# Patient Record
Sex: FEMALE | Race: BLACK OR AFRICAN AMERICAN | NOT HISPANIC OR LATINO | URBAN - METROPOLITAN AREA
[De-identification: names, ages, dates, MRNs, and addresses within clinical notes are randomized per-mention and may not be internally consistent; named-entity substitution may affect disease eponyms.]

---

## 2023-10-20 ENCOUNTER — EMERGENCY (EMERGENCY)
Facility: HOSPITAL | Age: 43
LOS: 1 days | Discharge: HOME | End: 2023-10-20
Attending: EMERGENCY MEDICINE | Admitting: EMERGENCY MEDICINE
Payer: COMMERCIAL

## 2023-10-20 VITALS
DIASTOLIC BLOOD PRESSURE: 79 MMHG | TEMPERATURE: 98 F | RESPIRATION RATE: 16 BRPM | SYSTOLIC BLOOD PRESSURE: 126 MMHG | HEART RATE: 80 BPM | OXYGEN SATURATION: 100 %

## 2023-10-20 VITALS
TEMPERATURE: 98 F | HEART RATE: 97 BPM | OXYGEN SATURATION: 100 % | SYSTOLIC BLOOD PRESSURE: 136 MMHG | RESPIRATION RATE: 18 BRPM | DIASTOLIC BLOOD PRESSURE: 85 MMHG

## 2023-10-20 LAB
ALBUMIN SERPL ELPH-MCNC: 4.1 G/DL — SIGNIFICANT CHANGE UP (ref 3.3–5)
ALBUMIN SERPL ELPH-MCNC: 4.1 G/DL — SIGNIFICANT CHANGE UP (ref 3.3–5)
ALP SERPL-CCNC: 70 U/L — SIGNIFICANT CHANGE UP (ref 40–120)
ALP SERPL-CCNC: 70 U/L — SIGNIFICANT CHANGE UP (ref 40–120)
ALT FLD-CCNC: 7 U/L — SIGNIFICANT CHANGE UP (ref 4–33)
ALT FLD-CCNC: 7 U/L — SIGNIFICANT CHANGE UP (ref 4–33)
ANION GAP SERPL CALC-SCNC: 12 MMOL/L — SIGNIFICANT CHANGE UP (ref 7–14)
ANION GAP SERPL CALC-SCNC: 12 MMOL/L — SIGNIFICANT CHANGE UP (ref 7–14)
AST SERPL-CCNC: 16 U/L — SIGNIFICANT CHANGE UP (ref 4–32)
AST SERPL-CCNC: 16 U/L — SIGNIFICANT CHANGE UP (ref 4–32)
BASOPHILS # BLD AUTO: 0.03 K/UL — SIGNIFICANT CHANGE UP (ref 0–0.2)
BASOPHILS # BLD AUTO: 0.03 K/UL — SIGNIFICANT CHANGE UP (ref 0–0.2)
BASOPHILS NFR BLD AUTO: 0.5 % — SIGNIFICANT CHANGE UP (ref 0–2)
BASOPHILS NFR BLD AUTO: 0.5 % — SIGNIFICANT CHANGE UP (ref 0–2)
BILIRUB SERPL-MCNC: 0.2 MG/DL — SIGNIFICANT CHANGE UP (ref 0.2–1.2)
BILIRUB SERPL-MCNC: 0.2 MG/DL — SIGNIFICANT CHANGE UP (ref 0.2–1.2)
BUN SERPL-MCNC: 10 MG/DL — SIGNIFICANT CHANGE UP (ref 7–23)
BUN SERPL-MCNC: 10 MG/DL — SIGNIFICANT CHANGE UP (ref 7–23)
CALCIUM SERPL-MCNC: 9.4 MG/DL — SIGNIFICANT CHANGE UP (ref 8.4–10.5)
CALCIUM SERPL-MCNC: 9.4 MG/DL — SIGNIFICANT CHANGE UP (ref 8.4–10.5)
CHLORIDE SERPL-SCNC: 102 MMOL/L — SIGNIFICANT CHANGE UP (ref 98–107)
CHLORIDE SERPL-SCNC: 102 MMOL/L — SIGNIFICANT CHANGE UP (ref 98–107)
CO2 SERPL-SCNC: 23 MMOL/L — SIGNIFICANT CHANGE UP (ref 22–31)
CO2 SERPL-SCNC: 23 MMOL/L — SIGNIFICANT CHANGE UP (ref 22–31)
CREAT SERPL-MCNC: 0.43 MG/DL — LOW (ref 0.5–1.3)
CREAT SERPL-MCNC: 0.43 MG/DL — LOW (ref 0.5–1.3)
EGFR: 122 ML/MIN/1.73M2 — SIGNIFICANT CHANGE UP
EGFR: 122 ML/MIN/1.73M2 — SIGNIFICANT CHANGE UP
EOSINOPHIL # BLD AUTO: 0.03 K/UL — SIGNIFICANT CHANGE UP (ref 0–0.5)
EOSINOPHIL # BLD AUTO: 0.03 K/UL — SIGNIFICANT CHANGE UP (ref 0–0.5)
EOSINOPHIL NFR BLD AUTO: 0.5 % — SIGNIFICANT CHANGE UP (ref 0–6)
EOSINOPHIL NFR BLD AUTO: 0.5 % — SIGNIFICANT CHANGE UP (ref 0–6)
GLUCOSE SERPL-MCNC: 77 MG/DL — SIGNIFICANT CHANGE UP (ref 70–99)
GLUCOSE SERPL-MCNC: 77 MG/DL — SIGNIFICANT CHANGE UP (ref 70–99)
HCT VFR BLD CALC: 36.1 % — SIGNIFICANT CHANGE UP (ref 34.5–45)
HCT VFR BLD CALC: 36.1 % — SIGNIFICANT CHANGE UP (ref 34.5–45)
HGB BLD-MCNC: 11.7 G/DL — SIGNIFICANT CHANGE UP (ref 11.5–15.5)
HGB BLD-MCNC: 11.7 G/DL — SIGNIFICANT CHANGE UP (ref 11.5–15.5)
IANC: 3.02 K/UL — SIGNIFICANT CHANGE UP (ref 1.8–7.4)
IANC: 3.02 K/UL — SIGNIFICANT CHANGE UP (ref 1.8–7.4)
IMM GRANULOCYTES NFR BLD AUTO: 0.2 % — SIGNIFICANT CHANGE UP (ref 0–0.9)
IMM GRANULOCYTES NFR BLD AUTO: 0.2 % — SIGNIFICANT CHANGE UP (ref 0–0.9)
LIDOCAIN IGE QN: 18 U/L — SIGNIFICANT CHANGE UP (ref 7–60)
LIDOCAIN IGE QN: 18 U/L — SIGNIFICANT CHANGE UP (ref 7–60)
LYMPHOCYTES # BLD AUTO: 1.87 K/UL — SIGNIFICANT CHANGE UP (ref 1–3.3)
LYMPHOCYTES # BLD AUTO: 1.87 K/UL — SIGNIFICANT CHANGE UP (ref 1–3.3)
LYMPHOCYTES # BLD AUTO: 33.6 % — SIGNIFICANT CHANGE UP (ref 13–44)
LYMPHOCYTES # BLD AUTO: 33.6 % — SIGNIFICANT CHANGE UP (ref 13–44)
MCHC RBC-ENTMCNC: 27.6 PG — SIGNIFICANT CHANGE UP (ref 27–34)
MCHC RBC-ENTMCNC: 27.6 PG — SIGNIFICANT CHANGE UP (ref 27–34)
MCHC RBC-ENTMCNC: 32.4 GM/DL — SIGNIFICANT CHANGE UP (ref 32–36)
MCHC RBC-ENTMCNC: 32.4 GM/DL — SIGNIFICANT CHANGE UP (ref 32–36)
MCV RBC AUTO: 85.1 FL — SIGNIFICANT CHANGE UP (ref 80–100)
MCV RBC AUTO: 85.1 FL — SIGNIFICANT CHANGE UP (ref 80–100)
MONOCYTES # BLD AUTO: 0.6 K/UL — SIGNIFICANT CHANGE UP (ref 0–0.9)
MONOCYTES # BLD AUTO: 0.6 K/UL — SIGNIFICANT CHANGE UP (ref 0–0.9)
MONOCYTES NFR BLD AUTO: 10.8 % — SIGNIFICANT CHANGE UP (ref 2–14)
MONOCYTES NFR BLD AUTO: 10.8 % — SIGNIFICANT CHANGE UP (ref 2–14)
NEUTROPHILS # BLD AUTO: 3.02 K/UL — SIGNIFICANT CHANGE UP (ref 1.8–7.4)
NEUTROPHILS # BLD AUTO: 3.02 K/UL — SIGNIFICANT CHANGE UP (ref 1.8–7.4)
NEUTROPHILS NFR BLD AUTO: 54.4 % — SIGNIFICANT CHANGE UP (ref 43–77)
NEUTROPHILS NFR BLD AUTO: 54.4 % — SIGNIFICANT CHANGE UP (ref 43–77)
NRBC # BLD: 0 /100 WBCS — SIGNIFICANT CHANGE UP (ref 0–0)
NRBC # BLD: 0 /100 WBCS — SIGNIFICANT CHANGE UP (ref 0–0)
NRBC # FLD: 0 K/UL — SIGNIFICANT CHANGE UP (ref 0–0)
NRBC # FLD: 0 K/UL — SIGNIFICANT CHANGE UP (ref 0–0)
PLATELET # BLD AUTO: 420 K/UL — HIGH (ref 150–400)
PLATELET # BLD AUTO: 420 K/UL — HIGH (ref 150–400)
POTASSIUM SERPL-MCNC: 4.8 MMOL/L — SIGNIFICANT CHANGE UP (ref 3.5–5.3)
POTASSIUM SERPL-MCNC: 4.8 MMOL/L — SIGNIFICANT CHANGE UP (ref 3.5–5.3)
POTASSIUM SERPL-SCNC: 4.8 MMOL/L — SIGNIFICANT CHANGE UP (ref 3.5–5.3)
POTASSIUM SERPL-SCNC: 4.8 MMOL/L — SIGNIFICANT CHANGE UP (ref 3.5–5.3)
PROT SERPL-MCNC: 7.7 G/DL — SIGNIFICANT CHANGE UP (ref 6–8.3)
PROT SERPL-MCNC: 7.7 G/DL — SIGNIFICANT CHANGE UP (ref 6–8.3)
RBC # BLD: 4.24 M/UL — SIGNIFICANT CHANGE UP (ref 3.8–5.2)
RBC # BLD: 4.24 M/UL — SIGNIFICANT CHANGE UP (ref 3.8–5.2)
RBC # FLD: 15.2 % — HIGH (ref 10.3–14.5)
RBC # FLD: 15.2 % — HIGH (ref 10.3–14.5)
SODIUM SERPL-SCNC: 137 MMOL/L — SIGNIFICANT CHANGE UP (ref 135–145)
SODIUM SERPL-SCNC: 137 MMOL/L — SIGNIFICANT CHANGE UP (ref 135–145)
TROPONIN T, HIGH SENSITIVITY RESULT: <6 NG/L — SIGNIFICANT CHANGE UP
TROPONIN T, HIGH SENSITIVITY RESULT: <6 NG/L — SIGNIFICANT CHANGE UP
WBC # BLD: 5.56 K/UL — SIGNIFICANT CHANGE UP (ref 3.8–10.5)
WBC # BLD: 5.56 K/UL — SIGNIFICANT CHANGE UP (ref 3.8–10.5)
WBC # FLD AUTO: 5.56 K/UL — SIGNIFICANT CHANGE UP (ref 3.8–10.5)
WBC # FLD AUTO: 5.56 K/UL — SIGNIFICANT CHANGE UP (ref 3.8–10.5)

## 2023-10-20 PROCEDURE — 99285 EMERGENCY DEPT VISIT HI MDM: CPT

## 2023-10-20 PROCEDURE — 93010 ELECTROCARDIOGRAM REPORT: CPT

## 2023-10-20 PROCEDURE — 74177 CT ABD & PELVIS W/CONTRAST: CPT | Mod: 26,MA

## 2023-10-20 PROCEDURE — 71045 X-RAY EXAM CHEST 1 VIEW: CPT | Mod: 26

## 2023-10-20 RX ORDER — ONDANSETRON 8 MG/1
4 TABLET, FILM COATED ORAL ONCE
Refills: 0 | Status: COMPLETED | OUTPATIENT
Start: 2023-10-20 | End: 2023-10-20

## 2023-10-20 RX ORDER — ACETAMINOPHEN 500 MG
1000 TABLET ORAL ONCE
Refills: 0 | Status: COMPLETED | OUTPATIENT
Start: 2023-10-20 | End: 2023-10-20

## 2023-10-20 RX ADMIN — ONDANSETRON 4 MILLIGRAM(S): 8 TABLET, FILM COATED ORAL at 22:21

## 2023-10-20 RX ADMIN — Medication 400 MILLIGRAM(S): at 21:40

## 2023-10-20 NOTE — ED PROVIDER NOTE - PROGRESS NOTE DETAILS
CT abdomen pelvis showing new endometrial lesion, and uterine fibroids.  Discussed results with patient, she says that she would prefer to follow-up with her OB/GYN for further outpatient evaluation and management.  Hemoglobin stable, otherwise work-up negative.  We will plan for discharge with close follow-up with her OB/GYN.

## 2023-10-20 NOTE — ED ADULT TRIAGE NOTE - CHIEF COMPLAINT QUOTE
Pt presents to ED via EMS from home with c/o abdominal pain and chest pain. Pt received 324mg asa by EMS. Pt was at routine OBGYN visit and was found to have abdominal mass. Pt has hx of spina bifida and HTN.

## 2023-10-20 NOTE — ED PROVIDER NOTE - OBJECTIVE STATEMENT
43-year-old female with history of spina bifida, potentially uterine fibroids, nearly 10 years of heavy menstrual periods with prolonged bleeding, presenting today from her OB/GYN for chest pain, shortness of breath.  Patient states that this for started at 6 AM this morning, and that it gets worse with exertion.  Also notes some nausea and vomiting, as well as constipation and diarrhea that has been ongoing over the past several days.  Additionally reports history of abnormal uterine bleeding, and is not sure if this is also coming from her bladder.  Has had some increased urination as well recently.  Also states that she is now having abdominal pain, which is different from baseline.    Patient states that she gets most of her care at [facility].  Notes that she is taking gabapentin in the past but that this actually made her pain worse.  Denies any recent fevers chills, cough, headache, new weakness, dizziness, lightheadedness, throat pain or swelling, rhinorrhea.

## 2023-10-20 NOTE — ED PROVIDER NOTE - PATIENT PORTAL LINK FT
You can access the FollowMyHealth Patient Portal offered by [facility] by registering at the following website: [email or web address]. By joining [employer]’s FollowMyHealth portal, you will also be able to view your health information using other applications (apps) compatible with our system.

## 2023-10-20 NOTE — ED ADULT NURSE NOTE - NSFALLRISKASMT_ED_ALL_ED_DT
[Patient] : patient [Follow-Up] : a follow-up visit for [Mother] : mother [FreeTextEntry3] : Pilonidal disease 20-Oct-2023 17:28

## 2023-10-20 NOTE — ED PROVIDER NOTE - PHYSICAL EXAMINATION
Constitutional: Well nourished, well developed, appears stated age.   Eyes: PERRL, EOMI. No scleral icterus  HENT: Moist mucous membranes. No posterior oropharyngeal erythema.   Neck: Supple. No goiter.   CV: RRR, no m/r/g. Well perfused extremities.   RESP: Lungs CTAB, normal respiratory effort  GI: Soft except for area of induration over right lower quadrant. Some TTP diffusely.   MSK: Moving all four extremities. No obvious deformity  Skin: Warm, dry, no rashes  Neuro: Alert and oriented. CNII-XII grossly intact. LEs flaccid. Upper extremities without deficits.   Psych: Appropriate mood and affect

## 2023-10-20 NOTE — ED PROVIDER NOTE - CLINICAL SUMMARY MEDICAL DECISION MAKING FREE TEXT BOX
Fili: 10 yrs heavy VB. C/o CP x 1 day. SOB. N/V/D. Abd firm lump (OB said fibroid vs. constipation). Check trop and EKG and CXR. Check Hb. CT abd/pelvis. Fili: 10 yrs heavy VB. C/o CP x 1 day. SOB. N/V/D. Abd firm lump (OB said fibroid vs. constipation). Check trop and EKG and CXR. Check Hb. CT abd/pelvis - ? SBO.

## 2023-10-20 NOTE — ED ADULT NURSE NOTE - OBJECTIVE STATEMENT
43 yof presents A&Ox4 c/o abdominal pain and chest pain that started this morning. Pt also endorsing heavy vaginal bleeding x10 years. PHx spinal bifida. States her GYN sent her into the ED this morning. Respirations even and unlabored.  sating 100% on air. Pt offers no additional complaints at this time. 20g IV placed in R AC. Labs sent per order. Pending results. Safety maintained.

## 2023-10-20 NOTE — ED ADULT NURSE NOTE - NSFALLRISKINTERV_ED_ALL_ED

## 2023-10-20 NOTE — ED PROVIDER NOTE - ATTENDING CONTRIBUTION TO CARE
I performed a face-to-face evaluation of the patient and performed a history and physical examination. I agree with the history and physical examination. If this was a PA visit, I personally saw the patient with the PA and performed a substantive portion of the visit including all aspects of the medical decision making.    10 yrs heavy VB. C/o CP x 1 day. SOB. N/V/D. Abd firm lump (OB said fibroid vs. constipation). Check trop and EKG and CXR. Check Hb. CT abd/pelvis. I performed a face-to-face evaluation of the patient and performed a history and physical examination. I agree with the history and physical examination. If this was a PA visit, I personally saw the patient with the PA and performed a substantive portion of the visit including all aspects of the medical decision making.    10 yrs heavy VB. C/o CP x 1 day. SOB. N/V/D. Abd firm lump (OB said fibroid vs. constipation). Check trop and EKG and CXR. Check Hb. CT abd/pelvis - ? SBO.

## 2024-05-11 ENCOUNTER — INPATIENT (INPATIENT)
Facility: HOSPITAL | Age: 44
LOS: 25 days | Discharge: NURSING, REHAB OR LONG-TERM CARE | End: 2024-06-06
Attending: HOSPITALIST | Admitting: HOSPITALIST
Payer: MEDICAID

## 2024-05-11 VITALS
HEIGHT: 62 IN | WEIGHT: 119.93 LBS | RESPIRATION RATE: 16 BRPM | DIASTOLIC BLOOD PRESSURE: 100 MMHG | HEART RATE: 114 BPM | TEMPERATURE: 98 F | SYSTOLIC BLOOD PRESSURE: 152 MMHG | OXYGEN SATURATION: 98 %

## 2024-05-11 LAB
ALBUMIN SERPL ELPH-MCNC: 3.5 G/DL — SIGNIFICANT CHANGE UP (ref 3.3–5)
ALP SERPL-CCNC: 70 U/L — SIGNIFICANT CHANGE UP (ref 40–120)
ALT FLD-CCNC: 6 U/L — SIGNIFICANT CHANGE UP (ref 4–33)
ANION GAP SERPL CALC-SCNC: 14 MMOL/L — SIGNIFICANT CHANGE UP (ref 7–14)
APPEARANCE UR: ABNORMAL
APTT BLD: 31.3 SEC — SIGNIFICANT CHANGE UP (ref 24.5–35.6)
AST SERPL-CCNC: 11 U/L — SIGNIFICANT CHANGE UP (ref 4–32)
BACTERIA # UR AUTO: ABNORMAL /HPF
BASE EXCESS BLDV CALC-SCNC: 1.9 MMOL/L — SIGNIFICANT CHANGE UP (ref -2–3)
BASOPHILS # BLD AUTO: 0.04 K/UL — SIGNIFICANT CHANGE UP (ref 0–0.2)
BASOPHILS NFR BLD AUTO: 0.5 % — SIGNIFICANT CHANGE UP (ref 0–2)
BILIRUB SERPL-MCNC: <0.2 MG/DL — SIGNIFICANT CHANGE UP (ref 0.2–1.2)
BILIRUB UR-MCNC: NEGATIVE — SIGNIFICANT CHANGE UP
BLOOD GAS VENOUS COMPREHENSIVE RESULT: SIGNIFICANT CHANGE UP
BUN SERPL-MCNC: 7 MG/DL — SIGNIFICANT CHANGE UP (ref 7–23)
CALCIUM SERPL-MCNC: 9 MG/DL — SIGNIFICANT CHANGE UP (ref 8.4–10.5)
CAST: 1 /LPF — SIGNIFICANT CHANGE UP (ref 0–4)
CHLORIDE BLDV-SCNC: 104 MMOL/L — SIGNIFICANT CHANGE UP (ref 96–108)
CHLORIDE SERPL-SCNC: 103 MMOL/L — SIGNIFICANT CHANGE UP (ref 98–107)
CO2 BLDV-SCNC: 27.9 MMOL/L — HIGH (ref 22–26)
CO2 SERPL-SCNC: 21 MMOL/L — LOW (ref 22–31)
COLOR SPEC: YELLOW — SIGNIFICANT CHANGE UP
CREAT SERPL-MCNC: 0.49 MG/DL — LOW (ref 0.5–1.3)
CRP SERPL-MCNC: 18.3 MG/L — HIGH
DIFF PNL FLD: ABNORMAL
EGFR: 118 ML/MIN/1.73M2 — SIGNIFICANT CHANGE UP
EOSINOPHIL # BLD AUTO: 0.03 K/UL — SIGNIFICANT CHANGE UP (ref 0–0.5)
EOSINOPHIL NFR BLD AUTO: 0.4 % — SIGNIFICANT CHANGE UP (ref 0–6)
ERYTHROCYTE [SEDIMENTATION RATE] IN BLOOD: 81 MM/HR — HIGH (ref 4–25)
GAS PNL BLDV: 135 MMOL/L — LOW (ref 136–145)
GAS PNL BLDV: SIGNIFICANT CHANGE UP
GLUCOSE BLDV-MCNC: 87 MG/DL — SIGNIFICANT CHANGE UP (ref 70–99)
GLUCOSE SERPL-MCNC: 84 MG/DL — SIGNIFICANT CHANGE UP (ref 70–99)
GLUCOSE UR QL: NEGATIVE MG/DL — SIGNIFICANT CHANGE UP
HCG SERPL-ACNC: <1 MIU/ML — SIGNIFICANT CHANGE UP
HCO3 BLDV-SCNC: 27 MMOL/L — SIGNIFICANT CHANGE UP (ref 22–29)
HCT VFR BLD CALC: 29.7 % — LOW (ref 34.5–45)
HCT VFR BLDA CALC: 30 % — LOW (ref 34.5–46.5)
HGB BLD CALC-MCNC: 10.1 G/DL — LOW (ref 11.7–16.1)
HGB BLD-MCNC: 9.4 G/DL — LOW (ref 11.5–15.5)
IANC: 5.58 K/UL — SIGNIFICANT CHANGE UP (ref 1.8–7.4)
IMM GRANULOCYTES NFR BLD AUTO: 0.2 % — SIGNIFICANT CHANGE UP (ref 0–0.9)
INR BLD: 1.04 RATIO — SIGNIFICANT CHANGE UP (ref 0.85–1.18)
KETONES UR-MCNC: NEGATIVE MG/DL — SIGNIFICANT CHANGE UP
LACTATE BLDV-MCNC: 1.4 MMOL/L — SIGNIFICANT CHANGE UP (ref 0.5–2)
LACTATE SERPL-SCNC: 1.2 MMOL/L — SIGNIFICANT CHANGE UP (ref 0.5–2)
LEUKOCYTE ESTERASE UR-ACNC: ABNORMAL
LYMPHOCYTES # BLD AUTO: 1.88 K/UL — SIGNIFICANT CHANGE UP (ref 1–3.3)
LYMPHOCYTES # BLD AUTO: 23.2 % — SIGNIFICANT CHANGE UP (ref 13–44)
MCHC RBC-ENTMCNC: 26 PG — LOW (ref 27–34)
MCHC RBC-ENTMCNC: 31.6 GM/DL — LOW (ref 32–36)
MCV RBC AUTO: 82.3 FL — SIGNIFICANT CHANGE UP (ref 80–100)
MONOCYTES # BLD AUTO: 0.54 K/UL — SIGNIFICANT CHANGE UP (ref 0–0.9)
MONOCYTES NFR BLD AUTO: 6.7 % — SIGNIFICANT CHANGE UP (ref 2–14)
NEUTROPHILS # BLD AUTO: 5.58 K/UL — SIGNIFICANT CHANGE UP (ref 1.8–7.4)
NEUTROPHILS NFR BLD AUTO: 69 % — SIGNIFICANT CHANGE UP (ref 43–77)
NITRITE UR-MCNC: NEGATIVE — SIGNIFICANT CHANGE UP
NRBC # BLD: 0 /100 WBCS — SIGNIFICANT CHANGE UP (ref 0–0)
NRBC # FLD: 0 K/UL — SIGNIFICANT CHANGE UP (ref 0–0)
PCO2 BLDV: 41 MMHG — SIGNIFICANT CHANGE UP (ref 39–52)
PH BLDV: 7.42 — SIGNIFICANT CHANGE UP (ref 7.32–7.43)
PH UR: 8 — SIGNIFICANT CHANGE UP (ref 5–8)
PLATELET # BLD AUTO: 516 K/UL — HIGH (ref 150–400)
PO2 BLDV: 21 MMHG — LOW (ref 25–45)
POTASSIUM BLDV-SCNC: 5.3 MMOL/L — HIGH (ref 3.5–5.1)
POTASSIUM SERPL-MCNC: 4.1 MMOL/L — SIGNIFICANT CHANGE UP (ref 3.5–5.3)
POTASSIUM SERPL-SCNC: 4.1 MMOL/L — SIGNIFICANT CHANGE UP (ref 3.5–5.3)
PROT SERPL-MCNC: 7.5 G/DL — SIGNIFICANT CHANGE UP (ref 6–8.3)
PROT UR-MCNC: NEGATIVE MG/DL — SIGNIFICANT CHANGE UP
PROTHROM AB SERPL-ACNC: 11.6 SEC — SIGNIFICANT CHANGE UP (ref 9.5–13)
RBC # BLD: 3.61 M/UL — LOW (ref 3.8–5.2)
RBC # FLD: 16.8 % — HIGH (ref 10.3–14.5)
RBC CASTS # UR COMP ASSIST: 44 /HPF — HIGH (ref 0–4)
REVIEW: SIGNIFICANT CHANGE UP
SAO2 % BLDV: 27.6 % — LOW (ref 67–88)
SODIUM SERPL-SCNC: 138 MMOL/L — SIGNIFICANT CHANGE UP (ref 135–145)
SP GR SPEC: 1.01 — SIGNIFICANT CHANGE UP (ref 1–1.03)
SQUAMOUS # UR AUTO: 2 /HPF — SIGNIFICANT CHANGE UP (ref 0–5)
UROBILINOGEN FLD QL: 0.2 MG/DL — SIGNIFICANT CHANGE UP (ref 0.2–1)
WBC # BLD: 8.09 K/UL — SIGNIFICANT CHANGE UP (ref 3.8–10.5)
WBC # FLD AUTO: 8.09 K/UL — SIGNIFICANT CHANGE UP (ref 3.8–10.5)
WBC UR QL: 3 /HPF — SIGNIFICANT CHANGE UP (ref 0–5)

## 2024-05-11 PROCEDURE — 93971 EXTREMITY STUDY: CPT | Mod: 26,LT

## 2024-05-11 PROCEDURE — 99285 EMERGENCY DEPT VISIT HI MDM: CPT

## 2024-05-11 PROCEDURE — 71045 X-RAY EXAM CHEST 1 VIEW: CPT | Mod: 26

## 2024-05-11 PROCEDURE — 73620 X-RAY EXAM OF FOOT: CPT | Mod: 26,RT

## 2024-05-11 PROCEDURE — 73701 CT LOWER EXTREMITY W/DYE: CPT | Mod: 26,RT,MC

## 2024-05-11 RX ORDER — MORPHINE SULFATE 50 MG/1
2 CAPSULE, EXTENDED RELEASE ORAL ONCE
Refills: 0 | Status: DISCONTINUED | OUTPATIENT
Start: 2024-05-11 | End: 2024-05-11

## 2024-05-11 RX ORDER — VANCOMYCIN HCL 1 G
1000 VIAL (EA) INTRAVENOUS ONCE
Refills: 0 | Status: COMPLETED | OUTPATIENT
Start: 2024-05-11 | End: 2024-05-11

## 2024-05-11 RX ORDER — SODIUM CHLORIDE 9 MG/ML
1700 INJECTION INTRAMUSCULAR; INTRAVENOUS; SUBCUTANEOUS ONCE
Refills: 0 | Status: COMPLETED | OUTPATIENT
Start: 2024-05-11 | End: 2024-05-11

## 2024-05-11 RX ORDER — PIPERACILLIN AND TAZOBACTAM 4; .5 G/20ML; G/20ML
3.38 INJECTION, POWDER, LYOPHILIZED, FOR SOLUTION INTRAVENOUS ONCE
Refills: 0 | Status: COMPLETED | OUTPATIENT
Start: 2024-05-11 | End: 2024-05-11

## 2024-05-11 RX ADMIN — Medication 250 MILLIGRAM(S): at 22:05

## 2024-05-11 RX ADMIN — SODIUM CHLORIDE 1700 MILLILITER(S): 9 INJECTION INTRAMUSCULAR; INTRAVENOUS; SUBCUTANEOUS at 18:45

## 2024-05-11 RX ADMIN — PIPERACILLIN AND TAZOBACTAM 200 GRAM(S): 4; .5 INJECTION, POWDER, LYOPHILIZED, FOR SOLUTION INTRAVENOUS at 18:45

## 2024-05-11 RX ADMIN — MORPHINE SULFATE 2 MILLIGRAM(S): 50 CAPSULE, EXTENDED RELEASE ORAL at 18:46

## 2024-05-11 NOTE — ED PROVIDER NOTE - ATTENDING CONTRIBUTION TO CARE
Attending Statement: I have personally seen and examined this patient. I have fully participated in the care of this patient. I have reviewed all pertinent clinical information, including history physical exam, plan and the Resident's note and agree except as noted  44-year-old female history of spina bifida presents with pain and swelling to the right foot.  Patient with father at bedside states that she has had abscess of the left foot in the past, and has had "swelling and a hole" for approximately 3 months however for the last 3 days the foot has been more swollen and painful.  Patient states last night there was "a lot of blood and pus draining" prompted ER visit.  No recent antibiotic use.  No recent fever or chills.  No other complaints.  No chest pain or shortness of breath no abdominal pain no nausea no vomiting.  Patient nonambulatory at baseline.  She is also noticed that her foot has been more swollen in general.  No history of DVT or PE.    Vital signs appreciated patient tachycardic heart rate 114 but not tachypneic and not hypoxic.  Patient sitting up in bed nontoxic alert and oriented.  Has no work of breathing benign abdomen.  Right foot anterior mid dorsum has a swollen, tender, erythematous and warm 6 x 7 cm wide area with a central opening with no obvious discharge.  No crepitus palpated.  Swelling of the digits as well.  Old healed scars noted on the dorsum of the foot.  No swelling of the ankle or the calf.    Plan pain meds, sepsis workup, IV antibiotics, pain meds, x-rays of the foot, chest x-ray, ultrasound rule out DVT and admission.  Will consult podiatry as well.  Plan discussed with patient and father at bedside Attending Statement: I have personally seen and examined this patient. I have fully participated in the care of this patient. I have reviewed all pertinent clinical information, including history physical exam, plan and the Resident's note and agree except as noted  44-year-old female history of spina bifida presents with pain and swelling to the right foot.  Patient with father at bedside states that she has had abscess of the left foot in the past, and has had "swelling and a hole" for approximately 3 months however for the last 3 days the foot has been more swollen and painful.  Patient states last night there was "a lot of blood and pus draining" prompted ER visit.  No recent antibiotic use.  No recent fever or chills.  No other complaints.  No chest pain or shortness of breath no abdominal pain no nausea no vomiting.  Patient nonambulatory at baseline.  She is also noticed that her foot has been more swollen in general.  No history of DVT or PE.    Vital signs appreciated patient tachycardic heart rate 114 but not tachypneic and not hypoxic.  Patient sitting up in bed nontoxic alert and oriented.  Has no work of breathing benign abdomen.  Right foot anterior mid dorsum has a swollen, tender, erythematous and warm 6 x 7 cm wide area with a central opening with no obvious discharge.  No crepitus palpated.  Swelling of the digits as well.  Old healed scars noted on the dorsum of the foot.  No swelling of the ankle or the calf.  +sacral decuit stage 4. pressure ulcer on the lateral left ankle. no swelling nontender     Plan pain meds, sepsis workup, IV antibiotics, pain meds, x-rays of the foot, chest x-ray, ultrasound rule out DVT and admission.  Will consult podiatry as well.  Plan discussed with patient and father at bedside

## 2024-05-11 NOTE — ED PROVIDER NOTE - PHYSICAL EXAMINATION
GENERAL: no acute distress, endomorphic body habitus  HEENT: atraumatic, normocephalic, vision grossly intact, EOMI, no conjunctivitis or discharge, hearing grossly intact, no nasal discharge or epistaxis, clear pharynx  CV: regular rate, normal rhythm, normal S1/S2, no murmurs/rubs, no cyanosis  PULM: normal work of breathing, normal O2 saturation on RA, clear breath sounds in b/l upper/lower lung fields, no crackles/rales/rhonchi/wheezing  GI: soft/non-tender/nondistended abdomen, no guarding or rebound tenderness, no palpable masses  NEURO: A&Ox4, follows commands, normal speech, no focal motor or sensory deficits  MSK: no joint tenderness/swelling/erythema, ranging all extremities c/w baseline  EXT/SKIN: 4cm diameter lesion on dorsal surface of R foot w/ fluctuance/erythema/TTP and surrounding skin peeling, 2 cm chronic ulcer over L lateral malleolus  PSYCH: appropriate mood and affect

## 2024-05-11 NOTE — ED ADULT NURSE NOTE - NSFALLRISKINTERV_ED_ALL_ED

## 2024-05-11 NOTE — CONSULT NOTE ADULT - SUBJECTIVE AND OBJECTIVE BOX
Patient is a 44y old  Female who presents with a chief complaint of     HPI:  43 yo F w/ PMHx of spina bifida, multiple bilateral foot surgeries, and  shunt for unspecified reason (managed [facility] neurosurgery) presents for 4 days of worsening swelling/pain/redness of right foot lesion and 1-2 days of purulent/bloody drainage.  She has been having right foot lesion/ulcer over the past 9 months that has not been evaluated by doctor. She denies any previous history of abscess formation on right foot and has never had an I&D or antibiotics prescribed for this lesion.  She denies any fever/chills/cough/sore throat, CP, SOB, abdominal pain,  symptoms.  She wishes to transfer medical care with regards to her feet to [facility].    PAST MEDICAL & SURGICAL HISTORY:  Spina bifida          MEDICATIONS  (STANDING):  vancomycin  IVPB. 1000 milliGRAM(s) IV Intermittent once    MEDICATIONS  (PRN):      Allergies    Zyrtec (Rash)    Intolerances        VITALS:    Vital Signs Last 24 Hrs  T(C): 38.1 (11 May 2024 19:19), Max: 38.1 (11 May 2024 19:19)  T(F): 100.5 (11 May 2024 19:19), Max: 100.5 (11 May 2024 19:19)  HR: 114 (11 May 2024 17:16) (114 - 114)  BP: 152/100 (11 May 2024 17:16) (152/100 - 152/100)  BP(mean): --  RR: 16 (11 May 2024 17:16) (16 - 16)  SpO2: 98% (11 May 2024 17:16) (98% - 98%)    Parameters below as of 11 May 2024 17:16  Patient On (Oxygen Delivery Method): room air        LABS:                          9.4    8.09  )-----------( 516      ( 11 May 2024 18:40 )             29.7       05-11    138  |  103  |  7   ----------------------------<  84  4.1   |  21<L>  |  0.49<L>    Ca    9.0      11 May 2024 18:40    TPro  7.5  /  Alb  3.5  /  TBili  <0.2  /  DBili  x   /  AST  11  /  ALT  6   /  AlkPhos  70  05-11      CAPILLARY BLOOD GLUCOSE          PT/INR - ( 11 May 2024 18:40 )   PT: 11.6 sec;   INR: 1.04 ratio         PTT - ( 11 May 2024 18:40 )  PTT:31.3 sec    LOWER EXTREMITY PHYSICAL EXAM:    Vascular: DP/PT 1/4, B/L, CFT <3 seconds B/L, Temperature gradient warm to cool, B/L.   Neuro: Epicritic sensation intact to the level of digits, B/L.  Musculoskeletal/Ortho: unremarkable  Skin: Right foot dorsal medial non-mobile mass with central wound to dermis, hyperpigmented periwound, no fluctuance, no bogginess, no malodor, no periwound erythema, no drainage. Left foot no open wounds or lesions, no acute signs of infection.      RADIOLOGY & ADDITIONAL STUDIES:     Patient is a 44y old  Female who presents with a chief complaint of     HPI:  45 yo F w/ PMHx of spina bifida, multiple bilateral foot surgeries, and  shunt for unspecified reason (managed [facility] neurosurgery) presents for 4 days of worsening swelling/pain/redness of right foot lesion and 1-2 days of purulent/bloody drainage.  She has been having right foot lesion/ulcer over the past 9 months that has not been evaluated by doctor. She denies any previous history of abscess formation on right foot and has never had an I&D or antibiotics prescribed for this lesion.  She denies any fever/chills/cough/sore throat, CP, SOB, abdominal pain,  symptoms.  She wishes to transfer medical care with regards to her feet to [facility].    PAST MEDICAL & SURGICAL HISTORY:  Spina bifida          MEDICATIONS  (STANDING):  vancomycin  IVPB. 1000 milliGRAM(s) IV Intermittent once    MEDICATIONS  (PRN):      Allergies    Zyrtec (Rash)    Intolerances        VITALS:    Vital Signs Last 24 Hrs  T(C): 38.1 (11 May 2024 19:19), Max: 38.1 (11 May 2024 19:19)  T(F): 100.5 (11 May 2024 19:19), Max: 100.5 (11 May 2024 19:19)  HR: 114 (11 May 2024 17:16) (114 - 114)  BP: 152/100 (11 May 2024 17:16) (152/100 - 152/100)  BP(mean): --  RR: 16 (11 May 2024 17:16) (16 - 16)  SpO2: 98% (11 May 2024 17:16) (98% - 98%)    Parameters below as of 11 May 2024 17:16  Patient On (Oxygen Delivery Method): room air        LABS:                          9.4    8.09  )-----------( 516      ( 11 May 2024 18:40 )             29.7       05-11    138  |  103  |  7   ----------------------------<  84  4.1   |  21<L>  |  0.49<L>    Ca    9.0      11 May 2024 18:40    TPro  7.5  /  Alb  3.5  /  TBili  <0.2  /  DBili  x   /  AST  11  /  ALT  6   /  AlkPhos  70  05-11      CAPILLARY BLOOD GLUCOSE          PT/INR - ( 11 May 2024 18:40 )   PT: 11.6 sec;   INR: 1.04 ratio         PTT - ( 11 May 2024 18:40 )  PTT:31.3 sec    LOWER EXTREMITY PHYSICAL EXAM:    Vascular: DP/PT 1/4, B/L, CFT <3 seconds B/L, Temperature gradient warm to cool, B/L.   Neuro: Epicritic sensation diminished but not absent to the level of digits, B/L.  Musculoskeletal/Ortho: unremarkable  Skin:  Right foot dorsal medial non-mobile mass with central wound to dermis, hyperpigmented periwound, no fluctuance, no pus, no bogginess, no malodor, no periwound erythema. Left foot no open wounds or lesions, no acute signs of infection.      RADIOLOGY & ADDITIONAL STUDIES:     Patient is a 44y old  Female who presents with a chief complaint of     HPI:  45 yo F w/ PMHx of spina bifida, multiple bilateral foot surgeries, and  shunt for unspecified reason (managed [facility] neurosurgery) presents for 4 days of worsening swelling/pain/redness of right foot lesion and 1-2 days of purulent/bloody drainage.  She has been having right foot lesion/ulcer over the past 9 months that has not been evaluated by doctor. She denies any previous history of abscess formation on right foot and has never had an I&D or antibiotics prescribed for this lesion.  She denies any fever/chills/cough/sore throat, CP, SOB, abdominal pain,  symptoms.  She wishes to transfer medical care with regards to her feet to [facility].    PAST MEDICAL & SURGICAL HISTORY:  Spina bifida          MEDICATIONS  (STANDING):  vancomycin  IVPB. 1000 milliGRAM(s) IV Intermittent once    MEDICATIONS  (PRN):      Allergies    Zyrtec (Rash)    Intolerances        VITALS:    Vital Signs Last 24 Hrs  T(C): 38.1 (11 May 2024 19:19), Max: 38.1 (11 May 2024 19:19)  T(F): 100.5 (11 May 2024 19:19), Max: 100.5 (11 May 2024 19:19)  HR: 114 (11 May 2024 17:16) (114 - 114)  BP: 152/100 (11 May 2024 17:16) (152/100 - 152/100)  BP(mean): --  RR: 16 (11 May 2024 17:16) (16 - 16)  SpO2: 98% (11 May 2024 17:16) (98% - 98%)    Parameters below as of 11 May 2024 17:16  Patient On (Oxygen Delivery Method): room air        LABS:                          9.4    8.09  )-----------( 516      ( 11 May 2024 18:40 )             29.7       05-11    138  |  103  |  7   ----------------------------<  84  4.1   |  21<L>  |  0.49<L>    Ca    9.0      11 May 2024 18:40    TPro  7.5  /  Alb  3.5  /  TBili  <0.2  /  DBili  x   /  AST  11  /  ALT  6   /  AlkPhos  70  05-11      CAPILLARY BLOOD GLUCOSE          PT/INR - ( 11 May 2024 18:40 )   PT: 11.6 sec;   INR: 1.04 ratio         PTT - ( 11 May 2024 18:40 )  PTT:31.3 sec    LOWER EXTREMITY PHYSICAL EXAM:    Vascular: DP/PT 1/4, B/L, CFT <3 seconds B/L, Temperature gradient warm to cool, B/L.   Neuro: Epicritic sensation diminished but not absent to the level of digits, B/L.  Musculoskeletal/Ortho: unremarkable  Skin:  Right foot dorsal medial non-mobile mass with central wound to dermis, hyperpigmented periwound, no fluctuance, no pus, no bogginess, no malodor, no periwound erythema. Left foot no open wounds or lesions, no acute signs of infection. Left lateral ankle dry eschar with periwound xerosis, no pus, no malodor.      RADIOLOGY & ADDITIONAL STUDIES:

## 2024-05-11 NOTE — ED PROVIDER NOTE - PROGRESS NOTE DETAILS
Tamir PGY2: Podiatry consulted.  Recommending CT scan noncontrast of right lower extremity.  However, since there is concern for abscess and has had multiple surgeries on bilateral legs, plan for CT right foot with contrast. Patient evaluated by podiatry.  Elevated ESR CRP.  Patient given pain meds and antibiotics.  Required ultrasound-guided IV pending CT to be done. Signed out as 44-year-old female with history of spina bifida presenting with right foot pain and mass, seen by podiatry, CT of foot equivocal for possible osteomyelitis, inflammatory markers elevated, patient agreeable to admission for MRI. Podiatry to follow. - Heavenly Yost, PGY-3

## 2024-05-11 NOTE — ED ADULT TRIAGE NOTE - CHIEF COMPLAINT QUOTE
c/o right foot wound and swelling x 3 days. Pt states has had abscess to right foot since Dec 2024. Pt states draining pus from wound intermittently since Feb 2024. Phx spina bifida, fibroids.

## 2024-05-11 NOTE — ED ADULT NURSE NOTE - OBJECTIVE STATEMENT
Pt A+Ox4.  Pt states she has a wound to right foot that opened several months ago but 3-4 days ago the wound began to swell.  Pt also noted with a scab on left ankle, no swelling/redness noted.  Pt lungs clear, equal and unlabored, abdomen soft.  Pt awaiting IV placement and CT scan.

## 2024-05-11 NOTE — ED PROVIDER NOTE - OBJECTIVE STATEMENT
43 yo F w/ PMHx of spina bifida, multiple bilateral foot surgeries, and  shunt for unspecified reason (managed [facility] neurosurgery) presents for 4 days of worsening swelling/pain/redness of right foot lesion and 1-2 days of purulent/bloody drainage.  She has been having right foot lesion/ulcer over the past 9 months that has not been evaluated by doctor. She denies any previous history of abscess formation on right foot and has never had an I&D or antibiotics prescribed for this lesion.  She denies any fever/chills/cough/sore throat, CP, SOB, abdominal pain,  symptoms.  She wishes to transfer medical care with regards to her feet to [facility].

## 2024-05-11 NOTE — ED ADULT NURSE REASSESSMENT NOTE - NS ED NURSE REASSESS COMMENT FT1
stage 4 pressure wound to inner right butt cheek, 5" by 7". wound care applied. stage 2 pressure wound to right butt cheek, 2" by 3".

## 2024-05-11 NOTE — ED PROVIDER NOTE - NS_EDPROVIDERDISPOUSERTYPE_ED_A_ED
Advance Care Planning    Advance Care Planning (ACP) Provider Note - Comprehensive     Date of ACP Conversation: 11/21/18  Persons included in Conversation:  patient  Length of ACP Conversation in minutes:  16 minutes    Authorized Decision Maker (if patient is incapable of making informed decisions): This person is: wife  Healthcare Agent/Medical Power of  under Advance Directive          General ACP for ALL Patients with Decision Making Capacity:   Importance of advance care planning, including choosing a healthcare agent to communicate patient's healthcare decisions if patient lost the ability to make decisions, such as after a sudden illness or accident  Understanding of the healthcare agent role was assessed and information provided  Exploration of values, goals, and preferences if recovery is not expected, even with continued medical treatment in the event of: Imminent death  Severe, permanent brain injury    Review of Existing Advance Directive:  Does this advance directive still reflect your preferences? Yes (Provide new form/Refer for assistance in updating)    For Serious or Chronic Illness:  Understanding of medical condition    Understanding of CPR, goals and expected outcomes, benefits and burdens discussed.     Interventions Provided:  Recommended communicating the plan and making copies for the healthcare agent, personal physician, and others as appropriate (e.g., health system)  Recommended review of completed ACP document annually or upon change in health status Attending Attestation (For Attendings USE Only)...

## 2024-05-11 NOTE — ED PROVIDER NOTE - CLINICAL SUMMARY MEDICAL DECISION MAKING FREE TEXT BOX
43 yo F w/ PMHx of spina bifida, multiple bilateral foot surgeries, and  shunt for unspecified reason (managed [facility] neurosurgery) presents for 4 days of worsening swelling/pain/redness of right foot lesion and 1-2 days of purulent/bloody drainage.  Vital signs are remarkable for  and /100.  Physical exam is remarkable for 4cm diameter lesion on dorsal surface of R foot w/ fluctuance/erythema/TTP and surrounding skin peeling, 2 cm chronic ulcer over L lateral malleolus.  Concern for right foot abscess and chronic arterial stasis vs pressure ulcers.  Low concern for DVT, but difficult to rule out as patient has had multiple surgeries b/l legs. Plan for sepsis workup, DVT study, x-rays, empiric antibiotics, and podiatry consult.

## 2024-05-11 NOTE — CONSULT NOTE ADULT - ASSESSMENT
44F presents with R foot dorsal medial mass  - Patient seen and evaluated  - Temp 100.5, WBC 8.09, ESR 81, CRP 1.83  - Right foot dorsal medial non-mobile mass with central wound to dermis, hyperpigmented periwound, no fluctuance, no pus, no bogginess, no malodor, no periwound erythema. Left foot no open wounds or lesions, no acute signs of infection.  - All imaging still pending  - After gaining verbal consent, cleansed right foot with alcohol swab. Injected 10cc of 2% lidocaine plain. Using a #15 blade, made an incision down to subQ and not beyond on the dorsal medial aspect of the midfoot, sanguinous drainage, no pus, no tracking or tunneling. Patient tolerated well.  - No culture 2/2 no acute signs of infection  - Recommend ambulation in a surgical shoe to the R foot  - Recommend application of mupirocin, 4x4 gauze and kerlix to right foot wound  - Recommend rule out other sources of infection causing increased fever and increased heart rate  - Recommend MR of right foot if admitted or when discharged  - Strict decubitus precaution with Z-flows to be worn at all times when in bed or in chair  - Pod plan PENDING IMAGING   - Discussed with attending   44F presents with R foot dorsal medial mass  - Patient seen and evaluated  - Temp 100.5, WBC 8.09, ESR 81, CRP 1.83  - Right foot dorsal medial non-mobile mass with central wound to dermis, hyperpigmented periwound, no fluctuance, no pus, no bogginess, no malodor, no periwound erythema. Left foot no open wounds or lesions, no acute signs of infection.  - Right foot XR: pending  - Right foot CT: pending (AFTER 10cc of LIDOCAINE INJECTED around mass)  - After gaining verbal consent, cleansed right foot with alcohol swab. Injected 10cc of 2% lidocaine plain. Using a #15 blade, made an incision down to subQ and not beyond on the dorsal medial aspect of the midfoot, sanguinous drainage, no pus, no tracking or tunneling. Patient tolerated well.  - No culture 2/2 no acute signs of infection  - Recommend ambulation in a surgical shoe to the R foot  - Recommend application of mupirocin, 4x4 gauze and kerlix to right foot wound  - Recommend rule out other sources of infection causing increased fever and increased heart rate  - Recommend MR of right foot if admitted or when discharged  - Strict decubitus precaution with Z-flows to be worn at all times when in bed or in chair  - Pod plan PENDING IMAGING   - Discussed with attending   44F presents with R foot dorsal medial mass  - Patient seen and evaluated  - Temp 100.5, WBC 8.09, ESR 81, CRP 1.83  - Right foot dorsal medial non-mobile mass with central wound to dermis, hyperpigmented periwound, no fluctuance, no pus, no bogginess, no malodor, no periwound erythema. Left foot no open wounds or lesions, no acute signs of infection.  - After gaining verbal consent, cleansed right foot with alcohol swab. Injected 10cc of 2% lidocaine plain. Using a #15 blade, made an incision down to subQ and not beyond on the dorsal medial aspect of the midfoot, sanguinous drainage, no pus, no tracking or tunneling. Incision closed primarily with 3-0 nylon. Patient tolerated well.  - Right foot XR: pending  - Right foot CT: pending (AFTER 10cc of LIDOCAINE INJECTED around mass)  - No culture 2/2 no acute signs of infection  - Recommend ambulation as tolerated in a surgical shoe to the R foot  - Recommend application of mupirocin, 4x4 gauze and kerlix to right foot wound  - Recommend rule out other sources of infection causing increased fever and increased heart rate  - Recommend MR of right foot if admitted or when discharged  - Strict decubitus precaution with Z-flows to be worn at all times when in bed or in chair  - Pod plan PENDING IMAGING   - Discussed with attending   44F presents with R foot dorsal medial mass  - Patient seen and evaluated  - Temp 100.5, WBC 8.09, ESR 81, CRP 1.83  - Right foot dorsal medial non-mobile mass with central wound to dermis, hyperpigmented periwound, no fluctuance, no pus, no bogginess, no malodor, no periwound erythema. Left foot no open wounds or lesions, no acute signs of infection.  Left lateral ankle dry eschar with periwound xerosis, no pus, no malodor.  - After gaining verbal consent, cleansed right foot with alcohol swab. Injected 10cc of 2% lidocaine plain. Using a #15 blade, made an incision down to subQ and not beyond on the dorsal medial aspect of the midfoot, sanguinous drainage, no pus, no tracking or tunneling. Incision closed primarily with 3-0 nylon. Patient tolerated well.  - Right foot XR: pending  - Right foot CT: pending (AFTER 10cc of LIDOCAINE INJECTED around mass)  - No culture 2/2 no acute signs of infection  - Recommend ambulation as tolerated in a surgical shoe to the R foot  - Recommend application of mupirocin, 4x4 gauze and kerlix to right foot wound  - Recommend rule out other sources of infection causing increased fever and increased heart rate  - Recommend MR of right foot if admitted or when discharged  - Strict decubitus precaution with Z-flows to be worn at all times when in bed or in chair  - Pod plan PENDING IMAGING   - Discussed with attending

## 2024-05-12 DIAGNOSIS — Z98.890 OTHER SPECIFIED POSTPROCEDURAL STATES: Chronic | ICD-10-CM

## 2024-05-12 DIAGNOSIS — D25.9 LEIOMYOMA OF UTERUS, UNSPECIFIED: ICD-10-CM

## 2024-05-12 DIAGNOSIS — Z98.2 PRESENCE OF CEREBROSPINAL FLUID DRAINAGE DEVICE: Chronic | ICD-10-CM

## 2024-05-12 DIAGNOSIS — S91.301A UNSPECIFIED OPEN WOUND, RIGHT FOOT, INITIAL ENCOUNTER: ICD-10-CM

## 2024-05-12 DIAGNOSIS — Q05.9 SPINA BIFIDA, UNSPECIFIED: ICD-10-CM

## 2024-05-12 DIAGNOSIS — L08.9 LOCAL INFECTION OF THE SKIN AND SUBCUTANEOUS TISSUE, UNSPECIFIED: ICD-10-CM

## 2024-05-12 DIAGNOSIS — S31.000A UNSPECIFIED OPEN WOUND OF LOWER BACK AND PELVIS WITHOUT PENETRATION INTO RETROPERITONEUM, INITIAL ENCOUNTER: ICD-10-CM

## 2024-05-12 DIAGNOSIS — Z29.9 ENCOUNTER FOR PROPHYLACTIC MEASURES, UNSPECIFIED: ICD-10-CM

## 2024-05-12 LAB
ANION GAP SERPL CALC-SCNC: 13 MMOL/L — SIGNIFICANT CHANGE UP (ref 7–14)
BUN SERPL-MCNC: 4 MG/DL — LOW (ref 7–23)
CALCIUM SERPL-MCNC: 8.3 MG/DL — LOW (ref 8.4–10.5)
CHLORIDE SERPL-SCNC: 104 MMOL/L — SIGNIFICANT CHANGE UP (ref 98–107)
CO2 SERPL-SCNC: 21 MMOL/L — LOW (ref 22–31)
CREAT SERPL-MCNC: 0.47 MG/DL — LOW (ref 0.5–1.3)
EGFR: 119 ML/MIN/1.73M2 — SIGNIFICANT CHANGE UP
GLUCOSE SERPL-MCNC: 82 MG/DL — SIGNIFICANT CHANGE UP (ref 70–99)
HCT VFR BLD CALC: 25.3 % — LOW (ref 34.5–45)
HGB BLD-MCNC: 8.6 G/DL — LOW (ref 11.5–15.5)
MAGNESIUM SERPL-MCNC: 1.9 MG/DL — SIGNIFICANT CHANGE UP (ref 1.6–2.6)
MCHC RBC-ENTMCNC: 26.6 PG — LOW (ref 27–34)
MCHC RBC-ENTMCNC: 34 GM/DL — SIGNIFICANT CHANGE UP (ref 32–36)
MCV RBC AUTO: 78.3 FL — LOW (ref 80–100)
MRSA PCR RESULT.: SIGNIFICANT CHANGE UP
NRBC # BLD: 0 /100 WBCS — SIGNIFICANT CHANGE UP (ref 0–0)
NRBC # FLD: 0 K/UL — SIGNIFICANT CHANGE UP (ref 0–0)
PHOSPHATE SERPL-MCNC: 3.1 MG/DL — SIGNIFICANT CHANGE UP (ref 2.5–4.5)
PLATELET # BLD AUTO: 462 K/UL — HIGH (ref 150–400)
POTASSIUM SERPL-MCNC: 3.4 MMOL/L — LOW (ref 3.5–5.3)
POTASSIUM SERPL-SCNC: 3.4 MMOL/L — LOW (ref 3.5–5.3)
RBC # BLD: 3.23 M/UL — LOW (ref 3.8–5.2)
RBC # FLD: 16.1 % — HIGH (ref 10.3–14.5)
S AUREUS DNA NOSE QL NAA+PROBE: DETECTED
SODIUM SERPL-SCNC: 138 MMOL/L — SIGNIFICANT CHANGE UP (ref 135–145)
WBC # BLD: 4.99 K/UL — SIGNIFICANT CHANGE UP (ref 3.8–10.5)
WBC # FLD AUTO: 4.99 K/UL — SIGNIFICANT CHANGE UP (ref 3.8–10.5)

## 2024-05-12 PROCEDURE — 99223 1ST HOSP IP/OBS HIGH 75: CPT

## 2024-05-12 PROCEDURE — 99222 1ST HOSP IP/OBS MODERATE 55: CPT | Mod: GC

## 2024-05-12 PROCEDURE — 99254 IP/OBS CNSLTJ NEW/EST MOD 60: CPT | Mod: GC

## 2024-05-12 RX ORDER — ENOXAPARIN SODIUM 100 MG/ML
30 INJECTION SUBCUTANEOUS EVERY 24 HOURS
Refills: 0 | Status: DISCONTINUED | OUTPATIENT
Start: 2024-05-12 | End: 2024-05-19

## 2024-05-12 RX ORDER — KETOROLAC TROMETHAMINE 30 MG/ML
15 SYRINGE (ML) INJECTION EVERY 6 HOURS
Refills: 0 | Status: DISCONTINUED | OUTPATIENT
Start: 2024-05-12 | End: 2024-05-15

## 2024-05-12 RX ORDER — MUPIROCIN 20 MG/G
1 OINTMENT TOPICAL
Refills: 0 | Status: DISCONTINUED | OUTPATIENT
Start: 2024-05-12 | End: 2024-06-06

## 2024-05-12 RX ORDER — TRAMADOL HYDROCHLORIDE 50 MG/1
25 TABLET ORAL EVERY 6 HOURS
Refills: 0 | Status: DISCONTINUED | OUTPATIENT
Start: 2024-05-12 | End: 2024-05-20

## 2024-05-12 RX ORDER — LANOLIN ALCOHOL/MO/W.PET/CERES
3 CREAM (GRAM) TOPICAL AT BEDTIME
Refills: 0 | Status: DISCONTINUED | OUTPATIENT
Start: 2024-05-12 | End: 2024-06-06

## 2024-05-12 RX ORDER — ENOXAPARIN SODIUM 100 MG/ML
40 INJECTION SUBCUTANEOUS EVERY 24 HOURS
Refills: 0 | Status: DISCONTINUED | OUTPATIENT
Start: 2024-05-12 | End: 2024-05-12

## 2024-05-12 RX ORDER — ACETAMINOPHEN 500 MG
975 TABLET ORAL EVERY 6 HOURS
Refills: 0 | Status: DISCONTINUED | OUTPATIENT
Start: 2024-05-12 | End: 2024-05-22

## 2024-05-12 RX ORDER — PIPERACILLIN AND TAZOBACTAM 4; .5 G/20ML; G/20ML
3.38 INJECTION, POWDER, LYOPHILIZED, FOR SOLUTION INTRAVENOUS EVERY 8 HOURS
Refills: 0 | Status: COMPLETED | OUTPATIENT
Start: 2024-05-12 | End: 2024-05-21

## 2024-05-12 RX ORDER — POTASSIUM CHLORIDE 20 MEQ
40 PACKET (EA) ORAL ONCE
Refills: 0 | Status: COMPLETED | OUTPATIENT
Start: 2024-05-12 | End: 2024-05-12

## 2024-05-12 RX ORDER — ACETAMINOPHEN 500 MG
650 TABLET ORAL ONCE
Refills: 0 | Status: DISCONTINUED | OUTPATIENT
Start: 2024-05-12 | End: 2024-05-12

## 2024-05-12 RX ADMIN — Medication 3 MILLIGRAM(S): at 21:52

## 2024-05-12 RX ADMIN — PIPERACILLIN AND TAZOBACTAM 25 GRAM(S): 4; .5 INJECTION, POWDER, LYOPHILIZED, FOR SOLUTION INTRAVENOUS at 06:56

## 2024-05-12 RX ADMIN — PIPERACILLIN AND TAZOBACTAM 25 GRAM(S): 4; .5 INJECTION, POWDER, LYOPHILIZED, FOR SOLUTION INTRAVENOUS at 21:52

## 2024-05-12 RX ADMIN — Medication 40 MILLIEQUIVALENT(S): at 12:52

## 2024-05-12 RX ADMIN — PIPERACILLIN AND TAZOBACTAM 25 GRAM(S): 4; .5 INJECTION, POWDER, LYOPHILIZED, FOR SOLUTION INTRAVENOUS at 14:44

## 2024-05-12 NOTE — DIETITIAN INITIAL EVALUATION ADULT - PHYSCIAL ASSESSMENT
Patient appears with little muscle/fat stores however noted she has always been at low body weight throughout her life./underweight

## 2024-05-12 NOTE — H&P ADULT - NSICDXPASTMEDICALHX_GEN_ALL_CORE_FT
PAST MEDICAL HISTORY:  Spina bifida      PAST MEDICAL HISTORY:  Fibroids     Spina bifida     Wound of left foot     Wound of right foot

## 2024-05-12 NOTE — H&P ADULT - NSHPPHYSICALEXAM_GEN_ALL_CORE
VITALS:   T(C): 36.8 (05-12-24 @ 06:40), Max: 38.1 (05-11-24 @ 19:19)  HR: 87 (05-12-24 @ 06:40) (83 - 114)  BP: 140/90 (05-12-24 @ 06:40) (139/90 - 152/100)  RR: 17 (05-12-24 @ 06:40) (16 - 18)  SpO2: 100% (05-12-24 @ 06:40) (98% - 100%)    GENERAL: NAD, lying in bed comfortably  HEAD:  Atraumatic, Normocephalic  EYES: EOMI, PERRLA, conjunctiva and sclera clear  ENT: Moist mucous membranes  NECK: Supple, No JVD  CHEST/LUNG: CTABL; No rales, rhonchi, wheezing, or rubs. Unlabored respirations  HEART: RRR. No M/R/G  ABDOMEN: Soft, nontender, non-distended, normoactive BS. No hepatomegaly  EXTREMITIES:  2+ Peripheral Pulses, brisk capillary refill. No clubbing, cyanosis, or edema  NERVOUS SYSTEM:  Alert & Oriented X3, speech clear. No deficits, CN II-XII intact. Normal sensation   MSK: FROM upper extremities, can mildly move lower extremities   PSYCH: Normal affect, normal speech, normal behavior  SKIN: wound with central ulceration, no active drainage, wrapped by podiatry

## 2024-05-12 NOTE — H&P ADULT - NSHPREVIEWOFSYSTEMS_GEN_ALL_CORE
REVIEW OF SYSTEMS:    CONSTITUTIONAL: No weakness, fevers or chills  EYES: No vision changes  ENT: No vertigo or throat pain   NECK: No pain or stiffness  RESPIRATORY: No cough, wheezing, hemoptysis; No shortness of breath  CARDIOVASCULAR: No chest pain or palpitations  GASTROINTESTINAL: + menstrual cramping. No nausea, vomiting, or hematemesis; No diarrhea or constipation. No melena or hematochezia.  GENITOURINARY: No dysuria, frequency or hematuria  NEUROLOGICAL: No numbness or weakness  PSYCH: No anxiety, depression, or behavior changes  MSK: R foot wound   All other review of systems is negative unless indicated above. CONSTITUTIONAL: No weakness, fevers or chills  EYES: No vision changes  ENT: No vertigo or throat pain   NECK: No pain or stiffness  RESPIRATORY: No cough, wheezing, hemoptysis; No shortness of breath  CARDIOVASCULAR: No chest pain or palpitations  GASTROINTESTINAL: + menstrual cramping. No nausea, vomiting, or hematemesis; No diarrhea or constipation. No melena or hematochezia.  GENITOURINARY: No dysuria, frequency or hematuria  NEUROLOGICAL: No numbness or weakness  PSYCH: No anxiety, depression, or behavior changes  MSK: R foot wound   All other review of systems is negative unless indicated above.

## 2024-05-12 NOTE — PATIENT PROFILE ADULT - FALL HARM RISK - HARM RISK INTERVENTIONS

## 2024-05-12 NOTE — DIETITIAN INITIAL EVALUATION ADULT - OTHER INFO
44F PMH spina bifida admitted for local infection of skin and subcutaneous tissue.     Patient met bedside in high spirits. Stated she has a lot of pain in her foot but pain significantly reduced since visit from podiatrist. Endorsed she is hungry, has not eaten since 2pm yesterday and is requesting meal (chicken, mixed veg, and mashed potato, orange juice). Verbalized she has no changes to her intake and usually is in a low body weight. Her UBW is 106-110 pounds with her highest weight at 125 pounds during pregnancy. Patient confirmed her height at 5'2. Noted to drink "Nutrament" supplement 1xd OTC. Trialed Ensure in the past but believes it gave her diarrhea. Amenable to vanilla supplement and protein supplement upon this hospitalization. Requesting no chocolate. Denied food allergies. Denied nausea/vomitting/diarrhea/constipation. Labs reviewed, K+ low at 3.4. Patient inquired why she may have a low body weight/weight loss despite eating consistently.

## 2024-05-12 NOTE — H&P ADULT - ASSESSMENT
44F MHx spina bifida, multiple bilateral foot surgeries, and  shunt (managed [facility] neurosurgery), uterine fibroids presenting with R foot wound.

## 2024-05-12 NOTE — H&P ADULT - ATTENDING COMMENTS
Pt is a 45 yo F with PMH spina bifida (s/p  shunt), uterine fibroids, extensive hx BL foot wounds (s/p surgeries), and gluteal pressure wounds p/w R foot wound with swelling and drainage x months, worse for the last few days. Difficult to fully ascertain hx as pt is tangential. States she initially had issues with BL foot wounds up until around 15 yoa after which she was "okay" until early this year. Has not been evaluated by a physician since R foot wound started early this year. Says that it has "opened up" twice and "bled" in the shower. She is able to ambulate at home but uses a motorized wheelchair when outdoors. Lives with her young son. Has been due to f/u with gyn for fibroids and irregular menses with menorrhagia, but has yet to do so. Started her period in the ER, likely attributing to microscopic hematuria seen on UA. Discussed f/u with PCP for repeat UA outpt.    On arrival, HDS but developed Tmax 100.5. Labs with elevated ESR/CRP, normocytic anemia, MRSA neg. RLE doppler neg, CXR neg, XR R foot with talar, calcaneal, navicular, and cuboid osseous fusion with surgical staples and extensive ST swelling most prominent medially at the level of the tarsal bones. CT R foot with talocalcaneal, navicular, and cuboid osseous and hardware fusion; 5.3x1.8x5cm forming abscess within the dorsal medial soft tissues; mild cortical irregularity at the adjacent distal navicula and proximal medial cuneiform with tiny ossific fragments age indeterminant.     Podiatry evaluated pt in ER, performed bedside ?I&D but unfortunately did not send culture as they had low c/f infx. Recommending amb as tolerated, surgical shoe when ambulatory, strict decub and z flows at all times, and mupirocen + 4x4 gauze + kerlix daily. MR R foot ordered. ID consulted with c/f abscess formation s/p drainage without clear organism. MRSA neg; received vanc x1 and zosyn. Will c/w zosyn for now pending ID recs. Pain control with tylenol / toradol / tramadol as needed. PT consulted. iSTOP reviewed, no fill history. Will need repeat UA outpt to assess resolution of microscopic hematuria along with outpt gyn f/u for fibroids and menorrhagia.     Discussed with HS, rest as outlined above.

## 2024-05-12 NOTE — DIETITIAN INITIAL EVALUATION ADULT - PERTINENT LABORATORY DATA
05-12    138  |  104  |  4<L>  ----------------------------<  82  3.4<L>   |  21<L>  |  0.47<L>    Ca    8.3<L>      12 May 2024 09:10  Phos  3.1     05-12  Mg     1.90     05-12    TPro  7.5  /  Alb  3.5  /  TBili  <0.2  /  DBili  x   /  AST  11  /  ALT  6   /  AlkPhos  70  05-11

## 2024-05-12 NOTE — PROGRESS NOTE ADULT - ASSESSMENT
44F s/p b/s R foot dorsal medial mass incision & drainage (5/11)  - Patient seen and evaluated  - Afebrile, no leukocytosis   - 5/11 s/p b/s Right foot dorsal medial non-mobile mass incision and drainage, closed, sutures intact, central mass wound to dermis, hyperpigmented periwound, no fluctuance, no pus, no bogginess, no malodor, no periwound erythema.  Left lateral ankle dry eschar with periwound xerosis, no signs of infection   - Right foot XR: no OM, no gas  - Right foot CT: No OM, no Abscess, no tracking of gas  - R foot MR pending   - No culture 2/2 no acute signs of infection  - R foot not likely source of fever, please rule out other sources   - Ordered ZFLO offloading boots   - Strict decubitus precaution with Z-flows to be worn at all times when in bed or in chair  - Pod plan local wound care pending MR  - Discussed with attending

## 2024-05-12 NOTE — CONSULT NOTE ADULT - SUBJECTIVE AND OBJECTIVE BOX
Patient is a 44y old  Female who presents with a chief complaint of Local infection of skin and subcutaneous tissue     (12 May 2024 13:38)    HPI:  44F MHx spina bifida, multiple bilateral foot surgeries, and  shunt (managed [facility] neurosurgery), uterine fibroids presenting with R foot wound. Per patient, has had R foot wound for past 6-7 months, denies past trauma or injury. Last foot surgery was at age 15, not able to say what type of surgery. Foot wound presented first as a palpable mass that started draining, drainage was mostly bloody, for past few days has been white/yellow and very painful- now developed into area with central ulceration and assc redness in the past. Can ambulate at home, small distances, but has struggled with this due to pain. Endorsing abdominal pain due to active menstruation. Denies any fevers, chills, CP, SOB, n/v/d.     In the ED, T 100.5, , /100, 99% RA.  (12 May 2024 09:51)       REVIEW OF SYSTEMS  pending full examination    prior hospital charts reviewed [V]  primary team notes reviewed [V]  other consultant notes reviewed [V]    PAST MEDICAL & SURGICAL HISTORY:  Spina bifida      Fibroids      Wound of right foot      Wound of left foot      S/P  shunt      S/P foot surgery, left      S/P foot surgery, right          SOCIAL HISTORY:  Denied smoking/vaping/alcohol/recreational drug use    FAMILY HISTORY:  No pertinent family history in first degree relatives        Allergies  Zyrtec (Rash)        ANTIMICROBIALS:  piperacillin/tazobactam IVPB.. 3.375 every 8 hours      ANTIMICROBIALS (past 90 days):  MEDICATIONS  (STANDING):  piperacillin/tazobactam IVPB..   25 mL/Hr IV Intermittent (05-12-24 @ 14:44)   25 mL/Hr IV Intermittent (05-12-24 @ 06:56)    piperacillin/tazobactam IVPB...   200 mL/Hr IV Intermittent (05-11-24 @ 18:45)    vancomycin  IVPB.   250 mL/Hr IV Intermittent (05-11-24 @ 22:05)        OTHER MEDS:   MEDICATIONS  (STANDING):  acetaminophen     Tablet .. 975 every 6 hours PRN  enoxaparin Injectable 30 every 24 hours  ketorolac 15 every 6 hours PRN  melatonin 3 at bedtime  traMADol 25 every 6 hours PRN      VITALS:  Vital Signs Last 24 Hrs  T(F): 98 (05-12-24 @ 12:59), Max: 100.5 (05-11-24 @ 19:19)    Vital Signs Last 24 Hrs  HR: 90 (05-12-24 @ 12:59) (83 - 114)  BP: 145/94 (05-12-24 @ 12:59) (139/90 - 152/100)  RR: 18 (05-12-24 @ 12:59)  SpO2: 100% (05-12-24 @ 12:59) (98% - 100%)  Wt(kg): --    EXAM:  pending full examination      Labs:                        8.6    4.99  )-----------( 462      ( 12 May 2024 09:10 )             25.3     05-12    138  |  104  |  4<L>  ----------------------------<  82  3.4<L>   |  21<L>  |  0.47<L>    Ca    8.3<L>      12 May 2024 09:10  Phos  3.1     05-12  Mg     1.90     05-12    TPro  7.5  /  Alb  3.5  /  TBili  <0.2  /  DBili  x   /  AST  11  /  ALT  6   /  AlkPhos  70  05-11      WBC Trend:  WBC Count: 4.99 (05-12-24 @ 09:10)  WBC Count: 8.09 (05-11-24 @ 18:40)      Auto Neutrophil #: 5.58 K/uL (05-11-24 @ 18:40)  Auto Neutrophil #: 3.02 K/uL (10-20-23 @ 17:12)      Creatine Trend:  Creatinine: 0.47 (05-12)  Creatinine: 0.49 (05-11)      Liver Biochemical Testing Trend:  Alanine Aminotransferase (ALT/SGPT): 6 (05-11)  Alanine Aminotransferase (ALT/SGPT): 7 (10-20)  Aspartate Aminotransferase (AST/SGOT): 11 (05-11-24 @ 18:40)  Aspartate Aminotransferase (AST/SGOT): 16 (10-20-23 @ 17:12)  Bilirubin Total: <0.2 (05-11)  Bilirubin Total: 0.2 (10-20)      Trend LDH      Auto Eosinophil %: 0.4 % (05-11-24 @ 18:40)      MICROBIOLOGY:  MRSA PCR Result.: NotDetec (05-11-24 @ 20:27)      C-Reactive Protein: 18.3 (05-11)  Lactate, Blood: 1.2 (05-11 @ 18:40)  Blood Gas Venous - Lactate: 1.4 (05-11 @ 18:40)    RADIOLOGY:  imaging below personally reviewed   Patient is a 44y old  Female who presents with a chief complaint of Local infection of skin and subcutaneous tissue    HPI:  Patient is a 44F PMHx spina bifida, multiple bilateral foot surgeries, and  shunt (managed [facility] neurosurgery), uterine fibroids presenting with R foot wound. Per patient, has had R foot wound for past 6-7 months, denies past trauma or injury. Last foot surgery was at age 15, not able to say what type of surgery. Foot wound presented first as a palpable mass that started draining, drainage was mostly bloody, for past few days has been white/yellow and very painful- now developed into area with central ulceration and assc redness in the past. UPon work up patient febrile to 100.5 (rectal) with tachycardia. Labs were significant for WBC of 8.09 now 4.99, PLTs 16 now 462, ESR 81, CrP 18.3, MSSA positive on swab and UA showing 3 WBC but many bacteria. Imaging performed was a RLE US which was negative for deep venous thrombosis but showed a nonspecific left groin lymph node measures 2.2 x 1.2 x 2.1 cm, and CT of the R foot which showed Talocalcaneal, navicular and cuboid osseous and hardware fusion, 5.3 x 1.8 x 5.0 cm forming abscess within the dorsal medial soft tissues, mild cortical irregularity at the adjacent distal navicula and proximal medial cuneiform with tiny ossific fragments and CXR negative.     REVIEW OF SYSTEMS  pending full examination    prior hospital charts reviewed [V]  primary team notes reviewed [V]  other consultant notes reviewed [V]    PAST MEDICAL & SURGICAL HISTORY:  Spina bifida      Fibroids      Wound of right foot      Wound of left foot      S/P  shunt      S/P foot surgery, left      S/P foot surgery, right          SOCIAL HISTORY:  Denied smoking/vaping/alcohol/recreational drug use    FAMILY HISTORY:  No pertinent family history in first degree relatives        Allergies  Zyrtec (Rash)        ANTIMICROBIALS:  piperacillin/tazobactam IVPB.. 3.375 every 8 hours      ANTIMICROBIALS (past 90 days):  MEDICATIONS  (STANDING):  piperacillin/tazobactam IVPB..   25 mL/Hr IV Intermittent (05-12-24 @ 14:44)   25 mL/Hr IV Intermittent (05-12-24 @ 06:56)    piperacillin/tazobactam IVPB...   200 mL/Hr IV Intermittent (05-11-24 @ 18:45)    vancomycin  IVPB.   250 mL/Hr IV Intermittent (05-11-24 @ 22:05)        OTHER MEDS:   MEDICATIONS  (STANDING):  acetaminophen     Tablet .. 975 every 6 hours PRN  enoxaparin Injectable 30 every 24 hours  ketorolac 15 every 6 hours PRN  melatonin 3 at bedtime  traMADol 25 every 6 hours PRN      VITALS:  Vital Signs Last 24 Hrs  T(F): 98 (05-12-24 @ 12:59), Max: 100.5 (05-11-24 @ 19:19)    Vital Signs Last 24 Hrs  HR: 90 (05-12-24 @ 12:59) (83 - 114)  BP: 145/94 (05-12-24 @ 12:59) (139/90 - 152/100)  RR: 18 (05-12-24 @ 12:59)  SpO2: 100% (05-12-24 @ 12:59) (98% - 100%)  Wt(kg): --    EXAM:  pending full examination      Labs:                        8.6    4.99  )-----------( 462      ( 12 May 2024 09:10 )             25.3     05-12    138  |  104  |  4<L>  ----------------------------<  82  3.4<L>   |  21<L>  |  0.47<L>    Ca    8.3<L>      12 May 2024 09:10  Phos  3.1     05-12  Mg     1.90     05-12    TPro  7.5  /  Alb  3.5  /  TBili  <0.2  /  DBili  x   /  AST  11  /  ALT  6   /  AlkPhos  70  05-11      WBC Trend:  WBC Count: 4.99 (05-12-24 @ 09:10)  WBC Count: 8.09 (05-11-24 @ 18:40)      Auto Neutrophil #: 5.58 K/uL (05-11-24 @ 18:40)  Auto Neutrophil #: 3.02 K/uL (10-20-23 @ 17:12)      Creatine Trend:  Creatinine: 0.47 (05-12)  Creatinine: 0.49 (05-11)      Liver Biochemical Testing Trend:  Alanine Aminotransferase (ALT/SGPT): 6 (05-11)  Alanine Aminotransferase (ALT/SGPT): 7 (10-20)  Aspartate Aminotransferase (AST/SGOT): 11 (05-11-24 @ 18:40)  Aspartate Aminotransferase (AST/SGOT): 16 (10-20-23 @ 17:12)  Bilirubin Total: <0.2 (05-11)  Bilirubin Total: 0.2 (10-20)    Auto Eosinophil %: 0.4 % (05-11-24 @ 18:40)    MICROBIOLOGY:  MRSA PCR Result.: NotDetec (05-11-24 @ 20:27)    C-Reactive Protein: 18.3 (05-11)  Lactate, Blood: 1.2 (05-11 @ 18:40)  Blood Gas Venous - Lactate: 1.4 (05-11 @ 18:40)    RADIOLOGY:  < from: CT Foot w/ IV Cont, Right (05.11.24 @ 23:51) >  Findings:    There is confluent hypodensity dorsal medially centered at the level of   the navicular medial cuneiform articulation which spans 5.3 x 1.8 x 5.0   cm. There may be faint rim enhancement suggestive of a forming abscess.   There is mild cortical irregularity at the adjacent distal navicular and   proximal medial cuneiform with tiny adjacent ossificfragments which is   age indeterminate. Correlate with the scheduled MRI.    Status post osseous talocalcaneal, navicular, cuboid fusion with 2   surgical staples. A small ossific fragment dorsally adjacent to the base   the first proximal phalanx appears chronic. There is posterior superior   spurring at the dorsal talus with small adjacent chronic osseous   fragmentation. There is diffuse muscle atrophy at the foot.    Impression: Talocalcaneal, navicular and cuboid osseous and hardware   fusion. 5.3 x 1.8 x 5.0 cm forming abscess within the dorsal medial soft   tissues. Mild cortical irregularity at the adjacent distal navicula and   proximal medial cuneiform with tiny ossific fragments which is age   indeterminate. Correlate with the scheduled MRI.    < from: Xray Foot AP + Lateral, Right (05.11.24 @ 23:40) >  Impression:  Talar, calcaneal, navicular and cuboid osseous fusion with associated   surgical staples. There is extensive soft tissue swelling which is most   prominent medially at the level of thetarsal bones. There is age   indeterminate irregularity of the distal navicular and medial cuneiform   proximally. Correlate with the pending MRI.    There is a chronic ossific fragment medially adjacent to the base of the   first proximal phalanx. There is mild concavity at the first metatarsal   head and flattening at the fifth metatarsal head which are likely chronic.    < from: Xray Chest 1 View AP/PA (05.11.24 @ 23:39) >  IMPRESSION:  No focal consolidations. Findings unchanged      < from: US Duplex Venous Lower Ext Ltd, Right (05.11.24 @ 21:16) >  IMPRESSION:  No evidence of right lower extremity deep venous thrombosis.  A nonspecific left groin lymph node measures 2.2 x 1.2 x 2.1 cm.       Patient is a 44y old  Female who presents with a chief complaint of Local infection of skin and subcutaneous tissue    HPI:  Patient is a 44F PMHx spina bifida, multiple bilateral foot surgeries, and  shunt (managed [facility] neurosurgery), uterine fibroids presenting with R foot wound. Per patient, has had R foot wound for past 6-7 months, denies past trauma or injury. Last foot surgery was at age 15, not able to say what type of surgery. Foot wound presented first as a palpable mass that started draining, drainage was mostly bloody, for past few days has been white/yellow and very painful- now developed into area with central ulceration and assc redness in the past. UPon work up patient febrile to 100.5 (rectal) with tachycardia. Labs were significant for WBC of 8.09 now 4.99, PLTs 16 now 462, ESR 81, CrP 18.3, MSSA positive on swab and UA showing 3 WBC but many bacteria. Imaging performed was a RLE US which was negative for deep venous thrombosis but showed a nonspecific left groin lymph node measures 2.2 x 1.2 x 2.1 cm, and CT of the R foot which showed Talocalcaneal, navicular and cuboid osseous and hardware fusion, 5.3 x 1.8 x 5.0 cm forming abscess within the dorsal medial soft tissues, mild cortical irregularity at the adjacent distal navicula and proximal medial cuneiform with tiny ossific fragments and CXR negative. Currently patient admits to 6/10 pain in her R foot improved after draining. She states she was given a brace in November 2023 and then in January she started to develop swelling of her foot which has progressed and occasionally drains blood and pus. She also thinks there is some area on her L foot being affected. She states the pain would occasional move up her legs.     REVIEW OF SYSTEMS  Constitutional: No fevers, No chills, No weight loss, No fatigue   Skin: R foot wound, L foot skin break down, no phlebitis	  Eyes: No discharge, No change in vision	  ENMT: No sore throat, No ulcers  Respiratory: No cough, no SOB  Cardiovascular:  No chest pain, No palpitations   Gastrointestinal: No pain, No nausea, No vomiting, No diarrhea, No constipation	  Genitourinary: No dysuria, No frequency, No hesitancy, No flank pain  MSK: R foot pain, No back pain  Neurological: No HA, no weakness, no seizures, no AMS     prior hospital charts reviewed [V]  primary team notes reviewed [V]  other consultant notes reviewed [V]    PAST MEDICAL & SURGICAL HISTORY:  Spina bifida      Fibroids      Wound of right foot      Wound of left foot      S/P  shunt      S/P foot surgery, left      S/P foot surgery, right          SOCIAL HISTORY:  Denied smoking/vaping/alcohol/recreational drug use, lives with her mom and her son, denies travel, no pets, no sick contacts    FAMILY HISTORY:  No pertinent family history in first degree relatives        Allergies  Zyrtec (Rash)        ANTIMICROBIALS:  piperacillin/tazobactam IVPB.. 3.375 every 8 hours      ANTIMICROBIALS (past 90 days):  MEDICATIONS  (STANDING):  piperacillin/tazobactam IVPB..   25 mL/Hr IV Intermittent (05-12-24 @ 14:44)   25 mL/Hr IV Intermittent (05-12-24 @ 06:56)    piperacillin/tazobactam IVPB...   200 mL/Hr IV Intermittent (05-11-24 @ 18:45)    vancomycin  IVPB.   250 mL/Hr IV Intermittent (05-11-24 @ 22:05)        OTHER MEDS:   MEDICATIONS  (STANDING):  acetaminophen     Tablet .. 975 every 6 hours PRN  enoxaparin Injectable 30 every 24 hours  ketorolac 15 every 6 hours PRN  melatonin 3 at bedtime  traMADol 25 every 6 hours PRN      VITALS:  Vital Signs Last 24 Hrs  T(F): 98 (05-12-24 @ 12:59), Max: 100.5 (05-11-24 @ 19:19)    Vital Signs Last 24 Hrs  HR: 90 (05-12-24 @ 12:59) (83 - 114)  BP: 145/94 (05-12-24 @ 12:59) (139/90 - 152/100)  RR: 18 (05-12-24 @ 12:59)  SpO2: 100% (05-12-24 @ 12:59) (98% - 100%)  Wt(kg): --    EXAM:  General: Patient appears comfortable, no acute distress  HEENT: NCAT, PERRL, anicteric sclera, mucous membranes moist and intact  Neck: Supple, No lymphadenopathy  CV: +S1/S2, RRR, no M/R/G  Lungs: No respiratory distress, CTA b/l, no wheezing, rales or rhonchi  Abd:  BS4+, Soft, NTND, no guarding  : No suprapubic tenderness  Neuro: AAOx3.   Ext: R foot with large edematous/erythematous wound on the dorsal aspect, L foot has slight skin break down medially, slight pain to palpation of R foot,  Msk: freely moving upper and lower extremities  Skin: No rash, no phlebitis, No erythema other than mentioned above      Labs:                        8.6    4.99  )-----------( 462      ( 12 May 2024 09:10 )             25.3     05-12    138  |  104  |  4<L>  ----------------------------<  82  3.4<L>   |  21<L>  |  0.47<L>    Ca    8.3<L>      12 May 2024 09:10  Phos  3.1     05-12  Mg     1.90     05-12    TPro  7.5  /  Alb  3.5  /  TBili  <0.2  /  DBili  x   /  AST  11  /  ALT  6   /  AlkPhos  70  05-11      WBC Trend:  WBC Count: 4.99 (05-12-24 @ 09:10)  WBC Count: 8.09 (05-11-24 @ 18:40)      Auto Neutrophil #: 5.58 K/uL (05-11-24 @ 18:40)  Auto Neutrophil #: 3.02 K/uL (10-20-23 @ 17:12)      Creatine Trend:  Creatinine: 0.47 (05-12)  Creatinine: 0.49 (05-11)      Liver Biochemical Testing Trend:  Alanine Aminotransferase (ALT/SGPT): 6 (05-11)  Alanine Aminotransferase (ALT/SGPT): 7 (10-20)  Aspartate Aminotransferase (AST/SGOT): 11 (05-11-24 @ 18:40)  Aspartate Aminotransferase (AST/SGOT): 16 (10-20-23 @ 17:12)  Bilirubin Total: <0.2 (05-11)  Bilirubin Total: 0.2 (10-20)    Auto Eosinophil %: 0.4 % (05-11-24 @ 18:40)    MICROBIOLOGY:  MRSA PCR Result.: NotDetec (05-11-24 @ 20:27)    C-Reactive Protein: 18.3 (05-11)  Lactate, Blood: 1.2 (05-11 @ 18:40)  Blood Gas Venous - Lactate: 1.4 (05-11 @ 18:40)    RADIOLOGY:  < from: CT Foot w/ IV Cont, Right (05.11.24 @ 23:51) >  Findings:    There is confluent hypodensity dorsal medially centered at the level of   the navicular medial cuneiform articulation which spans 5.3 x 1.8 x 5.0   cm. There may be faint rim enhancement suggestive of a forming abscess.   There is mild cortical irregularity at the adjacent distal navicular and   proximal medial cuneiform with tiny adjacent ossificfragments which is   age indeterminate. Correlate with the scheduled MRI.    Status post osseous talocalcaneal, navicular, cuboid fusion with 2   surgical staples. A small ossific fragment dorsally adjacent to the base   the first proximal phalanx appears chronic. There is posterior superior   spurring at the dorsal talus with small adjacent chronic osseous   fragmentation. There is diffuse muscle atrophy at the foot.    Impression: Talocalcaneal, navicular and cuboid osseous and hardware   fusion. 5.3 x 1.8 x 5.0 cm forming abscess within the dorsal medial soft   tissues. Mild cortical irregularity at the adjacent distal navicula and   proximal medial cuneiform with tiny ossific fragments which is age   indeterminate. Correlate with the scheduled MRI.    < from: Xray Foot AP + Lateral, Right (05.11.24 @ 23:40) >  Impression:  Talar, calcaneal, navicular and cuboid osseous fusion with associated   surgical staples. There is extensive soft tissue swelling which is most   prominent medially at the level of thetarsal bones. There is age   indeterminate irregularity of the distal navicular and medial cuneiform   proximally. Correlate with the pending MRI.    There is a chronic ossific fragment medially adjacent to the base of the   first proximal phalanx. There is mild concavity at the first metatarsal   head and flattening at the fifth metatarsal head which are likely chronic.    < from: Xray Chest 1 View AP/PA (05.11.24 @ 23:39) >  IMPRESSION:  No focal consolidations. Findings unchanged      < from: US Duplex Venous Lower Ext Ltd, Right (05.11.24 @ 21:16) >  IMPRESSION:  No evidence of right lower extremity deep venous thrombosis.  A nonspecific left groin lymph node measures 2.2 x 1.2 x 2.1 cm.

## 2024-05-12 NOTE — H&P ADULT - PROBLEM SELECTOR PLAN 1
pt with chronic R foot wound with white/yellow drainage  XR R foot with extensive soft tissue swelling  prelim CT R foot with soft tissue swelling/thickening, subq edema   appreciate podiatry recs   f/u MR R foot   c/w empiric zosyn pt with chronic R foot wound with white/yellow drainage  XR R foot with extensive soft tissue swelling  prelim CT R foot with soft tissue swelling/thickening, subq edema   appreciate podiatry recs   f/u MR R foot   c/w empiric zosyn  consider ID consult if febrile or worsening foot exam   pain control: tylenol, toradol 15 mod pain, tramadol 25 severe pain

## 2024-05-12 NOTE — DIETITIAN INITIAL EVALUATION ADULT - NUTRITIONGOAL OUTCOME1
Adequate oral intake (>75% at most meals, 100% oral nutrition supplement)   Maintain weight (+-2%) / Gradual wt gain (+2%)

## 2024-05-12 NOTE — DIETITIAN INITIAL EVALUATION ADULT - NSFNSPHYEXAMSKINFT_GEN_A_CORE
Pressure Injury 1: sacrum, Stage III 3.5x2x1.5 cm  Pressure Injury 2: Left:, heel, Suspected deep tissue injury 3x2 cm

## 2024-05-12 NOTE — PROGRESS NOTE ADULT - SUBJECTIVE AND OBJECTIVE BOX
Patient is a 44y old  Female who presents with a chief complaint of right foot wound (12 May 2024 09:51)       INTERVAL HPI/OVERNIGHT EVENTS:  Patient seen and evaluated at bedside.  Pt is resting comfortable in NAD. Denies N/V/F/C.      Allergies    Zyrtec (Rash)    Intolerances        Vital Signs Last 24 Hrs  T(C): 36.8 (12 May 2024 06:40), Max: 38.1 (11 May 2024 19:19)  T(F): 98.2 (12 May 2024 06:40), Max: 100.5 (11 May 2024 19:19)  HR: 87 (12 May 2024 06:40) (83 - 114)  BP: 140/90 (12 May 2024 06:40) (139/90 - 152/100)  BP(mean): --  RR: 17 (12 May 2024 06:40) (16 - 18)  SpO2: 100% (12 May 2024 06:40) (98% - 100%)    Parameters below as of 12 May 2024 06:40  Patient On (Oxygen Delivery Method): room air        LABS:                        8.6    4.99  )-----------( 462      ( 12 May 2024 09:10 )             25.3     05-12    138  |  104  |  4<L>  ----------------------------<  82  3.4<L>   |  21<L>  |  0.47<L>    Ca    8.3<L>      12 May 2024 09:10  Phos  3.1     05-12  Mg     1.90     05-12    TPro  7.5  /  Alb  3.5  /  TBili  <0.2  /  DBili  x   /  AST  11  /  ALT  6   /  AlkPhos  70  05-11    PT/INR - ( 11 May 2024 18:40 )   PT: 11.6 sec;   INR: 1.04 ratio         PTT - ( 11 May 2024 18:40 )  PTT:31.3 sec  Urinalysis Basic - ( 12 May 2024 09:10 )    Color: x / Appearance: x / SG: x / pH: x  Gluc: 82 mg/dL / Ketone: x  / Bili: x / Urobili: x   Blood: x / Protein: x / Nitrite: x   Leuk Esterase: x / RBC: x / WBC x   Sq Epi: x / Non Sq Epi: x / Bacteria: x      CAPILLARY BLOOD GLUCOSE          Lower Extremity Physical Exam:  Vascular: DP/PT 1/4, B/L, CFT <3 seconds B/L, Temperature gradient warm to cool, B/L.   Neuro: Epicritic sensation diminished but not absent to the level of digits, B/L.  Musculoskeletal/Ortho: drop foot, weak muscle strength, ROM diminished 2/2 spina bifida   Skin:  5/11 s/p b/s Right foot dorsal medial non-mobile mass incision and drainage, closed, sutures intact, central mass wound to dermis, hyperpigmented periwound, no fluctuance, no pus, no bogginess, no malodor, no periwound erythema.  Left lateral ankle dry eschar with periwound xerosis,no signs of infection     RADIOLOGY & ADDITIONAL TESTS:  < from: CT Foot w/ IV Cont, Right (05.11.24 @ 23:51) >    ACC: [id number] EXAM:  CT FOOT ONLY IC RT   ORDERED BY: KATERINA NELSON     PROCEDURE DATE:  05/11/2024          INTERPRETATION:  Clinical information: Right foot abscess, rule out   osteomyelitis.    Comparison: Right foot radiographs from 5/11/2024    Technique:  CT scan of the right foot.  Intravenous contrast: 75 cc of Omnipaque 350 were administered and 25 cc   were discarded.    Findings:    There is confluent hypodensity dorsal medially centered at the level of   the navicular medial cuneiform articulation which spans 5.3 x 1.8 x 5.0   cm. There may be faint rim enhancement suggestive of a forming abscess.   There is mild cortical irregularity at the adjacent distal navicular and   proximal medial cuneiform with tiny adjacent ossificfragments which is   age indeterminate. Correlate with the scheduled MRI.    Status post osseous talocalcaneal, navicular, cuboid fusion with 2   surgical staples. A small ossific fragment dorsally adjacent to the base   the first proximal phalanx appears chronic. There is posterior superior   spurring at the dorsal talus with small adjacent chronic osseous   fragmentation. There is diffuse muscle atrophy at the foot.    Impression: Talocalcaneal, navicular and cuboid osseous and hardware   fusion. 5.3 x 1.8 x 5.0 cm forming abscess within the dorsal medial soft   tissues. Mild cortical irregularity at the adjacent distal navicula and   proximal medial cuneiform with tiny ossific fragments which is age   indeterminate. Correlate with the scheduled MRI.    --- End of Report ---      < from: Xray Foot AP + Lateral, Right (05.11.24 @ 23:40) >    ACC: [id number] EXAM:  XR FOOT 2 VIEWS RT   ORDERED BY: KATERINA NELSON     PROCEDURE DATE:  05/11/2024          INTERPRETATION:  CLINICAL INDICATION: Right foot wound and swelling. Rule   out abscess.    TECHNIQUE:  3 views of the right foot.    COMPARISON: No similar prior comparisons available.    Impression:  Talar, calcaneal, navicular and cuboid osseous fusion with associated   surgical staples. There is extensive soft tissue swelling which is most   prominent medially at the level of thetarsal bones. There is age   indeterminate irregularity of the distal navicular and medial cuneiform   proximally. Correlate with the pending MRI.    There is a chronic ossific fragment medially adjacent to the base of the   first proximal phalanx. There is mild concavity at the first metatarsal   head and flattening at the fifth metatarsal head which are likely chronic.    --- End of Report ---          CHIVO LADD MD; Resident Radiologist  This document has been electronically signed.  DAVID NAVA MD; Attending Radiologist  This document has been electronically signed. May 12 2024  9:41AM    < end of copied text >        DAVID NAVA MD; Attending Radiologist  This document has been electronically signed. May 12 2024 11:44AM    < end of copied text >

## 2024-05-12 NOTE — PATIENT PROFILE ADULT - FUNCTIONAL ASSESSMENT - BASIC MOBILITY 6.
2-calculated by average /Not able to assess (calculate score using [facility] averaging method)

## 2024-05-12 NOTE — H&P ADULT - HISTORY OF PRESENT ILLNESS
44F MHx spina bifida, multiple bilateral foot surgeries, and  shunt (managed [facility] neurosurgery), uterine fibroids presenting with R foot wound. Per patient, has had R foot wound for past 6-7 months, denies past trauma or injury. Last foot surgery was at age 15, not able to say what type of surgery. Foot wound presented first as a palpable mass that started draining, drainage was mostly bloody, for past few days has been white/yellow and very painful- now developed into area with central ulceration and assc redness in the past. Can ambulate at home, small distances, but has struggled with this due to pain. Endorsing abdominal pain due to active menstruation. Denies any fevers, chills, CP, SOB, n/v/d.     In the ED, T 100.5, , /100, 99% RA.

## 2024-05-12 NOTE — H&P ADULT - NSHPLABSRESULTS_GEN_ALL_CORE
8.6    4.99  )-----------( 462      ( 12 May 2024 09:10 )             25.3     05-12    138  |  104  |  4<L>  ----------------------------<  82  3.4<L>   |  21<L>  |  0.47<L>    Ca    8.3<L>      12 May 2024 09:10  Phos  3.1     05-12  Mg     1.90     05-12    TPro  7.5  /  Alb  3.5  /  TBili  <0.2  /  DBili  x   /  AST  11  /  ALT  6   /  AlkPhos  70  05-11          LIVER FUNCTIONS - ( 11 May 2024 18:40 )  Alb: 3.5 g/dL / Pro: 7.5 g/dL / ALK PHOS: 70 U/L / ALT: 6 U/L / AST: 11 U/L / GGT: x           Urinalysis Basic - ( 12 May 2024 09:10 )    Color: x / Appearance: x / SG: x / pH: x  Gluc: 82 mg/dL / Ketone: x  / Bili: x / Urobili: x   Blood: x / Protein: x / Nitrite: x   Leuk Esterase: x / RBC: x / WBC x   Sq Epi: x / Non Sq Epi: x / Bacteria: x      PT/INR - ( 11 May 2024 18:40 )   PT: 11.6 sec;   INR: 1.04 ratio         PTT - ( 11 May 2024 18:40 )  PTT:31.3 sec    < from: Xray Foot AP + Lateral, Right (05.11.24 @ 23:40) >      Impression:  Talar, calcaneal, navicular and cuboid osseous fusion with associated   surgical staples. There is extensive soft tissue swelling which is most   prominent medially at the level of thetarsal bones. There is age   indeterminate irregularity of the distal navicular and medial cuneiform   proximally. Correlate with the pending MRI.    There is a chronic ossific fragment medially adjacent to the base of the   first proximal phalanx. There is mild concavity at the first metatarsal   head and flattening at the fifth metatarsal head which are likely chronic.    < end of copied text >    < from: CT Foot w/ IV Cont, Right (05.11.24 @ 23:51) >    impression:    There is orthopedic hardware in place.  There is chronic bony fusion of   the calcaneus, talus, navicular and cuneiform.    There is no acute fracture, dislocation or suspicious osseous lesion.    There is no gross CT evidence of advanced osteomyelitis or destructive   bony process.    There is diffuse superficial soft tissues/skin thickening and   subcutaneous edema in the foot.  No discrete abscess or drainable fluid   collection is visualized.  No soft tissue gas or foreign body is   visualized.    If there is clinical concern for early osteomyelitis, follow-up   three-phase nuclear medicine bone scan or MRI may be obtained for further   evaluation.    < end of copied text >

## 2024-05-12 NOTE — H&P ADULT - PROBLEM SELECTOR PLAN 4
hx of uterine fibroid, heavy periods  outpatient OBGYN f/u hx of uterine fibroid, heavy periods  currently on period -- likely etiology of microscopic hematuria on UA  outpatient OBGYN f/u

## 2024-05-12 NOTE — H&P ADULT - NSHPSOCIALHISTORY_GEN_ALL_CORE
lives home with son    uses motorized wheelchair when outside, can ambulate at home small distance  no alc use, smoking use, IVDU lives at home with son    uses motorized wheelchair when outside, can ambulate at home small distances  no alc use, smoking use, IVDU

## 2024-05-12 NOTE — DIETITIAN INITIAL EVALUATION ADULT - PERTINENT MEDS FT
MEDICATIONS  (STANDING):  enoxaparin Injectable 40 milliGRAM(s) SubCutaneous every 24 hours  melatonin 3 milliGRAM(s) Oral at bedtime  mupirocin 2% Ointment 1 Application(s) Topical <User Schedule>  piperacillin/tazobactam IVPB.. 3.375 Gram(s) IV Intermittent every 8 hours    MEDICATIONS  (PRN):  acetaminophen     Tablet .. 975 milliGRAM(s) Oral every 6 hours PRN Temp greater or equal to 38C (100.4F), Mild Pain (1 - 3)  ketorolac 15 milliGRAM(s) Oral every 6 hours PRN Moderate Pain (4 - 6)  traMADol 25 milliGRAM(s) Oral every 6 hours PRN Severe Pain (7 - 10)

## 2024-05-12 NOTE — H&P ADULT - TIME BILLING
review of laboratory data, radiology results, consultants' recommendations, documentation in [facility], discussion with patient/ACP and interdisciplinary staff (such as , social workers, etc). Interventions were performed as documented above.

## 2024-05-12 NOTE — H&P ADULT - PROBLEM SELECTOR PLAN 3
pt with hx spina bifida, has motorized wheelchair at home    shunt managed by [facility] neurosurgery   CTM

## 2024-05-12 NOTE — H&P ADULT - PROBLEM SELECTOR PLAN 5
DVT: lovenox   Diet; regular  Dispo: pending DVT: lovenox   Diet: regular  Dispo: pending med optimization, PT eval

## 2024-05-12 NOTE — CONSULT NOTE ADULT - ASSESSMENT
Impression/Hospital Course:  Patient is a 44F PMHx spina bifida, multiple bilateral foot surgeries, and  shunt (managed [facility] neurosurgery), uterine fibroids presenting with R foot wound. Per patient, has had R foot wound for past 6-7 months, denies past trauma or injury. Last foot surgery was at age 15, not able to say what type of surgery. Foot wound presented first as a palpable mass that started draining, drainage was mostly bloody, for past few days has been white/yellow and very painful- now developed into area with central ulceration and assc redness in the past. UPon work up patient febrile to 100.5 (rectal) with tachycardia. Labs were significant for WBC of 8.09 now 4.99, PLTs 16 now 462, ESR 81, CrP 18.3, MSSA positive on swab and UA showing 3 WBC but many bacteria. Imaging performed was a RLE US which was negative for deep venous thrombosis but showed a nonspecific left groin lymph node measures 2.2 x 1.2 x 2.1 cm, and CT of the R foot which showed Talocalcaneal, navicular and cuboid osseous and hardware fusion, 5.3 x 1.8 x 5.0 cm forming abscess within the dorsal medial soft tissues, mild cortical irregularity at the adjacent distal navicula and proximal medial cuneiform with tiny ossific fragments and CXR negative.     Antimicrobials:  vancomycin 5/11  piperacillin/tazobactam 5/11 - current    Assessment:  *R foot wound with 5.3 x 1.8 x 5.0 cm abscess along with hardware concerning for hardware infection along with possible osteomyelitis with soft tissue infection  *Pyrexia secondary to above  *Elevated ESR and CrP  *Thrombocytosis   *Spina bifida   * shunt     Recommendations: PLEASE DEFER ALL CHANGES IN PLAN UNTIL SIGNED BY ATTENDING. All recommendations are tentative pending Attending Attestation.  - continue piperacillin/tazobactam   - would obtain culture of foot drainage  - obtain MRI of R foot to assess extent of infection   - no need for Vanco as swab shows MSSA  - there is concern for source control in the setting of abscess with closely associated hardware, would defer to podiatry team for intervention  - trend temperature, ESR/CRP and WBC curve to monitor the progression of infection   - will adjust antimicrobial therapy based off of culture and sensitivity, blood cultures received by lab with results pending     Casa Martino DO, PGY-4   Infectious Disease Fellow  [facility] Teams Preferred  After 5pm/weekends call [phone]  Impression/Hospital Course:  Patient is a 44F PMHx spina bifida, multiple bilateral foot surgeries, and  shunt (managed [facility] neurosurgery), uterine fibroids presenting with R foot wound. Per patient, has had R foot wound for past 6-7 months, denies past trauma or injury. Last foot surgery was at age 15, not able to say what type of surgery. Foot wound presented first as a palpable mass that started draining, drainage was mostly bloody, for past few days has been white/yellow and very painful- now developed into area with central ulceration and assc redness in the past. UPon work up patient febrile to 100.5 (rectal) with tachycardia. Labs were significant for WBC of 8.09 now 4.99, PLTs 16 now 462, ESR 81, CrP 18.3, MSSA positive on swab and UA showing 3 WBC but many bacteria. Imaging performed was a RLE US which was negative for deep venous thrombosis but showed a nonspecific left groin lymph node measures 2.2 x 1.2 x 2.1 cm, and CT of the R foot which showed Talocalcaneal, navicular and cuboid osseous and hardware fusion, 5.3 x 1.8 x 5.0 cm forming abscess within the dorsal medial soft tissues, mild cortical irregularity at the adjacent distal navicula and proximal medial cuneiform with tiny ossific fragments and CXR negative.     Antimicrobials:  vancomycin 5/11  piperacillin/tazobactam 5/11 - current    Assessment:  *R foot wound with 5.3 x 1.8 x 5.0 cm abscess along with hardware concerning for hardware infection along with possible osteomyelitis with soft tissue infection  *Pyrexia secondary to above  *Elevated ESR and CrP  *Thrombocytosis   *Spina bifida   * shunt     Recommendations:   - continue piperacillin/tazobactam   - would obtain culture of foot drainage  - obtain MRI of R foot to assess extent of infection   - no need for Vanco as swab shows MSSA  - there is concern for source control in the setting of abscess with closely associated hardware, would defer to podiatry team for intervention  - trend temperature, ESR/CRP and WBC curve to monitor the progression of infection   - will adjust antimicrobial therapy based off of culture and sensitivity, blood cultures received by lab with results pending     Casa Martino DO, PGY-4   Infectious Disease Fellow  [facility] Teams Preferred  After 5pm/weekends call [phone]

## 2024-05-12 NOTE — DIETITIAN INITIAL EVALUATION ADULT - PERSON TAUGHT/METHOD
Explored patient food preferences and provided encouragement to eat consistently to optimize intake. Advised to adhere to supplement recommendations to meet nutritional needs. Explored patient food preferences to implement as able during hospitalization. Education provided on high energy/protein requirements for wound healing. Patient verbalized understanding and thanked writer for visit./verbal instruction/individual instruction/patient instructed

## 2024-05-13 ENCOUNTER — TRANSCRIPTION ENCOUNTER (OUTPATIENT)
Age: 44
End: 2024-05-13

## 2024-05-13 ENCOUNTER — RESULT REVIEW (OUTPATIENT)
Age: 44
End: 2024-05-13

## 2024-05-13 LAB
ANION GAP SERPL CALC-SCNC: 12 MMOL/L — SIGNIFICANT CHANGE UP (ref 7–14)
BUN SERPL-MCNC: 15 MG/DL — SIGNIFICANT CHANGE UP (ref 7–23)
CALCIUM SERPL-MCNC: 9 MG/DL — SIGNIFICANT CHANGE UP (ref 8.4–10.5)
CHLORIDE SERPL-SCNC: 104 MMOL/L — SIGNIFICANT CHANGE UP (ref 98–107)
CO2 SERPL-SCNC: 22 MMOL/L — SIGNIFICANT CHANGE UP (ref 22–31)
CREAT SERPL-MCNC: 0.55 MG/DL — SIGNIFICANT CHANGE UP (ref 0.5–1.3)
EGFR: 114 ML/MIN/1.73M2 — SIGNIFICANT CHANGE UP
GLUCOSE SERPL-MCNC: 103 MG/DL — HIGH (ref 70–99)
HCT VFR BLD CALC: 29.3 % — LOW (ref 34.5–45)
HGB BLD-MCNC: 9.4 G/DL — LOW (ref 11.5–15.5)
MAGNESIUM SERPL-MCNC: 2 MG/DL — SIGNIFICANT CHANGE UP (ref 1.6–2.6)
MCHC RBC-ENTMCNC: 26.9 PG — LOW (ref 27–34)
MCHC RBC-ENTMCNC: 32.1 GM/DL — SIGNIFICANT CHANGE UP (ref 32–36)
MCV RBC AUTO: 84 FL — SIGNIFICANT CHANGE UP (ref 80–100)
NRBC # BLD: 0 /100 WBCS — SIGNIFICANT CHANGE UP (ref 0–0)
NRBC # FLD: 0 K/UL — SIGNIFICANT CHANGE UP (ref 0–0)
PHOSPHATE SERPL-MCNC: 4.1 MG/DL — SIGNIFICANT CHANGE UP (ref 2.5–4.5)
PLATELET # BLD AUTO: 452 K/UL — HIGH (ref 150–400)
POTASSIUM SERPL-MCNC: 3.7 MMOL/L — SIGNIFICANT CHANGE UP (ref 3.5–5.3)
POTASSIUM SERPL-SCNC: 3.7 MMOL/L — SIGNIFICANT CHANGE UP (ref 3.5–5.3)
RBC # BLD: 3.49 M/UL — LOW (ref 3.8–5.2)
RBC # FLD: 17.2 % — HIGH (ref 10.3–14.5)
SODIUM SERPL-SCNC: 138 MMOL/L — SIGNIFICANT CHANGE UP (ref 135–145)
WBC # BLD: 4.51 K/UL — SIGNIFICANT CHANGE UP (ref 3.8–10.5)
WBC # FLD AUTO: 4.51 K/UL — SIGNIFICANT CHANGE UP (ref 3.8–10.5)

## 2024-05-13 PROCEDURE — 99233 SBSQ HOSP IP/OBS HIGH 50: CPT

## 2024-05-13 PROCEDURE — 93923 UPR/LXTR ART STDY 3+ LVLS: CPT | Mod: 26

## 2024-05-13 PROCEDURE — 99232 SBSQ HOSP IP/OBS MODERATE 35: CPT

## 2024-05-13 PROCEDURE — 99222 1ST HOSP IP/OBS MODERATE 55: CPT

## 2024-05-13 PROCEDURE — 93922 UPR/L XTREMITY ART 2 LEVELS: CPT | Mod: 26,59

## 2024-05-13 RX ORDER — MUPIROCIN 20 MG/G
1 OINTMENT TOPICAL EVERY 12 HOURS
Refills: 0 | Status: DISCONTINUED | OUTPATIENT
Start: 2024-05-13 | End: 2024-05-13

## 2024-05-13 RX ORDER — MUPIROCIN 20 MG/G
1 OINTMENT TOPICAL EVERY 12 HOURS
Refills: 0 | Status: COMPLETED | OUTPATIENT
Start: 2024-05-13 | End: 2024-05-16

## 2024-05-13 RX ADMIN — PIPERACILLIN AND TAZOBACTAM 25 GRAM(S): 4; .5 INJECTION, POWDER, LYOPHILIZED, FOR SOLUTION INTRAVENOUS at 05:01

## 2024-05-13 RX ADMIN — PIPERACILLIN AND TAZOBACTAM 25 GRAM(S): 4; .5 INJECTION, POWDER, LYOPHILIZED, FOR SOLUTION INTRAVENOUS at 14:39

## 2024-05-13 RX ADMIN — Medication 975 MILLIGRAM(S): at 22:00

## 2024-05-13 RX ADMIN — MUPIROCIN 1 APPLICATION(S): 20 OINTMENT TOPICAL at 09:55

## 2024-05-13 RX ADMIN — PIPERACILLIN AND TAZOBACTAM 25 GRAM(S): 4; .5 INJECTION, POWDER, LYOPHILIZED, FOR SOLUTION INTRAVENOUS at 21:15

## 2024-05-13 RX ADMIN — Medication 975 MILLIGRAM(S): at 21:27

## 2024-05-13 RX ADMIN — Medication 3 MILLIGRAM(S): at 21:15

## 2024-05-13 NOTE — PROGRESS NOTE ADULT - PROBLEM SELECTOR PLAN 5
DVT: lovenox   Diet: regular  Dispo: pending med optimization, PT eval DVT: lovenox   Diet: regular  Dispo: pending med optimization, PT rec outpatient pt

## 2024-05-13 NOTE — PROGRESS NOTE ADULT - ATTENDING COMMENTS
HPI:  87M PMHx dementia (AAO x 2 at baseline), Afib on eliquis, DM, HTN, hard of hearing who presents with cough, sneezing, and increased confusion at home after Covid exposure Wednesday July 6. Of note, patient currently AAO x 1 is unable to provide any ROS or contribution to information regarding symptoms due to dementia/delirium. As per his son in law Dr. Zeinab Frost (cardiologist; [phone]), his baseline mental status is AAO x 2 usually, able to basically care for himself for the most part with help of HHA at home. Ambulates independently. As per my discussion with his daughter Maddie, patient has been coughing and sneezing and has been "sundowning" at home, more confused than baseline. He lives with his wife who is currently in ER as well, who also presented with Covid and cognitive decline/increasing confusion. Patient denies having any pain when asked in Lao using , as well as staff at bedside who speaks Lao. Denied having any other complaints but very limited history as noted above. In the ED, he was noted to have wheezing and mild tachypnea, was given duonebs and decadron, 500cc LR, and haldol 2.5mg IV for agitation. He was noted to have fever of 100.8F and be saturating well on room air.  (11 Jul 2022 08:33)    Endocrine consult  HPI:  88 yo M with dementia, afib, DM2, HTN, with COVID s/p dex course. Endocrine consulted for DM2 management.    History obtained by phone from family.  Has had diabetes for many years. Has never been on insulin and has never been hospitalized for diabetes. Per son-in-law (cardiologist at [facility]), patient's diabetes has been well controlled without hypoglycemia. No known diabetes complications. DM managed by Dr. Paz (palliative care medicine).    Home Meds:  Glipizide XR 10 mg only    Previously on metformin     Last HbA1c: 6.1%    Inpatient DM regimen:  s/p lantus 12 units last night  glucoses 120->422 in last 24 hrs      PAST MEDICAL & SURGICAL HISTORY:  Afib  on eliquis      Diabetes mellitus      Hypertension      Hard of hearing      Dementia      Cataract, bilateral      No significant past surgical history          FAMILY HISTORY:  No pertinent family history in first degree relatives  +DM in famly      Social History:  no tobacco, etoh or drug use    Outpatient Medications:    MEDICATIONS  (STANDING):  amLODIPine   Tablet 5 milliGRAM(s) Oral daily  apixaban 2.5 milliGRAM(s) Oral every 12 hours  ascorbic acid 500 milliGRAM(s) Oral daily  dextrose 5%. 1000 milliLiter(s) (50 mL/Hr) IV Continuous <Continuous>  dextrose 5%. 1000 milliLiter(s) (100 mL/Hr) IV Continuous <Continuous>  dextrose 5%. 1000 milliLiter(s) (100 mL/Hr) IV Continuous <Continuous>  dextrose 5%. 1000 milliLiter(s) (50 mL/Hr) IV Continuous <Continuous>  dextrose 50% Injectable 25 Gram(s) IV Push once  dextrose 50% Injectable 12.5 Gram(s) IV Push once  dextrose 50% Injectable 25 Gram(s) IV Push once  dextrose 50% Injectable 25 Gram(s) IV Push once  dextrose 50% Injectable 12.5 Gram(s) IV Push once  dextrose 50% Injectable 25 Gram(s) IV Push once  donepezil 5 milliGRAM(s) Oral at bedtime  glucagon  Injectable 1 milliGRAM(s) IntraMuscular once  glucagon  Injectable 1 milliGRAM(s) IntraMuscular once  insulin glargine Injectable (LANTUS) 12 Unit(s) SubCutaneous at bedtime  insulin lispro (ADMELOG) corrective regimen sliding scale   SubCutaneous three times a day before meals  insulin lispro (ADMELOG) corrective regimen sliding scale   SubCutaneous at bedtime  melatonin 9 milliGRAM(s) Oral at bedtime  metoprolol tartrate 50 milliGRAM(s) Oral two times a day  multivitamin 1 Tablet(s) Oral daily  pantoprazole    Tablet 40 milliGRAM(s) Oral before breakfast  simvastatin 20 milliGRAM(s) Oral at bedtime    MEDICATIONS  (PRN):  acetaminophen     Tablet .. 650 milliGRAM(s) Oral every 4 hours PRN Temp greater or equal to 38C (100.4F), Severe Pain (7 - 10)  ALBUTerol    90 MICROgram(s) HFA Inhaler 2 Puff(s) Inhalation every 6 hours PRN Bronchospasm  dextrose Oral Gel 15 Gram(s) Oral once PRN Blood Glucose LESS THAN 70 milliGRAM(s)/deciliter  dextrose Oral Gel 15 Gram(s) Oral once PRN Blood Glucose LESS THAN 70 milliGRAM(s)/deciliter  guaiFENesin Oral Liquid (Sugar-Free) 100 milliGRAM(s) Oral every 6 hours PRN Cough  metoprolol tartrate Injectable 5 milliGRAM(s) IV Push every 6 hours PRN sustained HR over 140 bpm      Allergies    No Known Allergies    Intolerances        Review of Systems:  Constitutional: No fever, good appetite/po intake  Eyes: No blurry vision, diplopia  Neuro: No tremors  HEENT: No pain  Cardiovascular: No chest pain, palpitations  Respiratory: No SOB, no cough  GI: No nausea, vomiting,   : No dysuria, hematuria  Skin: no rash  Psych: no depression  Endocrine: no polyuria, polydipsia  Hem/lymph: no swelling  Osteoporosis: no fractures    ALL OTHER SYSTEMS REVIEWED AND NEGATIVE    PHYSICAL EXAM:  VITALS: T(C): 36.4 (07-21-22 @ 11:07)  T(F): 97.5 (07-21-22 @ 11:07), Max: 97.8 (07-20-22 @ 22:45)  HR: 74 (07-21-22 @ 11:07) (74 - 85)  BP: 119/72 (07-21-22 @ 11:07) (106/60 - 146/75)  RR:  (16 - 18)  SpO2:  (94% - 98%)  Wt(kg): --  GENERAL: NAD, well-groomed, well-developed  EYES: No proptosis, extraocular movements intact,  no lid lag, anicteric  HEENT:  Atraumatic, Normocephalic, moist mucous membranes  THYROID: Normal size, no palpable nodules, no thyromegaly  RESPIRATORY: Clear to auscultation bilaterally; No rales, rhonchi, wheezing, or rubs  CARDIOVASCULAR: Regular rate and rhythm; No murmurs; no peripheral edema  GI: Soft, nontender, non distended, normal bowel sounds  SKIN: Dry, intact, No rashes or lesions  EXTREMITIES: No foot ulcers, distal pedal pulses intact bilaterally  NEURO: sensation intact, no tremors  PSYCH: reactive affect, euthymic mood  CUSHING'S SIGNS: no striae or visible bruising                              12.9   16.22 )-----------( 198      ( 21 Jul 2022 05:44 )             42.1       07-21    147<H>  |  111<H>  |  66<H>  ----------------------------<  171<H>  4.8   |  25  |  1.16    eGFR: 60    Ca    9.1      07-21  Mg     2.40     07-21  Phos  3.0     07-21    TPro  5.9<L>  /  Alb  2.8<L>  /  TBili  0.6  /  DBili  0.2  /  AST  34  /  ALT  34  /  AlkPhos  82  07-19      Thyroid Function Tests:      07-12 Chol 146 Direct LDL -- LDL calculated 67 HDL 62 Trig 84    Radiology:       A/P: 88 yo M with dementia, afib, DM2, HTN, with COVID s/p dex course. Endocrine consulted for DM2 management.      #Type 2 Diabetes Mellitus c/b steroid induced hyperglycemia  - a1c 6.1%, home regimen glipizide 10 mg  - Recommend reducing lantus 10 units qhs  - Recommend low dose admelog correction scale TIDQAC and QHS  - Please check FSG before meals and QHS, or q6h while NPO  - inpt glucose goals 140-180  - carb consistent diet  - hypo protocol  - d/c planning - if no further steroids, would discharge off glipizide. Glucoses likely tightly controlled at home prior to covid/dex. Outpatient can consider alternative/non sulfonylurea agent such as DPP4i (tradjenta, januvia, alogliptin). If going to rehab, can discharge on just low dose correction scale with meals/before bed if no steroids    #Hypertension  - goal BP <130/80  - Management as per primary team    #Hyperlipidemia  - check fasting lipid panel as outpt  - continue simvastatin 20 mg    Avis Feliciano MD  Attending Physician   Department of Endocrinology, Diabetes and Metabolism   Pager: [phone]    If before 9AM or after 5PM, or on weekends/holidays, please call the Endocrine answering service for assistance ([phone]).  For nonurgent matters, please email [email or web address] for assistance.          HPI:  87M PMHx dementia (AAO x 2 at baseline), Afib on eliquis, DM, HTN, hard of hearing who presents with cough, sneezing, and increased confusion at home after Covid exposure Wednesday July 6. Of note, patient currently AAO x 1 is unable to provide any ROS or contribution to information regarding symptoms due to dementia/delirium. As per his son in law Dr. Zeinab Frost (cardiologist; [phone]), his baseline mental status is AAO x 2 usually, able to basically care for himself for the most part with help of HHA at home. Ambulates independently. As per my discussion with his daughter Maddie, patient has been coughing and sneezing and has been "sundowning" at home, more confused than baseline. He lives with his wife who is currently in ER as well, who also presented with Covid and cognitive decline/increasing confusion. Patient denies having any pain when asked in Polish using , as well as staff at bedside who speaks Polish. Denied having any other complaints but very limited history as noted above. In the ED, he was noted to have wheezing and mild tachypnea, was given duonebs and decadron, 500cc LR, and haldol 2.5mg IV for agitation. He was noted to have fever of 100.8F and be saturating well on room air.  (11 Jul 2022 08:33)    Endocrine consult  HPI:  88 yo M with dementia, afib, DM2, HTN, with COVID s/p dex course. Endocrine consulted for DM2 management.  [place]  ID [id number]    History obtained by phone from family.   Has had diabetes for many years. Has never been on insulin and has never been hospitalized for diabetes. Per son-in-law (cardiologist at [facility]), patient's diabetes has been well controlled without hypoglycemia. No known diabetes complications. DM managed by Dr. Paz (palliative care medicine).    Home Meds:  Glipizide XR 10 mg only    Previously on metformin     Last HbA1c: 6.1%    Inpatient DM regimen:  s/p lantus 12 units last night  glucoses 120->422 in last 24 hrs      PAST MEDICAL & SURGICAL HISTORY:  Afib  on eliquis      Diabetes mellitus      Hypertension      Hard of hearing      Dementia      Cataract, bilateral      No significant past surgical history          FAMILY HISTORY:  No pertinent family history in first degree relatives  +DM in famly      Social History:  no tobacco, etoh or drug use    Outpatient Medications:    MEDICATIONS  (STANDING):  amLODIPine   Tablet 5 milliGRAM(s) Oral daily  apixaban 2.5 milliGRAM(s) Oral every 12 hours  ascorbic acid 500 milliGRAM(s) Oral daily  dextrose 5%. 1000 milliLiter(s) (50 mL/Hr) IV Continuous <Continuous>  dextrose 5%. 1000 milliLiter(s) (100 mL/Hr) IV Continuous <Continuous>  dextrose 5%. 1000 milliLiter(s) (100 mL/Hr) IV Continuous <Continuous>  dextrose 5%. 1000 milliLiter(s) (50 mL/Hr) IV Continuous <Continuous>  dextrose 50% Injectable 25 Gram(s) IV Push once  dextrose 50% Injectable 12.5 Gram(s) IV Push once  dextrose 50% Injectable 25 Gram(s) IV Push once  dextrose 50% Injectable 25 Gram(s) IV Push once  dextrose 50% Injectable 12.5 Gram(s) IV Push once  dextrose 50% Injectable 25 Gram(s) IV Push once  donepezil 5 milliGRAM(s) Oral at bedtime  glucagon  Injectable 1 milliGRAM(s) IntraMuscular once  glucagon  Injectable 1 milliGRAM(s) IntraMuscular once  insulin glargine Injectable (LANTUS) 12 Unit(s) SubCutaneous at bedtime  insulin lispro (ADMELOG) corrective regimen sliding scale   SubCutaneous three times a day before meals  insulin lispro (ADMELOG) corrective regimen sliding scale   SubCutaneous at bedtime  melatonin 9 milliGRAM(s) Oral at bedtime  metoprolol tartrate 50 milliGRAM(s) Oral two times a day  multivitamin 1 Tablet(s) Oral daily  pantoprazole    Tablet 40 milliGRAM(s) Oral before breakfast  simvastatin 20 milliGRAM(s) Oral at bedtime    MEDICATIONS  (PRN):  acetaminophen     Tablet .. 650 milliGRAM(s) Oral every 4 hours PRN Temp greater or equal to 38C (100.4F), Severe Pain (7 - 10)  ALBUTerol    90 MICROgram(s) HFA Inhaler 2 Puff(s) Inhalation every 6 hours PRN Bronchospasm  dextrose Oral Gel 15 Gram(s) Oral once PRN Blood Glucose LESS THAN 70 milliGRAM(s)/deciliter  dextrose Oral Gel 15 Gram(s) Oral once PRN Blood Glucose LESS THAN 70 milliGRAM(s)/deciliter  guaiFENesin Oral Liquid (Sugar-Free) 100 milliGRAM(s) Oral every 6 hours PRN Cough  metoprolol tartrate Injectable 5 milliGRAM(s) IV Push every 6 hours PRN sustained HR over 140 bpm      Allergies    No Known Allergies    Intolerances        Review of Systems:  Constitutional: No fever, good appetite/po intake  Eyes: No blurry vision, diplopia  Neuro: No tremors  HEENT: No pain  Cardiovascular: No chest pain, palpitations  Respiratory: No SOB, no cough  GI: No nausea, vomiting,   : No dysuria, hematuria  Skin: no rash  Psych: no depression  Endocrine: no polyuria, polydipsia  Hem/lymph: no swelling  Osteoporosis: no fractures    ALL OTHER SYSTEMS REVIEWED AND NEGATIVE    PHYSICAL EXAM:  VITALS: T(C): 36.4 (07-21-22 @ 11:07)  T(F): 97.5 (07-21-22 @ 11:07), Max: 97.8 (07-20-22 @ 22:45)  HR: 74 (07-21-22 @ 11:07) (74 - 85)  BP: 119/72 (07-21-22 @ 11:07) (106/60 - 146/75)  RR:  (16 - 18)  SpO2:  (94% - 98%)  Wt(kg): --  GENERAL: NAD, well-groomed, well-developed  EYES: No proptosis, extraocular movements intact,  no lid lag, anicteric  HEENT:  Atraumatic, Normocephalic, moist mucous membranes  THYROID: Normal size, no thyromegaly  RESPIRATORY: no respiratory distress on RA; No rales, rhonchi, wheezing, or rubs  CARDIOVASCULAR: Regular rate and rhythm; No murmurs; no peripheral edema  GI: Soft, nontender, non distended, normal bowel sounds  SKIN: Dry, intact, No rashes or lesions  EXTREMITIES: No edema or rash  NEURO: sensation intact, no tremors  PSYCH: reactive affect, euthymic mood  CUSHING'S SIGNS: no striae or visible bruising                              12.9   16.22 )-----------( 198      ( 21 Jul 2022 05:44 )             42.1       07-21    147<H>  |  111<H>  |  66<H>  ----------------------------<  171<H>  4.8   |  25  |  1.16    eGFR: 60    Ca    9.1      07-21  Mg     2.40     07-21  Phos  3.0     07-21    TPro  5.9<L>  /  Alb  2.8<L>  /  TBili  0.6  /  DBili  0.2  /  AST  34  /  ALT  34  /  AlkPhos  82  07-19      Thyroid Function Tests:      07-12 Chol 146 Direct LDL -- LDL calculated 67 HDL 62 Trig 84    Radiology:        44F MHx spina bifida, multiple bilateral foot surgeries, and  shunt (managed [facility] neurosurgery), uterine fibroids presenting with R foot wound.  Pt admitted for rule out OM    R foot wound: given drainage and suspicion of abscess  seen on CT, will continue with ABx. MRI pending to r/o OM/ f/u Podiatry and ID recs. Check ADAM. 44F MHx spina bifida, multiple bilateral foot surgeries, and  shunt (managed [facility] neurosurgery), uterine fibroids presenting with R foot wound.  Pt admitted for rule out OM    R foot wound: given drainage and suspicion of abscess  seen on CT, will continue with ABx. MRI pending to r/o OM/ f/u Podiatry and ID recs. Check ADAM.    Stage 4 sacral  decubitus: c/w wound vac and wound care.

## 2024-05-13 NOTE — PROGRESS NOTE ADULT - PROBLEM SELECTOR PLAN 4
hx of uterine fibroid, heavy periods  currently on period -- likely etiology of microscopic hematuria on UA  outpatient OBGYN f/u

## 2024-05-13 NOTE — DISCHARGE NOTE PROVIDER - NSDCCPTREATMENT_GEN_ALL_CORE_FT
PRINCIPAL PROCEDURE  Procedure: CT foot right  Findings and Treatment:   < end of copied text >  Impression: Talocalcaneal, navicular and cuboid osseous and hardware   fusion. 5.3 x 1.8 x 5.0 cm forming abscess within the dorsal medial soft   tissues. Mild cortical irregularity at the adjacent distal navicula and   proximal medial cuneiform with tiny ossific fragments which is age   indeterminate. Correlate with the scheduled MRI.< from: CT Foot w/ IV Cont, Right (05.11.24 @ 23:51) >       PRINCIPAL PROCEDURE  Procedure: CT foot right  Findings and Treatment:   < end of copied text >  Impression: Talocalcaneal, navicular and cuboid osseous and hardware   fusion. 5.3 x 1.8 x 5.0 cm forming abscess within the dorsal medial soft   tissues. Mild cortical irregularity at the adjacent distal navicula and   proximal medial cuneiform with tiny ossific fragments which is age   indeterminate. Correlate with the scheduled MRI.< from: CT Foot w/ IV Cont, Right (05.11.24 @ 23:51) >        SECONDARY PROCEDURE  Procedure: CT abdomen and pelvis  Findings and Treatment: IMPRESSION:  *  Enlarged, myomatous uterus.  *  Stool distends the colon and rectum.  *  Posteriorly directed anal sphincter complex with surrounding soft   tissue infiltration.  *  Thick walled fluid collection superficial to the left ischium   measuring up to 7.9 cm with thick surrounding soft tissue component.   Findings are concerning for abscess.      Procedure: MRI rt foot  Findings and Treatment: IMPRESSION:  1.  Subcutaneous abscess in the dorsum of the foot measuring 18 x 22 8 mm.  2.  No acute osteomyelitis.      Procedure: CT head  Findings and Treatment: FINDINGS:  Right occipital approach shunt catheter tip in region of interhemispheric   fissure. Left occipital shunt catheter in the region of the posterior   medial left temporal lobe. Agenesis of the corpus callosum. The lateral   ventricles are slitlike in appearance. Chronic right parietal calvarial   shivam hole.  No acute intracranial hemorrhage. There is no compelling evidence for an   acute transcortical infarction. There is no evidence of mass, mass   effect, midline shift or extra-axial fluid collection.  The orbits,   mastoid air cells and visualized paranasal sinuses are unremarkable. The   calvarium is intact. Consider MRI as clinically warranted.  IMPRESSION: No hydrocephalus.

## 2024-05-13 NOTE — PROGRESS NOTE ADULT - PROBLEM SELECTOR PLAN 3
pt with hx spina bifida, has motorized wheelchair at home    shunt managed by [facility] neurosurgery   CTM

## 2024-05-13 NOTE — PROGRESS NOTE ADULT - PROBLEM SELECTOR PLAN 2
pt with sacral wound noted  frequent repositioning  f/u wound care recs pt with sacral wound noted  frequent repositioning  f/u wound care recs  per wound care NP, pt to go home with wound vac for sacral wound Stage 4 sacral decub with wound vac.  pt with sacral wound noted  frequent repositioning  f/u wound care recs  per wound care NP, pt to go home with wound vac for sacral wound

## 2024-05-13 NOTE — PROGRESS NOTE ADULT - ASSESSMENT
44F s/p b/s R foot dorsal medial mass incision & drainage (5/11)  - Patient seen and evaluated  - Afebrile, no leukocytosis   - 5/11 s/p b/s Right foot dorsal medial non-mobile mass incision and drainage, closed, sutures intact, central mass wound to dermis, hyperpigmented periwound, no fluctuance, no pus, no bogginess, no malodor, no periwound erythema.  Left lateral ankle wound to subQ, no tracking/ tunnelling/ signs of infection.   - Right foot XR: no OM, no gas  - Right foot CT:  abscess within the dorsal medial soft tissues, clinically no fluctuance, no purulence expressed today.   - R foot MR pending   - R foot not likely source of fever, please rule out other sources   - STRICT decubitus precautions, Z- FLOWS boots at all time when in bed and in chair resting.   - Pod plan heavily pending R foot MR  - Seen with attending

## 2024-05-13 NOTE — DISCHARGE NOTE PROVIDER - DETAILS OF MALNUTRITION DIAGNOSIS/DIAGNOSES
This patient has been assessed with a concern for Malnutrition and was treated during this hospitalization for the following Nutrition diagnosis/diagnoses:     -  05/12/2024: Underweight (BMI < 19)

## 2024-05-13 NOTE — DISCHARGE NOTE PROVIDER - NSDCMRMEDTOKEN_GEN_ALL_CORE_FT
gabapentin 100 mg oral capsule: 1 cap(s) orally 3 times a day   acetaminophen 325 mg oral tablet: 2 tab(s) orally every 6 hours As needed Temp greater or equal to 38C (100.4F), Mild Pain (1 - 3), Moderate Pain (4 - 6)  bisacodyl 10 mg rectal suppository: 1 suppository(ies) rectal once a day as needed for  constipation  gabapentin 100 mg oral capsule: 1 cap(s) orally 3 times a day  polyethylene glycol 3350 oral powder for reconstitution: 17 gram(s) orally once a day  simethicone 80 mg oral tablet, chewable: 1 tab(s) orally 3 times a day As needed Gas   acetaminophen 325 mg oral tablet: 2 tab(s) orally every 6 hours As needed Temp greater or equal to 38C (100.4F), Mild Pain (1 - 3), Moderate Pain (4 - 6)  bisacodyl 10 mg rectal suppository: 1 suppository(ies) rectal once a day as needed for  constipation  calamine topical lotion: 1 Apply topically to affected area 4 times a day As needed Itching  enoxaparin: 30 milligram(s) subcutaneous once a day  ferrous sulfate 325 mg (65 mg elemental iron) oral tablet: 1 tab(s) orally once a day  gabapentin 100 mg oral capsule: 1 cap(s) orally 3 times a day  hydrocortisone 1% topical cream: 1 Apply topically to affected area 2 times a day  lidocaine 4% topical film: Apply topically to affected area once a day  melatonin 3 mg oral tablet: 1 tab(s) orally once a day (at bedtime)  oxyCODONE 5 mg oral tablet: 1 tab(s) orally every 8 hours As needed Severe Pain (7 - 10)  polyethylene glycol 3350 oral powder for reconstitution: 17 gram(s) orally once a day  senna leaf extract oral tablet: 2 tab(s) orally 2 times a day  simethicone 80 mg oral tablet, chewable: 1 tab(s) orally 3 times a day As needed Gas   acetaminophen 325 mg oral tablet: 2 tab(s) orally every 6 hours As needed Temp greater or equal to 38C (100.4F), Mild Pain (1 - 3), Moderate Pain (4 - 6)  bisacodyl 10 mg rectal suppository: 1 suppository(ies) rectal once a day as needed for  constipation  calamine topical lotion: 1 Apply topically to affected area 4 times a day As needed Itching  enoxaparin: 30 milligram(s) subcutaneous once a day  ferrous sulfate 325 mg (65 mg elemental iron) oral tablet: 1 tab(s) orally once a day  gabapentin 100 mg oral capsule: 1 cap(s) orally 3 times a day  hydrocortisone 1% topical cream: 1 Apply topically to affected area 2 times a day  Levaquin 750 mg oral tablet: 1 tab(s) orally once a day  lidocaine 4% topical film: Apply topically to affected area once a day  melatonin 3 mg oral tablet: 1 tab(s) orally once a day (at bedtime)  oxyCODONE 5 mg oral tablet: 1 tab(s) orally every 8 hours As needed Severe Pain (7 - 10)  polyethylene glycol 3350 oral powder for reconstitution: 17 gram(s) orally once a day  senna leaf extract oral tablet: 2 tab(s) orally 2 times a day  simethicone 80 mg oral tablet, chewable: 1 tab(s) orally 3 times a day As needed Gas

## 2024-05-13 NOTE — CONSULT NOTE ADULT - SUBJECTIVE AND OBJECTIVE BOX
Wound SURGERY CONSULT NOTE    HPI: 44F MHx spina bifida, multiple bilateral foot surgeries, and  shunt (managed [facility] neurosurgery), uterine fibroids presenting with R foot wound. Per patient, has had R foot wound for past 6-7 months, denies past trauma or injury. Last foot surgery was at age 15, not able to say what type of surgery. Foot wound presented first as a palpable mass that started draining, drainage was mostly bloody, for past few days has been white/yellow and very painful- now developed into area with central ulceration and assc redness in the past. Can ambulate at home, small distances, but has struggled with this due to pain. Endorsing abdominal pain due to active menstruation. Denies any fevers, chills, CP, SOB, n/v/d.  In the ED, T 100.5, , /100, 99% RA.  (12 May 2024 09:51)    Wound consult requested to assist w/ management of  Left ischium Stage 4 pressure injury present on admission. Patient endorses she was not aware of left ischium wound until she came to the hospital and the nurses told her she has a wound. Patient denies associated pain or discomfort. Patient able to move in bed with some assistance. Endorses she is bedbound since November of 2023 after she has being having more bleeding secondary to "fibroids". Patient endorses prior to that she walked with a walker and braces to her lower extremities. Endorses she believes the braces caused "irritation and peeling of the skin to bilateral feet causing wounds" Patient endorses she has sensation from her waist down, endorses she has had surgeries in her lumbar spine, and bilateral feet. Patient endorses in the past most if her was under [facility] hospital. Patient reported at home wears diapers and sits at times in her motorized  wheelchair. Patient endorses left foot with previous "scab" today was oozing reported the podiatrist was recently at bedside. Patient endorses good appetite denies weight loss. Patient reports continence of urine and stool       Current Diet: Diet, Regular (05-12-24 @ 09:21)      PAST MEDICAL & SURGICAL HISTORY:  Spina bifida      Fibroids      Wound of right foot      Wound of left foot      S/P  shunt      S/P foot surgery, left      S/P foot surgery, right      REVIEW OF SYSTEMS:   General/Skin/ MSK: see HPI  All other systems negative    MEDICATIONS  (STANDING):  enoxaparin Injectable 30 milliGRAM(s) SubCutaneous every 24 hours  melatonin 3 milliGRAM(s) Oral at bedtime  mupirocin 2% Ointment 1 Application(s) Topical <User Schedule>  piperacillin/tazobactam IVPB.. 3.375 Gram(s) IV Intermittent every 8 hours    MEDICATIONS  (PRN):  acetaminophen     Tablet .. 975 milliGRAM(s) Oral every 6 hours PRN Temp greater or equal to 38C (100.4F), Mild Pain (1 - 3)  ketorolac 15 milliGRAM(s) Oral every 6 hours PRN Moderate Pain (4 - 6)  traMADol 25 milliGRAM(s) Oral every 6 hours PRN Severe Pain (7 - 10)      Allergies    Zyrtec (Rash)    Intolerances    SOCIAL HISTORY: Bedbound, lives at home with her son.     FAMILY HISTORY:  No pertinent family history in first degree relatives    PHYSICAL EXAM:  Vital Signs Last 24 Hrs  T(C): 36.7 (13 May 2024 04:30), Max: 36.9 (12 May 2024 18:50)  T(F): 98 (13 May 2024 04:30), Max: 98.5 (12 May 2024 18:50)  HR: 75 (13 May 2024 04:30) (74 - 90)  BP: 139/72 (13 May 2024 04:30) (108/66 - 145/94)  BP(mean): --  RR: 17 (13 May 2024 04:30) (17 - 18)  SpO2: 100% (13 May 2024 04:30) (100% - 100%)    Parameters below as of 13 May 2024 04:30  Patient On (Oxygen Delivery Method): room air      Weight (kg): 43.227 (12 May 2024 06:40)  BMI (kg/m2): 17.4 (12 May 2024 06:40)    Physical Exam   Constitutional: NAD/AOX3. Well kept.   Thin, (+) bony prominences to sacrum and bilateral trochanters  (+) low airloss support surface, (+) fluidized positioning devices, (+) complete cair boots  HEENT:  NC/AT, non icteric, mucosa moist, throat clear, trachea midline, neck supple  Cardiovascular: Rate regular   Respiratory: Equal chest expansion   Gastrointestinal soft NT/ND  : continence   Neurology : weakened strength & sensation grossly intact. Paraplegic.   Musculoskeletal:  limited aROM in lower extremities. Mild foot drop  Vascular: LLE equally warm, o cyanosis, clubbing, edema  Right foot with intact/dry dressing in place-Management as per podiatry   Left lateral malleolus shallow wound- 0.4cmx1.2cmx0.4cm, linear shape.   -No associated cellulitis, no purulence.   -Tissue type exposing 100% pink moist granular tissue.   -Periwound skin with yellow callus at wound edge from 9-12 o'clock. (+) Trace edema, no erythema, no fluctuance.   -No purulence.   Left DP pulse +2 palpable  no overt  ischemia noted  Skin:  moist w/ good turgor  Lumbar spine, Left achilles surgical scars, well approximated.   Left ischium Stage 4 pressure injury   -Well demarcated ulcer   -7izh5umw8.5cm   -Undermining circumferentially extends 3.5cm at 11 o'clock, and 4cm at 6 and 9 o'clock.   -No purulence express  -Tissue type exposing 100% red viable tissue appears like muscle?  -No bone palpable or visualized   -Periwound skin with skin maceration circumferentially no increased warmth, no erythema, no edema.   Bilateral buttocks with areas of hypopigmentation previous healed wounds suspect secondary to moisture associated dermatitis. No open wounds to sacrum.   Psych: calm and cooperative. Becomes emotional when sharing the delivery of her son  experienced. Emotional support provided.   **Of note patient requesting to keep briefs in place as she "bleeds a lot" and does not want to wet the bed. Explained to patient that absorbent pads are absorbent and will not trap moisture potentially further impairing wound healing, patient verbalized understanding, however still requesting to wear for now, encouraged patient to notified nursing promptly when the brief is soiled, she verbalized understanding.     LABS/ CULTURES/ RADIOLOGY:                        9.4    4.51  )-----------( 452      ( 13 May 2024 06:39 )             29.3       138  |  104  |  15  ----------------------------<  103      [05-13-24 @ 06:39]  3.7   |  22  |  0.55        Ca     9.0     [05-13-24 @ 06:39]      Mg     2.00     [05-13-24 @ 06:39]      Phos  4.1     [05-13-24 @ 06:39]    TPro  7.5  /  Alb  3.5  /  TBili  <0.2  /  DBili  x   /  AST  11  /  ALT  6   /  AlkPhos  70  [05-11-24 @ 18:40]    PT/INR: PT 11.6 , INR 1.04       [05-11-24 @ 18:40]  PTT: 31.3       [05-11-24 @ 18:40]    Culture - Blood (collected 05-11-24 @ 18:30)  Source: .Blood Blood-Peripheral  Preliminary Report (05-13-24 @ 02:02):    No growth at 24 hours    Culture - Blood (collected 05-11-24 @ 18:15)  Source: .Blood Blood-Peripheral  Preliminary Report (05-13-24 @ 02:02):    No growth at 24 hours        ACC: [id number] EXAM:  CT ABDOMEN AND PELVIS IC   ORDERED BY: ALFONSO RODRIGUES     PROCEDURE DATE:  10/20/2023      INTERPRETATION:  CLINICAL INFORMATION: Abdominal pain. Nausea and   vomiting.    COMPARISON: None.    CONTRAST/COMPLICATIONS:  IV Contrast: Omnipaque 350  90 cc administered   10 cc discarded  Oral Contrast: NONE  Complications: None reported at time of study completion    PROCEDURE:  CT of the Abdomen and Pelvis was performed.  Sagittal and coronal reformats were performed.    FINDINGS:  LOWER CHEST: Scattered cysts and subcentimeter indeterminant hypodense   foci that are too small to characterize.    LIVER: Within normal limits.  BILE DUCTS: Normal caliber.  GALLBLADDER: Within normal limits.  SPLEEN: Within normal limits.  PANCREAS: Within normal limits.  ADRENALS: Within normal limits.  KIDNEYS/URETERS: Within normal limits.    BLADDER: Within normal limits.  REPRODUCTIVE ORGANS: Enlarged multi fibroid uterus. Hypodense foci within   the endometrium.    BOWEL: No bowel obstruction. Moderate fecal load with fecal impaction of   the rectum.  PERITONEUM: No ascites.  VESSELS: Within normal limits.  RETROPERITONEUM/LYMPH NODES: No lymphadenopathy.  ABDOMINAL WALL:  shunt catheter descending along the left ventral chest  and abdominal wall entering the peritoneal cavity with tip terminating in   the left abdomen. Decubitus ulcer 2.4 cm deep overlying the left ischium..  BONES: L5 neural tube defect.    IMPRESSION:  Enlarged fibroid uterus with endometrial lesion is of uncertain etiology.   These may represent polyps, submucosal fibroids, neoplasm, or blood   clots. Recommend correlating with transvaginal pelvic ultrasound.    Left-sided decubitus ulcer overlying the ischium.    --- End of Report ---            MYRA GARCIA MD; Attending Radiologist  This document has been electronically signed. Oct 20 2023  9:16PM

## 2024-05-13 NOTE — DISCHARGE NOTE PROVIDER - NSDCHHASSISTDEVIC_GEN_ALL_CORE
Toothache    Toothaches are generally caused by tooth decay. If you have severe pain or swelling around a tooth, you may have a deep tooth infection. Tooth decay and infections require evaluation and treatment by a dentist or an oral surgeon. The emergency department will provide you with the best possible care available for your dental problem. Unfortunately, there is not a dentist or dental clinic available in the department. Routine dental care, such as fillings, tooth extractions, or sukhdev canals are not available in the emergency department. However, we are avaialbe for emergencies including abscesses, fractures of the jaw and other oral trauma such as a tooth that is knocked out. Any other dental problem is best treated by a dentist.  Please see the list of dental clinics in the area if you do not currently have a dentist.      Treatment That Can Be Provided for Toothache in the Emergency Department    If you have a severe toothache, medication may be prescribed for you until you are able to see a dentist.  If you are given an antibiotic, take it as prescribed and continue to take it until gone. Even if you start to feel better, your toothache will need to be treated by a dentist or an oral surgeon. Pain medication may be prescribed for you. As is the policy of the [facility] Department, the emergency department uses nonsteroidal anti-inflammatory medication (NSAIDs) as the primary medication for pain. These may include ibuprofen (Motrin®) or naproxyn sodium (Naprosyn®). Patients who are unable to take nonsteroidal anti-inflammatory medications will generally be advised to take acetaminophen (Tylenol®) for dental pain. As is the policy of the [address] [place] [address], the hospitals emergency department policy strongly discourages the use of the narcotic medications. (Tylenol #3®, Vicodin®, etc) are restricted by specific, strict guidelines.     If you have any questions concerning these instructions, call the emergency department at [facility] @ [phone]. Consult your care giver regarding these instructions and seek your physicians advice regarding medical care. Dental Referrals  Following is a list of local clinics that treat dental problems. Many also have specific emergency hours. For an appointment or for hours of operation, contact the clinic. [address] [facility]  [phone]     The [facility]  [address] Priyanka Weinberg 37    [facility] (referral agency)  [address] [facility], [address] , [address] [place] [address] Jermaine  [phone] or [phone]  (Application Process, Must Qualify)     [facility]  [address] [place] [address] Amy Harley Jose D Villal[address]. [address]  [phone]  ([facility] Medicaid and Insurance with Payment plans for Self-Insured)     Clinics:    [address] [facility]   [facility]  [address], [address]  [address] [facility]  [address]  [place] [address]. [place], [address] [facility]  [address] [facility]  Outpatient, [address] [facility], [address]:    [address] Simone Howell  Appointment only  [address] [facility], [address] [facility]  Malena-British speaking  [address]  [address]  [place], [address] [facility]  19[address] [facility]  81 [facility]  Bean Makeda [facility] [place] [address]  [address]  [address]  [place]  [address]  [facility] 167.   [place], [address]  [facility] 39  5900 [facility] 401 [facility] [place] [address]  Holly Weber 61  [address] Sim[address] [facility], [address] [place]     [facility]   [address]  [place], [address] Jayla Fitzpatrick  [phone] ext.  Mir Cuenc[address] 19  10 [facility], 1100 [facility]  [phone] or [phone]  (Must qualify) Wheelchair

## 2024-05-13 NOTE — CONSULT NOTE ADULT - ASSESSMENT
Assessment/Plan: 44F MHx spina bifida, multiple bilateral foot surgeries, and  shunt (managed [facility] neurosurgery), uterine fibroids presenting with R foot wound. Patient has being seen by podiatry and ID for right foot with abscess.     Wound Consult requested to assist w/ management of Left ischium Stage 4 pressure injury present on admission.     Left ischium pressure injury present on admission   -Well demarcated ulcer   -Patient endorses sensation from waist down, however was not aware of full thickness wound.   -Denies pain ir tenderness   -Previous CT of the ABD and pelvis (10/20/23):" Decubitus ulcer 2.4 cm deep overlying the left ischium"   -+ Undermining circumferentially deepest extends 4cm at 6 and 9 o'clock, no purulence.   -Tissue type exposing 100% viable tissue appears like muscle   -No associated cellulitis   -No bone palpable or visible  -No associated cellulitis   -Topical recommendations for left ischium: Negative therapy dressing, white foam for undermining, black granufoam, and 3M advanced skin protectant for macerated periwound skin. Manage by wound care nursing  team on M,W and Friday.   Default dressing: Clean wound and periwound skin with NS. Pat dry. Apply liquid barrier film to intact periwound skin, allow to dry. Lightly pack wound depth and undermining with Aquacel hydrofiber ribbon, make sure to leave 2" out at end visible for fully removal with next dressing change. Cover with silicone foam with border. Change daily or prn if soiled.   -Continue to offload as per hospsital protocol   -Avoid diaper use, if patient reluctant to keeping diaper please provide vigilant perineal care  -Risks for moisture associated dermatitis in bilateral buttocks/perineum: Gently clean with skin cleanser. Pat dry. Apply a thin layer of Willian barrier moisture cream, apply twice a day or prn if soiled.   -Appreciate nutrition Consult for patient with underweight, consider MVI & Vit C to promote wound healing  -Once discharge to home, Patient should qualify for a  roho seat cushion and low air loss bed  if she does not already have one at home.     Right foot wound and left lateral malleolus shallow wound -Management as per podiatry team, right foot wound receiving mupirocin as per podiatry team, consider mupirocin for left foot lateral malleolus wound and to cover  with small silicone foam with border (awaiting official recs from podiatry). Change daily.   -Continue to offload both heels with complete cair boots.     Additional Recs   Continue turning and positioning w/ offloading assistive devices as per protocol  Continue w/ Pericare as per protocol  Continue w/ low air loss fluidized bed surface     Care as per medicine, will follow w/ you periodically during hospital stay, please reconsult earlier if needed     Upon discharge f/u as outpatient at [facility] [address] [place], [place] [phone]  D/w team MD Billy Deluna and wound care nurse   Findings discussed with patient, all questions and concerns address to satisfaction.     Thank you for this consult  Roxie Patterson, KATHY-BC, CWАННАN (pager #[phone] or available in MS teams)    If after 4PM or before 7:30AM on Mon-Friday or weekend/holiday please contact general surgery for urgent matters.   Team A- [id number]   Team B- [id number]  For non-urgent matters e-mail [email or web address]    I spent 55 minutes face to face with this patient of which more than 50% of the time was spent counseling & coordinating care of this pt

## 2024-05-13 NOTE — DISCHARGE NOTE PROVIDER - ATTENDING ATTESTATION STATEMENT
Well FT AGA Female Cornish I have personally seen and examined the patient. I have collaborated with and supervised the

## 2024-05-13 NOTE — DISCHARGE NOTE PROVIDER - NSDCHHWEIGHT_GEN_ALL_CORE
"Occupational Therapy Evaluation completed.   Functional Status:  Pt seen today for OT evaluation. Pt is at supv level for basic self cares, functional transfers, and functional mobility with FWW. Pt educated on compensatory strategies during ADLs and reviewed DME for home. Pt has very accessible home and is well prepared, with assistance from her spouse as needed. Pt denies any further deficits in ADLs at this time.  Plan of Care: Patient with no further skilled OT needs in the acute care setting at this time  Discharge Recommendations:  Equipment: No Equipment Needed. Post-acute therapy Discharge to home with outpatient or home health for additional skilled therapy services.    See \"Rehab Therapy-Acute\" Patient Summary Report for complete documentation.    "
"Physical Therapy Evaluation completed.   Bed Mobility:  Supine to Sit: Supervised  Transfers: Sit to Stand: Supervised  Gait: Level Of Assist: Supervised with Front-Wheel Walker       Plan of Care: Patient with no further skilled PT needs in the acute care setting at this time  Discharge Recommendations: Equipment: No Equipment Needed. Post-acute therapy Discharge to home with outpatient or home health for additional skilled therapy services.    Ms. Gottlieb is a 71 y/o female who presents to acute secondary to L TKA. She presents with mild L knee ROM deficits and weakness that result in altered gait pattern and dynamic balance impairments. Balance deficits are remedied by use of FWW.  Cues for heel toe gait pattern. Provided pt with TKA therex handout and reviewed. Pt demonstrated good understanding. No physical assist required to perfrom gait, transfers, and bed mobility. No additional acute physical therapy needs at this time. Recommend continue with outpatient phyiscal therapy services to address ROM and strength deficits.     See \"Rehab Therapy-Acute\" Patient Summary Report for complete documentation.     "
Yes

## 2024-05-13 NOTE — DISCHARGE NOTE PROVIDER - CARE PROVIDERS DIRECT ADDRESSES
,[email or web address]

## 2024-05-13 NOTE — PROGRESS NOTE ADULT - SUBJECTIVE AND OBJECTIVE BOX
Infectious Diseases Follow Up:    Patient is a 44y old  Female who presents with a chief complaint of right foot wound (13 May 2024 06:57)      Interval History/ROS:  No acute events ON.   Pt c/o drainage from her left foot ulcer, clear. B/l bruises on medial knees     Allergies  Zyrtec (Rash)        ANTIMICROBIALS:  piperacillin/tazobactam IVPB.. 3.375 every 8 hours      Current Abx:     Previous Abx     OTHER MEDS:  MEDICATIONS  (STANDING):  acetaminophen     Tablet .. 975 every 6 hours PRN  enoxaparin Injectable 30 every 24 hours  ketorolac 15 every 6 hours PRN  melatonin 3 at bedtime  traMADol 25 every 6 hours PRN      Vital Signs Last 24 Hrs  T(C): 36.7 (13 May 2024 04:30), Max: 36.9 (12 May 2024 18:50)  T(F): 98 (13 May 2024 04:30), Max: 98.5 (12 May 2024 18:50)  HR: 75 (13 May 2024 04:30) (74 - 90)  BP: 139/72 (13 May 2024 04:30) (108/66 - 145/94)  BP(mean): --  RR: 17 (13 May 2024 04:30) (17 - 18)  SpO2: 100% (13 May 2024 04:30) (100% - 100%)    Parameters below as of 13 May 2024 04:30  Patient On (Oxygen Delivery Method): room air        PHYSICAL EXAM:  GENERAL: NAD, thin  HEAD:  Atraumatic, Normocephalic  EYES: EOMI, PERRLA, conjunctiva and sclera clear  NECK: Supple, No JVD  CHEST/LUNG: On RA, not in respiratory distress   EXTREMITIES:  R foot swelling with large ulcer, stitches. L foot with small ulcer, mild drainage   PSYCH: AAOx3                            9.4    4.51  )-----------( 452      ( 13 May 2024 06:39 )             29.3       05-13    138  |  104  |  15  ----------------------------<  103<H>  3.7   |  22  |  0.55    Ca    9.0      13 May 2024 06:39  Phos  4.1     05-13  Mg     2.00     05-13    TPro  7.5  /  Alb  3.5  /  TBili  <0.2  /  DBili  x   /  AST  11  /  ALT  6   /  AlkPhos  70  05-11      Urinalysis Basic - ( 13 May 2024 06:39 )    Color: x / Appearance: x / SG: x / pH: x  Gluc: 103 mg/dL / Ketone: x  / Bili: x / Urobili: x   Blood: x / Protein: x / Nitrite: x   Leuk Esterase: x / RBC: x / WBC x   Sq Epi: x / Non Sq Epi: x / Bacteria: x        MICROBIOLOGY:  v  .Blood Blood-Peripheral  05-11-24   No growth at 24 hours  --  --      .Blood Blood-Peripheral  05-11-24   No growth at 24 hours  --  --                RADIOLOGY:  < from: CT Foot w/ IV Cont, Right (05.11.24 @ 23:51) >  There is confluent hypodensity dorsal medially centered at the level of   the navicular medial cuneiform articulation which spans 5.3 x 1.8 x 5.0   cm. There may be faint rim enhancement suggestive of a forming abscess.   There is mild cortical irregularity at the adjacent distal navicular and   proximal medial cuneiform with tiny adjacent ossificfragments which is   age indeterminate. Correlate with the scheduled MRI.    Status post osseous talocalcaneal, navicular, cuboid fusion with 2   surgical staples. A small ossific fragment dorsally adjacent to the base   the first proximal phalanx appears chronic. There is posterior superior   spurring at the dorsal talus with small adjacent chronic osseous   fragmentation. There is diffuse muscle atrophy at the foot.    Impression: Talocalcaneal, navicular and cuboid osseous and hardware   fusion. 5.3 x 1.8 x 5.0 cm forming abscess within the dorsal medial soft   tissues. Mild cortical irregularity at the adjacent distal navicula and   proximal medial cuneiform with tiny ossific fragments which is age   indeterminate. Correlate with the scheduled MRI.    < end of copied text >

## 2024-05-13 NOTE — DISCHARGE NOTE PROVIDER - PROVIDER TOKENS
PROVIDER:[TOKEN:[[id number]],FOLLOWUP:[2 weeks]] PROVIDER:[TOKEN:[[id number]],FOLLOWUP:[2 weeks]],PROVIDER:[TOKEN:[[id number]],FOLLOWUP:[2 weeks],ESTABLISHEDPATIENT:[T]]

## 2024-05-13 NOTE — DISCHARGE NOTE PROVIDER - HOSPITAL COURSE
44F MHx spina bifida, multiple bilateral foot surgeries, and  shunt (managed [facility] neurosurgery), uterine fibroids presenting with R foot wound. Per patient, has had R foot wound for past 6-7 months, denies past trauma or injury. Last foot surgery was at age 15, not able to say what type of surgery. Foot wound presented first as a palpable mass that started draining, drainage was mostly bloody, for past few days has been white/yellow and very painful- now developed into area with central ulceration and assc redness in the past. Can ambulate at home, small distances, but has struggled with this due to pain. Endorsing abdominal pain due to active menstruation. Denies any fevers, chills, CP, SOB, n/v/d.     In the ED, T 100.5, , /100, 99% RA.     Hospital Course: Patient was admitted for R foot wound and started on empiric ABx. CT R foot showing forming abscess. Podiatry evaluated patient and recommended MRI for further evaluation. CTAP was done due to patient having a L sacral wound with underlying mass, revealing XXX 44F MHx spina bifida, multiple bilateral foot surgeries, and  shunt (managed [facility] neurosurgery), uterine fibroids presenting with R foot wound. Per patient, has had R foot wound for past 6-7 months, denies past trauma or injury. Last foot surgery was at age 15, not able to say what type of surgery. Foot wound presented first as a palpable mass that started draining, drainage was mostly bloody, for past few days has been white/yellow and very painful- now developed into area with central ulceration and assc redness in the past. Can ambulate at home, small distances, but has struggled with this due to pain. Endorsing abdominal pain due to active menstruation. Denies any fevers, chills, CP, SOB, n/v/d.     In the ED, T 100.5, , /100, 99% RA.     Hospital Course: Patient was admitted for R foot wound and started on empiric zosyn. CT R foot showing forming abscess. Podiatry evaluated patient and recommended MRI for further evaluation. CTAP was done due to patient having a L ischial wound with underlying mass showing L ischial stage 4 pressure wound, CT A/P 5/15 w/ thick walled fluid collection superficial to L ischium 7.9 x 3.7 x 5.7cm, concerning for abscess. Patient had bedside I&D on 5/17 and was continued on zosyn. Cultures from the pressure wound were negative. Wound care was consulted and cared for the wound during her stay. Patient was recommended to get a wound vac but ultimately decided against it due to significant home care required on dc. Patient also with L foot abscess draining purulent fluid s/p drainage and R foot soft tissue mass removal by podiatry on 5/23/24. Cultures from this site grew MSSA. Zosyn was continued a day after procedure and then stopped per recommendations by ID team. Ultimately patient improved clinically and was deemed stable for discharge. Patient was recommended for acute inpatient rehab by physiatry but chose to be discharged home instead. Patient was discharged home with a low air loss mattress and lisandro lift.    The patient is afebrile, hemodynamically stable and medically optimized for discharge to home with follow up with PCP, podiatry. On day of discharge, patient is clinically stable with no new exam findings or acute symptoms compared to prior. The patient was seen by the attending physician on the date of discharge and deemed stable and acceptable for discharge. The patient's chronic medical conditions were treated accordingly per the patient's home medication regimen. The patient's medication reconciliation (with changes made to chronic medications), follow up appointments, discharge orders, instructions, and significant lab and diagnostic studies are as noted.   44F MHx spina bifida, multiple bilateral foot surgeries, and  shunt (managed [facility] neurosurgery), uterine fibroids presenting with R foot wound. Per patient, has had R foot wound for past 6-7 months, denies past trauma or injury. Last foot surgery was at age 15, not able to say what type of surgery. Foot wound presented first as a palpable mass that started draining, drainage was mostly bloody, for past few days has been white/yellow and very painful- now developed into area with central ulceration and assc redness in the past. Can ambulate at home, small distances, but has struggled with this due to pain. Endorsing abdominal pain due to active menstruation. Denies any fevers, chills, CP, SOB, n/v/d.     In the ED, T 100.5, , /100, 99% RA.     Hospital Course: Patient was admitted for R foot wound and started on empiric zosyn. CT R foot showing forming abscess. Podiatry evaluated patient and recommended MRI for further evaluation. CTAP was done due to patient having a L ischial wound with underlying mass showing L ischial stage 4 pressure wound, CT A/P 5/15 w/ thick walled fluid collection superficial to L ischium 7.9 x 3.7 x 5.7cm, concerning for abscess. Patient had bedside I&D on 5/17 and was continued on zosyn. Cultures from the pressure wound were negative. Wound care was consulted and cared for the wound during her stay. Patient was recommended to get a wound vac but ultimately decided against it due to significant home care required on dc. Patient also with L foot abscess draining purulent fluid s/p drainage and R foot soft tissue mass removal by podiatry on 5/23/24. Cultures from this site grew MSSA. Zosyn was continued a day after procedure and then stopped per recommendations by ID team. Ultimately patient improved clinically and was deemed stable for discharge. Patient was recommended for acute inpatient rehab by physiatry but chose to be discharged home instead. Due to concerns from mother that her wound care could not be managed at home, she was discharged to [facility] instead.    The patient is afebrile, hemodynamically stable and medically optimized for discharge to home with follow up with PCP, podiatry. On day of discharge, patient is clinically stable with no new exam findings or acute symptoms compared to prior. The patient was seen by the attending physician on the date of discharge and deemed stable and acceptable for discharge. The patient's chronic medical conditions were treated accordingly per the patient's home medication regimen. The patient's medication reconciliation (with changes made to chronic medications), follow up appointments, discharge orders, instructions, and significant lab and diagnostic studies are as noted.    To-do:  -Wound  care as per instructions in this note  -Follow-up with PCP, podiatry, and wound care   44F MHx spina bifida, multiple bilateral foot surgeries, and  shunt (managed [facility] neurosurgery), uterine fibroids presenting with R foot wound. Per patient, has had R foot wound for past 6-7 months, denies past trauma or injury. Last foot surgery was at age 15, not able to say what type of surgery. Foot wound presented first as a palpable mass that started draining, drainage was mostly bloody, for past few days has been white/yellow and very painful- now developed into area with central ulceration and assc redness in the past. Can ambulate at home, small distances, but has struggled with this due to pain. Endorsing abdominal pain due to active menstruation. Denies any fevers, chills, CP, SOB, n/v/d.     In the ED, T 100.5, , /100, 99% RA.     Hospital Course: Patient was admitted for R foot wound and started on empiric zosyn. CT R foot showing forming abscess. Podiatry evaluated patient and recommended MRI for further evaluation. CTAP was done due to patient having a L ischial wound with underlying mass showing L ischial stage 4 pressure wound, CT A/P 5/15 w/ thick walled fluid collection superficial to L ischium 7.9 x 3.7 x 5.7cm, concerning for abscess. Patient had bedside I&D on 5/17 and was continued on zosyn. Cultures from the pressure wound were negative. Wound care was consulted and cared for the wound during her stay. Patient also with L foot abscess draining purulent fluid s/p drainage and R foot soft tissue mass removal by podiatry on 5/23/24. Cultures from this site grew MSSA. Zosyn was continued a day after procedure and then stopped per recommendations by ID team. Ultimately patient improved clinically and was deemed stable for discharge. Patient was discharged to [facility] instead.    The patient is afebrile, hemodynamically stable and medically optimized for discharge to home with follow up with PCP, podiatry. On day of discharge, patient is clinically stable with no new exam findings or acute symptoms compared to prior. The patient was seen by the attending physician on the date of discharge and deemed stable and acceptable for discharge. The patient's chronic medical conditions were treated accordingly per the patient's home medication regimen. The patient's medication reconciliation (with changes made to chronic medications), follow up appointments, discharge orders, instructions, and significant lab and diagnostic studies are as noted.    To-do:  -Wound  care as per instructions in this note  -Follow-up with PCP, podiatry, and wound care

## 2024-05-13 NOTE — DISCHARGE NOTE PROVIDER - NSDCFUADDAPPT_GEN_ALL_CORE_FT
Podiatry Discharge Instructions:  Follow up: Please follow up with Dr. Waterhouse within 1 week of discharge from the hospital, please call [phone] for appointment and discuss that you recently were seen in the hospital.  Wound Care: Please leave your dressing clean dry intact until your follow up appointment   Weight bearing: Please weight bear as tolerated in a surgical shoe to right foot.  Antibiotics: Please continue as instructed. Podiatry Discharge Instructions:  Follow up: Please follow up with Dr. Waterhouse within 1 week of discharge from the hospital, please call [phone] for appointment and discuss that you recently were seen in the hospital.  Wound Care: Please leave your dressing clean dry intact until your follow up appointment   Weight bearing: Please weight bear as tolerated in a surgical shoe to right foot.  Antibiotics: Please continue as instructed.    APPTS ARE READY TO BE MADE: [X] YES    Best Family or Patient Contact (if needed):    Additional Information about above appointments (if needed):    1: Please make appointment with [facility] ([phone])  2: Please make appointment with Dr. Waterhouse of podiatry (call [phone]) and discuss that you recently were seen in the hospital.  3:     Other comments or requests:    Podiatry Discharge Instructions:  Follow up: Please follow up with Dr. Waterhouse within 1 week of discharge from the hospital, please call [phone] for appointment and discuss that you recently were seen in the hospital.  Wound Care: Please leave your dressing clean dry intact until your follow up appointment   Weight bearing: Please weight bear as tolerated in a surgical shoe to right foot.  Antibiotics: Please continue as instructed.    APPTS ARE READY TO BE MADE: [X] YES    Best Family or Patient Contact (if needed):    Additional Information about above appointments (if needed):    1: Please make appointment with [facility] ([phone])  2: Please make appointment with Dr. Waterhouse of podiatry (call [phone]) and discuss that you recently were seen in the hospital.  3:     Other comments or requests:   Patient is being discharged to [facility]. Caregiver will arrange follow up. Podiatry Discharge Instructions:  Follow up: Please follow up with Dr. Waterhouse within 1 week of discharge from the hospital, please call [phone] for appointment and discuss that you recently were seen in the hospital.  Wound Care: Please leave your dressing on R foot clean dry intact until your follow up appointment. Please apply allevyn pad to lateral L ankle for cushion as needed.  Weight bearing: Please weight bear as tolerated in a surgical shoe to right foot. Please wear z-joel boots to bilateral feet when in bed resting or in chair.  Antibiotics: Please continue as instructed.    APPTS ARE READY TO BE MADE: [X] YES    Best Family or Patient Contact (if needed):    Additional Information about above appointments (if needed):    1: Please make appointment with [facility] ([phone])  2: Please make appointment with Dr. Waterhouse of podiatry (call [phone]) and discuss that you recently were seen in the hospital.  3:     Other comments or requests:   Patient is being discharged to [facility]. Caregiver will arrange follow up.

## 2024-05-13 NOTE — PROGRESS NOTE ADULT - SUBJECTIVE AND OBJECTIVE BOX
JANETH REILLY, MRN [id number], 44yfemale      Patient is a 44y old  Female who presents with a chief complaint of right foot wound (13 May 2024 11:53)       INTERVAL HPI/OVERNIGHT EVENTS:  Patient seen and evaluated at bedside.  Pt is resting comfortable in NAD. Denies N/V/F/C.    Allergies    Zyrtec (Rash)    Intolerances        Vital Signs Last 24 Hrs  T(C): 36.7 (13 May 2024 04:30), Max: 36.9 (12 May 2024 18:50)  T(F): 98 (13 May 2024 04:30), Max: 98.5 (12 May 2024 18:50)  HR: 75 (13 May 2024 04:30) (74 - 85)  BP: 139/72 (13 May 2024 04:30) (108/66 - 139/72)  BP(mean): --  RR: 17 (13 May 2024 04:30) (17 - 18)  SpO2: 100% (13 May 2024 04:30) (100% - 100%)    Parameters below as of 13 May 2024 04:30  Patient On (Oxygen Delivery Method): room air        LABS:                        9.4    4.51  )-----------( 452      ( 13 May 2024 06:39 )             29.3     05-13    138  |  104  |  15  ----------------------------<  103<H>  3.7   |  22  |  0.55    Ca    9.0      13 May 2024 06:39  Phos  4.1     05-13  Mg     2.00     05-13    TPro  7.5  /  Alb  3.5  /  TBili  <0.2  /  DBili  x   /  AST  11  /  ALT  6   /  AlkPhos  70  05-11    PT/INR - ( 11 May 2024 18:40 )   PT: 11.6 sec;   INR: 1.04 ratio         PTT - ( 11 May 2024 18:40 )  PTT:31.3 sec  Urinalysis Basic - ( 13 May 2024 06:39 )    Color: x / Appearance: x / SG: x / pH: x  Gluc: 103 mg/dL / Ketone: x  / Bili: x / Urobili: x   Blood: x / Protein: x / Nitrite: x   Leuk Esterase: x / RBC: x / WBC x   Sq Epi: x / Non Sq Epi: x / Bacteria: x      CAPILLARY BLOOD GLUCOSE          Lower Extremity Physical Exam:    Vascular: DP/PT 1/4, B/L, CFT <3 seconds B/L, Temperature gradient warm to cool, B/L.   Neuro: Epicritic sensation diminished but not absent to the level of digits, B/L.  Musculoskeletal/Ortho: drop foot, weak muscle strength, ROM diminished 2/2 spina bifida   Skin:  5/11 s/p b/s Right foot dorsal medial non-mobile mass incision and drainage, closed, sutures intact, central mass wound to dermis, hyperpigmented periwound, no fluctuance, no pus, no bogginess, no malodor, no periwound erythema.  Left lateral ankle dry eschar with periwound xerosis,no signs of infection         RADIOLOGY & ADDITIONAL TESTS:    ACC: [id number] EXAM:  CT FOOT ONLY IC RT   ORDERED BY: KATERINA NELSON     PROCEDURE DATE:  05/11/2024          INTERPRETATION:  Clinical information: Right foot abscess, rule out   osteomyelitis.    Comparison: Right foot radiographs from 5/11/2024    Technique:  CT scan of the right foot.  Intravenous contrast: 75 cc of Omnipaque 350 were administered and 25 cc   were discarded.    Findings:    There is confluent hypodensity dorsal medially centered at the level of   the navicular medial cuneiform articulation which spans 5.3 x 1.8 x 5.0   cm. There may be faint rim enhancement suggestive of a forming abscess.   There is mild cortical irregularity at the adjacent distal navicular and   proximal medial cuneiform with tiny adjacent ossificfragments which is   age indeterminate. Correlate with the scheduled MRI.    Status post osseous talocalcaneal, navicular, cuboid fusion with 2   surgical staples. A small ossific fragment dorsally adjacent to the base   the first proximal phalanx appears chronic. There is posterior superior   spurring at the dorsal talus with small adjacent chronic osseous   fragmentation. There is diffuse muscle atrophy at the foot.    Impression: Talocalcaneal, navicular and cuboid osseous and hardware   fusion. 5.3 x 1.8 x 5.0 cm forming abscess within the dorsal medial soft   tissues. Mild cortical irregularity at the adjacent distal navicula and   proximal medial cuneiform with tiny ossific fragments which is age   indeterminate. Correlate with the scheduled MRI.    --- End of Report ---            DAVID NAVA MD; Attending Radiologist  This document has been electronically signed. May 12 2024 11:44AM

## 2024-05-13 NOTE — DISCHARGE NOTE PROVIDER - NSDCCPCAREPLAN_GEN_ALL_CORE_FT
PRINCIPAL DISCHARGE DIAGNOSIS  Diagnosis: Right foot infection  Assessment and Plan of Treatment: You had a mass on your right foot that was evaluated by podiatry. We got a CT of your R foot showing a possible forming abscess. You got antibiotics while here.     PRINCIPAL DISCHARGE DIAGNOSIS  Diagnosis: Right foot infection  Assessment and Plan of Treatment: You had a mass on your right foot that was evaluated by podiatry. We got a CT of your R foot showing a possible forming abscess. You got antibiotics for the infection while you were here and podiatry did a soft-tissue mass removal. You were monitored for a few days after the procedure and deemed stable for discharge home. Upon discharge, please follow-up with your podiatrist and PCP regularly. Please perform dressing changes as below. Please return to the ED if you experience fevers, chills, purulent drainage from the foot, signifcant pain in your foot, nausea, vomiting, chest pain, or shortness of breath.  For your foot, wound care instructions are as follows:  -Wound Care: Please leave your dressing clean dry intact until your follow up appointment   -Weight bearing: Please weight bear as tolerated in a surgical shoe to right foot.      SECONDARY DISCHARGE DIAGNOSES  Diagnosis: Pressure sore of ischial area  Assessment and Plan of Treatment: While in the hospital, you were found to have a stage 4 (severe) pressure wound of the left ischium. The wound was cared for by our wound care team and was drained to release the pocket of infection. You were treated with broad spectrum antibiotics for this wound and your foot wound. Ultimately, you were deemed stable for discharge home. Wound care instructions are as below. Please follow-up with your PCP and the wound care center regularly. Please return to the ED if you have any further pain at the site, purulent drainage, fevers, chills, chest pain, back pain, shortness of breath, nausea, or vomiting.  Wound Care Instructions:  -Continue turning and positioning when laying in bed to offload and distribute weight evenly  -Continue w/ low air loss fluidized bed surface   -Continue nutritional management with multivitamin and vitamin C  -Follow-up with wound care center  -Dressing changes as instructed     PRINCIPAL DISCHARGE DIAGNOSIS  Diagnosis: Right foot infection  Assessment and Plan of Treatment: You had a mass on your right foot that was evaluated by podiatry. We got a CT of your R foot showing a possible forming abscess. You got antibiotics for the infection while you were here and podiatry did a soft-tissue mass removal. You were monitored for a few days after the procedure and deemed stable for discharge home. Upon discharge, please follow-up with your podiatrist and PCP regularly. Please perform dressing changes as below. Please return to the ED if you experience fevers, chills, purulent drainage from the foot, signifcant pain in your foot, nausea, vomiting, chest pain, or shortness of breath.  For your foot, wound care instructions are as follows:  -Wound Care: Please leave your dressing clean dry intact until your follow up appointment   -Weight bearing: Please weight bear as tolerated in a surgical shoe to right foot.      SECONDARY DISCHARGE DIAGNOSES  Diagnosis: Pressure sore of ischial area  Assessment and Plan of Treatment: While in the hospital, you were found to have a stage 4 (severe) pressure wound of the left ischium. The wound was cared for by our wound care team and was drained to release the pocket of infection. You were treated with broad spectrum antibiotics for this wound and your foot wound. Ultimately, you were deemed stable for discharge home. Wound care instructions are as below. Please follow-up with your PCP and the wound care center regularly. Please return to the ED if you have any further pain at the site, purulent drainage, fevers, chills, chest pain, back pain, shortness of breath, nausea, or vomiting.  Wound Care Instructions:  -Continue turning and positioning when laying in bed to offload and distribute weight evenly  -Continue w/ low air loss fluidized bed surface   -Continue nutritional management with multivitamin and vitamin C  -Follow-up with wound care center  -Dressing changes as instructed    Diagnosis: Urinary tract infection  Assessment and Plan of Treatment: You have urinary infection twice in the hospital. There are multiple reasons for UTI to occur. Things that can prevent further UTI include better personal hygiene and treatment of constipation. We will send you to rehab with oral antibiotics and constipation meds.     PRINCIPAL DISCHARGE DIAGNOSIS  Diagnosis: Right foot infection  Assessment and Plan of Treatment: You had a mass on your right foot that was evaluated by podiatry. We got a CT of your R foot showing a possible forming abscess. You got antibiotics for the infection while you were here and podiatry did a soft-tissue mass removal. You were monitored for a few days after the procedure and deemed stable for discharge home. Upon discharge, please follow-up with your podiatrist and PCP regularly. Please perform dressing changes as below. Please return to the ED if you experience fevers, chills, purulent drainage from the foot, signifcant pain in your foot, nausea, vomiting, chest pain, or shortness of breath.  For your foot, wound care instructions are as follows:  -Wound Care: Please leave your dressing clean dry intact until your follow up appointment   -Weight bearing: Please weight bear as tolerated in a surgical shoe to right foot.      SECONDARY DISCHARGE DIAGNOSES  Diagnosis: Pressure sore of ischial area  Assessment and Plan of Treatment: While in the hospital, you were found to have a stage 4 (severe) pressure wound of the left ischium. The wound was cared for by our wound care team and was drained to release the pocket of infection. You were treated with broad spectrum antibiotics for this wound and your foot wound. Ultimately, you were deemed stable for discharge home. Wound care instructions are as below. Please follow-up with your PCP and the wound care center regularly. Please return to the ED if you have any further pain at the site, purulent drainage, fevers, chills, chest pain, back pain, shortness of breath, nausea, or vomiting.  Wound Care Instructions:  -Continue turning and positioning when laying in bed to offload and distribute weight evenly  -Continue w/ low air loss fluidized bed surface   -Continue nutritional management with multivitamin and vitamin C  -Follow-up with wound care center  -Dressing changes as instructed    Diagnosis: Urinary tract infection  Assessment and Plan of Treatment: You have urinary infection twice in the hospital. There are multiple reasons for UTI to occur. Things that can prevent further UTI include better personal hygiene and treatment of constipation. We will send you to rehab with oral antibiotics and constipation meds. We sent you to the rehab for an additional three days of levaquin 750mg since you already received 2d of zosyn, both of which treats your psuedomonal UTI.

## 2024-05-13 NOTE — PROGRESS NOTE ADULT - PROBLEM SELECTOR PLAN 1
pt with chronic R foot wound with white/yellow drainage  XR R foot with extensive soft tissue swelling  prelim CT R foot with soft tissue swelling/thickening, subq edema   appreciate podiatry recs   f/u MR R foot   ID recs appreciated, c/w zosyn   pain control: tylenol, toradol 15 mod pain, tramadol 25 severe pain pt with chronic R foot wound with white/yellow drainage  XR R foot with extensive soft tissue swelling  prelim CT R foot with soft tissue swelling/thickening, subq edema   appreciate podiatry recs   f/u MR R foot   ID recs appreciated, c/w zosyn   pain control: tylenol, toradol 15 mod pain, tramadol 25 severe pain  PT - outpatient PT

## 2024-05-13 NOTE — DISCHARGE NOTE PROVIDER - CARE PROVIDER_API CALL
Maddy Mckeon  Internal Medicine  [address] [place] [address]  Phone: [phone]  Fax: [phone]  Follow Up Time: 2 weeks   Maddy Mckeon  Internal Medicine  [address] [place] [address]  Phone: [phone]  Fax: [phone]  Follow Up Time: 2 weeks    Waterhouse, Joseph Cameron  Podiatric Medicine and Surgery  [facility] [place] [address]  Phone: [phone]  Fax: [phone]  Established Patient  Follow Up Time: 2 weeks

## 2024-05-13 NOTE — PHYSICAL THERAPY INITIAL EVALUATION ADULT - ADDITIONAL COMMENTS
Pt stated she lives with her mom and son in a house with 2 stairs to enter; Pt resides on the first floor. Pt stated that prior to admission she ambulated short distances with a rollator. Pt uses a motorized scooter for community mobility. Pt stated she was able to perform stair mobility with supervision, but lately her family members have been carrying her up the stairs. Pt stated that she occasionally wears bilateral KAFOs to ambulate outside the home, but doesn't always wear them inside the home to ambulate.     Pt. left comfortable in bed with all tubes/lines intact, head of the bed elevated, call bell in reach and in NAD. RN aware of session and pt current position.

## 2024-05-13 NOTE — PROGRESS NOTE ADULT - ASSESSMENT
This is a 44F w/ PMHx of multiple bilateral foot surgeries, and  shunt (managed [facility] neurosurgery), uterine fibroids presenting with R foot wound on 5/12/24, has been ~7 months, now bloody, white/yellow drainage.   Fever to 100.5, no leukocytosis. CT w/ abscess 5.3 x 1.8 x 5.0 cm.     #R foot abscess   #Fever     Overall R foot abscess w/ drainage, fever, otherwise HD stable  On exam, L foot lateral ulcer likely from braces, not infected, R foot with large swelling, collection, with ulcer. Per patient, blood/purulent drainage   C/w Zosyn for broad coverage, recommend intervention for abscess pending MRI   Antibiotics are merely supportive, will need further surgical drainage     Plan:   1. C/w Zosyn 3.375 g q8   2. F/u BCx, would send Cx of drainage  3. F/u MRI, recommend podiatry f/u for intervention for abscess, would send Cx     Thank you for this consult. Inpatient ID team will follow.    Carlton Chaudhary M.D.  Attending Physician  Division of Infectious Diseases  Department of Medicine    Please contact through MS Teams message.  Office: [phone] (after 5 PM or weekend).

## 2024-05-13 NOTE — PROGRESS NOTE ADULT - SUBJECTIVE AND OBJECTIVE BOX
JANETH REILLY  44y  MRN: [id number]    Patient is a 44y old  Female who presents with a chief complaint of right foot wound (12 May 2024 15:25)      Subjective: no events ON. Denies fever, CP, SOB, abn pain, N/V, dysuria. Tolerating diet.      MEDICATIONS  (STANDING):  enoxaparin Injectable 30 milliGRAM(s) SubCutaneous every 24 hours  melatonin 3 milliGRAM(s) Oral at bedtime  mupirocin 2% Ointment 1 Application(s) Topical <User Schedule>  piperacillin/tazobactam IVPB.. 3.375 Gram(s) IV Intermittent every 8 hours    MEDICATIONS  (PRN):  acetaminophen     Tablet .. 975 milliGRAM(s) Oral every 6 hours PRN Temp greater or equal to 38C (100.4F), Mild Pain (1 - 3)  ketorolac 15 milliGRAM(s) Oral every 6 hours PRN Moderate Pain (4 - 6)  traMADol 25 milliGRAM(s) Oral every 6 hours PRN Severe Pain (7 - 10)      Objective:    Vitals: Vital Signs Last 24 Hrs  T(C): 36.7 (05-13-24 @ 04:30), Max: 36.9 (05-12-24 @ 18:50)  T(F): 98 (05-13-24 @ 04:30), Max: 98.5 (05-12-24 @ 18:50)  HR: 75 (05-13-24 @ 04:30) (74 - 90)  BP: 139/72 (05-13-24 @ 04:30) (108/66 - 145/94)  BP(mean): --  RR: 17 (05-13-24 @ 04:30) (17 - 18)  SpO2: 100% (05-13-24 @ 04:30) (100% - 100%)            I&O's Summary      PHYSICAL EXAM:  GENERAL: NAD  HEAD:  Atraumatic, Normocephalic  EYES: EOMI, conjunctiva and sclera clear  CHEST/LUNG: Clear to auscultation bilaterally; No rales, rhonchi, wheezing, or rubs  HEART: Regular rate and rhythm; No murmurs, rubs, or gallops  ABDOMEN: Soft, Nontender, Nondistended;   SKIN: No rashes or lesions  NERVOUS SYSTEM:  Alert & Oriented X3, no focal deficits    LABS:  05-12    138  |  104  |  4<L>  ----------------------------<  82  3.4<L>   |  21<L>  |  0.47<L>  05-11    138  |  103  |  7   ----------------------------<  84  4.1   |  21<L>  |  0.49<L>    Ca    8.3<L>      12 May 2024 09:10  Ca    9.0      11 May 2024 18:40  Phos  3.1     05-12  Mg     1.90     05-12    TPro  7.5  /  Alb  3.5  /  TBili  <0.2  /  DBili  x   /  AST  11  /  ALT  6   /  AlkPhos  70  05-11      PT/INR - ( 11 May 2024 18:40 )   PT: 11.6 sec;   INR: 1.04 ratio         PTT - ( 11 May 2024 18:40 )  PTT:31.3 sec              Urinalysis Basic - ( 12 May 2024 09:10 )    Color: x / Appearance: x / SG: x / pH: x  Gluc: 82 mg/dL / Ketone: x  / Bili: x / Urobili: x   Blood: x / Protein: x / Nitrite: x   Leuk Esterase: x / RBC: x / WBC x   Sq Epi: x / Non Sq Epi: x / Bacteria: x                              8.6    4.99  )-----------( 462      ( 12 May 2024 09:10 )             25.3                         9.4    8.09  )-----------( 516      ( 11 May 2024 18:40 )             29.7     CAPILLARY BLOOD GLUCOSE          RADIOLOGY & ADDITIONAL TESTS:    Imaging Personally Reviewed:  [x ] YES  [ ] NO    Consultants involved in case:   Consultant(s) Notes Reviewed:  [ x] YES  [ ] NO:   Care Discussed with Consultants/Other Providers [x ] YES  [ ] NO         JANETH REILLY  44y  MRN: [id number]    Patient is a 44y old  Female who presents with a chief complaint of right foot wound (12 May 2024 15:25)      Subjective: no events ON. Denies fever, CP, SOB, abn pain, N/V, dysuria. Tolerating diet. Endorsing open L lateral malleolus wound draining pus overnight. Pain better today     MEDICATIONS  (STANDING):  enoxaparin Injectable 30 milliGRAM(s) SubCutaneous every 24 hours  melatonin 3 milliGRAM(s) Oral at bedtime  mupirocin 2% Ointment 1 Application(s) Topical <User Schedule>  piperacillin/tazobactam IVPB.. 3.375 Gram(s) IV Intermittent every 8 hours    MEDICATIONS  (PRN):  acetaminophen     Tablet .. 975 milliGRAM(s) Oral every 6 hours PRN Temp greater or equal to 38C (100.4F), Mild Pain (1 - 3)  ketorolac 15 milliGRAM(s) Oral every 6 hours PRN Moderate Pain (4 - 6)  traMADol 25 milliGRAM(s) Oral every 6 hours PRN Severe Pain (7 - 10)      Objective:    Vitals: Vital Signs Last 24 Hrs  T(C): 36.7 (05-13-24 @ 04:30), Max: 36.9 (05-12-24 @ 18:50)  T(F): 98 (05-13-24 @ 04:30), Max: 98.5 (05-12-24 @ 18:50)  HR: 75 (05-13-24 @ 04:30) (74 - 90)  BP: 139/72 (05-13-24 @ 04:30) (108/66 - 145/94)  BP(mean): --  RR: 17 (05-13-24 @ 04:30) (17 - 18)  SpO2: 100% (05-13-24 @ 04:30) (100% - 100%)            I&O's Summary      PHYSICAL EXAM:  GENERAL: NAD  HEAD:  Atraumatic, Normocephalic  EYES: EOMI, conjunctiva and sclera clear  CHEST/LUNG: Clear to auscultation bilaterally; No rales, rhonchi, wheezing, or rubs  HEART: Regular rate and rhythm; No murmurs, rubs, or gallops  ABDOMEN: Soft, Nontender, Nondistended;   SKIN: No rashes or lesions  MSK: R foot wrapped. L lat malleolus with open wound nonerythematous nonbleeding no drainage when palpated   NERVOUS SYSTEM:  Alert & Oriented X3, no focal deficits    LABS:  05-12    138  |  104  |  4<L>  ----------------------------<  82  3.4<L>   |  21<L>  |  0.47<L>  05-11    138  |  103  |  7   ----------------------------<  84  4.1   |  21<L>  |  0.49<L>    Ca    8.3<L>      12 May 2024 09:10  Ca    9.0      11 May 2024 18:40  Phos  3.1     05-12  Mg     1.90     05-12    TPro  7.5  /  Alb  3.5  /  TBili  <0.2  /  DBili  x   /  AST  11  /  ALT  6   /  AlkPhos  70  05-11      PT/INR - ( 11 May 2024 18:40 )   PT: 11.6 sec;   INR: 1.04 ratio         PTT - ( 11 May 2024 18:40 )  PTT:31.3 sec              Urinalysis Basic - ( 12 May 2024 09:10 )    Color: x / Appearance: x / SG: x / pH: x  Gluc: 82 mg/dL / Ketone: x  / Bili: x / Urobili: x   Blood: x / Protein: x / Nitrite: x   Leuk Esterase: x / RBC: x / WBC x   Sq Epi: x / Non Sq Epi: x / Bacteria: x                              8.6    4.99  )-----------( 462      ( 12 May 2024 09:10 )             25.3                         9.4    8.09  )-----------( 516      ( 11 May 2024 18:40 )             29.7     CAPILLARY BLOOD GLUCOSE          RADIOLOGY & ADDITIONAL TESTS:    Imaging Personally Reviewed:  [x ] YES  [ ] NO    Consultants involved in case:   Consultant(s) Notes Reviewed:  [ x] YES  [ ] NO:   Care Discussed with Consultants/Other Providers [x ] YES  [ ] NO

## 2024-05-14 LAB
-  AMOXICILLIN/CLAVULANIC ACID: SIGNIFICANT CHANGE UP
-  AMPICILLIN/SULBACTAM: SIGNIFICANT CHANGE UP
-  AMPICILLIN: SIGNIFICANT CHANGE UP
-  AZTREONAM: SIGNIFICANT CHANGE UP
-  CEFAZOLIN: SIGNIFICANT CHANGE UP
-  CEFEPIME: SIGNIFICANT CHANGE UP
-  CEFOXITIN: SIGNIFICANT CHANGE UP
-  CEFTRIAXONE: SIGNIFICANT CHANGE UP
-  CEFUROXIME: SIGNIFICANT CHANGE UP
-  CIPROFLOXACIN: SIGNIFICANT CHANGE UP
-  ERTAPENEM: SIGNIFICANT CHANGE UP
-  GENTAMICIN: SIGNIFICANT CHANGE UP
-  LEVOFLOXACIN: SIGNIFICANT CHANGE UP
-  MEROPENEM: SIGNIFICANT CHANGE UP
-  NITROFURANTOIN: SIGNIFICANT CHANGE UP
-  PIPERACILLIN/TAZOBACTAM: SIGNIFICANT CHANGE UP
-  TOBRAMYCIN: SIGNIFICANT CHANGE UP
-  TRIMETHOPRIM/SULFAMETHOXAZOLE: SIGNIFICANT CHANGE UP
CULTURE RESULTS: ABNORMAL
GRAM STN FLD: SIGNIFICANT CHANGE UP
HCT VFR BLD CALC: 27.5 % — LOW (ref 34.5–45)
HGB BLD-MCNC: 9 G/DL — LOW (ref 11.5–15.5)
MCHC RBC-ENTMCNC: 26.2 PG — LOW (ref 27–34)
MCHC RBC-ENTMCNC: 32.7 GM/DL — SIGNIFICANT CHANGE UP (ref 32–36)
MCV RBC AUTO: 80.2 FL — SIGNIFICANT CHANGE UP (ref 80–100)
METHOD TYPE: SIGNIFICANT CHANGE UP
NRBC # BLD: 0 /100 WBCS — SIGNIFICANT CHANGE UP (ref 0–0)
NRBC # FLD: 0 K/UL — SIGNIFICANT CHANGE UP (ref 0–0)
ORGANISM # SPEC MICROSCOPIC CNT: ABNORMAL
ORGANISM # SPEC MICROSCOPIC CNT: ABNORMAL
PLATELET # BLD AUTO: 442 K/UL — HIGH (ref 150–400)
RBC # BLD: 3.43 M/UL — LOW (ref 3.8–5.2)
RBC # FLD: 16.7 % — HIGH (ref 10.3–14.5)
SPECIMEN SOURCE: SIGNIFICANT CHANGE UP
SPECIMEN SOURCE: SIGNIFICANT CHANGE UP
WBC # BLD: 4.89 K/UL — SIGNIFICANT CHANGE UP (ref 3.8–10.5)
WBC # FLD AUTO: 4.89 K/UL — SIGNIFICANT CHANGE UP (ref 3.8–10.5)

## 2024-05-14 PROCEDURE — 70450 CT HEAD/BRAIN W/O DYE: CPT | Mod: 26

## 2024-05-14 PROCEDURE — 99232 SBSQ HOSP IP/OBS MODERATE 35: CPT | Mod: GC

## 2024-05-14 PROCEDURE — 99232 SBSQ HOSP IP/OBS MODERATE 35: CPT

## 2024-05-14 RX ADMIN — ENOXAPARIN SODIUM 30 MILLIGRAM(S): 100 INJECTION SUBCUTANEOUS at 22:58

## 2024-05-14 RX ADMIN — TRAMADOL HYDROCHLORIDE 25 MILLIGRAM(S): 50 TABLET ORAL at 23:22

## 2024-05-14 RX ADMIN — MUPIROCIN 1 APPLICATION(S): 20 OINTMENT TOPICAL at 17:12

## 2024-05-14 RX ADMIN — TRAMADOL HYDROCHLORIDE 25 MILLIGRAM(S): 50 TABLET ORAL at 17:12

## 2024-05-14 RX ADMIN — PIPERACILLIN AND TAZOBACTAM 25 GRAM(S): 4; .5 INJECTION, POWDER, LYOPHILIZED, FOR SOLUTION INTRAVENOUS at 13:51

## 2024-05-14 RX ADMIN — MUPIROCIN 1 APPLICATION(S): 20 OINTMENT TOPICAL at 05:22

## 2024-05-14 RX ADMIN — PIPERACILLIN AND TAZOBACTAM 25 GRAM(S): 4; .5 INJECTION, POWDER, LYOPHILIZED, FOR SOLUTION INTRAVENOUS at 22:29

## 2024-05-14 RX ADMIN — PIPERACILLIN AND TAZOBACTAM 25 GRAM(S): 4; .5 INJECTION, POWDER, LYOPHILIZED, FOR SOLUTION INTRAVENOUS at 05:22

## 2024-05-14 RX ADMIN — TRAMADOL HYDROCHLORIDE 25 MILLIGRAM(S): 50 TABLET ORAL at 18:13

## 2024-05-14 RX ADMIN — Medication 3 MILLIGRAM(S): at 22:30

## 2024-05-14 NOTE — PROGRESS NOTE ADULT - PROBLEM SELECTOR PLAN 3
pt with hx spina bifida, has motorized wheelchair at home    shunt managed by [facility] neurosurgery   CTM

## 2024-05-14 NOTE — PROGRESS NOTE ADULT - ASSESSMENT
This is a 44F w/ PMHx of multiple bilateral foot surgeries, and  shunt (managed [facility] neurosurgery), uterine fibroids presenting with R foot wound on 5/12/24, has been ~7 months, now bloody, white/yellow drainage.   Fever to 100.5, no leukocytosis. CT w/ abscess 5.3 x 1.8 x 5.0 cm.     #R foot abscess   #Fever     Overall R foot abscess w/ drainage, fever, otherwise HD stable  On exam, L foot lateral ulcer likely from braces, not infected, R foot with large swelling, collection, with ulcer. Per patient, blood/purulent drainage   C/w Zosyn for broad coverage,    Spoke to podiatry attending, discussed case in detail, no findings of pus on his exam and during probe, does not seem infected. Pt w/o leukocytosis, no longer febrile, will f/u MRI     Plan:   1. C/w Zosyn 3.375 g q8   2. F/u BCx, NGTD  3. F/u MRI    Thank you for this consult. Inpatient ID team will follow.    Carlton Chaudhary M.D.  Attending Physician  Division of Infectious Diseases  Department of Medicine    Please contact through MS Teams message.  Office: [phone] (after 5 PM or weekend).

## 2024-05-14 NOTE — PROGRESS NOTE ADULT - SUBJECTIVE AND OBJECTIVE BOX
Infectious Diseases Follow Up:    Patient is a 44y old  Female who presents with a chief complaint of right foot wound (14 May 2024 09:55)      Interval History/ROS:  No acute events, afebrile    Allergies  Zyrtec (Rash)        ANTIMICROBIALS:  piperacillin/tazobactam IVPB.. 3.375 every 8 hours      Current Abx:     Previous Abx     OTHER MEDS:  MEDICATIONS  (STANDING):  acetaminophen     Tablet .. 975 every 6 hours PRN  enoxaparin Injectable 30 every 24 hours  ketorolac 15 every 6 hours PRN  melatonin 3 at bedtime  traMADol 25 every 6 hours PRN      Vital Signs Last 24 Hrs  T(C): 36.7 (14 May 2024 11:35), Max: 36.9 (13 May 2024 17:01)  T(F): 98.1 (14 May 2024 11:35), Max: 98.5 (13 May 2024 17:01)  HR: 77 (14 May 2024 11:35) (72 - 85)  BP: 118/82 (14 May 2024 11:35) (114/73 - 132/88)  BP(mean): --  RR: 18 (14 May 2024 11:35) (18 - 18)  SpO2: 100% (14 May 2024 11:35) (100% - 100%)    Parameters below as of 14 May 2024 11:35  Patient On (Oxygen Delivery Method): room air        PHYSICAL EXAM:  GENERAL: NAD, thin  HEAD:  Atraumatic, Normocephalic  EYES: EOMI, PERRLA, conjunctiva and sclera clear  NECK: Supple, No JVD  CHEST/LUNG: On RA, not in respiratory distress   EXTREMITIES:  R foot swelling with large ulcer, stitches. L foot with small ulcer, mild drainage   PSYCH: AAOx3                            9.0    4.89  )-----------( 442      ( 14 May 2024 05:00 )             27.5       05-13    138  |  104  |  15  ----------------------------<  103<H>  3.7   |  22  |  0.55    Ca    9.0      13 May 2024 06:39  Phos  4.1     05-13  Mg     2.00     05-13        Urinalysis Basic - ( 13 May 2024 06:39 )    Color: x / Appearance: x / SG: x / pH: x  Gluc: 103 mg/dL / Ketone: x  / Bili: x / Urobili: x   Blood: x / Protein: x / Nitrite: x   Leuk Esterase: x / RBC: x / WBC x   Sq Epi: x / Non Sq Epi: x / Bacteria: x        MICROBIOLOGY:  v  Clean Catch Clean Catch (Midstream)  05-11-24   50,000 - 99,000 CFU/mL Proteus mirabilis  --  --      .Blood Blood-Peripheral  05-11-24   No growth at 48 Hours  --  --      .Blood Blood-Peripheral  05-11-24   No growth at 48 Hours  --  --                RADIOLOGY:    < from: CT Foot w/ IV Cont, Right (05.11.24 @ 23:51) >  There is confluent hypodensity dorsal medially centered at the level of   the navicular medial cuneiform articulation which spans 5.3 x 1.8 x 5.0   cm. There may be faint rim enhancement suggestive of a forming abscess.   There is mild cortical irregularity at the adjacent distal navicular and   proximal medial cuneiform with tiny adjacent ossificfragments which is   age indeterminate. Correlate with the scheduled MRI.    Status post osseous talocalcaneal, navicular, cuboid fusion with 2   surgical staples. A small ossific fragment dorsally adjacent to the base   the first proximal phalanx appears chronic. There is posterior superior   spurring at the dorsal talus with small adjacent chronic osseous   fragmentation. There is diffuse muscle atrophy at the foot.    Impression: Talocalcaneal, navicular and cuboid osseous and hardware   fusion. 5.3 x 1.8 x 5.0 cm forming abscess within the dorsal medial soft   tissues. Mild cortical irregularity at the adjacent distal navicula and   proximal medial cuneiform with tiny ossific fragments which is age   indeterminate. Correlate with the scheduled MRI.

## 2024-05-14 NOTE — PROGRESS NOTE ADULT - ATTENDING COMMENTS
44F MHx spina bifida, multiple bilateral foot surgeries, and  shunt (managed [facility] neurosurgery), uterine fibroids presenting with R foot wound.  Pt admitted for rule out OM    R foot wound: given drainage and suspicion of abscess  seen on CT, will continue with ABx. MRI pending to r/o OM and further eval ?abscess f/u Podiatry and ID recs. ADAM with no PAD. Podiatry does not feel strongly about foot infection.    UTI: cannot r/o UTI, UA was done after Abx    Stage 4 sacral  decubitus: c/w wound vac and wound care.

## 2024-05-14 NOTE — PROGRESS NOTE ADULT - SUBJECTIVE AND OBJECTIVE BOX
JANETH REILLY  44y  MRN: [id number]    Patient is a 44y old  Female who presents with a chief complaint of right foot wound (13 May 2024 18:32)      Subjective: no events ON. Denies fever, CP, SOB, abn pain, N/V, dysuria. Tolerating diet.      MEDICATIONS  (STANDING):  enoxaparin Injectable 30 milliGRAM(s) SubCutaneous every 24 hours  melatonin 3 milliGRAM(s) Oral at bedtime  mupirocin 2% Ointment 1 Application(s) Both Nostrils every 12 hours  mupirocin 2% Ointment 1 Application(s) Topical <User Schedule>  piperacillin/tazobactam IVPB.. 3.375 Gram(s) IV Intermittent every 8 hours    MEDICATIONS  (PRN):  acetaminophen     Tablet .. 975 milliGRAM(s) Oral every 6 hours PRN Temp greater or equal to 38C (100.4F), Mild Pain (1 - 3)  ketorolac 15 milliGRAM(s) Oral every 6 hours PRN Moderate Pain (4 - 6)  traMADol 25 milliGRAM(s) Oral every 6 hours PRN Severe Pain (7 - 10)      Objective:    Vitals: Vital Signs Last 24 Hrs  T(C): 36.7 (05-14-24 @ 06:00), Max: 36.9 (05-13-24 @ 17:01)  T(F): 98 (05-14-24 @ 06:00), Max: 98.5 (05-13-24 @ 17:01)  HR: 85 (05-14-24 @ 06:00) (72 - 85)  BP: 129/80 (05-14-24 @ 06:00) (114/73 - 132/88)  BP(mean): --  RR: 18 (05-14-24 @ 06:00) (18 - 18)  SpO2: 100% (05-14-24 @ 06:00) (100% - 100%)            I&O's Summary      PHYSICAL EXAM:  GENERAL: NAD  HEAD:  Atraumatic, Normocephalic  EYES: EOMI, conjunctiva and sclera clear  CHEST/LUNG: Clear to auscultation bilaterally; No rales, rhonchi, wheezing, or rubs  HEART: Regular rate and rhythm; No murmurs, rubs, or gallops  ABDOMEN: Soft, Nontender, Nondistended;   SKIN: No rashes or lesions  NERVOUS SYSTEM:  Alert & Oriented X3, no focal deficits    LABS:  05-13    138  |  104  |  15  ----------------------------<  103<H>  3.7   |  22  |  0.55  05-12    138  |  104  |  4<L>  ----------------------------<  82  3.4<L>   |  21<L>  |  0.47<L>  05-11    138  |  103  |  7   ----------------------------<  84  4.1   |  21<L>  |  0.49<L>    Ca    9.0      13 May 2024 06:39  Ca    8.3<L>      12 May 2024 09:10  Ca    9.0      11 May 2024 18:40  Phos  4.1     05-13  Mg     2.00     05-13    TPro  7.5  /  Alb  3.5  /  TBili  <0.2  /  DBili  x   /  AST  11  /  ALT  6   /  AlkPhos  70  05-11                    Urinalysis Basic - ( 13 May 2024 06:39 )    Color: x / Appearance: x / SG: x / pH: x  Gluc: 103 mg/dL / Ketone: x  / Bili: x / Urobili: x   Blood: x / Protein: x / Nitrite: x   Leuk Esterase: x / RBC: x / WBC x   Sq Epi: x / Non Sq Epi: x / Bacteria: x                              9.0    4.89  )-----------( 442      ( 14 May 2024 05:00 )             27.5                         9.4    4.51  )-----------( 452      ( 13 May 2024 06:39 )             29.3                         8.6    4.99  )-----------( 462      ( 12 May 2024 09:10 )             25.3     CAPILLARY BLOOD GLUCOSE          RADIOLOGY & ADDITIONAL TESTS:    Imaging Personally Reviewed:  [x ] YES  [ ] NO    Consultants involved in case:   Consultant(s) Notes Reviewed:  [ x] YES  [ ] NO:   Care Discussed with Consultants/Other Providers [x ] YES  [ ] NO         JANETH REILLY  44y  MRN: [id number]    Patient is a 44y old  Female who presents with a chief complaint of right foot wound (13 May 2024 18:32)      Subjective: no events ON. Denies fever, CP, SOB, abn pain, N/V, dysuria. Tolerating diet.  Wounds bandaged, endorsing clear drainage from L foot wound otherwise no complaints     MEDICATIONS  (STANDING):  enoxaparin Injectable 30 milliGRAM(s) SubCutaneous every 24 hours  melatonin 3 milliGRAM(s) Oral at bedtime  mupirocin 2% Ointment 1 Application(s) Both Nostrils every 12 hours  mupirocin 2% Ointment 1 Application(s) Topical <User Schedule>  piperacillin/tazobactam IVPB.. 3.375 Gram(s) IV Intermittent every 8 hours    MEDICATIONS  (PRN):  acetaminophen     Tablet .. 975 milliGRAM(s) Oral every 6 hours PRN Temp greater or equal to 38C (100.4F), Mild Pain (1 - 3)  ketorolac 15 milliGRAM(s) Oral every 6 hours PRN Moderate Pain (4 - 6)  traMADol 25 milliGRAM(s) Oral every 6 hours PRN Severe Pain (7 - 10)      Objective:    Vitals: Vital Signs Last 24 Hrs  T(C): 36.7 (05-14-24 @ 06:00), Max: 36.9 (05-13-24 @ 17:01)  T(F): 98 (05-14-24 @ 06:00), Max: 98.5 (05-13-24 @ 17:01)  HR: 85 (05-14-24 @ 06:00) (72 - 85)  BP: 129/80 (05-14-24 @ 06:00) (114/73 - 132/88)  BP(mean): --  RR: 18 (05-14-24 @ 06:00) (18 - 18)  SpO2: 100% (05-14-24 @ 06:00) (100% - 100%)            I&O's Summary      PHYSICAL EXAM:  GENERAL: NAD  HEAD:  Atraumatic, Normocephalic  EYES: EOMI, conjunctiva and sclera clear  CHEST/LUNG: Clear to auscultation bilaterally; No rales, rhonchi, wheezing, or rubs  HEART: Regular rate and rhythm; No murmurs, rubs, or gallops  ABDOMEN: Soft, Nontender, Nondistended;   SKIN: No rashes or lesions  MSK: wounds bandaged dry intact   NERVOUS SYSTEM:  Alert & Oriented X3, no focal deficits    LABS:  05-13    138  |  104  |  15  ----------------------------<  103<H>  3.7   |  22  |  0.55  05-12    138  |  104  |  4<L>  ----------------------------<  82  3.4<L>   |  21<L>  |  0.47<L>  05-11    138  |  103  |  7   ----------------------------<  84  4.1   |  21<L>  |  0.49<L>    Ca    9.0      13 May 2024 06:39  Ca    8.3<L>      12 May 2024 09:10  Ca    9.0      11 May 2024 18:40  Phos  4.1     05-13  Mg     2.00     05-13    TPro  7.5  /  Alb  3.5  /  TBili  <0.2  /  DBili  x   /  AST  11  /  ALT  6   /  AlkPhos  70  05-11                    Urinalysis Basic - ( 13 May 2024 06:39 )    Color: x / Appearance: x / SG: x / pH: x  Gluc: 103 mg/dL / Ketone: x  / Bili: x / Urobili: x   Blood: x / Protein: x / Nitrite: x   Leuk Esterase: x / RBC: x / WBC x   Sq Epi: x / Non Sq Epi: x / Bacteria: x                              9.0    4.89  )-----------( 442      ( 14 May 2024 05:00 )             27.5                         9.4    4.51  )-----------( 452      ( 13 May 2024 06:39 )             29.3                         8.6    4.99  )-----------( 462      ( 12 May 2024 09:10 )             25.3     CAPILLARY BLOOD GLUCOSE          RADIOLOGY & ADDITIONAL TESTS:    Imaging Personally Reviewed:  [x ] YES  [ ] NO    Consultants involved in case:   Consultant(s) Notes Reviewed:  [ x] YES  [ ] NO:   Care Discussed with Consultants/Other Providers [x ] YES  [ ] NO

## 2024-05-14 NOTE — PROGRESS NOTE ADULT - PROBLEM SELECTOR PLAN 1
pt with chronic R foot wound with white/yellow drainage  XR R foot with extensive soft tissue swelling  CT R foot with forming abscess   appreciate podiatry recs - will need better visualization of foot w MR to assess abscess vs soft tissue mass before any intervention   f/u MR R foot   ID recs appreciated, c/w zosyn   pain control: tylenol, toradol 15 mod pain, tramadol 25 severe pain  PT - outpatient PT

## 2024-05-14 NOTE — PROGRESS NOTE ADULT - PROBLEM SELECTOR PLAN 2
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  pt with sacral wound noted  frequent repositioning  f/u wound care recs  per wound care NP, pt to go home with wound vac for sacral wound

## 2024-05-14 NOTE — PROGRESS NOTE ADULT - SUBJECTIVE AND OBJECTIVE BOX
JANETH REILLY, MRN [id number], 44yfemale      Patient is a 44y old  Female who presents with a chief complaint of right foot wound (14 May 2024 06:36)       INTERVAL HPI/OVERNIGHT EVENTS:  Patient seen and evaluated at bedside.  Pt is resting comfortable in NAD. Denies N/V/F/C.    Allergies    Zyrtec (Rash)    Intolerances        Vital Signs Last 24 Hrs  T(C): 36.7 (14 May 2024 06:00), Max: 36.9 (13 May 2024 17:01)  T(F): 98 (14 May 2024 06:00), Max: 98.5 (13 May 2024 17:01)  HR: 85 (14 May 2024 06:00) (72 - 85)  BP: 129/80 (14 May 2024 06:00) (114/73 - 132/88)  BP(mean): --  RR: 18 (14 May 2024 06:00) (18 - 18)  SpO2: 100% (14 May 2024 06:00) (100% - 100%)    Parameters below as of 14 May 2024 06:00  Patient On (Oxygen Delivery Method): room air        LABS:                        9.0    4.89  )-----------( 442      ( 14 May 2024 05:00 )             27.5     05-13    138  |  104  |  15  ----------------------------<  103<H>  3.7   |  22  |  0.55    Ca    9.0      13 May 2024 06:39  Phos  4.1     05-13  Mg     2.00     05-13        Urinalysis Basic - ( 13 May 2024 06:39 )    Color: x / Appearance: x / SG: x / pH: x  Gluc: 103 mg/dL / Ketone: x  / Bili: x / Urobili: x   Blood: x / Protein: x / Nitrite: x   Leuk Esterase: x / RBC: x / WBC x   Sq Epi: x / Non Sq Epi: x / Bacteria: x      CAPILLARY BLOOD GLUCOSE          Lower Extremity Physical Exam:  \  Vascular: DP/PT 1/4, B/L, CFT <3 seconds B/L, Temperature gradient warm to cool, B/L.   Neuro: Epicritic sensation diminished but not absent to the level of digits, B/L.  Musculoskeletal/Ortho: drop foot, weak muscle strength, ROM diminished 2/2 spina bifida   Skin:  5/11 s/p b/s Right foot dorsal medial non-mobile mass incision and drainage, closed, sutures intact, central mass wound to dermis, hyperpigmented periwound, no fluctuance, no pus, no bogginess, no malodor, no periwound erythema.  Left lateral ankle wound to subQ, no tracking/ tunnelling/ signs of infection, small mobile soft tissue mass at subQ level, no pain on palpation     RADIOLOGY & ADDITIONAL TESTS:

## 2024-05-14 NOTE — PROGRESS NOTE ADULT - ASSESSMENT
44F s/p b/s R foot dorsal medial mass incision & drainage (5/11)  - Patient seen and evaluated  - Afebrile, no leukocytosis   - 5/11 s/p b/s Right foot dorsal medial non-mobile mass incision and drainage, closed, sutures intact, central mass wound to dermis, hyperpigmented periwound, no fluctuance, no pus, no bogginess, no malodor, no periwound erythema.  Left lateral ankle wound to subQ, no tracking/ tunnelling/ signs of infection, small mobile soft tissue mass at subQ level, no pain on palpation   - R foot culture pending    - Right foot XR: no OM, no gas  - Right foot CT:  abscess within the dorsal medial soft tissues, clinically no fluctuance, no purulence expressed today.   - R foot MR pending   - R foot not likely source of fever, please rule out other sources   - STRICT decubitus precautions, Z- FLOWS boots at all time when in bed and in chair resting.   - Pod plan heavily pending R foot MR  - Discussed with attending

## 2024-05-15 LAB
GRAM STN FLD: ABNORMAL
HCT VFR BLD CALC: 26.7 % — LOW (ref 34.5–45)
HGB BLD-MCNC: 8.9 G/DL — LOW (ref 11.5–15.5)
MCHC RBC-ENTMCNC: 26.6 PG — LOW (ref 27–34)
MCHC RBC-ENTMCNC: 33.3 GM/DL — SIGNIFICANT CHANGE UP (ref 32–36)
MCV RBC AUTO: 79.7 FL — LOW (ref 80–100)
NRBC # BLD: 0 /100 WBCS — SIGNIFICANT CHANGE UP (ref 0–0)
NRBC # FLD: 0 K/UL — SIGNIFICANT CHANGE UP (ref 0–0)
PLATELET # BLD AUTO: 425 K/UL — HIGH (ref 150–400)
RBC # BLD: 3.35 M/UL — LOW (ref 3.8–5.2)
RBC # FLD: 16.4 % — HIGH (ref 10.3–14.5)
WBC # BLD: 5.07 K/UL — SIGNIFICANT CHANGE UP (ref 3.8–10.5)
WBC # FLD AUTO: 5.07 K/UL — SIGNIFICANT CHANGE UP (ref 3.8–10.5)

## 2024-05-15 PROCEDURE — 73720 MRI LWR EXTREMITY W/O&W/DYE: CPT | Mod: 26,RT

## 2024-05-15 PROCEDURE — 99232 SBSQ HOSP IP/OBS MODERATE 35: CPT

## 2024-05-15 PROCEDURE — 74177 CT ABD & PELVIS W/CONTRAST: CPT | Mod: 26

## 2024-05-15 RX ORDER — CHLORHEXIDINE GLUCONATE 213 G/1000ML
1 SOLUTION TOPICAL DAILY
Refills: 0 | Status: DISCONTINUED | OUTPATIENT
Start: 2024-05-15 | End: 2024-06-06

## 2024-05-15 RX ADMIN — PIPERACILLIN AND TAZOBACTAM 25 GRAM(S): 4; .5 INJECTION, POWDER, LYOPHILIZED, FOR SOLUTION INTRAVENOUS at 14:02

## 2024-05-15 RX ADMIN — TRAMADOL HYDROCHLORIDE 25 MILLIGRAM(S): 50 TABLET ORAL at 19:31

## 2024-05-15 RX ADMIN — Medication 15 MILLIGRAM(S): at 22:43

## 2024-05-15 RX ADMIN — MUPIROCIN 1 APPLICATION(S): 20 OINTMENT TOPICAL at 05:41

## 2024-05-15 RX ADMIN — MORPHINE SULFATE 2 MILLIGRAM(S): 50 CAPSULE, EXTENDED RELEASE ORAL at 00:00

## 2024-05-15 RX ADMIN — TRAMADOL HYDROCHLORIDE 25 MILLIGRAM(S): 50 TABLET ORAL at 00:00

## 2024-05-15 RX ADMIN — PIPERACILLIN AND TAZOBACTAM 25 GRAM(S): 4; .5 INJECTION, POWDER, LYOPHILIZED, FOR SOLUTION INTRAVENOUS at 05:40

## 2024-05-15 RX ADMIN — MUPIROCIN 1 APPLICATION(S): 20 OINTMENT TOPICAL at 19:32

## 2024-05-15 RX ADMIN — PIPERACILLIN AND TAZOBACTAM 25 GRAM(S): 4; .5 INJECTION, POWDER, LYOPHILIZED, FOR SOLUTION INTRAVENOUS at 22:43

## 2024-05-15 RX ADMIN — Medication 3 MILLIGRAM(S): at 22:44

## 2024-05-15 RX ADMIN — CHLORHEXIDINE GLUCONATE 1 APPLICATION(S): 213 SOLUTION TOPICAL at 19:32

## 2024-05-15 RX ADMIN — Medication 15 MILLIGRAM(S): at 23:43

## 2024-05-15 NOTE — PROGRESS NOTE ADULT - SUBJECTIVE AND OBJECTIVE BOX
JANETH REILLY  44y  MRN: [id number]    Patient is a 44y old  Female who presents with a chief complaint of right foot wound (14 May 2024 11:26)      Subjective: no events ON. Denies fever, CP, SOB, abn pain, N/V, dysuria. Tolerating diet.  Had some pain in b/l feet overnight but resolved now    MEDICATIONS  (STANDING):  enoxaparin Injectable 30 milliGRAM(s) SubCutaneous every 24 hours  melatonin 3 milliGRAM(s) Oral at bedtime  mupirocin 2% Ointment 1 Application(s) Topical <User Schedule>  mupirocin 2% Ointment 1 Application(s) Both Nostrils every 12 hours  piperacillin/tazobactam IVPB.. 3.375 Gram(s) IV Intermittent every 8 hours    MEDICATIONS  (PRN):  acetaminophen     Tablet .. 975 milliGRAM(s) Oral every 6 hours PRN Temp greater or equal to 38C (100.4F), Mild Pain (1 - 3)  ketorolac 15 milliGRAM(s) Oral every 6 hours PRN Moderate Pain (4 - 6)  traMADol 25 milliGRAM(s) Oral every 6 hours PRN Severe Pain (7 - 10)      Objective:    Vitals: Vital Signs Last 24 Hrs  T(C): 36.6 (05-15-24 @ 05:40), Max: 36.8 (05-14-24 @ 15:17)  T(F): 97.9 (05-15-24 @ 05:40), Max: 98.2 (05-14-24 @ 15:17)  HR: 71 (05-15-24 @ 05:40) (71 - 77)  BP: 118/82 (05-15-24 @ 05:40) (113/74 - 120/81)  BP(mean): --  RR: 16 (05-15-24 @ 05:40) (16 - 18)  SpO2: 100% (05-15-24 @ 05:40) (100% - 100%)            I&O's Summary    14 May 2024 07:01  -  15 May 2024 07:00  --------------------------------------------------------  IN: 0 mL / OUT: 0 mL / NET: 0 mL        PHYSICAL EXAM:  GENERAL: NAD  HEAD:  Atraumatic, Normocephalic  EYES: EOMI, conjunctiva and sclera clear  CHEST/LUNG: Clear to auscultation bilaterally; No rales, rhonchi, wheezing, or rubs  HEART: Regular rate and rhythm; No murmurs, rubs, or gallops  ABDOMEN: Soft, Nontender, Nondistended;   SKIN: No rashes or lesions  MSK: R foot with large mass over medial dorsum no redness, erythema, drainage. L lateral malleolus with open skin tear no drainage redness erythema   NERVOUS SYSTEM:  Alert & Oriented X3, no focal deficits    LABS:  05-13    138  |  104  |  15  ----------------------------<  103<H>  3.7   |  22  |  0.55  05-12    138  |  104  |  4<L>  ----------------------------<  82  3.4<L>   |  21<L>  |  0.47<L>    Ca    9.0      13 May 2024 06:39  Ca    8.3<L>      12 May 2024 09:10                                                8.9    5.07  )-----------( 425      ( 15 May 2024 07:11 )             26.7                         9.0    4.89  )-----------( 442      ( 14 May 2024 05:00 )             27.5                         9.4    4.51  )-----------( 452      ( 13 May 2024 06:39 )             29.3     CAPILLARY BLOOD GLUCOSE          RADIOLOGY & ADDITIONAL TESTS:    Imaging Personally Reviewed:  [x ] YES  [ ] NO    Consultants involved in case:   Consultant(s) Notes Reviewed:  [ x] YES  [ ] NO:   Care Discussed with Consultants/Other Providers [x ] YES  [ ] NO         JANETH REILLY  44y  MRN: [id number]    Patient is a 44y old  Female who presents with a chief complaint of right foot wound (14 May 2024 11:26)      Subjective: no events ON. Denies fever, CP, SOB, abn pain, N/V, dysuria. Tolerating diet.  Had some pain in b/l feet overnight, better now    MEDICATIONS  (STANDING):  enoxaparin Injectable 30 milliGRAM(s) SubCutaneous every 24 hours  melatonin 3 milliGRAM(s) Oral at bedtime  mupirocin 2% Ointment 1 Application(s) Topical <User Schedule>  mupirocin 2% Ointment 1 Application(s) Both Nostrils every 12 hours  piperacillin/tazobactam IVPB.. 3.375 Gram(s) IV Intermittent every 8 hours    MEDICATIONS  (PRN):  acetaminophen     Tablet .. 975 milliGRAM(s) Oral every 6 hours PRN Temp greater or equal to 38C (100.4F), Mild Pain (1 - 3)  ketorolac 15 milliGRAM(s) Oral every 6 hours PRN Moderate Pain (4 - 6)  traMADol 25 milliGRAM(s) Oral every 6 hours PRN Severe Pain (7 - 10)      Objective:    Vitals: Vital Signs Last 24 Hrs  T(C): 36.6 (05-15-24 @ 05:40), Max: 36.8 (05-14-24 @ 15:17)  T(F): 97.9 (05-15-24 @ 05:40), Max: 98.2 (05-14-24 @ 15:17)  HR: 71 (05-15-24 @ 05:40) (71 - 77)  BP: 118/82 (05-15-24 @ 05:40) (113/74 - 120/81)  BP(mean): --  RR: 16 (05-15-24 @ 05:40) (16 - 18)  SpO2: 100% (05-15-24 @ 05:40) (100% - 100%)            I&O's Summary    14 May 2024 07:01  -  15 May 2024 07:00  --------------------------------------------------------  IN: 0 mL / OUT: 0 mL / NET: 0 mL        PHYSICAL EXAM:  GENERAL: NAD  HEAD:  Atraumatic, Normocephalic  EYES: EOMI, conjunctiva and sclera clear  CHEST/LUNG: Clear to auscultation bilaterally; No rales, rhonchi, wheezing, or rubs  HEART: Regular rate and rhythm; No murmurs, rubs, or gallops  ABDOMEN: Soft, Nontender, Nondistended;   SKIN: No rashes or lesions  MSK: R foot with large mass over medial dorsum no redness, erythema, drainage. L lateral malleolus with open skin tear no drainage redness erythema   NERVOUS SYSTEM:  Alert & Oriented X3, no focal deficits    LABS:  05-13    138  |  104  |  15  ----------------------------<  103<H>  3.7   |  22  |  0.55  05-12    138  |  104  |  4<L>  ----------------------------<  82  3.4<L>   |  21<L>  |  0.47<L>    Ca    9.0      13 May 2024 06:39  Ca    8.3<L>      12 May 2024 09:10                                                8.9    5.07  )-----------( 425      ( 15 May 2024 07:11 )             26.7                         9.0    4.89  )-----------( 442      ( 14 May 2024 05:00 )             27.5                         9.4    4.51  )-----------( 452      ( 13 May 2024 06:39 )             29.3     CAPILLARY BLOOD GLUCOSE          RADIOLOGY & ADDITIONAL TESTS:    Imaging Personally Reviewed:  [x ] YES  [ ] NO    Consultants involved in case:   Consultant(s) Notes Reviewed:  [ x] YES  [ ] NO:   Care Discussed with Consultants/Other Providers [x ] YES  [ ] NO

## 2024-05-15 NOTE — PROGRESS NOTE ADULT - ASSESSMENT
Assessment/Plan:      Wound care follow up for Left ischium Stage 4 pressure injury     Continue low airloss support surface.  Continue to turn and position every 2 hours with z-joel fluidized positioning device.  Continue use of Complete Cair pressure offloading boots.  Continue Nutritional management as per RD recommendations.    Upon discharge follow up at outpatient [facility]. [address] [place] [address]. [phone].    Will continue to follow while inpatient. Please reconsult eralier if needed   Thank you.    Disussed findings and plan with primary team Regi Patterson, [facility]-BC, [facility]    pager #[phone] or available in MS teams     If after 4PM or before 7:30AM on Mon-Friday or weekend/holiday please contact general surgery for urgent matters.   Team A- [id number]   Team B- [id number]  For non-urgent matters e-mail [email or web address]    I spent 35 minutes face-to-face with this patient of which more than 50% of the time was spent counseling/coordinating care of this patient. Assessment/Plan:      Wound care follow up for Left ischium Stage 4 pressure injury     FULL NOTE TO FOLLOW    Continue low airloss support surface.  Continue to turn and position every 2 hours with z-joel fluidized positioning device.  Continue use of Complete Cair pressure offloading boots.  Continue Nutritional management as per RD recommendations.    Upon discharge follow up at outpatient [facility]. [address] [place] [address]. [phone].    Will continue to follow while inpatient. Please reconsult eralier if needed   Thank you.    Disussed findings and plan with primary team Regi Patterson, [facility]-BC, [facility]    pager #[phone] or available in MS teams     If after 4PM or before 7:30AM on Mon-Friday or weekend/holiday please contact general surgery for urgent matters.   Team A- [id number]   Team B- [id number]  For non-urgent matters e-mail [email or web address]    I spent 35 minutes face-to-face with this patient of which more than 50% of the time was spent counseling/coordinating care of this patient. Assessment/Plan: 44F MHx spina bifida, multiple bilateral foot surgeries, and  shunt (managed [facility] neurosurgery), uterine fibroids presenting with R foot wound. Patient has being seen by podiatry and ID for right foot with abscess/masses.     Wound care follow up for Left ischium Stage 4 pressure injury with negative therapy dressing initiated on 5/13/24 accidently removed by patient yesterday. Default dressing placed.     Left ischium pressure injury present on admission   -Well demarcated ulcer   -Patient endorses sensation from waist down, however was not aware of full thickness wound. Uncertain of exact onset of wound however previous CT of the ABD and pelvis in 2023 reported a left ischium pressure injury.   -Denies pain or tenderness to area   -Previous CT of the ABD and pelvis (10/20/23):" Decubitus ulcer 2.4 cm deep overlying the left ischium"   -Given tissue type presentation (shiny, red/pale pink)  and palpable mass like? on palpation, consider dermatology consult to r/o malignancy   -+ Undermining circumferentially deepest extends 3.5cm at 12 and 6 o'clock (prev deepest 4cm)  -No associated cellulitis   -No bone palpable or visible  -No associated cellulitis   - Topical recommendations: Clean wound and periwound skin with NS. Pat dry. Apply 3M advanced skin protectant to macerated periwound skin, allow to dry (3M advanced skin protectant requires 3x a week application only apply T.TH and saturdays, 3M advanced skin protectant thea [id number]). Lightly pack wound depth and undermining with Aquacel hydrofiber ribbon, make sure to leave 2" out at end visible for fully removal with next dressing change. Cover with silicone foam with border. Change daily or prn if soiled.   -Continue to offload as per hospital protocol   -Avoid diaper use, if patient reluctant to keeping diaper please provide vigilant perineal care  -Risks for moisture associated dermatitis in bilateral buttocks/perineum: Gently clean with skin cleanser. Pat dry. Apply a thin layer of Willian barrier moisture cream, apply twice a day or prn if soiled.   -Appreciate nutrition Consult for patient with underweight, consider MVI & Vit C to promote wound healing  -Once discharge to home, Patient should qualify for a  roho seat cushion and low air loss bed  if she does not already have one at home.     Bilateral foot wounds -Management as per podiatry team, receiving muporicin.   -Continue to offload both heels with complete cair boots.     Additional Recs   Continue turning and positioning w/ offloading assistive devices as per protocol  Continue w/ Pericare as per protocol  Continue w/ low air loss fluidized bed surface     Care as per medicine, will follow w/ you periodically during hospital stay, please reconsult earlier if needed     Upon discharge f/u as outpatient at [facility] [address] [phone]    Continue low airloss support surface.  Continue to turn and position every 2 hours with z-joel fluidized positioning device.  Continue use of Complete Cair pressure offloading boots.  Continue Nutritional management as per RD recommendations.    Upon discharge follow up at outpatient [facility]. [address] [facility]. [phone].    Will continue to follow while inpatient. Please reconsult earlier if needed   Thank you.    Discussed findings and plan with primary team Regi Cervantes MD    Cofmort langston, please kindly follow up to optimize safe discharge. Patient should also qualify for group 2 mattress Low air loss bed and a roho seat cushion.   Roxie Patterson, [facility]-BC, [facility]    pager #[phone] or available in MS teams     If after 4PM or before 7:30AM on Mon-Friday or weekend/holiday please contact general surgery for urgent matters.   Team A- [id number]   Team B- [id number]  For non-urgent matters e-mail [email or web address]    I spent 35 minutes face-to-face with this patient of which more than 50% of the time was spent counseling/coordinating care of this patient. Assessment/Plan: 44F MHx spina bifida, multiple bilateral foot surgeries, and  shunt (managed [facility] neurosurgery), uterine fibroids presenting with R foot wound. Patient has being seen by podiatry and ID for right foot with abscess/masses.     Wound care follow up for Left ischium Stage 4 pressure injury with negative therapy dressing initiated on 5/13/24 accidently removed by patient yesterday. Default dressing placed.     Left ischium pressure injury present on admission   -Well demarcated ulcer  -Patient endorses sensation from waist down, however was not aware of full thickness wound. Uncertain of exact onset of wound however previous CT of the ABD and pelvis in 2023 reported a left ischium pressure injury.   -Denies pain or tenderness to area   -Previous CT of the ABD and pelvis (10/20/23):" Decubitus ulcer 2.4 cm deep overlying the left ischium"   -Given tissue type presentation (shiny, red/pale pink)  and palpable mass like? on palpation, consider dermatology consult to r/o malignancy?  -At this time will recommend to hold off vac, if no further concerns with tissue type then patient might benefit from wound vac. In addition, patient declining VNS services at home therefore patient would not be able to receive Negative therapy  once discharge.   -+ Undermining circumferentially deepest extends 3.5cm at 12 and 6 o'clock (prev deepest 4cm)  -No associated cellulitis   -No bone palpable or visible  -No associated cellulitis   - Topical recommendations: Clean wound and periwound skin with NS. Pat dry. Apply 3M advanced skin protectant to macerated periwound skin, allow to dry (3M advanced skin protectant requires 3x a week application only apply T.TH and saturdays, 3M advanced skin protectant thea [id number]). Lightly pack wound depth and undermining with Aquacel hydrofiber ribbon, make sure to leave 2" out at end visible for fully removal with next dressing change. Cover with silicone foam with border. Change daily or prn if soiled.   -Continue to offload as per hospital protocol   -Avoid diaper use, if patient reluctant to keeping diaper please provide vigilant perineal care  -Risks for moisture associated dermatitis in bilateral buttocks/perineum: Gently clean with skin cleanser. Pat dry. Apply a thin layer of Willian barrier moisture cream, apply twice a day or prn if soiled.   -Appreciate nutrition Consult for patient with underweight, consider MVI & Vit C to promote wound healing  -Once discharge to home, Patient should qualify for a  roho seat cushion and low air loss bed  if she does not already have one at home.     Bilateral foot wounds -Management as per podiatry team, receiving muporicin.   -Continue to offload both heels with complete cair boots.     Additional Recs   Continue turning and positioning w/ offloading assistive devices as per protocol  Continue w/ Pericare as per protocol  Continue w/ low air loss fluidized bed surface     Care as per medicine, will follow w/ you periodically during hospital stay, please reconsult earlier if needed     Upon discharge f/u as outpatient at [facility] [address] [phone]    Continue low airloss support surface.  Continue to turn and position every 2 hours with z-joel fluidized positioning device.  Continue use of Complete Cair pressure offloading boots.  Continue Nutritional management as per RD recommendations.    Upon discharge follow up at outpatient [facility]. [address] [facility]. [phone].    Will continue to follow while inpatient. Please reconsult earlier if needed   Thank you.    Discussed findings and plan with primary team Regi Cervantes MD at the bedside    Comfort langston, please kindly follow up to optimize safe discharge. Patient should qualify for group 2 mattress Low air loss bed and a roho seat cushion once discharge given Stage 4 pressure injury to left ischium.   Roxie Patterson, [facility]-BC, [facility]    pager #[phone] or available in MS teams     If after 4PM or before 7:30AM on Mon-Friday or weekend/holiday please contact general surgery for urgent matters.   Team A- [id number]   Team B- [id number]  For non-urgent matters e-mail [email or web address]    I spent 35 minutes face-to-face with this patient of which more than 50% of the time was spent counseling/coordinating care of this patient.

## 2024-05-15 NOTE — PROGRESS NOTE ADULT - SUBJECTIVE AND OBJECTIVE BOX
Patient is a 44y old  Female who presents with a chief complaint of right foot wound (15 May 2024 08:22)       INTERVAL HPI/OVERNIGHT EVENTS:  Patient seen and evaluated at bedside.  Pt is resting comfortable in NAD. Denies N/V/F/C.    Allergies    Zyrtec (Rash)    Intolerances        Vital Signs Last 24 Hrs  T(C): 36.6 (15 May 2024 05:40), Max: 36.8 (14 May 2024 15:17)  T(F): 97.9 (15 May 2024 05:40), Max: 98.2 (14 May 2024 15:17)  HR: 71 (15 May 2024 05:40) (71 - 77)  BP: 118/82 (15 May 2024 05:40) (113/74 - 120/81)  BP(mean): --  RR: 16 (15 May 2024 05:40) (16 - 18)  SpO2: 100% (15 May 2024 05:40) (100% - 100%)    Parameters below as of 15 May 2024 05:40  Patient On (Oxygen Delivery Method): room air        LABS:                        8.9    5.07  )-----------( 425      ( 15 May 2024 07:11 )             26.7               CAPILLARY BLOOD GLUCOSE          Lower Extremity Physical Exam:  Vascular: DP/PT 1/4, B/L, CFT <3 seconds B/L, Temperature gradient warm to cool, B/L.   Neuro: Epicritic sensation diminished but not absent to the level of digits, B/L.  Musculoskeletal/Ortho: drop foot, weak muscle strength, ROM diminished 2/2 spina bifida   Skin:  5/11 s/p b/s Right foot dorsal medial non-mobile mass incision and drainage, closed, sutures intact, central mass wound to dermis, hyperpigmented periwound, no fluctuance, no pus, no bogginess, no malodor, no periwound erythema.  Left lateral ankle wound to subQ, no tracking/ tunnelling/ signs of infection, small mobile soft tissue mass at subQ level, no pain on palpation     RADIOLOGY & ADDITIONAL TESTS:

## 2024-05-15 NOTE — PROGRESS NOTE ADULT - PROBLEM SELECTOR PLAN 3
pt with hx spina bifida, has motorized wheelchair at home    shunt managed by [facility] neurosurgery   CTM

## 2024-05-15 NOTE — PROGRESS NOTE ADULT - ASSESSMENT
This is a 44F w/ PMHx of multiple bilateral foot surgeries, and  shunt (managed [facility] neurosurgery), uterine fibroids presenting with R foot wound on 5/12/24, has been ~7 months, now bloody, white/yellow drainage.   Fever to 100.5, no leukocytosis. CT w/ abscess 5.3 x 1.8 x 5.0 cm.     #R foot abscess   #Fever     Overall R foot abscess w/ drainage, fever, otherwise HD stable  On exam, L foot lateral ulcer likely from braces, not infected, R foot with large swelling, collection, with ulcer. Per patient, blood/purulent drainage   C/w Zosyn for broad coverage,    Spoke to podiatry attending, discussed case in detail, no findings of pus on his exam and during probe, does not seem infected. Pt w/o leukocytosis, no longer febrile, will f/u MRI     Plan:   1. C/w Zosyn 3.375 g q8   2. F/u BCx, NGTD  3. F/u MRI    Thank you for this consult. Inpatient ID team will follow.    Carlton Chaudhary M.D.  Attending Physician  Division of Infectious Diseases  Department of Medicine    Please contact through MS Teams message.  Office: [phone] (after 5 PM or weekend).

## 2024-05-15 NOTE — PROGRESS NOTE ADULT - SUBJECTIVE AND OBJECTIVE BOX
Infectious Diseases Follow Up:    Patient is a 44y old  Female who presents with a chief complaint of right foot wound (15 May 2024 11:27)      Interval History/ROS:  No acute complaints     Allergies  Zyrtec (Rash)        ANTIMICROBIALS:  piperacillin/tazobactam IVPB.. 3.375 every 8 hours      Current Abx:     Previous Abx     OTHER MEDS:  MEDICATIONS  (STANDING):  acetaminophen     Tablet .. 975 every 6 hours PRN  enoxaparin Injectable 30 every 24 hours  ketorolac 15 every 6 hours PRN  melatonin 3 at bedtime  traMADol 25 every 6 hours PRN      Vital Signs Last 24 Hrs  T(C): 36.6 (15 May 2024 05:40), Max: 36.8 (14 May 2024 15:17)  T(F): 97.9 (15 May 2024 05:40), Max: 98.2 (14 May 2024 15:17)  HR: 71 (15 May 2024 05:40) (71 - 76)  BP: 118/82 (15 May 2024 05:40) (113/74 - 120/81)  BP(mean): --  RR: 16 (15 May 2024 05:40) (16 - 16)  SpO2: 100% (15 May 2024 05:40) (100% - 100%)    Parameters below as of 15 May 2024 05:40  Patient On (Oxygen Delivery Method): room air      PHYSICAL EXAM:  GENERAL: NAD, thin  HEAD:  Atraumatic, Normocephalic  EYES: EOMI, conjunctiva and sclera clear  NECK: Supple, No JVD  CHEST/LUNG: On RA, not in respiratory distress   EXTREMITIES:  b/l fee wrapped   PSYCH: AAOx3                        8.9    5.07  )-----------( 425      ( 15 May 2024 07:11 )             26.7                   MICROBIOLOGY:  v  .Abscess R foot wound  05-14-24 --  --    No polymorphonuclear cells seen per low power field  No organisms seen per oil power field      Clean Catch Clean Catch (Midstream)  05-11-24   50,000 - 99,000 CFU/mL Proteus mirabilis  --  Proteus mirabilis      .Blood Blood-Peripheral  05-11-24   No growth at 72 Hours  --  --      .Blood Blood-Peripheral  05-11-24   No growth at 72 Hours  --  --                RADIOLOGY:

## 2024-05-15 NOTE — PROGRESS NOTE ADULT - PROBLEM SELECTOR PLAN 1
pt with chronic R foot wound with white/yellow drainage  XR R foot with extensive soft tissue swelling  CT R foot with forming abscess   appreciate podiatry recs - will need better visualization of foot w MR to assess abscess vs soft tissue mass before any intervention   CTH for  shunt done   f/u MR R foot   ID recs appreciated, c/w zosyn   pain control: tylenol, toradol 15 mod pain, tramadol 25 severe pain  PT - outpatient PT

## 2024-05-15 NOTE — PROGRESS NOTE ADULT - ATTENDING COMMENTS
44F PMHx spina bifida, multiple bilateral foot surgeries, and  shunt (managed [facility] neurosurgery), uterine fibroids presenting with R foot wound.  Pt admitted for rule out OM    R foot wound: given drainage and suspicion of abscess  seen on CT, will continue with ABx. MRI pending to r/o OM and further eval ?abscess f/u Podiatry and ID recs. ADAM with no PAD. Podiatry does not feel strongly about foot infection.     s/p  shunt: CT head done to eval  shunt and compatibility with MRI, ok for MRI per team    UTI: cannot r/o UTI, UA was done after Abx    Stage 4 sacral  decubitus: c/w wound vac and wound care. ? suspicion for mass per wound care, will check CT Pelvis for better eval

## 2024-05-15 NOTE — PROGRESS NOTE ADULT - SUBJECTIVE AND OBJECTIVE BOX
[facility]-- WOUND TEAM -- FOLLOW UP NOTE  --------------------------------------------------------------------------------    subjective: Patient seen and examined at bedside.     Interval HPI/24 hour events:   -->Patient pulled wound vac off yesterday when she was getting on stretcher for MRI of foot   -->Afebrile    Chart reviewed including labs and relevant images      Diet:  Diet, Regular (05-12-24 @ 09:21)      ROS: General/ SKIN/ see HPI  all other systems negative  pt unable to offer    ALLERGIES & MEDICATIONS  --------------------------------------------------------------------------------  Allergies    Zyrtec (Rash)    Intolerances          STANDING INPATIENT MEDICATIONS    enoxaparin Injectable 30 milliGRAM(s) SubCutaneous every 24 hours  melatonin 3 milliGRAM(s) Oral at bedtime  mupirocin 2% Ointment 1 Application(s) Both Nostrils every 12 hours  mupirocin 2% Ointment 1 Application(s) Topical <User Schedule>  piperacillin/tazobactam IVPB.. 3.375 Gram(s) IV Intermittent every 8 hours      PRN INPATIENT MEDICATION  acetaminophen     Tablet .. 975 milliGRAM(s) Oral every 6 hours PRN  ketorolac 15 milliGRAM(s) Oral every 6 hours PRN  traMADol 25 milliGRAM(s) Oral every 6 hours PRN        Vital signs:  T(C): 36.6 (05-15-24 @ 05:40), Max: 36.8 (05-14-24 @ 15:17)  HR: 71 (05-15-24 @ 05:40) (71 - 77)  BP: 118/82 (05-15-24 @ 05:40) (113/74 - 120/81)  RR: 16 (05-15-24 @ 05:40) (16 - 18)  SpO2: 100% (05-15-24 @ 05:40) (100% - 100%)  Wt(kg): --        05-14-24 @ 07:01  -  05-15-24 @ 07:00  --------------------------------------------------------  IN: 0 mL / OUT: 0 mL / NET: 0 mL              LABS/ CULTURES/ RADIOLOGY:              8.9    5.07  >-----------<  425      [05-15-24 @ 07:11]              26.7     CAPILLARY BLOOD GLUCOSE    Culture - Blood (collected 05-11-24 @ 18:30)  Source: .Blood Blood-Peripheral  Preliminary Report (05-15-24 @ 02:02):    No growth at 72 Hours    Culture - Blood (collected 05-11-24 @ 18:15)  Source: .Blood Blood-Peripheral  Preliminary Report (05-15-24 @ 02:02):    No growth at 72 Hours                       [facility]-- WOUND TEAM -- FOLLOW UP NOTE  --------------------------------------------------------------------------------    subjective: Patient seen and examined at bedside. Patient endorses bilateral leg     Interval HPI/24 hour events:   -->Patient pulled wound vac off yesterday when she was getting on stretcher for MRI of foot   -->Afebrile    Chart reviewed including labs and relevant images      Diet:  Diet, Regular (05-12-24 @ 09:21)      ROS: General/ SKIN/ see HPI  all other systems negative  pt unable to offer    ALLERGIES & MEDICATIONS  --------------------------------------------------------------------------------  Allergies    Zyrtec (Rash)    Intolerances          STANDING INPATIENT MEDICATIONS    enoxaparin Injectable 30 milliGRAM(s) SubCutaneous every 24 hours  melatonin 3 milliGRAM(s) Oral at bedtime  mupirocin 2% Ointment 1 Application(s) Both Nostrils every 12 hours  mupirocin 2% Ointment 1 Application(s) Topical <User Schedule>  piperacillin/tazobactam IVPB.. 3.375 Gram(s) IV Intermittent every 8 hours      PRN INPATIENT MEDICATION  acetaminophen     Tablet .. 975 milliGRAM(s) Oral every 6 hours PRN  ketorolac 15 milliGRAM(s) Oral every 6 hours PRN  traMADol 25 milliGRAM(s) Oral every 6 hours PRN        Vital signs:  T(C): 36.6 (05-15-24 @ 05:40), Max: 36.8 (05-14-24 @ 15:17)  HR: 71 (05-15-24 @ 05:40) (71 - 77)  BP: 118/82 (05-15-24 @ 05:40) (113/74 - 120/81)  RR: 16 (05-15-24 @ 05:40) (16 - 18)  SpO2: 100% (05-15-24 @ 05:40) (100% - 100%)  Wt(kg): --        05-14-24 @ 07:01  -  05-15-24 @ 07:00  --------------------------------------------------------  IN: 0 mL / OUT: 0 mL / NET: 0 mL              LABS/ CULTURES/ RADIOLOGY:              8.9    5.07  >-----------<  425      [05-15-24 @ 07:11]              26.7     CAPILLARY BLOOD GLUCOSE    Culture - Blood (collected 05-11-24 @ 18:30)  Source: .Blood Blood-Peripheral  Preliminary Report (05-15-24 @ 02:02):    No growth at 72 Hours    Culture - Blood (collected 05-11-24 @ 18:15)  Source: .Blood Blood-Peripheral  Preliminary Report (05-15-24 @ 02:02):    No growth at 72 Hours                       [facility]-- WOUND TEAM -- FOLLOW UP NOTE  --------------------------------------------------------------------------------    subjective: Patient seen and examined at bedside. Patient endorses bilateral leg throbbing last night, she notified primary team. She believes there "nerve pain associated with cold weather/rain". Patient endorses she has being told in the past that "nerve pains" can be normal in patients  with spina bifida. Also endorses occasional back pain at home. Patient declines VNS services "I don't want a nurse in my house", discussed with patient she will  still require assistance with wound care as she would not be able to preform wound care independently given location of the wound she cannot visualized the wound. Patient endorses she will have a family member help her but did not provide name at the time of interviewed.     Interval HPI/24 hour events:   -->Patient by accident disconnected  wound vac off yesterday when she was getting on stretcher for MRI of foot   -->Afebrile, WBC afebrile   -->Podiatry continues to follow up with patient for bilateral foot wounds.   Chart reviewed including labs and relevant images    Diet:  Diet, Regular (05-12-24 @ 09:21)    ROS: General/ SKIN/ see HP  all other systems negative      ALLERGIES & MEDICATIONS  --------------------------------------------------------------------------------  Allergies    Zyrtec (Rash)    Intolerances    STANDING INPATIENT MEDICATIONS    enoxaparin Injectable 30 milliGRAM(s) SubCutaneous every 24 hours  melatonin 3 milliGRAM(s) Oral at bedtime  mupirocin 2% Ointment 1 Application(s) Both Nostrils every 12 hours  mupirocin 2% Ointment 1 Application(s) Topical <User Schedule>  piperacillin/tazobactam IVPB.. 3.375 Gram(s) IV Intermittent every 8 hours    PRN INPATIENT MEDICATION  acetaminophen     Tablet .. 975 milliGRAM(s) Oral every 6 hours PRN  ketorolac 15 milliGRAM(s) Oral every 6 hours PRN  traMADol 25 milliGRAM(s) Oral every 6 hours PRN    Vital signs:  T(C): 36.6 (05-15-24 @ 05:40), Max: 36.8 (05-14-24 @ 15:17)  HR: 71 (05-15-24 @ 05:40) (71 - 77)  BP: 118/82 (05-15-24 @ 05:40) (113/74 - 120/81)  RR: 16 (05-15-24 @ 05:40) (16 - 18)  SpO2: 100% (05-15-24 @ 05:40) (100% - 100%)    Wt(kg): --    05-14-24 @ 07:01  -  05-15-24 @ 07:00  --------------------------------------------------------  IN: 0 mL / OUT: 0 mL / NET: 0 mL    Physical Exam   Constitutional: NAD/AOX3. Well kept.   Thin, (+) bony prominences to sacrum and bilateral trochanters  (+) low airloss support surface, (+) fluidized positioning devices, (+) complete cair boots  HEENT:  NC/AT, non icteric, mucosa moist, throat clear, trachea midline, neck supple  Cardiovascular: Rate regular   Respiratory: Equal chest expansion   Gastrointestinal soft NT/ND  : continence   Neurology : weakened strength & sensation grossly intact. Paraplegic.   Musculoskeletal:  limited aROM in lower extremities. Mild foot drop  Vascular: LLE equally warm, no cyanosis, clubbing, edema  Bilateral  foot with intact/dry dressing in place-Management as per podiatry   Skin:  moist w/ good turgor  Lumbar spine, right ankle, Left achilles surgical scars, well approximated.   Left ischium Stage 4 pressure injury   -Well demarcated ulcer   -[id number] (prev 4udy1jxa8.5cm)  -Undermining circumferentially extends deepest  3.5cm at 6 and 12 o'clock.   -No purulence express  -Moderate serous drainage, no odor   -Tissue type exposing 100% red/pale pink viable tissue appears like muscle? On today's exam appears more shiny/flat red tissue, also upon palpation feels like a palpable mass?   Medical team Cervantes came to bedside to assess, discussed at the bedside that given tissue presentation and palpable mass?  would recommend dermatology to assess underline tissue   -No bone palpable or visualized   -Patient endorses sharp sensation   -Periwound skin with skin maceration circumferentially no increased warmth, no erythema, no edema.   Bilateral buttocks with areas of hypopigmentation previous healed wounds suspect secondary to moisture associated dermatitis. No open wounds to sacrum.   Psych: calm and cooperative.      LABS/ CULTURES/ RADIOLOGY:              8.9    5.07  >-----------<  425      [05-15-24 @ 07:11]              26.7     CAPILLARY BLOOD GLUCOSE    Culture - Blood (collected 05-11-24 @ 18:30)  Source: .Blood Blood-Peripheral  Preliminary Report (05-15-24 @ 02:02):    No growth at 72 Hours    Culture - Blood (collected 05-11-24 @ 18:15)  Source: .Blood Blood-Peripheral  Preliminary Report (05-15-24 @ 02:02):    No growth at 72 Hours                       [facility]-- WOUND TEAM -- FOLLOW UP NOTE  --------------------------------------------------------------------------------    subjective: Patient seen and examined at bedside. Patient endorses bilateral leg throbbing last night, she notified primary team. She believes there "nerve pain associated with cold weather/rain". Patient endorses she has being told in the past that "nerve pains" can be normal in patients  with spina bifida. Also endorses occasional back pain at home. Patient declines VNS services "I don't want a nurse in my house", discussed with patient she will  still require assistance with wound care as she would not be able to preform wound care independently given location of the wound she cannot visualized the wound. Patient endorses she will have a family member help her but did not provide name at the time of interviewed.     Interval HPI/24 hour events:   -->Patient by accident disconnected  wound vac off yesterday when she was getting on stretcher for MRI of foot   -->Afebrile, WBC afebrile   -->Podiatry continues to follow up with patient for bilateral foot wounds.   Chart reviewed including labs and relevant images    Diet:  Diet, Regular (05-12-24 @ 09:21)    ROS: General/ SKIN/ see HP  all other systems negative      ALLERGIES & MEDICATIONS  --------------------------------------------------------------------------------  Allergies    Zyrtec (Rash)    Intolerances    STANDING INPATIENT MEDICATIONS    enoxaparin Injectable 30 milliGRAM(s) SubCutaneous every 24 hours  melatonin 3 milliGRAM(s) Oral at bedtime  mupirocin 2% Ointment 1 Application(s) Both Nostrils every 12 hours  mupirocin 2% Ointment 1 Application(s) Topical <User Schedule>  piperacillin/tazobactam IVPB.. 3.375 Gram(s) IV Intermittent every 8 hours    PRN INPATIENT MEDICATION  acetaminophen     Tablet .. 975 milliGRAM(s) Oral every 6 hours PRN  ketorolac 15 milliGRAM(s) Oral every 6 hours PRN  traMADol 25 milliGRAM(s) Oral every 6 hours PRN    Vital signs:  T(C): 36.6 (05-15-24 @ 05:40), Max: 36.8 (05-14-24 @ 15:17)  HR: 71 (05-15-24 @ 05:40) (71 - 77)  BP: 118/82 (05-15-24 @ 05:40) (113/74 - 120/81)  RR: 16 (05-15-24 @ 05:40) (16 - 18)  SpO2: 100% (05-15-24 @ 05:40) (100% - 100%)    Wt(kg): --    05-14-24 @ 07:01  -  05-15-24 @ 07:00  --------------------------------------------------------  IN: 0 mL / OUT: 0 mL / NET: 0 mL    Physical Exam   Constitutional: NAD/AOX3. Well kept.   Thin, (+) bony prominences to sacrum and bilateral trochanters  (+) low airloss support surface, (+) fluidized positioning devices, (+) complete cair boots  HEENT:  NC/AT, non icteric, mucosa moist, throat clear, trachea midline, neck supple  Cardiovascular: Rate regular   Respiratory: Equal chest expansion   Gastrointestinal soft NT/ND  : continence   Neurology : weakened strength & sensation grossly intact. Paraplegic.   Musculoskeletal:  limited aROM in lower extremities. Mild foot drop  Vascular: LLE equally warm, no cyanosis, clubbing, edema  Bilateral  foot with intact/dry dressing in place-Management as per podiatry   Skin:  moist w/ good turgor  Lumbar spine, right ankle, Left achilles surgical scars, well approximated.   Left ischium Stage 4 pressure injury   -Well demarcated ulcer   -5xvs2kkw2.7cm (prev 6mhs1rjg7.5cm)  -Undermining circumferentially extends deepest  3.5cm at 6 and 12 o'clock.   -No purulence express  -Moderate serous drainage, no odor   -Tissue type exposing 100% red/pale pink viable tissue appears like muscle? On today's exam appears more shiny/flat red tissue, also upon palpation feels like a circular palpable mass?   Medical team Cervantes came to bedside to assess, discussed at the bedside that given tissue presentation and palpable mass?  would recommend dermatology to assess underline tissue   -No bone palpable or visualized   -Patient endorses sensation in lower spine   -Periwound skin with skin maceration circumferentially no increased warmth, no erythema, no edema.   Bilateral buttocks with areas of hypopigmentation previous healed wounds suspect secondary to moisture associated dermatitis. No open wounds to sacrum.   Psych: calm and cooperative.      LABS/ CULTURES/ RADIOLOGY:              8.9    5.07  >-----------<  425      [05-15-24 @ 07:11]              26.7     CAPILLARY BLOOD GLUCOSE    Culture - Blood (collected 05-11-24 @ 18:30)  Source: .Blood Blood-Peripheral  Preliminary Report (05-15-24 @ 02:02):    No growth at 72 Hours    Culture - Blood (collected 05-11-24 @ 18:15)  Source: .Blood Blood-Peripheral  Preliminary Report (05-15-24 @ 02:02):    No growth at 72 Hours      ACC: [id number] EXAM:  CT ABDOMEN AND PELVIS IC   ORDERED BY: ALFONSO RODRIGUES     PROCEDURE DATE:  10/20/2023          INTERPRETATION:  CLINICAL INFORMATION: Abdominal pain. Nausea and   vomiting.    COMPARISON: None.    CONTRAST/COMPLICATIONS:  IV Contrast: Omnipaque 350  90 cc administered   10 cc discarded  Oral Contrast: NONE  Complications: None reported at time of study completion    PROCEDURE:  CT of the Abdomen and Pelvis was performed.  Sagittal and coronal reformats were performed.    FINDINGS:  LOWER CHEST: Scattered cysts and subcentimeter indeterminant hypodense   foci that are too small to characterize.    LIVER: Within normal limits.  BILE DUCTS: Normal caliber.  GALLBLADDER: Within normal limits.  SPLEEN: Within normal limits.  PANCREAS: Within normal limits.  ADRENALS: Within normal limits.  KIDNEYS/URETERS: Within normal limits.    BLADDER: Within normal limits.  REPRODUCTIVE ORGANS: Enlarged multi fibroid uterus. Hypodense foci within   the endometrium.    BOWEL: No bowel obstruction. Moderate fecal load with fecal impaction of   the rectum.  PERITONEUM: No ascites.  VESSELS: Within normal limits.  RETROPERITONEUM/LYMPH NODES: No lymphadenopathy.  ABDOMINAL WALL:  shunt catheter descending along the left ventral chest  and abdominal wall entering the peritoneal cavity with tip terminating in   the left abdomen. Decubitus ulcer 2.4 cm deep overlying the left ischium..  BONES: L5 neural tube defect.    IMPRESSION:  Enlarged fibroid uterus with endometrial lesion isof uncertain etiology.   These may represent polyps, submucosal fibroids, neoplasm, or blood   clots. Recommend correlating with transvaginal pelvic ultrasound.    Left-sided decubitus ulcer overlying the ischium.    --- End of Report ---            MYRA GARCIA MD; Attending Radiologist  This document has been electronically signed. Oct 20 2023  9:16PM

## 2024-05-16 LAB
-  CLINDAMYCIN: SIGNIFICANT CHANGE UP
-  ERYTHROMYCIN: SIGNIFICANT CHANGE UP
-  GENTAMICIN: SIGNIFICANT CHANGE UP
-  OXACILLIN: SIGNIFICANT CHANGE UP
-  RIFAMPIN: SIGNIFICANT CHANGE UP
-  TETRACYCLINE: SIGNIFICANT CHANGE UP
-  TRIMETHOPRIM/SULFAMETHOXAZOLE: SIGNIFICANT CHANGE UP
-  VANCOMYCIN: SIGNIFICANT CHANGE UP
METHOD TYPE: SIGNIFICANT CHANGE UP

## 2024-05-16 PROCEDURE — 99233 SBSQ HOSP IP/OBS HIGH 50: CPT | Mod: GC

## 2024-05-16 PROCEDURE — 99232 SBSQ HOSP IP/OBS MODERATE 35: CPT | Mod: GC

## 2024-05-16 PROCEDURE — 99253 IP/OBS CNSLTJ NEW/EST LOW 45: CPT | Mod: GC

## 2024-05-16 RX ORDER — LIDOCAINE HYDROCHLORIDE AND EPINEPHRINE 10; 10 MG/ML; UG/ML
20 INJECTION, SOLUTION INFILTRATION; PERINEURAL ONCE
Refills: 0 | Status: DISCONTINUED | OUTPATIENT
Start: 2024-05-16 | End: 2024-05-22

## 2024-05-16 RX ORDER — SENNA PLUS 8.6 MG/1
2 TABLET ORAL AT BEDTIME
Refills: 0 | Status: DISCONTINUED | OUTPATIENT
Start: 2024-05-16 | End: 2024-05-26

## 2024-05-16 RX ORDER — POLYETHYLENE GLYCOL 3350 17 G/17G
17 POWDER, FOR SOLUTION ORAL DAILY
Refills: 0 | Status: DISCONTINUED | OUTPATIENT
Start: 2024-05-16 | End: 2024-05-26

## 2024-05-16 RX ADMIN — MUPIROCIN 1 APPLICATION(S): 20 OINTMENT TOPICAL at 05:32

## 2024-05-16 RX ADMIN — Medication 3 MILLIGRAM(S): at 23:36

## 2024-05-16 RX ADMIN — TRAMADOL HYDROCHLORIDE 25 MILLIGRAM(S): 50 TABLET ORAL at 10:45

## 2024-05-16 RX ADMIN — MUPIROCIN 1 APPLICATION(S): 20 OINTMENT TOPICAL at 18:34

## 2024-05-16 RX ADMIN — PIPERACILLIN AND TAZOBACTAM 25 GRAM(S): 4; .5 INJECTION, POWDER, LYOPHILIZED, FOR SOLUTION INTRAVENOUS at 14:55

## 2024-05-16 RX ADMIN — PIPERACILLIN AND TAZOBACTAM 25 GRAM(S): 4; .5 INJECTION, POWDER, LYOPHILIZED, FOR SOLUTION INTRAVENOUS at 05:27

## 2024-05-16 RX ADMIN — CHLORHEXIDINE GLUCONATE 1 APPLICATION(S): 213 SOLUTION TOPICAL at 12:17

## 2024-05-16 RX ADMIN — PIPERACILLIN AND TAZOBACTAM 25 GRAM(S): 4; .5 INJECTION, POWDER, LYOPHILIZED, FOR SOLUTION INTRAVENOUS at 23:37

## 2024-05-16 RX ADMIN — TRAMADOL HYDROCHLORIDE 25 MILLIGRAM(S): 50 TABLET ORAL at 11:18

## 2024-05-16 NOTE — PROGRESS NOTE ADULT - ATTENDING COMMENTS
44F PMHx spina bifida, multiple bilateral foot surgeries, and  shunt (managed [facility] neurosurgery), uterine fibroids presenting with R foot wound.  Pt admitted for rule out OM    R foot wound: given drainage and suspicion of abscess  seen on CT, will continue with ABx. MRI shows abscess, possible drainage by Podiatry,   f/u Podiatry and ID recs. No OM on MRI. ADAM with no PAD. Podiatry does not feel strongly about foot infection.     s/p  shunt: CT head done to eval  shunt and compatibility with MRI, ok for MRI per team    UTI: cannot r/o UTI, UA was done after Abx    Sacral Mass: CT shows possible abscess, will have surgery vs IR eval for biopsy/    Stage 4 sacral  decubitus: c/w wound vac and wound care. Wound care rec wound vac at home, pt declining, will continue to encourage.

## 2024-05-16 NOTE — PROGRESS NOTE ADULT - SUBJECTIVE AND OBJECTIVE BOX
Patient is a 44y old  Female who presents with a chief complaint of right foot wound (16 May 2024 06:40)       INTERVAL HPI/OVERNIGHT EVENTS:  Patient seen and evaluated at bedside.  Pt is resting comfortable in NAD. Denies N/V/F/C.    Allergies    Zyrtec (Rash)    Intolerances        Vital Signs Last 24 Hrs  T(C): 36.4 (16 May 2024 05:41), Max: 36.7 (15 May 2024 21:36)  T(F): 97.6 (16 May 2024 05:41), Max: 98 (15 May 2024 21:36)  HR: 65 (16 May 2024 05:41) (65 - 88)  BP: 111/80 (16 May 2024 05:41) (111/80 - 142/91)  BP(mean): --  RR: 18 (16 May 2024 05:41) (17 - 18)  SpO2: 96% (16 May 2024 05:41) (96% - 100%)    Parameters below as of 16 May 2024 05:41  Patient On (Oxygen Delivery Method): room air        LABS:                        8.9    5.07  )-----------( 425      ( 15 May 2024 07:11 )             26.7               CAPILLARY BLOOD GLUCOSE  < from: MR Foot w/wo IV Cont, Right (05.15.24 @ 19:50) >    ACC: [id number] EXAM:  MR FOOT WAW IC RT   ORDERED BY: FLORENTINO CORMIER     PROCEDURE DATE:  05/15/2024          INTERPRETATION:  RIGHT FOOT MRI    CLINICAL INFORMATION: Right foot wound. Question osteomyelitis.  TECHNIQUE: Multiplanar, multisequence MRIwas obtained of the RIGHT foot   before and after the intravenous administration of 4 ml Gadavist (3.5 cc   discarded) .  COMPARISON: Right foot CT 5/11/2024. Right foot radiographs 5/11/2024.    FINDINGS:    PERIPHERAL SOFT TISSUES: Rim-enhancing fluid collection arising in the   subcutaneous fat along the dorsum of the foot at the level of the   cuneiforms measuring 18 x 22 x 8 mm consistent with abscess. Edema and   soft tissue swelling is seen in the subcutaneous fat adjacent to the site   ofabscess. No deeper abscess is identified.  BONE MARROW: Susceptibility artifact from hardware in the lateral aspect   of the calcaneus and cuboid. No marrow signal alteration to suggest acute   osteomyelitis. No acute fracture.    MUSCLES AND TENDONS: Tendons are intact. Diffuse atrophy and fatty   infiltration of the intrinsic muscles of the foot.  LIGAMENTS AND CAPSULAR STRUCTURES: Ligaments of the ankle are not well   evaluated.  CARTILAGE AND SUBCHONDRAL BONE: Patient status post fusion of the   subtalar, talonavicular, and calcaneocuboid joints. Chondral loss and   degenerative change at the medial aspect of the naviculocuneiform joint.  SYNOVIUM/JOINT FLUID: No joint effusion or synovial enhancement to   suggest septic arthritis.      IMPRESSION:  1.  Subcutaneous abscess in the dorsum of the foot measuring 18 x 22 8 mm.  2.  No acute osteomyelitis.    --- End of Report ---            CARLEE CANCINO MD; Attending Radiologist  This document has been electronically signed. May 15 2024  8:27PM    < end of copied text >          Lower Extremity Physical Exam:  Vascular: DP/PT 1/4, B/L, CFT <3 seconds B/L, Temperature gradient warm to cool, B/L.   Neuro: Epicritic sensation diminished but not absent to the level of digits, B/L.  Musculoskeletal/Ortho: drop foot, weak muscle strength, ROM diminished 2/2 spina bifida   Skin:  5/11 s/p b/s Right foot dorsal medial non-mobile mass incision and drainage, closed, sutures intact, central mass wound to dermis, hyperpigmented periwound, no fluctuance, no pus, no bogginess, no malodor, no periwound erythema.  Left lateral ankle wound to subQ, no tracking/ tunnelling/ signs of infection, small mobile soft tissue mass at subQ level, no pain on palpation   RADIOLOGY & ADDITIONAL TESTS:

## 2024-05-16 NOTE — CONSULT NOTE ADULT - ASSESSMENT
ASSESSMENT: 44F w/ PMHx spina bifida, multiple bilateral foot surgeries, and  shunt (managed [facility]), uterine fibroids presented with R foot wound, s/p I&D w/ podiatry. Patient also w/ L ischial stage 4 pressure wound, CT A/P 5/15 w/ thick walled fluid collection superficial to L ischium 7.9 x 3.7 x 5.7cm, concerning for abscess. General surgery consulted for evaluation.       PLAN: ***incomplete***  - No acute surgical intervention  - Plan pending attending discussion       ASSESSMENT: 44F w/ PMHx spina bifida, multiple bilateral foot surgeries, and  shunt (managed [facility]), uterine fibroids presented with R foot wound, s/p I&D w/ podiatry. Patient also w/ L ischial stage 4 pressure wound, CT A/P 5/15 w/ thick walled fluid collection superficial to L ischium 7.9 x 3.7 x 5.7cm, concerning for abscess. General surgery consulted for evaluation.       PLAN:   - Will do bedside I&D this evening  - Rest of care per primary      Patient discussed with attending surgeon, Dr. Henley.     Deepa Sue, PGY-2  B Team Surgery  [id number]       ASSESSMENT: 44F w/ PMHx spina bifida, multiple bilateral foot surgeries, and  shunt (managed [facility]), uterine fibroids presented with R foot wound, s/p I&D w/ podiatry. Patient also w/ L ischial stage 4 pressure wound, CT A/P 5/15 w/ thick walled fluid collection superficial to L ischium 7.9 x 3.7 x 5.7cm, concerning for abscess. General surgery consulted for evaluation.       PLAN:   - Will do bedside I&D this evening  - May remove packing tape in 24hrs  - Appreciate wound care consult  - Rest of care per primary      Patient discussed with attending surgeon, Dr. Henley.     Deepa Sue, PGY-2  B Team Surgery  [id number]       ASSESSMENT: 44F w/ PMHx spina bifida, multiple bilateral foot surgeries, and  shunt (managed [facility]), uterine fibroids presented with R foot wound, s/p I&D w/ podiatry. Patient also w/ L ischial stage 4 pressure wound, CT A/P 5/15 w/ thick walled fluid collection superficial to L ischium 7.9 x 3.7 x 5.7cm, concerning for abscess. General surgery consulted for evaluation.       PLAN:   - Will do bedside I&D this evening  - May remove packing tape in 24hrs  - f/u aspirate culture  - Appreciate wound care consult  - Rest of care per primary      Patient discussed with attending surgeon, Dr. Henley.     Deepa Sue, PGY-2  B Team Surgery  [id number]

## 2024-05-16 NOTE — CONSULT NOTE ADULT - SUBJECTIVE AND OBJECTIVE BOX
GENERAL SURGERY CONSULT NOTE  --------------------------------------------------------------------------------------------  HPI:  44F w/ PMHx spina bifida, multiple bilateral foot surgeries, and  shunt (managed [facility] neurosurgery), uterine fibroids presented with R foot wound, s/p I&D w/ podiatry, planning for additional abscess drainage/mass excision in OR. Patient also w/ L ischial stage 4 pressure wound, wound care following. Vac was initially placed but accidentally removed by patient and not replaced d/t mass-like tissue component at the base. CT A/P obtained 5/15 w/ thick walled fluid collection superficial to L ischium 7.9 x 3.7 x 5.7cm, concerning for abscess. General surgery consulted for evaluation.    Patient seen and examined. Denies buttock pain, states that she was not aware of a pressure injury until her nurse told her about it. Denies drainage or bleeding from wound, fevers, chills. Patient is afebrile, HDS, labs w/o leukocytosis.        PAST MEDICAL & SURGICAL HISTORY:  Spina bifida      Fibroids      Wound of right foot      Wound of left foot      S/P  shunt      S/P foot surgery, left      S/P foot surgery, right        FAMILY HISTORY:  No pertinent family history in first degree relatives    [] Family history not pertinent as reviewed with the patient and family    SOCIAL HISTORY:    ALLERGIES: Zyrtec (Rash)      HOME MEDICATIONS:    CURRENT MEDICATIONS  MEDICATIONS (STANDING): enoxaparin Injectable 30 milliGRAM(s) SubCutaneous every 24 hours  melatonin 3 milliGRAM(s) Oral at bedtime  piperacillin/tazobactam IVPB.. 3.375 Gram(s) IV Intermittent every 8 hours  polyethylene glycol 3350 17 Gram(s) Oral daily  senna 2 Tablet(s) Oral at bedtime    MEDICATIONS (PRN):acetaminophen     Tablet .. 975 milliGRAM(s) Oral every 6 hours PRN Temp greater or equal to 38C (100.4F), Mild Pain (1 - 3)  ketorolac 15 milliGRAM(s) Oral every 6 hours PRN Moderate Pain (4 - 6)  traMADol 25 milliGRAM(s) Oral every 6 hours PRN Severe Pain (7 - 10)    --------------------------------------------------------------------------------------------    Vitals:   T(C): 36.4 (05-16-24 @ 14:36), Max: 36.7 (05-15-24 @ 21:36)  HR: 66 (05-16-24 @ 14:36) (65 - 88)  BP: 134/92 (05-16-24 @ 14:36) (111/80 - 142/91)  RR: 18 (05-16-24 @ 14:36) (17 - 18)  SpO2: 97% (05-16-24 @ 14:36) (96% - 100%)  CAPILLARY BLOOD GLUCOSE          Height (cm): 157.5 (05-12 @ 06:40)  Weight (kg): 43.227 (05-12 @ 06:40)  BMI (kg/m2): 17.4 (05-12 @ 06:40)  BSA (m2): 1.4 (05-12 @ 06:40)      PHYSICAL EXAM:   General: NAD, Lying in bed comfortably  Neuro: Alert and answering questions appropriately   Cardio: Regular rate  Resp: Good effort, no signs of respiratory distress  Skin: L ischial stage 4 pressure ulcer w/ hypopigmented edges and palpable mass/soft tissue component at base, no drainage, malodor or bleeding    --------------------------------------------------------------------------------------------    LABS  CBC (05-15 @ 07:11)                              8.9<L>                         5.07    )----------------(  425<H>     --    % Neutrophils, --    % Lymphocytes, ANC: --                                  26.7<L>                --------------------------------------------------------------------------------------------    MICROBIOLOGY    -> .Abscess R foot wound Culture (05-14 @ 11:33)       No polymorphonuclear cells seen per low power field  No organisms seen per oil power field<!>    Staphylococcus aureus<!>    Few Staphylococcus aureus<!>    -> Clean Catch Clean Catch (Midstream) Culture (05-11 @ 22:06)     NG    Proteus mirabilis<!>    50,000 - 99,000 CFU/mL Proteus mirabilis<!>    -> .Blood Blood-Peripheral Culture (05-11 @ 18:30)     NG    NG    No growth at 4 days    -> .Blood Blood-Peripheral Culture (05-11 @ 18:15)     NG    NG    No growth at 4 days      --------------------------------------------------------------------------------------------    IMAGING    < from: CT Abdomen and Pelvis w/ Oral Cont and w/ IV Cont (05.15.24 @ 22:07) >  FINDINGS:  LOWER CHEST: Within normal limits.    LIVER: 1.4 cm liver cyst and additional lesions too small to characterize  BILE DUCTS: Normal caliber.  GALLBLADDER: Within normal limits.  SPLEEN: Within normal limits.  PANCREAS: Within normal limits.  ADRENALS: Within normal limits.  KIDNEYS/URETERS: Within normal limits.    BLADDER: Within normal limits.  REPRODUCTIVE ORGANS: Enlarged, myomatous uterus measures 13.5 x 7.0 x 3.3   cm.    BOWEL: No bowel obstruction. Appendix is not visualized. Stool throughout   the colon and rectum. The anal sphincter complex is directed posteriorly.   Soft tissue infiltration is noted surrounding the renal stricture   complex. (3:71)  PERITONEUM: Ventricular peritoneal catheter coils in the anterior abdomen   before terminating in the pelvis.  VESSELS: Within normal limits.  RETROPERITONEUM/LYMPH NODES: No lymphadenopathy.  ABDOMINAL WALL: Fat-containing umbilical hernia. Left gluteal lesion   adjacent to the ischium extending medial to the gluteal fold and   posterior to the superficial gluteal skin measures 7.9 x 3.7 x 5.7 cm,   demonstrating a central fluid component measuring 4.9 x 1.8 x 3.4 cm.   Findings correspond with location of large ulceration present on prior CT.  BONES: Within normal limits.    IMPRESSION:  *  Enlarged, myomatous uterus.  *  Stool distends the colon and rectum.  *  Posteriorly directed anal sphincter complex with surrounding soft   tissue infiltration.  *  Thick walled fluid collection superficial to the left ischium   measuring up to 7.9 cm with thick surrounding soft tissue component.   Findings are concerning for abscess.    < end of copied text >      --------------------------------------------------------------------------------------------

## 2024-05-16 NOTE — PROCEDURE NOTE - ADDITIONAL PROCEDURE DETAILS
Aspirated clear red fluid. Used punch biopsy and scissors to remove small area of thick rind. Probed defect with clamps and packed with 1" packing tape. Covered with gauze and abd.

## 2024-05-16 NOTE — PROGRESS NOTE ADULT - PROBLEM SELECTOR PLAN 3
pt with hx spina bifida, has motorized wheelchair at home    shunt managed by [facility] neurosurgery   CTM

## 2024-05-16 NOTE — CONSULT NOTE ADULT - ATTENDING COMMENTS
This is a 44F w/ PMHx of multiple bilateral foot surgeries, and  shunt (managed [facility] neurosurgery), uterine fibroids presenting with R foot wound on 5/12/24, has been ~7 months, now bloody, white/yellow drainage.   Fever to 100.5, no leukocytosis. CT w/ abscess 5.3 x 1.8 x 5.0 cm.     #R foot abscess   #Fever     Overall R foot abscess w/ drainage, fever, otherwise HD stable  On exam, L foot lateral ulcer likely from braces, not infected, R foot with large swelling, collection, with ulcer. Per patient, blood/purulent drainage   C/w Zosyn for broad coverage, recommend intervention for abscess pending MRI   Antibiotics are merely supportive, will need further surgical drainage     Plan:   1. C/w Zosyn 3.375 g q8   2. F/u BCx, would send Cx of drainage  3. F/u MRI, recommend podiatry f/u for intervention for abscess, would send Cx     Thank you for this consult. Inpatient ID team will follow.    Carlton Chaudhary M.D.  Attending Physician  Division of Infectious Diseases  Department of Medicine    Please contact through MS Teams message.  Office: [phone] (after 5 PM or weekend)
Patient with ischial wound  debrided and drained bedside  wound care    I have personally interviewed and examined this patient, reviewed pertinent labs and imaging, and discussed the case with colleagues, residents, and physician assistants on B Team rounds.    The active care issues are:  1. ischial wound    The Acute Care Surgery (B Team) Attending Group Practice:  Dr. Chayo Alonzo, Dr. Josue Riggs, Dr. Jimi Rubio, Dr. Tom Henley,     urgent issues - spectra [id number]  nonurgent issues - [phone]  patient appointments or afterhours - [phone]

## 2024-05-16 NOTE — PROGRESS NOTE ADULT - ASSESSMENT
44F s/p b/s R foot dorsal medial mass incision & drainage (DOS: 5/11)  - Patient seen and evaluated  - Afebrile, no leukocytosis   - 5/11 s/p b/s Right foot dorsal medial non-mobile mass incision and drainage, closed, sutures intact, central mass wound to dermis, hyperpigmented periwound, no fluctuance, no pus, no bogginess, no malodor, no periwound erythema.  Left lateral ankle wound to subQ, no tracking/ tunnelling/ signs of infection, small mobile soft tissue mass at subQ level, no pain on palpation   - R foot culture pending    - Right foot XR: no OM, no gas  - Right foot CT:  abscess within the dorsal medial soft tissues, clinically no fluctuance, no purulence expressed today.   - R foot MR: subcutaneous abscess with surrounding soft tissue mass within dorsum of foot   - R foot not likely source of fever, please rule out other sources   - STRICT decubitus precautions, Z- FLOWS boots at all time when in bed and in chair resting.   - Pod plan right foot soft tissue mass removal tentatively booked on Monday 5/20 at 5pm with Dr. Waterhouse   - Please document medical clearance for podiatric procedure under anesthesia   - Discussed with attending

## 2024-05-16 NOTE — PROGRESS NOTE ADULT - SUBJECTIVE AND OBJECTIVE BOX
JANETH REILLY  44y  MRN: [id number]    Patient is a 44y old  Female who presents with a chief complaint of right foot wound (15 May 2024 11:56)      Subjective: no events ON. Denies fever, CP, SOB, abn pain, N/V, dysuria. Tolerating diet.      MEDICATIONS  (STANDING):  chlorhexidine 2% Cloths 1 Application(s) Topical daily  enoxaparin Injectable 30 milliGRAM(s) SubCutaneous every 24 hours  melatonin 3 milliGRAM(s) Oral at bedtime  mupirocin 2% Ointment 1 Application(s) Topical <User Schedule>  mupirocin 2% Ointment 1 Application(s) Both Nostrils every 12 hours  piperacillin/tazobactam IVPB.. 3.375 Gram(s) IV Intermittent every 8 hours    MEDICATIONS  (PRN):  acetaminophen     Tablet .. 975 milliGRAM(s) Oral every 6 hours PRN Temp greater or equal to 38C (100.4F), Mild Pain (1 - 3)  ketorolac 15 milliGRAM(s) Oral every 6 hours PRN Moderate Pain (4 - 6)  traMADol 25 milliGRAM(s) Oral every 6 hours PRN Severe Pain (7 - 10)      Objective:    Vitals: Vital Signs Last 24 Hrs  T(C): 36.4 (05-16-24 @ 05:41), Max: 36.7 (05-15-24 @ 10:58)  T(F): 97.6 (05-16-24 @ 05:41), Max: 98 (05-15-24 @ 10:58)  HR: 65 (05-16-24 @ 05:41) (65 - 88)  BP: 111/80 (05-16-24 @ 05:41) (111/80 - 142/91)  BP(mean): --  RR: 18 (05-16-24 @ 05:41) (17 - 18)  SpO2: 96% (05-16-24 @ 05:41) (96% - 100%)            I&O's Summary    14 May 2024 07:01  -  15 May 2024 07:00  --------------------------------------------------------  IN: 0 mL / OUT: 0 mL / NET: 0 mL        PHYSICAL EXAM:  GENERAL: NAD  HEAD:  Atraumatic, Normocephalic  EYES: EOMI, conjunctiva and sclera clear  CHEST/LUNG: Clear to auscultation bilaterally; No rales, rhonchi, wheezing, or rubs  HEART: Regular rate and rhythm; No murmurs, rubs, or gallops  ABDOMEN: Soft, Nontender, Nondistended;   SKIN: No rashes or lesions  NERVOUS SYSTEM:  Alert & Oriented X3, no focal deficits    LABS:                                                  8.9    5.07  )-----------( 425      ( 15 May 2024 07:11 )             26.7                         9.0    4.89  )-----------( 442      ( 14 May 2024 05:00 )             27.5     CAPILLARY BLOOD GLUCOSE          RADIOLOGY & ADDITIONAL TESTS:    Imaging Personally Reviewed:  [x ] YES  [ ] NO    Consultants involved in case:   Consultant(s) Notes Reviewed:  [ x] YES  [ ] NO:   Care Discussed with Consultants/Other Providers [x ] YES  [ ] NO

## 2024-05-17 LAB
CULTURE RESULTS: SIGNIFICANT CHANGE UP
CULTURE RESULTS: SIGNIFICANT CHANGE UP
GRAM STN FLD: SIGNIFICANT CHANGE UP
HCT VFR BLD CALC: 29 % — LOW (ref 34.5–45)
HGB BLD-MCNC: 8.8 G/DL — LOW (ref 11.5–15.5)
MCHC RBC-ENTMCNC: 25.8 PG — LOW (ref 27–34)
MCHC RBC-ENTMCNC: 30.3 GM/DL — LOW (ref 32–36)
MCV RBC AUTO: 85 FL — SIGNIFICANT CHANGE UP (ref 80–100)
NRBC # BLD: 0 /100 WBCS — SIGNIFICANT CHANGE UP (ref 0–0)
NRBC # FLD: 0 K/UL — SIGNIFICANT CHANGE UP (ref 0–0)
PLATELET # BLD AUTO: 392 K/UL — SIGNIFICANT CHANGE UP (ref 150–400)
RBC # BLD: 3.41 M/UL — LOW (ref 3.8–5.2)
RBC # FLD: 17.1 % — HIGH (ref 10.3–14.5)
SPECIMEN SOURCE: SIGNIFICANT CHANGE UP
WBC # BLD: 4.43 K/UL — SIGNIFICANT CHANGE UP (ref 3.8–10.5)
WBC # FLD AUTO: 4.43 K/UL — SIGNIFICANT CHANGE UP (ref 3.8–10.5)

## 2024-05-17 PROCEDURE — 99232 SBSQ HOSP IP/OBS MODERATE 35: CPT

## 2024-05-17 PROCEDURE — 99232 SBSQ HOSP IP/OBS MODERATE 35: CPT | Mod: GC

## 2024-05-17 RX ADMIN — PIPERACILLIN AND TAZOBACTAM 25 GRAM(S): 4; .5 INJECTION, POWDER, LYOPHILIZED, FOR SOLUTION INTRAVENOUS at 13:08

## 2024-05-17 RX ADMIN — SENNA PLUS 2 TABLET(S): 8.6 TABLET ORAL at 21:41

## 2024-05-17 RX ADMIN — POLYETHYLENE GLYCOL 3350 17 GRAM(S): 17 POWDER, FOR SOLUTION ORAL at 12:44

## 2024-05-17 RX ADMIN — PIPERACILLIN AND TAZOBACTAM 25 GRAM(S): 4; .5 INJECTION, POWDER, LYOPHILIZED, FOR SOLUTION INTRAVENOUS at 21:41

## 2024-05-17 RX ADMIN — CHLORHEXIDINE GLUCONATE 1 APPLICATION(S): 213 SOLUTION TOPICAL at 12:44

## 2024-05-17 RX ADMIN — PIPERACILLIN AND TAZOBACTAM 25 GRAM(S): 4; .5 INJECTION, POWDER, LYOPHILIZED, FOR SOLUTION INTRAVENOUS at 07:25

## 2024-05-17 RX ADMIN — Medication 3 MILLIGRAM(S): at 21:41

## 2024-05-17 NOTE — PROGRESS NOTE ADULT - ASSESSMENT
This is a 44F w/ PMHx of multiple bilateral foot surgeries, and  shunt (managed [facility] neurosurgery), uterine fibroids presenting with R foot wound on 5/12/24, has been ~7 months, now bloody, white/yellow drainage.   Fever to 100.5, no leukocytosis. CT w/ abscess 5.3 x 1.8 x 5.0 cm.     #R foot abscess   #Fever     Overall R foot abscess w/ drainage, fever, otherwise HD stable  On exam, L foot lateral ulcer likely from braces, not infected, R foot with large swelling, collection, with ulcer. Per patient, blood/purulent drainage   C/w Zosyn for broad coverage,    Spoke to podiatry attending, discussed case in detail, no findings of pus on his exam and during probe, does not seem infected. MRI with findings of abscess, pending podiatry intervention on 5/20/24. Superficial Cx w/ MSSA   CT A/P with L hip abscess, s/p I&D with surgery, dark bloody drainage, Cx pending     Plan:   1. C/w Zosyn 3.375 g q8   2. F/u BCx, NGTD  3. F/u all Cx     Thank you for this consult. Inpatient ID team will follow.    Carlton Chaudhary M.D.  Attending Physician  Division of Infectious Diseases  Department of Medicine    Please contact through [employer].  Office: [phone] (after 5 PM or weekend).

## 2024-05-17 NOTE — PROGRESS NOTE ADULT - SUBJECTIVE AND OBJECTIVE BOX
SUBJECTIVE: Patient seen and examined. Pt denies any pain at I&D site. Denies fevers, chills, SOB, CP.     Vital Signs Last 24 Hrs  T(C): 37 (17 May 2024 05:31), Max: 37 (17 May 2024 05:31)  T(F): 98.6 (17 May 2024 05:31), Max: 98.6 (17 May 2024 05:31)  HR: 77 (17 May 2024 05:31) (66 - 78)  BP: 133/95 (17 May 2024 05:31) (133/95 - 144/84)  BP(mean): --  RR: 18 (17 May 2024 05:31) (17 - 18)  SpO2: 100% (17 May 2024 05:31) (97% - 100%)    Parameters below as of 17 May 2024 05:31  Patient On (Oxygen Delivery Method): room air        I&O's Detail    16 May 2024 07:01  -  17 May 2024 07:00  --------------------------------------------------------  IN:    IV PiggyBack: 110 mL    Oral Fluid: 850 mL  Total IN: 960 mL    OUT:    Voided (mL): 350 mL  Total OUT: 350 mL    Total NET: 610 mL          Physical Exam:  General: NAD, Lying in bed comfortably  Neuro: Alert and answering questions appropriately   Cardio: Regular rate  Resp: Good effort, no signs of respiratory distress  Skin: L ischial stage 4 pressure ulcer w/ hypopigmented edges and palpable mass/soft tissue component at base with circular Incision site, packed, s/s drainage, no malodor or bleeding    LABS:                        8.8    4.43  )-----------( 392      ( 17 May 2024 06:55 )             29.0                 RADIOLOGY & ADDITIONAL STUDIES:

## 2024-05-17 NOTE — PROGRESS NOTE ADULT - ASSESSMENT
ASSESSMENT: 44F w/ PMHx spina bifida, multiple bilateral foot surgeries, and  shunt (managed [facility] neurosurgery), uterine fibroids presented with R foot wound, s/p I&D w/ podiatry. Patient also w/ L ischial stage 4 pressure wound, CT A/P 5/15 w/ thick walled fluid collection superficial to L ischium 7.9 x 3.7 x 5.7cm, concerning for abscess. Now s/p bedside I&D on 5/17. Based on CT scan and appearance of wound and contents of drainage, consider possibility of bursa cyst of ischium 2/2 pressure ulcer.      PLAN:   - c/w daily dressing changes  - f/u aspirate culture  - Appreciate wound care consult  - recommend orthopedic involvement to comment on relation of wound and ischium  - B team surgery to sign off, reconsult as needed   - Rest of care per primary    B Team  [id number]   ASSESSMENT: 44F w/ PMHx spina bifida, multiple bilateral foot surgeries, and  shunt (managed [facility] neurosurgery), uterine fibroids presented with R foot wound, s/p I&D w/ podiatry. Patient also w/ L ischial stage 4 pressure wound, CT A/P 5/15 w/ thick walled fluid collection superficial to L ischium 7.9 x 3.7 x 5.7cm, concerning for abscess. Now s/p bedside I&D on 5/17. Based on CT scan and appearance of wound and contents of drainage, consider possibility of bursa cyst of ischium 2/2 pressure ulcer. This is a chronic wound that will not heal with just I&D and requires multidisciplinary care.      PLAN:   - c/w daily dressing changes  - f/u aspirate culture  - Appreciate wound care consult  - recommend orthopedic involvement to comment on relation of wound and ischium  - recommend physiatry consult for possible wheel chair refitting  - recommend plastic surg consult for possible future flap  - B team surgery to sign off, reconsult as needed   - Rest of care per primary    B Team  [id number]

## 2024-05-17 NOTE — PROGRESS NOTE ADULT - ATTENDING COMMENTS
44F PMHx spina bifida, multiple bilateral foot surgeries, and  shunt (managed [facility] neurosurgery), uterine fibroids presenting with R foot wound.  Pt admitted for rule out OM    R foot wound: given drainage and suspicion of abscess  seen on CT, will continue with ABx. MRI shows abscess, plan for drainage by Podiatry 5/20,   f/u Podiatry and ID recs. No OM on MRI. ADAM with no PAD    s/p  shunt: CT head done to eval  shunt and compatibility with MRI, ok for MRI per team    UTI: cannot r/o UTI, UA was done after Abx    Sacral Mass: CT shows possible abscess, s/p surgery bedside I&D, f/u Cx, c/w Abx    Stage 4 sacral  decubitus: c/w wound vac and wound care. Wound care rec wound vac at home, pt declining, will continue to encourage.

## 2024-05-17 NOTE — PROGRESS NOTE ADULT - PROBLEM SELECTOR PLAN 1
pt with chronic R foot wound with white/yellow drainage  XR R foot with extensive soft tissue swelling  CT R foot with forming abscess   appreciate podiatry recs - likely OR Monday for removal of R foot abscess   CTH for  shunt done   MR R foot with abscess   ID recs appreciated, c/w zosyn   pain control: tylenol, toradol 15 mod pain, tramadol 25 severe pain  PT - outpatient PT pt with chronic R foot wound with white/yellow drainage  XR R foot with extensive soft tissue swelling  CT R foot with forming abscess   MR R foot with abscess   appreciate podiatry recs - OR Monday for removal of R foot abscess   CTH for  shunt done   ID recs appreciated, c/w zosyn   pain control: tylenol, toradol 15 mod pain, tramadol 25 severe pain  PT - outpatient PT

## 2024-05-17 NOTE — PROGRESS NOTE ADULT - PROBLEM SELECTOR PLAN 3
pt with hx spina bifida, has motorized wheelchair at home    shunt managed by [facility] neurosurgery   CTM

## 2024-05-17 NOTE — PROGRESS NOTE ADULT - PROBLEM SELECTOR PLAN 2
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  pt with sacral wound noted  frequent repositioning  CTAP with abscess underlying sacral wound  s/p I&D 5/17 Stage 4 sacral decub with wound vac.  pt with sacral wound noted  frequent repositioning  CTAP with abscess underlying sacral wound  s/p I&D 5/17 - serosanguinous drainage, possibly bursa cysts iso pressure wound

## 2024-05-17 NOTE — PROGRESS NOTE ADULT - SUBJECTIVE AND OBJECTIVE BOX
JANETH REILLY  44y  MRN: [id number]    Patient is a 44y old  Female who presents with a chief complaint of right foot wound (16 May 2024 17:05)      Subjective: s/p I&D of L ischial abscess with surg. Tolerated well. Denies fever, CP, SOB, abn pain, N/V, dysuria. Tolerating diet.      MEDICATIONS  (STANDING):  chlorhexidine 2% Cloths 1 Application(s) Topical daily  enoxaparin Injectable 30 milliGRAM(s) SubCutaneous every 24 hours  lidocaine 1%/epinephrine 1:100,000 Inj 20 milliLiter(s) Local Injection once  melatonin 3 milliGRAM(s) Oral at bedtime  mupirocin 2% Ointment 1 Application(s) Topical <User Schedule>  piperacillin/tazobactam IVPB.. 3.375 Gram(s) IV Intermittent every 8 hours  polyethylene glycol 3350 17 Gram(s) Oral daily  senna 2 Tablet(s) Oral at bedtime    MEDICATIONS  (PRN):  acetaminophen     Tablet .. 975 milliGRAM(s) Oral every 6 hours PRN Temp greater or equal to 38C (100.4F), Mild Pain (1 - 3)  ketorolac 15 milliGRAM(s) Oral every 6 hours PRN Moderate Pain (4 - 6)  traMADol 25 milliGRAM(s) Oral every 6 hours PRN Severe Pain (7 - 10)      Objective:    Vitals: Vital Signs Last 24 Hrs  T(C): 37 (05-17-24 @ 05:31), Max: 37 (05-17-24 @ 05:31)  T(F): 98.6 (05-17-24 @ 05:31), Max: 98.6 (05-17-24 @ 05:31)  HR: 77 (05-17-24 @ 05:31) (66 - 78)  BP: 133/95 (05-17-24 @ 05:31) (133/95 - 144/84)  BP(mean): --  RR: 18 (05-17-24 @ 05:31) (17 - 18)  SpO2: 100% (05-17-24 @ 05:31) (97% - 100%)            I&O's Summary    16 May 2024 07:01  -  17 May 2024 06:43  --------------------------------------------------------  IN: 960 mL / OUT: 350 mL / NET: 610 mL        PHYSICAL EXAM:  GENERAL: NAD  HEAD:  Atraumatic, Normocephalic  EYES: EOMI, conjunctiva and sclera clear  CHEST/LUNG: Clear to auscultation bilaterally; No rales, rhonchi, wheezing, or rubs  HEART: Regular rate and rhythm; No murmurs, rubs, or gallops  ABDOMEN: Soft, Nontender, Nondistended;   SKIN: No rashes or lesions  NERVOUS SYSTEM:  Alert & Oriented X3, no focal deficits    LABS:                                                  8.9    5.07  )-----------( 425      ( 15 May 2024 07:11 )             26.7     CAPILLARY BLOOD GLUCOSE          RADIOLOGY & ADDITIONAL TESTS:    Imaging Personally Reviewed:  [x ] YES  [ ] NO    Consultants involved in case:   Consultant(s) Notes Reviewed:  [ x] YES  [ ] NO:   Care Discussed with Consultants/Other Providers [x ] YES  [ ] NO         JANETH REILLY  44y  MRN: [id number]    Patient is a 44y old  Female who presents with a chief complaint of right foot wound (16 May 2024 17:05)      Subjective: s/p I&D of L ischial mass with surg. Tolerated well. Denies fever, CP, SOB, abn pain, N/V, dysuria. Tolerating diet. Started menstruation today.     MEDICATIONS  (STANDING):  chlorhexidine 2% Cloths 1 Application(s) Topical daily  enoxaparin Injectable 30 milliGRAM(s) SubCutaneous every 24 hours  lidocaine 1%/epinephrine 1:100,000 Inj 20 milliLiter(s) Local Injection once  melatonin 3 milliGRAM(s) Oral at bedtime  mupirocin 2% Ointment 1 Application(s) Topical <User Schedule>  piperacillin/tazobactam IVPB.. 3.375 Gram(s) IV Intermittent every 8 hours  polyethylene glycol 3350 17 Gram(s) Oral daily  senna 2 Tablet(s) Oral at bedtime    MEDICATIONS  (PRN):  acetaminophen     Tablet .. 975 milliGRAM(s) Oral every 6 hours PRN Temp greater or equal to 38C (100.4F), Mild Pain (1 - 3)  ketorolac 15 milliGRAM(s) Oral every 6 hours PRN Moderate Pain (4 - 6)  traMADol 25 milliGRAM(s) Oral every 6 hours PRN Severe Pain (7 - 10)      Objective:    Vitals: Vital Signs Last 24 Hrs  T(C): 37 (05-17-24 @ 05:31), Max: 37 (05-17-24 @ 05:31)  T(F): 98.6 (05-17-24 @ 05:31), Max: 98.6 (05-17-24 @ 05:31)  HR: 77 (05-17-24 @ 05:31) (66 - 78)  BP: 133/95 (05-17-24 @ 05:31) (133/95 - 144/84)  BP(mean): --  RR: 18 (05-17-24 @ 05:31) (17 - 18)  SpO2: 100% (05-17-24 @ 05:31) (97% - 100%)            I&O's Summary    16 May 2024 07:01  -  17 May 2024 06:43  --------------------------------------------------------  IN: 960 mL / OUT: 350 mL / NET: 610 mL        PHYSICAL EXAM:  GENERAL: NAD  HEAD:  Atraumatic, Normocephalic  EYES: EOMI, conjunctiva and sclera clear  CHEST/LUNG: Clear to auscultation bilaterally; No rales, rhonchi, wheezing, or rubs  HEART: Regular rate and rhythm; No murmurs, rubs, or gallops  ABDOMEN: Soft, Nontender, Nondistended;   SKIN: R foot wound wrapped   NERVOUS SYSTEM:  Alert & Oriented X3, no focal deficits    LABS:                                                  8.9    5.07  )-----------( 425      ( 15 May 2024 07:11 )             26.7     CAPILLARY BLOOD GLUCOSE          RADIOLOGY & ADDITIONAL TESTS:    Imaging Personally Reviewed:  [x ] YES  [ ] NO    Consultants involved in case:   Consultant(s) Notes Reviewed:  [ x] YES  [ ] NO:   Care Discussed with Consultants/Other Providers [x ] YES  [ ] NO

## 2024-05-17 NOTE — PROGRESS NOTE ADULT - SUBJECTIVE AND OBJECTIVE BOX
Infectious Diseases Follow Up:    Patient is a 44y old  Female who presents with a chief complaint of right foot wound (17 May 2024 06:43)      Interval History/ROS:  No acute events ON. Pt is s/p I&D of L hip collection, bloody drainage     Allergies  Zyrtec (Rash)        ANTIMICROBIALS:  piperacillin/tazobactam IVPB.. 3.375 every 8 hours      Current Abx:     Previous Abx     OTHER MEDS:  MEDICATIONS  (STANDING):  acetaminophen     Tablet .. 975 every 6 hours PRN  enoxaparin Injectable 30 every 24 hours  ketorolac 15 every 6 hours PRN  melatonin 3 at bedtime  polyethylene glycol 3350 17 daily  senna 2 at bedtime  traMADol 25 every 6 hours PRN      Vital Signs Last 24 Hrs  T(C): 37 (17 May 2024 05:31), Max: 37 (17 May 2024 05:31)  T(F): 98.6 (17 May 2024 05:31), Max: 98.6 (17 May 2024 05:31)  HR: 77 (17 May 2024 05:31) (66 - 78)  BP: 133/95 (17 May 2024 05:31) (133/95 - 144/84)  BP(mean): --  RR: 18 (17 May 2024 05:31) (17 - 18)  SpO2: 100% (17 May 2024 05:31) (97% - 100%)    Parameters below as of 17 May 2024 05:31  Patient On (Oxygen Delivery Method): room air      PHYSICAL EXAM:  GENERAL: NAD, thin  HEAD:  Atraumatic, Normocephalic  EYES: EOMI, conjunctiva and sclera clear  NECK: Supple, No JVD  CHEST/LUNG: On RA, not in respiratory distress   EXTREMITIES:  b/l fee wrapped   PSYCH: AAOx3                          8.8    4.43  )-----------( 392      ( 17 May 2024 06:55 )             29.0                   MICROBIOLOGY:  v  .Abscess Hip - Left  05-16-24 --  --    Rare polymorphonuclear leukocytes seen per low power field  No organisms seen per oil power field      .Abscess R foot wound  05-14-24   Few Staphylococcus aureus  --  Staphylococcus aureus      Clean Catch Clean Catch (Midstream)  05-11-24   50,000 - 99,000 CFU/mL Proteus mirabilis  --  Proteus mirabilis      .Blood Blood-Peripheral  05-11-24   No growth at 5 days  --  --      .Blood Blood-Peripheral  05-11-24   No growth at 5 days  --  --                RADIOLOGY:

## 2024-05-18 LAB
HCT VFR BLD CALC: 27.6 % — LOW (ref 34.5–45)
HGB BLD-MCNC: 8.7 G/DL — LOW (ref 11.5–15.5)
MCHC RBC-ENTMCNC: 26 PG — LOW (ref 27–34)
MCHC RBC-ENTMCNC: 31.5 GM/DL — LOW (ref 32–36)
MCV RBC AUTO: 82.6 FL — SIGNIFICANT CHANGE UP (ref 80–100)
NRBC # BLD: 0 /100 WBCS — SIGNIFICANT CHANGE UP (ref 0–0)
NRBC # FLD: 0 K/UL — SIGNIFICANT CHANGE UP (ref 0–0)
PLATELET # BLD AUTO: 375 K/UL — SIGNIFICANT CHANGE UP (ref 150–400)
RBC # BLD: 3.34 M/UL — LOW (ref 3.8–5.2)
RBC # FLD: 16.7 % — HIGH (ref 10.3–14.5)
WBC # BLD: 5.08 K/UL — SIGNIFICANT CHANGE UP (ref 3.8–10.5)
WBC # FLD AUTO: 5.08 K/UL — SIGNIFICANT CHANGE UP (ref 3.8–10.5)

## 2024-05-18 PROCEDURE — 99233 SBSQ HOSP IP/OBS HIGH 50: CPT

## 2024-05-18 RX ORDER — ONDANSETRON 8 MG/1
4 TABLET, FILM COATED ORAL ONCE
Refills: 0 | Status: COMPLETED | OUTPATIENT
Start: 2024-05-18 | End: 2024-05-18

## 2024-05-18 RX ADMIN — PIPERACILLIN AND TAZOBACTAM 25 GRAM(S): 4; .5 INJECTION, POWDER, LYOPHILIZED, FOR SOLUTION INTRAVENOUS at 22:07

## 2024-05-18 RX ADMIN — PIPERACILLIN AND TAZOBACTAM 25 GRAM(S): 4; .5 INJECTION, POWDER, LYOPHILIZED, FOR SOLUTION INTRAVENOUS at 05:04

## 2024-05-18 RX ADMIN — CHLORHEXIDINE GLUCONATE 1 APPLICATION(S): 213 SOLUTION TOPICAL at 11:03

## 2024-05-18 RX ADMIN — ONDANSETRON 4 MILLIGRAM(S): 8 TABLET, FILM COATED ORAL at 09:47

## 2024-05-18 RX ADMIN — Medication 3 MILLIGRAM(S): at 22:04

## 2024-05-18 RX ADMIN — PIPERACILLIN AND TAZOBACTAM 25 GRAM(S): 4; .5 INJECTION, POWDER, LYOPHILIZED, FOR SOLUTION INTRAVENOUS at 13:21

## 2024-05-18 NOTE — PROGRESS NOTE ADULT - PROBLEM SELECTOR PLAN 2
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  pt with sacral wound noted  frequent repositioning  CTAP with abscess underlying sacral wound  s/p I&D 5/17 - serosanguinous drainage, possibly bursa cysts iso pressure wound

## 2024-05-18 NOTE — PROGRESS NOTE ADULT - SUBJECTIVE AND OBJECTIVE BOX
INTERVAL HPI/OVERNIGHT EVENTS:    Patient was seen and examined at bedside. As per nurse and patient, no o/n events, patient resting comfortably. No complaints at this time. Patient denies: fever, chills, dizziness, weakness, HA, CP, palpitations, SOB, cough, N/V/D/C, dysuria, changes in bowel movements, LE edema.    ROS: as above    VITAL SIGNS:  T(F): 98.2 (05-18-24 @ 05:45)  HR: 69 (05-18-24 @ 05:45)  BP: 117/86 (05-18-24 @ 05:45)  RR: 18 (05-18-24 @ 05:45)  SpO2: 99% (05-18-24 @ 05:45)  Wt(kg): --    PHYSICAL EXAM:    Constitutional: resting confortably, in no acute distress  Eyes: PERRL, sclera non-icteric  Neck: supple, trachea midline, no masses, no JVD  Respiratory: CTA b/l, good air entry b/l, no wheezing, no rhonchi, no rales, without accessory muscle use and no intercostal retractions  Cardiovascular: RRR, normal S1S2, no M/R/G  Gastrointestinal: soft, NTND, no masses palpable, BS normal  Extremities: Warm, well perfused, pulses equal bilateral upper and lower extremities, no edema, no clubbing  Neurological: AAOx3, CN Grossly intact  Skin: Normal temperature, warm, dry    MEDICATIONS  (STANDING):  chlorhexidine 2% Cloths 1 Application(s) Topical daily  enoxaparin Injectable 30 milliGRAM(s) SubCutaneous every 24 hours  lidocaine 1%/epinephrine 1:100,000 Inj 20 milliLiter(s) Local Injection once  melatonin 3 milliGRAM(s) Oral at bedtime  mupirocin 2% Ointment 1 Application(s) Topical <User Schedule>  piperacillin/tazobactam IVPB.. 3.375 Gram(s) IV Intermittent every 8 hours  polyethylene glycol 3350 17 Gram(s) Oral daily  senna 2 Tablet(s) Oral at bedtime    MEDICATIONS  (PRN):  acetaminophen     Tablet .. 975 milliGRAM(s) Oral every 6 hours PRN Temp greater or equal to 38C (100.4F), Mild Pain (1 - 3)  traMADol 25 milliGRAM(s) Oral every 6 hours PRN Severe Pain (7 - 10)      Allergies    Zyrtec (Rash)    Intolerances        LABS:                        8.7    5.08  )-----------( 375      ( 18 May 2024 06:05 )             27.6                 RADIOLOGY & ADDITIONAL TESTS:   INTERVAL HPI/OVERNIGHT EVENTS:    Patient was seen and examined at bedside. As per nurse and patient, no o/n events, patient resting comfortably. Pt endorsing nausea this AM; denies headache, dizziness, weakness, chest pain, pain in LEs,   - did have a loose BM overnight per pt    ROS: as above    VITAL SIGNS:  T(F): 98.2 (05-18-24 @ 05:45)  HR: 69 (05-18-24 @ 05:45)  BP: 117/86 (05-18-24 @ 05:45)  RR: 18 (05-18-24 @ 05:45)  SpO2: 99% (05-18-24 @ 05:45)  Wt(kg): --    PHYSICAL EXAM:    Constitutional: resting confortably, in no acute distress  Eyes: PERRL, sclera non-icteric  Neck: supple, trachea midline, no masses, no JVD  Respiratory: CTA b/l, good air entry b/l, no wheezing, no rhonchi, no rales, without accessory muscle use and no intercostal retractions  Cardiovascular: RRR, normal S1S2, no M/R/G  Gastrointestinal: soft, NTND, no masses palpable, BS normal  Extremities: Warm, well perfused, pulses equal bilateral upper and lower extremities, no edema, no clubbing  Neurological: AAOx3, CN Grossly intact  Skin: Normal temperature, warm, dry    MEDICATIONS  (STANDING):  chlorhexidine 2% Cloths 1 Application(s) Topical daily  enoxaparin Injectable 30 milliGRAM(s) SubCutaneous every 24 hours  lidocaine 1%/epinephrine 1:100,000 Inj 20 milliLiter(s) Local Injection once  melatonin 3 milliGRAM(s) Oral at bedtime  mupirocin 2% Ointment 1 Application(s) Topical <User Schedule>  piperacillin/tazobactam IVPB.. 3.375 Gram(s) IV Intermittent every 8 hours  polyethylene glycol 3350 17 Gram(s) Oral daily  senna 2 Tablet(s) Oral at bedtime    MEDICATIONS  (PRN):  acetaminophen     Tablet .. 975 milliGRAM(s) Oral every 6 hours PRN Temp greater or equal to 38C (100.4F), Mild Pain (1 - 3)  traMADol 25 milliGRAM(s) Oral every 6 hours PRN Severe Pain (7 - 10)      Allergies    Zyrtec (Rash)    Intolerances        LABS:                        8.7    5.08  )-----------( 375      ( 18 May 2024 06:05 )             27.6                 RADIOLOGY & ADDITIONAL TESTS:

## 2024-05-18 NOTE — PROGRESS NOTE ADULT - PROBLEM SELECTOR PLAN 1
pt with chronic R foot wound with white/yellow drainage  XR R foot with extensive soft tissue swelling  CT R foot with forming abscess   MR R foot with abscess   appreciate podiatry recs - OR Monday for removal of R foot abscess   [facility]H for  shunt done   ID recs appreciated, c/w zosyn   pain control: tylenol, toradol 15 mod pain, tramadol 25 severe pain  PT - outpatient PT

## 2024-05-18 NOTE — PROGRESS NOTE ADULT - PROBLEM SELECTOR PLAN 3
pt with hx spina bifida, has motorized wheelchair at home    shunt managed by [facility] neurosurgery   CTM

## 2024-05-19 ENCOUNTER — TRANSCRIPTION ENCOUNTER (OUTPATIENT)
Age: 44
End: 2024-05-19

## 2024-05-19 LAB
ANION GAP SERPL CALC-SCNC: 10 MMOL/L — SIGNIFICANT CHANGE UP (ref 7–14)
BUN SERPL-MCNC: 12 MG/DL — SIGNIFICANT CHANGE UP (ref 7–23)
CALCIUM SERPL-MCNC: 8.4 MG/DL — SIGNIFICANT CHANGE UP (ref 8.4–10.5)
CHLORIDE SERPL-SCNC: 106 MMOL/L — SIGNIFICANT CHANGE UP (ref 98–107)
CO2 SERPL-SCNC: 22 MMOL/L — SIGNIFICANT CHANGE UP (ref 22–31)
CREAT SERPL-MCNC: 0.53 MG/DL — SIGNIFICANT CHANGE UP (ref 0.5–1.3)
CULTURE RESULTS: ABNORMAL
EGFR: 115 ML/MIN/1.73M2 — SIGNIFICANT CHANGE UP
GLUCOSE SERPL-MCNC: 80 MG/DL — SIGNIFICANT CHANGE UP (ref 70–99)
HCG SERPL-ACNC: <1 MIU/ML — SIGNIFICANT CHANGE UP
HCT VFR BLD CALC: 26.7 % — LOW (ref 34.5–45)
HGB BLD-MCNC: 8.8 G/DL — LOW (ref 11.5–15.5)
MCHC RBC-ENTMCNC: 26.3 PG — LOW (ref 27–34)
MCHC RBC-ENTMCNC: 33 GM/DL — SIGNIFICANT CHANGE UP (ref 32–36)
MCV RBC AUTO: 79.9 FL — LOW (ref 80–100)
NRBC # BLD: 0 /100 WBCS — SIGNIFICANT CHANGE UP (ref 0–0)
NRBC # FLD: 0 K/UL — SIGNIFICANT CHANGE UP (ref 0–0)
ORGANISM # SPEC MICROSCOPIC CNT: ABNORMAL
ORGANISM # SPEC MICROSCOPIC CNT: ABNORMAL
PLATELET # BLD AUTO: 358 K/UL — SIGNIFICANT CHANGE UP (ref 150–400)
POTASSIUM SERPL-MCNC: 4 MMOL/L — SIGNIFICANT CHANGE UP (ref 3.5–5.3)
POTASSIUM SERPL-SCNC: 4 MMOL/L — SIGNIFICANT CHANGE UP (ref 3.5–5.3)
RBC # BLD: 3.34 M/UL — LOW (ref 3.8–5.2)
RBC # FLD: 16.9 % — HIGH (ref 10.3–14.5)
SODIUM SERPL-SCNC: 138 MMOL/L — SIGNIFICANT CHANGE UP (ref 135–145)
SPECIMEN SOURCE: SIGNIFICANT CHANGE UP
WBC # BLD: 4.15 K/UL — SIGNIFICANT CHANGE UP (ref 3.8–10.5)
WBC # FLD AUTO: 4.15 K/UL — SIGNIFICANT CHANGE UP (ref 3.8–10.5)

## 2024-05-19 PROCEDURE — 99233 SBSQ HOSP IP/OBS HIGH 50: CPT | Mod: GC

## 2024-05-19 PROCEDURE — 93010 ELECTROCARDIOGRAM REPORT: CPT

## 2024-05-19 RX ADMIN — PIPERACILLIN AND TAZOBACTAM 25 GRAM(S): 4; .5 INJECTION, POWDER, LYOPHILIZED, FOR SOLUTION INTRAVENOUS at 13:03

## 2024-05-19 RX ADMIN — PIPERACILLIN AND TAZOBACTAM 25 GRAM(S): 4; .5 INJECTION, POWDER, LYOPHILIZED, FOR SOLUTION INTRAVENOUS at 06:11

## 2024-05-19 RX ADMIN — PIPERACILLIN AND TAZOBACTAM 25 GRAM(S): 4; .5 INJECTION, POWDER, LYOPHILIZED, FOR SOLUTION INTRAVENOUS at 21:50

## 2024-05-19 RX ADMIN — Medication 3 MILLIGRAM(S): at 21:48

## 2024-05-19 RX ADMIN — CHLORHEXIDINE GLUCONATE 1 APPLICATION(S): 213 SOLUTION TOPICAL at 11:11

## 2024-05-19 NOTE — PROGRESS NOTE ADULT - SUBJECTIVE AND OBJECTIVE BOX
JANETH REILLY  44y  MRN: [id number]    Patient is a 44y old  Female who presents with a chief complaint of right foot wound (18 May 2024 07:12)      Subjective: no events ON. Denies fever, CP, SOB, abn pain, N/V, dysuria. Tolerating diet.      MEDICATIONS  (STANDING):  chlorhexidine 2% Cloths 1 Application(s) Topical daily  enoxaparin Injectable 30 milliGRAM(s) SubCutaneous every 24 hours  lidocaine 1%/epinephrine 1:100,000 Inj 20 milliLiter(s) Local Injection once  melatonin 3 milliGRAM(s) Oral at bedtime  mupirocin 2% Ointment 1 Application(s) Topical <User Schedule>  piperacillin/tazobactam IVPB.. 3.375 Gram(s) IV Intermittent every 8 hours  polyethylene glycol 3350 17 Gram(s) Oral daily  senna 2 Tablet(s) Oral at bedtime    MEDICATIONS  (PRN):  acetaminophen     Tablet .. 975 milliGRAM(s) Oral every 6 hours PRN Temp greater or equal to 38C (100.4F), Mild Pain (1 - 3)  traMADol 25 milliGRAM(s) Oral every 6 hours PRN Severe Pain (7 - 10)      Objective:    Vitals: Vital Signs Last 24 Hrs  T(C): 36.9 (05-19-24 @ 05:25), Max: 36.9 (05-19-24 @ 05:25)  T(F): 98.4 (05-19-24 @ 05:25), Max: 98.4 (05-19-24 @ 05:25)  HR: 62 (05-19-24 @ 05:25) (62 - 73)  BP: 104/67 (05-19-24 @ 05:25) (104/67 - 138/84)  BP(mean): --  RR: 18 (05-19-24 @ 05:25) (18 - 18)  SpO2: 100% (05-19-24 @ 05:25) (98% - 100%)            I&O's Summary    17 May 2024 07:01  -  18 May 2024 07:00  --------------------------------------------------------  IN: 1100 mL / OUT: 0 mL / NET: 1100 mL    18 May 2024 07:01  -  19 May 2024 06:58  --------------------------------------------------------  IN: 200 mL / OUT: 0 mL / NET: 200 mL        PHYSICAL EXAM:  GENERAL: NAD  HEAD:  Atraumatic, Normocephalic  EYES: EOMI, conjunctiva and sclera clear  CHEST/LUNG: Clear to auscultation bilaterally; No rales, rhonchi, wheezing, or rubs  HEART: Regular rate and rhythm; No murmurs, rubs, or gallops  ABDOMEN: Soft, Nontender, Nondistended;   SKIN: No rashes or lesions  NERVOUS SYSTEM:  Alert & Oriented X3, no focal deficits    LABS:                                                  8.7    5.08  )-----------( 375      ( 18 May 2024 06:05 )             27.6                         8.8    4.43  )-----------( 392      ( 17 May 2024 06:55 )             29.0     CAPILLARY BLOOD GLUCOSE          RADIOLOGY & ADDITIONAL TESTS:    Imaging Personally Reviewed:  [x ] YES  [ ] NO    Consultants involved in case:   Consultant(s) Notes Reviewed:  [ x] YES  [ ] NO:   Care Discussed with Consultants/Other Providers [x ] YES  [ ] NO         JANETH REILLY  44y  MRN: [id number]    Patient is a 44y old  Female who presents with a chief complaint of right foot wound (18 May 2024 07:12)      Subjective: no events ON. Denies fever, CP, SOB, abn pain, N/V, dysuria. Tolerating diet.    Patient is medically optimized for podiatry procedure tomorrow 5/20 under anesthesia.     MEDICATIONS  (STANDING):  chlorhexidine 2% Cloths 1 Application(s) Topical daily  enoxaparin Injectable 30 milliGRAM(s) SubCutaneous every 24 hours  lidocaine 1%/epinephrine 1:100,000 Inj 20 milliLiter(s) Local Injection once  melatonin 3 milliGRAM(s) Oral at bedtime  mupirocin 2% Ointment 1 Application(s) Topical <User Schedule>  piperacillin/tazobactam IVPB.. 3.375 Gram(s) IV Intermittent every 8 hours  polyethylene glycol 3350 17 Gram(s) Oral daily  senna 2 Tablet(s) Oral at bedtime    MEDICATIONS  (PRN):  acetaminophen     Tablet .. 975 milliGRAM(s) Oral every 6 hours PRN Temp greater or equal to 38C (100.4F), Mild Pain (1 - 3)  traMADol 25 milliGRAM(s) Oral every 6 hours PRN Severe Pain (7 - 10)      Objective:    Vitals: Vital Signs Last 24 Hrs  T(C): 36.9 (05-19-24 @ 05:25), Max: 36.9 (05-19-24 @ 05:25)  T(F): 98.4 (05-19-24 @ 05:25), Max: 98.4 (05-19-24 @ 05:25)  HR: 62 (05-19-24 @ 05:25) (62 - 73)  BP: 104/67 (05-19-24 @ 05:25) (104/67 - 138/84)  BP(mean): --  RR: 18 (05-19-24 @ 05:25) (18 - 18)  SpO2: 100% (05-19-24 @ 05:25) (98% - 100%)            I&O's Summary    17 May 2024 07:01  -  18 May 2024 07:00  --------------------------------------------------------  IN: 1100 mL / OUT: 0 mL / NET: 1100 mL    18 May 2024 07:01  -  19 May 2024 06:58  --------------------------------------------------------  IN: 200 mL / OUT: 0 mL / NET: 200 mL        PHYSICAL EXAM:  GENERAL: NAD  HEAD:  Atraumatic, Normocephalic  EYES: EOMI, conjunctiva and sclera clear  CHEST/LUNG: Clear to auscultation bilaterally; No rales, rhonchi, wheezing, or rubs  HEART: Regular rate and rhythm; No murmurs, rubs, or gallops  ABDOMEN: Soft, Nontender, Nondistended;   SKIN: No rashes or lesions  MSK: R foot wrapped, L foot wound with healing scab   NERVOUS SYSTEM:  Alert & Oriented X3, no focal deficits    LABS:                                                  8.7    5.08  )-----------( 375      ( 18 May 2024 06:05 )             27.6                         8.8    4.43  )-----------( 392      ( 17 May 2024 06:55 )             29.0     CAPILLARY BLOOD GLUCOSE          RADIOLOGY & ADDITIONAL TESTS:    Imaging Personally Reviewed:  [x ] YES  [ ] NO    Consultants involved in case:   Consultant(s) Notes Reviewed:  [ x] YES  [ ] NO:   Care Discussed with Consultants/Other Providers [x ] YES  [ ] NO

## 2024-05-19 NOTE — PROGRESS NOTE ADULT - SUBJECTIVE AND OBJECTIVE BOX
Patient is a 44y old  Female who presents with a chief complaint of right foot wound (19 May 2024 06:57)       INTERVAL HPI/OVERNIGHT EVENTS:  Patient seen and evaluated at bedside.  Pt is resting comfortable in NAD. Denies N/V/F/C.    Allergies    Zyrtec (Rash)    Intolerances        Vital Signs Last 24 Hrs  T(C): 36.9 (19 May 2024 05:25), Max: 36.9 (19 May 2024 05:25)  T(F): 98.4 (19 May 2024 05:25), Max: 98.4 (19 May 2024 05:25)  HR: 62 (19 May 2024 05:25) (62 - 73)  BP: 104/67 (19 May 2024 05:25) (104/67 - 138/84)  BP(mean): --  RR: 18 (19 May 2024 05:25) (18 - 18)  SpO2: 100% (19 May 2024 05:25) (98% - 100%)    Parameters below as of 19 May 2024 05:25  Patient On (Oxygen Delivery Method): room air        LABS:                        8.8    4.15  )-----------( 358      ( 19 May 2024 05:37 )             26.7     05-19    138  |  106  |  12  ----------------------------<  80  4.0   |  22  |  0.53    Ca    8.4      19 May 2024 05:37        Urinalysis Basic - ( 19 May 2024 05:37 )    Color: x / Appearance: x / SG: x / pH: x  Gluc: 80 mg/dL / Ketone: x  / Bili: x / Urobili: x   Blood: x / Protein: x / Nitrite: x   Leuk Esterase: x / RBC: x / WBC x   Sq Epi: x / Non Sq Epi: x / Bacteria: x      CAPILLARY BLOOD GLUCOSE          Lower Extremity Physical Exam:  Vascular: DP/PT 1/4, B/L, CFT <3 seconds B/L, Temperature gradient warm to cool, B/L.   Neuro: Epicritic sensation diminished but not absent to the level of digits, B/L.  Musculoskeletal/Ortho: drop foot, weak muscle strength, ROM diminished 2/2 spina bifida   Skin:  5/11 s/p b/s Right foot dorsal medial mobile mass incision and drainage, closed, sutures intact, central mass wound to dermis, hyperpigmented periwound, no fluctuance, no pus, no bogginess, no malodor, no periwound erythema.  Left lateral ankle wound to subQ, no tracking/ tunnelling/ signs of infection, small mobile soft tissue mass at subQ level, no pain on palpation     RADIOLOGY & ADDITIONAL TESTS:

## 2024-05-19 NOTE — PROGRESS NOTE ADULT - PROBLEM SELECTOR PLAN 3
pt with hx spina bifida, has motorized wheelchair at home    shunt managed by [facility] neurosurgery   CTM

## 2024-05-19 NOTE — PROGRESS NOTE ADULT - ASSESSMENT
44F s/p b/s R foot dorsal medial mass incision & drainage (DOS: 5/11)  - Patient seen and evaluated  - Afebrile, no leukocytosis   - 5/11 s/p b/s Right foot dorsal medial mobile mass incision and drainage, closed, sutures intact, central mass wound to dermis, hyperpigmented periwound, no fluctuance, no pus, no bogginess, no malodor, no periwound erythema.  Left lateral ankle wound to subQ, no tracking/ tunnelling/ signs of infection, small mobile soft tissue mass at subQ level, no pain on palpation   - R foot culture no growth prelim  - Right foot XR: no OM, no gas  - Right foot CT:  abscess within the dorsal medial soft tissues, clinically no fluctuance, no purulence expressed today.   - R foot MR: subcutaneous abscess with surrounding soft tissue mass within dorsum of foot   - R foot not likely source of fever, please rule out other sources   - STRICT decubitus precautions, Z- FLOWS boots at all time when in bed and in chair resting.   - Pod plan right foot soft tissue mass removal booked on Monday 5/20 at 7:30 am with Dr. Waterhouse   - NPO after midnight, PO meds with sip of water  - CBC, BMP, PT/PTT/INR in AM  - Type & Screen, ABO   - Please document medical clearance for podiatric procedure under anesthesia   - Discussed with attending

## 2024-05-19 NOTE — PROGRESS NOTE ADULT - ATTENDING COMMENTS
44F PMHx spina bifida, multiple bilateral foot surgeries, and  shunt (managed [facility] neurosurgery), uterine fibroids presenting with R foot wound.  Pt admitted for rule out OM    R foot wound: given drainage and suspicion of abscess  seen on CT, will continue with ABx. MRI shows abscess, plan for drainage by Podiatry 5/20,   f/u Podiatry and ID recs. No OM on MRI. ADAM with no PAD    s/p  shunt: CT head done to eval  shunt and compatibility with MRI, ok for MRI per team    UTI: cannot r/o UTI, UA was done after Abx. Completed treatment w/ zosyn.     Sacral Mass: CT shows possible abscess, s/p surgery bedside I&D, f/u Cx, c/w Abx    Stage 4 sacral  decubitus: c/w wound vac and wound care. Wound care rec wound vac at home, pt declining, will continue to encourage.      Encounter for pre-operative evaluation: Per RCRI score patient low risk for major adverse cardiac events for her intermediate risk podiatry surgery (right foot soft tissue mass removal booked on Monday 5/20 at 7:30 am with Dr. Waterhouse). Patient at 3.9% risk for death, MI, or cardiac arrest. Patient medically optimized. No further pre-operative workup required.

## 2024-05-19 NOTE — PROGRESS NOTE ADULT - ASSESSMENT
44F MHx spina bifida, multiple bilateral foot surgeries, and  shunt (managed [facility] neurosurgery), uterine fibroids presenting with R foot wound, found to have  R foot and L ischial abscess, pending OR 5/20 for R foot I&D with podiatry.

## 2024-05-20 ENCOUNTER — RESULT REVIEW (OUTPATIENT)
Age: 44
End: 2024-05-20

## 2024-05-20 LAB
ADD ON TEST-SPECIMEN IN LAB: SIGNIFICANT CHANGE UP
ANION GAP SERPL CALC-SCNC: 10 MMOL/L — SIGNIFICANT CHANGE UP (ref 7–14)
APTT BLD: 28.6 SEC — SIGNIFICANT CHANGE UP (ref 24.5–35.6)
BLD GP AB SCN SERPL QL: NEGATIVE — SIGNIFICANT CHANGE UP
BUN SERPL-MCNC: 14 MG/DL — SIGNIFICANT CHANGE UP (ref 7–23)
CALCIUM SERPL-MCNC: 8.6 MG/DL — SIGNIFICANT CHANGE UP (ref 8.4–10.5)
CHLORIDE SERPL-SCNC: 104 MMOL/L — SIGNIFICANT CHANGE UP (ref 98–107)
CO2 SERPL-SCNC: 22 MMOL/L — SIGNIFICANT CHANGE UP (ref 22–31)
CREAT SERPL-MCNC: 0.54 MG/DL — SIGNIFICANT CHANGE UP (ref 0.5–1.3)
EGFR: 115 ML/MIN/1.73M2 — SIGNIFICANT CHANGE UP
FERRITIN SERPL-MCNC: 30 NG/ML — SIGNIFICANT CHANGE UP (ref 15–150)
GLUCOSE BLDC GLUCOMTR-MCNC: 83 MG/DL — SIGNIFICANT CHANGE UP (ref 70–99)
GLUCOSE SERPL-MCNC: 82 MG/DL — SIGNIFICANT CHANGE UP (ref 70–99)
HCT VFR BLD CALC: 26.4 % — LOW (ref 34.5–45)
HGB BLD-MCNC: 8.6 G/DL — LOW (ref 11.5–15.5)
INR BLD: 0.95 RATIO — SIGNIFICANT CHANGE UP (ref 0.85–1.18)
IRON SATN MFR SERPL: 24 UG/DL — LOW (ref 30–160)
IRON SATN MFR SERPL: 8 % — LOW (ref 14–50)
MAGNESIUM SERPL-MCNC: 2 MG/DL — SIGNIFICANT CHANGE UP (ref 1.6–2.6)
MCHC RBC-ENTMCNC: 26.5 PG — LOW (ref 27–34)
MCHC RBC-ENTMCNC: 32.6 GM/DL — SIGNIFICANT CHANGE UP (ref 32–36)
MCV RBC AUTO: 81.2 FL — SIGNIFICANT CHANGE UP (ref 80–100)
NRBC # BLD: 0 /100 WBCS — SIGNIFICANT CHANGE UP (ref 0–0)
NRBC # FLD: 0 K/UL — SIGNIFICANT CHANGE UP (ref 0–0)
PHOSPHATE SERPL-MCNC: 3.1 MG/DL — SIGNIFICANT CHANGE UP (ref 2.5–4.5)
PLATELET # BLD AUTO: 382 K/UL — SIGNIFICANT CHANGE UP (ref 150–400)
POTASSIUM SERPL-MCNC: 3.8 MMOL/L — SIGNIFICANT CHANGE UP (ref 3.5–5.3)
POTASSIUM SERPL-SCNC: 3.8 MMOL/L — SIGNIFICANT CHANGE UP (ref 3.5–5.3)
PROTHROM AB SERPL-ACNC: 10.7 SEC — SIGNIFICANT CHANGE UP (ref 9.5–13)
RBC # BLD: 3.25 M/UL — LOW (ref 3.8–5.2)
RBC # FLD: 16.6 % — HIGH (ref 10.3–14.5)
RETICS #: 64.4 K/UL — SIGNIFICANT CHANGE UP (ref 25–125)
RETICS/RBC NFR: 2 % — SIGNIFICANT CHANGE UP (ref 0.5–2.5)
RH IG SCN BLD-IMP: POSITIVE — SIGNIFICANT CHANGE UP
SODIUM SERPL-SCNC: 136 MMOL/L — SIGNIFICANT CHANGE UP (ref 135–145)
TIBC SERPL-MCNC: 288 UG/DL — SIGNIFICANT CHANGE UP (ref 220–430)
UIBC SERPL-MCNC: 264 UG/DL — SIGNIFICANT CHANGE UP (ref 110–370)
WBC # BLD: 4.25 K/UL — SIGNIFICANT CHANGE UP (ref 3.8–10.5)
WBC # FLD AUTO: 4.25 K/UL — SIGNIFICANT CHANGE UP (ref 3.8–10.5)

## 2024-05-20 PROCEDURE — 99221 1ST HOSP IP/OBS SF/LOW 40: CPT

## 2024-05-20 PROCEDURE — 88307 TISSUE EXAM BY PATHOLOGIST: CPT | Mod: 26

## 2024-05-20 PROCEDURE — 99232 SBSQ HOSP IP/OBS MODERATE 35: CPT | Mod: GC

## 2024-05-20 PROCEDURE — 99253 IP/OBS CNSLTJ NEW/EST LOW 45: CPT

## 2024-05-20 RX ORDER — FERROUS SULFATE 325(65) MG
325 TABLET ORAL DAILY
Refills: 0 | Status: DISCONTINUED | OUTPATIENT
Start: 2024-05-20 | End: 2024-06-06

## 2024-05-20 RX ORDER — ENOXAPARIN SODIUM 100 MG/ML
30 INJECTION SUBCUTANEOUS EVERY 24 HOURS
Refills: 0 | Status: DISCONTINUED | OUTPATIENT
Start: 2024-05-20 | End: 2024-06-06

## 2024-05-20 RX ORDER — TRAMADOL HYDROCHLORIDE 50 MG/1
25 TABLET ORAL EVERY 6 HOURS
Refills: 0 | Status: DISCONTINUED | OUTPATIENT
Start: 2024-05-20 | End: 2024-05-20

## 2024-05-20 RX ORDER — ONDANSETRON 8 MG/1
4 TABLET, FILM COATED ORAL ONCE
Refills: 0 | Status: DISCONTINUED | OUTPATIENT
Start: 2024-05-20 | End: 2024-05-20

## 2024-05-20 RX ADMIN — PIPERACILLIN AND TAZOBACTAM 25 GRAM(S): 4; .5 INJECTION, POWDER, LYOPHILIZED, FOR SOLUTION INTRAVENOUS at 13:34

## 2024-05-20 RX ADMIN — PIPERACILLIN AND TAZOBACTAM 25 GRAM(S): 4; .5 INJECTION, POWDER, LYOPHILIZED, FOR SOLUTION INTRAVENOUS at 21:54

## 2024-05-20 RX ADMIN — Medication 325 MILLIGRAM(S): at 13:37

## 2024-05-20 RX ADMIN — CHLORHEXIDINE GLUCONATE 1 APPLICATION(S): 213 SOLUTION TOPICAL at 13:37

## 2024-05-20 RX ADMIN — TRAMADOL HYDROCHLORIDE 25 MILLIGRAM(S): 50 TABLET ORAL at 02:10

## 2024-05-20 RX ADMIN — Medication 3 MILLIGRAM(S): at 21:54

## 2024-05-20 RX ADMIN — PIPERACILLIN AND TAZOBACTAM 25 GRAM(S): 4; .5 INJECTION, POWDER, LYOPHILIZED, FOR SOLUTION INTRAVENOUS at 06:04

## 2024-05-20 RX ADMIN — TRAMADOL HYDROCHLORIDE 25 MILLIGRAM(S): 50 TABLET ORAL at 03:10

## 2024-05-20 NOTE — PROGRESS NOTE ADULT - SUBJECTIVE AND OBJECTIVE BOX
Podiatry Pager #: [phone] @ [place] and [address] at [facility], please page 10 digits full number.    Patient is a 44y old  Female who presents with a chief complaint of right foot wound (19 May 2024 08:50)      INTERVAL HPI/OVERNIGHT EVENTS:   Pt is scheduled for R foot soft tissue mass removal with Dr. Waterhouse at 0730 . Patient is aware of procedure and is NPO since midnight.    MEDICATIONS  (STANDING):  chlorhexidine 2% Cloths 1 Application(s) Topical daily  lidocaine 1%/epinephrine 1:100,000 Inj 20 milliLiter(s) Local Injection once  melatonin 3 milliGRAM(s) Oral at bedtime  mupirocin 2% Ointment 1 Application(s) Topical <User Schedule>  piperacillin/tazobactam IVPB.. 3.375 Gram(s) IV Intermittent every 8 hours  polyethylene glycol 3350 17 Gram(s) Oral daily  senna 2 Tablet(s) Oral at bedtime    MEDICATIONS  (PRN):  acetaminophen     Tablet .. 975 milliGRAM(s) Oral every 6 hours PRN Temp greater or equal to 38C (100.4F), Mild Pain (1 - 3)  traMADol 25 milliGRAM(s) Oral every 6 hours PRN Severe Pain (7 - 10)      Allergies    Zyrtec (Rash)    Intolerances        Vital Signs Last 24 Hrs  T(C): 36.6 (20 May 2024 05:52), Max: 37.1 (19 May 2024 14:40)  T(F): 97.9 (20 May 2024 05:52), Max: 98.8 (19 May 2024 14:40)  HR: 74 (20 May 2024 05:52) (74 - 78)  BP: 112/80 (20 May 2024 05:52) (112/80 - 122/93)  BP(mean): --  RR: 17 (20 May 2024 05:52) (17 - 18)  SpO2: 100% (20 May 2024 05:52) (100% - 100%)    Parameters below as of 19 May 2024 21:43  Patient On (Oxygen Delivery Method): room air        LABS:                        8.6    4.25  )-----------( 382      ( 20 May 2024 03:22 )             26.4     05-20    136  |  104  |  14  ----------------------------<  82  3.8   |  22  |  0.54    Ca    8.6      20 May 2024 03:22  Phos  3.1     05-20  Mg     2.00     05-20      PT/INR - ( 20 May 2024 03:22 )   PT: 10.7 sec;   INR: 0.95 ratio         PTT - ( 20 May 2024 03:22 )  PTT:28.6 sec  Urinalysis Basic - ( 20 May 2024 03:22 )    Color: x / Appearance: x / SG: x / pH: x  Gluc: 82 mg/dL / Ketone: x  / Bili: x / Urobili: x   Blood: x / Protein: x / Nitrite: x   Leuk Esterase: x / RBC: x / WBC x   Sq Epi: x / Non Sq Epi: x / Bacteria: x      CAPILLARY BLOOD GLUCOSE          RADIOLOGY & ADDITIONAL TESTS:    Plan:   Pt is scheduled for R foot soft tissue mass removal with Dr. Waterhouse at 0730 . Patient is aware of procedure and is NPO since midnight.  CXR on sunrise.EKG on sunrise.  Medical clearance since 5/19 and documented in chart.  Consent signed and in chart.  H&P seen and acknowledged.     Procedure was explained to patient in detail. All alternatives, risks and complications were discussed. All questions answered.

## 2024-05-20 NOTE — PRE-OP CHECKLIST - LATEX ALLERGY
Patient strep swab was positive for strep pharyngitis.  Sent prescription for oral penicillin to preferred pharmacy.  Instructed patient to change toothbrush after 48 hours.  May use salt water gargles, throat lozenges, warm tea with honey, and over-the-counter acetaminophen as needed for discomfort.  Stated that he will be contagious for 24 hours after starting the medication.  He should still follow instructions for possible COVID as instructed through on care.  Answered all questions.  Patient was appreciative.  
Patient was sent to [facility] testing from on care and was instructed to get both COVID-19 and strep swabs.  However, there was no order for a strep swab placed.  Was asked by clinical assisting staff to place an order.  Did call the patient and he has had sore throat and fevers.  Will place order and have [facility] staff obtain a swab.  Will contact patient with results.  
no

## 2024-05-20 NOTE — CONSULT NOTE ADULT - SUBJECTIVE AND OBJECTIVE BOX
HPI:  44F MHx spina bifida, multiple bilateral foot surgeries, and  shunt (managed [facility] neurosurgery), uterine fibroids presenting with R foot wound. Per patient, has had R foot wound for past 6-7 months, denies past trauma or injury. Last foot surgery was at age 15, not able to say what type of surgery. Foot wound presented first as a palpable mass that started draining, drainage was mostly bloody, for past few days has been white/yellow and very painful- now developed into area with central ulceration and assc redness in the past. Can ambulate at home, small distances, but has struggled with this due to pain. Endorsing abdominal pain due to active menstruation. Denies any fevers, chills, CP, SOB, n/v/d.     In the ED, T 100.5, , /100, 99% RA.  (12 May 2024 09:51)      REVIEW OF SYSTEMS/Subjective: Patient in bed in NAD, no SOB, no n/v, no pain  Constitutional - No fever, No fatigue  HEENT - No neck pain  Respiratory - No cough, No shortness of breath  Cardiovascular - No chest pain  Gastrointestinal - No abdominal pain  Genitourinary - No dysuria  Neurological - (+)weakness in BLE, (+)numbness in both feet  Musculoskeletal - No joint pain, No joint swelling, No muscle pain  Psychiatric - No depression, No anxiety  All other review of systems negative    PAST MEDICAL & SURGICAL HISTORY  Local infection of skin and subcutaneous tissue    No pertinent family history in first degree relatives    Handoff    MEWS Score    Spina bifida    Fibroids    Wound of right foot    Wound of left foot    Mass of soft tissue of foot    Mass of soft tissue of foot    CT foot right    Right foot infection    Wound of right foot    Spina bifida    Uterine fibroid    Prophylactic measure    Wound of sacral region    Exploration of soft tissue mass of foot    Excision, soft tissue tumor, foot, subfascial, 1.5 cm or greater    No significant past surgical history    S/P  shunt    S/P foot surgery, left    S/P foot surgery, right    FOOT WOUND    90+    [email or web address]        SOCIAL HISTORY    Smoking - Denied  EtOH - Denied   Drugs - Denied    FUNCTIONAL HISTORY  Lives with mother/child in a pvt house with 2STE. (-)HHA  Independent in ambulation in home short distances with RW, SAC in community with bilateral KAFO. Since 11/2023 obtained a motorized wheelchair 2/2 RLE wound, ADL's, transfers prior to hospitalization  Home equipment - motorized wheelchair in community since 11/2023        FAMILY HISTORY   Reviewed and non-contributory    ALLERGIES  latex (Hives; Rash)  Zyrtec (Rash)    VITALS  T(C): 36.3 (05-20-24 @ 09:00)  T(F): 97.4 (05-20-24 @ 09:00), Max: 98.8 (05-19-24 @ 14:40)  HR: 72 (05-20-24 @ 10:00) (60 - 95)  BP: 117/85 (05-20-24 @ 10:00) (112/80 - 163/95)  RR:  (13 - 18)  SpO2:  (95% - 100%)  Wt(kg): --    PHYSICAL EXAM  Constitutional - NAD, Comfortable  HEENT - NCAT, MMM  Neck - Supple  Chest - CTA bilaterally  Cardiovascular - RRR, S1S2  Abdomen - BS+, Soft, NTND  Extremities - (+)pedal edema. left foot dressed  Neurologic Exam -                    Cognitive - Awake, Alert, Oriented to self, place, date, year, situation     Communication - Fluent, No dysarthria        Cranial Nerves - CN 2-12 grossly intact     Motor -                     LEFT    UE - ShAB 5/5, EF 5/5, EE 5/5, WE 5/5,  5/5                    RIGHT UE - ShAB 5/5, EF 5/5, EE 5/5, WE 5/5,  5/5                    LEFT    LE - HF 4-/5, KE 4+/5, DF 0/5, PF 0/5                    RIGHT LE - HF 4-/5, KE 4+/5, DF 0/5, PF 0/5        Sensory - decreased LT both feet     Coordination - Finger-to-nose intact bilaterally   Psychiatric - Affect WNL    CURRENT FUNCTIONAL STATUS  Bed mobility - (A)  Transfers - deferred      RECENT LABS/IMAGING                        8.6    4.25  )-----------( 382      ( 20 May 2024 03:22 )             26.4     05-20    136  |  104  |  14  ----------------------------<  82  3.8   |  22  |  0.54    Ca    8.6      20 May 2024 03:22  Phos  3.1     05-20  Mg     2.00     05-20      PT/INR - ( 20 May 2024 03:22 )   PT: 10.7 sec;   INR: 0.95 ratio         PTT - ( 20 May 2024 03:22 )  PTT:28.6 sec  Urinalysis Basic - ( 20 May 2024 03:22 )    Color: x / Appearance: x / SG: x / pH: x  Gluc: 82 mg/dL / Ketone: x  / Bili: x / Urobili: x   Blood: x / Protein: x / Nitrite: x   Leuk Esterase: x / RBC: x / WBC x   Sq Epi: x / Non Sq Epi: x / Bacteria: x        HbA1C -   TSH -   CHL -   Tri -   HDL -   LDL -     MEDICATIONS   MEDICATIONS  (STANDING):  chlorhexidine 2% Cloths 1 Application(s) Topical daily  enoxaparin Injectable 30 milliGRAM(s) SubCutaneous every 24 hours  ferrous    sulfate 325 milliGRAM(s) Oral daily  lidocaine 1%/epinephrine 1:100,000 Inj 20 milliLiter(s) Local Injection once  melatonin 3 milliGRAM(s) Oral at bedtime  mupirocin 2% Ointment 1 Application(s) Topical <User Schedule>  piperacillin/tazobactam IVPB.. 3.375 Gram(s) IV Intermittent every 8 hours  polyethylene glycol 3350 17 Gram(s) Oral daily  senna 2 Tablet(s) Oral at bedtime    MEDICATIONS  (PRN):  acetaminophen     Tablet .. 975 milliGRAM(s) Oral every 6 hours PRN Temp greater or equal to 38C (100.4F), Mild Pain (1 - 3)  oxycodone    5 mG/acetaminophen 325 mG 1 Tablet(s) Oral every 6 hours PRN Moderate Pain (4 - 6)  traMADol 25 milliGRAM(s) Oral every 6 hours PRN Severe Pain (7 - 10)      ASSESSMENT/PLAN  The patient is a 45y/o female PMHx spina bifida/paraparesis, multiple bilateral foot surgeries, and  shunt, uterine fibroids presenting with R foot wound, found to have  R foot and L ischial abscess,  OR 5/20 for R foot I&D with podiatry with functional, gait, ADL impairments. Patient with functional decline over the last 5-6 months 2/2 RLE wound.    Disposition - Patient is a candidate for restorative inpatient rehab, acute unit. Follow progress with bedside PT  PT - ROM, Bed mobility, Transfers, Ambulation with assistive device  OT - ADLs, ROM    Precautions - Falls, sensory BLE  DVT Prophylaxis - Lovenox  Weight bearing status - WBAT  Skin - Turn Q2hrs  Diet - Regular HPI:  44F MHx spina bifida, multiple bilateral foot surgeries, and  shunt (managed [facility] neurosurgery), uterine fibroids presenting with R foot wound. Per patient, has had R foot wound for past 6-7 months, denies past trauma or injury. Last foot surgery was at age 15, not able to say what type of surgery. Foot wound presented first as a palpable mass that started draining, drainage was mostly bloody, for past few days has been white/yellow and very painful- now developed into area with central ulceration and assc redness in the past. Can ambulate at home, small distances, but has struggled with this due to pain. Endorsing abdominal pain due to active menstruation. Denies any fevers, chills, CP, SOB, n/v/d.     In the ED, T 100.5, , /100, 99% RA.  (12 May 2024 09:51)      REVIEW OF SYSTEMS/Subjective: Patient in bed in NAD, no SOB, no n/v, no pain  Constitutional - No fever, No fatigue  HEENT - No neck pain  Respiratory - No cough, No shortness of breath  Cardiovascular - No chest pain  Gastrointestinal - No abdominal pain  Genitourinary - No dysuria  Neurological - (+)weakness in BLE, (+)numbness in both feet  Musculoskeletal - No joint pain, No joint swelling, No muscle pain  Psychiatric - No depression, No anxiety  All other review of systems negative    PAST MEDICAL & SURGICAL HISTORY  Local infection of skin and subcutaneous tissue    No pertinent family history in first degree relatives    Handoff    MEWS Score    Spina bifida    Fibroids    Wound of right foot    Wound of left foot    Mass of soft tissue of foot    Mass of soft tissue of foot    CT foot right    Right foot infection    Wound of right foot    Spina bifida    Uterine fibroid    Prophylactic measure    Wound of sacral region    Exploration of soft tissue mass of foot    Excision, soft tissue tumor, foot, subfascial, 1.5 cm or greater    No significant past surgical history    S/P  shunt    S/P foot surgery, left    S/P foot surgery, right    FOOT WOUND    90+    [email or web address]        SOCIAL HISTORY    Smoking - Denied  EtOH - Denied   Drugs - Denied    FUNCTIONAL HISTORY  Lives with mother/child in a pvt house with 2STE. (-)HHA  Independent in ambulation in home short distances with RW, SAC in community with bilateral KAFO. Since 11/2023 obtained a motorized wheelchair 2/2 RLE wound, ADL's, transfers prior to hospitalization  Home equipment - motorized wheelchair in community since 11/2023        FAMILY HISTORY   Reviewed and non-contributory    ALLERGIES  latex (Hives; Rash)  Zyrtec (Rash)    VITALS  T(C): 36.3 (05-20-24 @ 09:00)  T(F): 97.4 (05-20-24 @ 09:00), Max: 98.8 (05-19-24 @ 14:40)  HR: 72 (05-20-24 @ 10:00) (60 - 95)  BP: 117/85 (05-20-24 @ 10:00) (112/80 - 163/95)  RR:  (13 - 18)  SpO2:  (95% - 100%)  Wt(kg): --    PHYSICAL EXAM  Constitutional - NAD, Comfortable  HEENT - NCAT, MMM  Neck - Supple  Chest - CTA bilaterally  Cardiovascular - RRR, S1S2  Abdomen - BS+, Soft, NTND  Extremities - (+)pedal edema. left foot dressed  Neurologic Exam -                    Cognitive - Awake, Alert, Oriented to self, place, date, year, situation     Communication - Fluent, No dysarthria        Cranial Nerves - CN 2-12 grossly intact     Motor -                     LEFT    UE - ShAB 5/5, EF 5/5, EE 5/5, WE 5/5,  5/5                    RIGHT UE - ShAB 5/5, EF 5/5, EE 5/5, WE 5/5,  5/5                    LEFT    LE - HF 4-/5, KE 4+/5, DF 0/5, PF 0/5                    RIGHT LE - HF 4-/5, KE 4+/5, DF 0/5, PF 0/5        Sensory - decreased LT both feet     Coordination - Finger-to-nose intact bilaterally   Psychiatric - Affect WNL    CURRENT FUNCTIONAL STATUS  Bed mobility - (A)  Transfers - deferred      RECENT LABS/IMAGING                        8.6    4.25  )-----------( 382      ( 20 May 2024 03:22 )             26.4     05-20    136  |  104  |  14  ----------------------------<  82  3.8   |  22  |  0.54    Ca    8.6      20 May 2024 03:22  Phos  3.1     05-20  Mg     2.00     05-20      PT/INR - ( 20 May 2024 03:22 )   PT: 10.7 sec;   INR: 0.95 ratio         PTT - ( 20 May 2024 03:22 )  PTT:28.6 sec  Urinalysis Basic - ( 20 May 2024 03:22 )    Color: x / Appearance: x / SG: x / pH: x  Gluc: 82 mg/dL / Ketone: x  / Bili: x / Urobili: x   Blood: x / Protein: x / Nitrite: x   Leuk Esterase: x / RBC: x / WBC x   Sq Epi: x / Non Sq Epi: x / Bacteria: x        HbA1C -   TSH -   CHL -   Tri -   HDL -   LDL -     MEDICATIONS   MEDICATIONS  (STANDING):  chlorhexidine 2% Cloths 1 Application(s) Topical daily  enoxaparin Injectable 30 milliGRAM(s) SubCutaneous every 24 hours  ferrous    sulfate 325 milliGRAM(s) Oral daily  lidocaine 1%/epinephrine 1:100,000 Inj 20 milliLiter(s) Local Injection once  melatonin 3 milliGRAM(s) Oral at bedtime  mupirocin 2% Ointment 1 Application(s) Topical <User Schedule>  piperacillin/tazobactam IVPB.. 3.375 Gram(s) IV Intermittent every 8 hours  polyethylene glycol 3350 17 Gram(s) Oral daily  senna 2 Tablet(s) Oral at bedtime    MEDICATIONS  (PRN):  acetaminophen     Tablet .. 975 milliGRAM(s) Oral every 6 hours PRN Temp greater or equal to 38C (100.4F), Mild Pain (1 - 3)  oxycodone    5 mG/acetaminophen 325 mG 1 Tablet(s) Oral every 6 hours PRN Moderate Pain (4 - 6)  traMADol 25 milliGRAM(s) Oral every 6 hours PRN Severe Pain (7 - 10)      ASSESSMENT/PLAN  The patient is a 43y/o female PMHx spina bifida/paraparesis, multiple bilateral foot surgeries, and  shunt, uterine fibroids presenting with R foot wound, found to have  R foot and L ischial abscess,  OR 5/20 for R foot I&D with podiatry with functional, gait, ADL impairments. Patient with functional decline over the last 5-6 months 2/2 RLE wound.    Disposition - Patient is a candidate for restorative inpatient rehab, acute unit. Follow progress with bedside PT. Patient reports having a motorized wheelchair and can follow up at the wheelchair clinic at [facility] for optimization.  PT - ROM, Bed mobility, Transfers, Ambulation with assistive device  OT - ADLs, ROM    Precautions - Falls, sensory BLE  DVT Prophylaxis - Lovenox  Weight bearing status - WBAT  Skin - Turn Q2hrs  Diet - Regular HPI:  44F MHx spina bifida, multiple bilateral foot surgeries, and  shunt (managed [facility] neurosurgery), uterine fibroids presenting with R foot wound. Per patient, has had R foot wound for past 6-7 months, denies past trauma or injury. Last foot surgery was at age 15, not able to say what type of surgery. Foot wound presented first as a palpable mass that started draining, drainage was mostly bloody, for past few days has been white/yellow and very painful- now developed into area with central ulceration and assc redness in the past. Can ambulate at home, small distances, but has struggled with this due to pain. Endorsing abdominal pain due to active menstruation. Denies any fevers, chills, CP, SOB, n/v/d.     In the ED, T 100.5, , /100, 99% RA.  (12 May 2024 09:51)      REVIEW OF SYSTEMS/Subjective: Patient in bed in NAD, no SOB, no n/v, no pain  Constitutional - No fever, No fatigue  HEENT - No neck pain  Respiratory - No cough, No shortness of breath  Cardiovascular - No chest pain  Gastrointestinal - No abdominal pain  Genitourinary - No dysuria  Neurological - (+)weakness in BLE, (+)numbness in both feet  Musculoskeletal - No joint pain, No joint swelling, No muscle pain  Psychiatric - No depression, No anxiety  All other review of systems negative    PAST MEDICAL & SURGICAL HISTORY  Local infection of skin and subcutaneous tissue    No pertinent family history in first degree relatives    Handoff    MEWS Score    Spina bifida    Fibroids    Wound of right foot    Wound of left foot    Mass of soft tissue of foot    Mass of soft tissue of foot    CT foot right    Right foot infection    Wound of right foot    Spina bifida    Uterine fibroid    Prophylactic measure    Wound of sacral region    Exploration of soft tissue mass of foot    Excision, soft tissue tumor, foot, subfascial, 1.5 cm or greater    No significant past surgical history    S/P  shunt    S/P foot surgery, left    S/P foot surgery, right    FOOT WOUND    90+    [email or web address]        SOCIAL HISTORY    Smoking - Denied  EtOH - Denied   Drugs - Denied    FUNCTIONAL HISTORY  Lives with mother/child in a pvt house with 2STE. (-)HHA  Independent in ambulation in home short distances with RW, SAC in community with bilateral KAFO. Since 11/2023 obtained a motorized wheelchair 2/2 RLE wound, ADL's, transfers prior to hospitalization  Home equipment - motorized wheelchair in community since 11/2023        FAMILY HISTORY   Reviewed and non-contributory    ALLERGIES  latex (Hives; Rash)  Zyrtec (Rash)    VITALS  T(C): 36.3 (05-20-24 @ 09:00)  T(F): 97.4 (05-20-24 @ 09:00), Max: 98.8 (05-19-24 @ 14:40)  HR: 72 (05-20-24 @ 10:00) (60 - 95)  BP: 117/85 (05-20-24 @ 10:00) (112/80 - 163/95)  RR:  (13 - 18)  SpO2:  (95% - 100%)  Wt(kg): --    PHYSICAL EXAM  Constitutional - NAD, Comfortable  HEENT - NCAT, MMM  Neck - Supple  Chest - CTA bilaterally  Cardiovascular - RRR, S1S2  Abdomen - BS+, Soft, NTND  Extremities - (+)pedal edema. left foot dressed  Neurologic Exam -                    Cognitive - Awake, Alert, Oriented to self, place, date, year, situation     Communication - Fluent, No dysarthria        Cranial Nerves - CN 2-12 grossly intact     Motor -                     LEFT    UE - ShAB 5/5, EF 5/5, EE 5/5, WE 5/5,  5/5                    RIGHT UE - ShAB 5/5, EF 5/5, EE 5/5, WE 5/5,  5/5                    LEFT    LE - HF 4-/5, KE 4+/5, DF 0/5, PF 0/5                    RIGHT LE - HF 4-/5, KE 4+/5, DF 0/5, PF 0/5        Sensory - decreased LT both feet     Coordination - Finger-to-nose intact bilaterally   Psychiatric - Affect WNL    CURRENT FUNCTIONAL STATUS  Bed mobility - (A)  Transfers - deferred      RECENT LABS/IMAGING                        8.6    4.25  )-----------( 382      ( 20 May 2024 03:22 )             26.4     05-20    136  |  104  |  14  ----------------------------<  82  3.8   |  22  |  0.54    Ca    8.6      20 May 2024 03:22  Phos  3.1     05-20  Mg     2.00     05-20      PT/INR - ( 20 May 2024 03:22 )   PT: 10.7 sec;   INR: 0.95 ratio         PTT - ( 20 May 2024 03:22 )  PTT:28.6 sec  Urinalysis Basic - ( 20 May 2024 03:22 )    Color: x / Appearance: x / SG: x / pH: x  Gluc: 82 mg/dL / Ketone: x  / Bili: x / Urobili: x   Blood: x / Protein: x / Nitrite: x   Leuk Esterase: x / RBC: x / WBC x   Sq Epi: x / Non Sq Epi: x / Bacteria: x        HbA1C -   TSH -   CHL -   Tri -   HDL -   LDL -     MEDICATIONS   MEDICATIONS  (STANDING):  chlorhexidine 2% Cloths 1 Application(s) Topical daily  enoxaparin Injectable 30 milliGRAM(s) SubCutaneous every 24 hours  ferrous    sulfate 325 milliGRAM(s) Oral daily  lidocaine 1%/epinephrine 1:100,000 Inj 20 milliLiter(s) Local Injection once  melatonin 3 milliGRAM(s) Oral at bedtime  mupirocin 2% Ointment 1 Application(s) Topical <User Schedule>  piperacillin/tazobactam IVPB.. 3.375 Gram(s) IV Intermittent every 8 hours  polyethylene glycol 3350 17 Gram(s) Oral daily  senna 2 Tablet(s) Oral at bedtime    MEDICATIONS  (PRN):  acetaminophen     Tablet .. 975 milliGRAM(s) Oral every 6 hours PRN Temp greater or equal to 38C (100.4F), Mild Pain (1 - 3)  oxycodone    5 mG/acetaminophen 325 mG 1 Tablet(s) Oral every 6 hours PRN Moderate Pain (4 - 6)  traMADol 25 milliGRAM(s) Oral every 6 hours PRN Severe Pain (7 - 10)      ASSESSMENT/PLAN  The patient is a 43y/o female PMHx spina bifida/paraparesis, multiple bilateral foot surgeries, and  shunt, uterine fibroids presenting with R foot wound, found to have  R foot and L ischial abscess,  OR 5/20 for R foot I&D with podiatry with functional, gait, ADL impairments. Patient with functional decline over the last 5-6 months 2/2 RLE wound.    Disposition - Patient is a candidate for restorative inpatient rehab, acute unit. Follow progress with bedside PT. Patient can tolerate 3 hours/day of rehab services. Patient reports having a motorized wheelchair and can follow up at the wheelchair clinic at [facility] for optimization.  PT - ROM, Bed mobility, Transfers, Ambulation with assistive device  OT - ADLs, ROM    Precautions - Falls, sensory BLE  DVT Prophylaxis - Lovenox  Weight bearing status - WBAT  Skin - Turn Q2hrs  Diet - Regular

## 2024-05-20 NOTE — CONSULT NOTE ADULT - CONSULT REASON
Rehabilitation evaluation
L ischial pressure wound w/ possible abscess
Right foot mass
foot wound with abscess
Left ischium Stage 4 pressure injury

## 2024-05-20 NOTE — PROGRESS NOTE ADULT - SUBJECTIVE AND OBJECTIVE BOX
*******************************  Andrew Paulson, PGY-1  Internal Medicine  Contact via [employer] TEAMS    *******************************    PROGRESS NOTE:     Patient is a 44y old  Female who presents with a chief complaint of right foot wound (20 May 2024 06:47)      INTERVAL EVENTS: No acute overnight events.     SUBJECTIVE: Patient seen and examined at bedside. This morning, the patient is comfortable and doing well. No acute complaints. Denies fevers, chills, N/V/D, chest pain, SOB, abdominal pain.    MEDICATIONS  (STANDING):  chlorhexidine 2% Cloths 1 Application(s) Topical daily  lidocaine 1%/epinephrine 1:100,000 Inj 20 milliLiter(s) Local Injection once  melatonin 3 milliGRAM(s) Oral at bedtime  mupirocin 2% Ointment 1 Application(s) Topical <User Schedule>  piperacillin/tazobactam IVPB.. 3.375 Gram(s) IV Intermittent every 8 hours  polyethylene glycol 3350 17 Gram(s) Oral daily  senna 2 Tablet(s) Oral at bedtime    MEDICATIONS  (PRN):  acetaminophen     Tablet .. 975 milliGRAM(s) Oral every 6 hours PRN Temp greater or equal to 38C (100.4F), Mild Pain (1 - 3)  traMADol 25 milliGRAM(s) Oral every 6 hours PRN Severe Pain (7 - 10)      CAPILLARY BLOOD GLUCOSE        I&O's Summary      PHYSICAL EXAM:  Vital Signs Last 24 Hrs  T(C): 36.4 (20 May 2024 07:13), Max: 37.1 (19 May 2024 14:40)  T(F): 97.6 (20 May 2024 07:13), Max: 98.8 (19 May 2024 14:40)  HR: 68 (20 May 2024 07:13) (68 - 78)  BP: 119/80 (20 May 2024 07:13) (112/80 - 122/93)  BP(mean): 88 (20 May 2024 07:13) (88 - 88)  RR: 16 (20 May 2024 07:13) (16 - 18)  SpO2: 100% (20 May 2024 07:13) (100% - 100%)    Parameters below as of 20 May 2024 07:13  Patient On (Oxygen Delivery Method): room air        GENERAL: NAD, lying in bed comfortably  HEAD: Atraumatic, normocephalic  EYES: EOMI, PERRLA, conjunctiva and sclera clear  ENT: Moist mucous membranes  NECK: Supple, no JVD  HEART: S1, S2, Regular rate and rhythm, no murmurs, rubs, or gallops  LUNGS: Unlabored respirations, clear to auscultation bilaterally, no crackles, wheezing, or rhonchi  ABDOMEN: Soft, nontender, nondistended, +BS  EXTREMITIES: 2+ peripheral pulses bilaterally. No clubbing, cyanosis, or edema  NERVOUS SYSTEM:  A&Ox3, no focal deficits   SKIN: No rashes or lesions    LABS:                        8.6    4.25  )-----------( 382      ( 20 May 2024 03:22 )             26.4     05-20    136  |  104  |  14  ----------------------------<  82  3.8   |  22  |  0.54    Ca    8.6      20 May 2024 03:22  Phos  3.1     05-20  Mg     2.00     05-20      PT/INR - ( 20 May 2024 03:22 )   PT: 10.7 sec;   INR: 0.95 ratio         PTT - ( 20 May 2024 03:22 )  PTT:28.6 sec      Urinalysis Basic - ( 20 May 2024 03:22 )    Color: x / Appearance: x / SG: x / pH: x  Gluc: 82 mg/dL / Ketone: x  / Bili: x / Urobili: x   Blood: x / Protein: x / Nitrite: x   Leuk Esterase: x / RBC: x / WBC x   Sq Epi: x / Non Sq Epi: x / Bacteria: x          RADIOLOGY & ADDITIONAL TESTS:  Results Reviewed:   Imaging Personally Reviewed:  Electrocardiogram Personally Reviewed:  Tele: *******************************  Andrew Paulson, PGY-1  Internal Medicine  Contact via [employer] TEAMS    *******************************    PROGRESS NOTE:     Patient is a 44y old  Female who presents with a chief complaint of right foot wound (20 May 2024 06:47)      INTERVAL EVENTS: No acute overnight events.     SUBJECTIVE: Patient seen and examined at bedside. This morning, the patient is comfortable and doing well. No acute complaints. Denies fevers, chills, N/V/D, chest pain, SOB, abdominal pain. Patient tolerated her procedure well and denies any pain.    MEDICATIONS  (STANDING):  chlorhexidine 2% Cloths 1 Application(s) Topical daily  lidocaine 1%/epinephrine 1:100,000 Inj 20 milliLiter(s) Local Injection once  melatonin 3 milliGRAM(s) Oral at bedtime  mupirocin 2% Ointment 1 Application(s) Topical <User Schedule>  piperacillin/tazobactam IVPB.. 3.375 Gram(s) IV Intermittent every 8 hours  polyethylene glycol 3350 17 Gram(s) Oral daily  senna 2 Tablet(s) Oral at bedtime    MEDICATIONS  (PRN):  acetaminophen     Tablet .. 975 milliGRAM(s) Oral every 6 hours PRN Temp greater or equal to 38C (100.4F), Mild Pain (1 - 3)  traMADol 25 milliGRAM(s) Oral every 6 hours PRN Severe Pain (7 - 10)      CAPILLARY BLOOD GLUCOSE        I&O's Summary      PHYSICAL EXAM:  Vital Signs Last 24 Hrs  T(C): 36.4 (20 May 2024 07:13), Max: 37.1 (19 May 2024 14:40)  T(F): 97.6 (20 May 2024 07:13), Max: 98.8 (19 May 2024 14:40)  HR: 68 (20 May 2024 07:13) (68 - 78)  BP: 119/80 (20 May 2024 07:13) (112/80 - 122/93)  BP(mean): 88 (20 May 2024 07:13) (88 - 88)  RR: 16 (20 May 2024 07:13) (16 - 18)  SpO2: 100% (20 May 2024 07:13) (100% - 100%)    Parameters below as of 20 May 2024 07:13  Patient On (Oxygen Delivery Method): room air        GENERAL: NAD, lying in bed comfortably  HEAD: Atraumatic, normocephalic  EYES: EOMI, conjunctiva and sclera clear  ENT: Moist mucous membranes  NECK: Supple, no JVD  HEART: S1, S2, Regular rate and rhythm, no murmurs, rubs, or gallops  LUNGS: Unlabored respirations, clear to auscultation bilaterally, no crackles, wheezing, or rhonchi  ABDOMEN: Soft, nontender, nondistended  EXTREMITIES: No LE edema. Wound dressing on RLE c/d/i.  NERVOUS SYSTEM:  A&Ox3, no focal deficits   SKIN: No rashes or lesions    LABS:                        8.6    4.25  )-----------( 382      ( 20 May 2024 03:22 )             26.4     05-20    136  |  104  |  14  ----------------------------<  82  3.8   |  22  |  0.54    Ca    8.6      20 May 2024 03:22  Phos  3.1     05-20  Mg     2.00     05-20      PT/INR - ( 20 May 2024 03:22 )   PT: 10.7 sec;   INR: 0.95 ratio         PTT - ( 20 May 2024 03:22 )  PTT:28.6 sec      Urinalysis Basic - ( 20 May 2024 03:22 )    Color: x / Appearance: x / SG: x / pH: x  Gluc: 82 mg/dL / Ketone: x  / Bili: x / Urobili: x   Blood: x / Protein: x / Nitrite: x   Leuk Esterase: x / RBC: x / WBC x   Sq Epi: x / Non Sq Epi: x / Bacteria: x          RADIOLOGY & ADDITIONAL TESTS:  Results Reviewed:   Imaging Personally Reviewed:  Electrocardiogram Personally Reviewed:  Tele:

## 2024-05-20 NOTE — PROGRESS NOTE ADULT - ATTENDING COMMENTS
44F hx of spina bifida, multiple bilateral foot surgeries, and  shunt (managed [facility] neurosurgery), uterine fibroids presenting with R foot wound.  Pt admitted for rule out OM    Patient seen and examined, states that her foot feels better today. She has had a runny nose since the procedure this AM, she has no other complaints.     #R foot wound: ID following for antibiotic management, will f/u regarding de-escalation from [facility]. MRI shows abscess, however s/p OR w/ podiatry on 5/20, s/p right foot soft tissue mass excision w/ low concern for infection, no intraop findings of purulence of abscess. No OM on MRI. ADAM with no PAD    #Sacral Mass: CT shows "Thick walled fluid collection superficial to the left ischium measuring up to 7.9 cm with thick surrounding soft tissue component. Findings are concerning for abscess." s/p surgery bedside I&D. c/w Abx.     #Stage 4 sacral decubitus: c/w wound vac and wound care. Wound care rec wound vac at home, pt declining, will continue to encourage. Can follow-up with plastics as outpatient for consideration of flap.     D/w patient  D/w resident Dr. Paulson 44F hx of spina bifida, multiple bilateral foot surgeries, and  shunt (managed [facility] neurosurgery), uterine fibroids presenting with R foot wound.  Pt admitted for rule out OM    Patient seen and examined, states that her foot feels better today. She has had a runny nose since the procedure this AM, she has no other complaints.     #R foot wound: ID following for antibiotic management, will f/u regarding de-escalation from [facility]. MRI shows abscess, however s/p OR w/ podiatry on 5/20, s/p right foot soft tissue mass excision w/ low concern for infection, no intraop findings of purulence of abscess. No OM on MRI. ADAM with no PAD    #Sacral Mass: CT shows "Thick walled fluid collection superficial to the left ischium measuring up to 7.9 cm with thick surrounding soft tissue component. Findings are concerning for abscess." s/p surgery bedside I&D. c/w Abx.     #Stage 4 sacral decubitus: c/w wound vac and wound care. Wound care rec wound vac at home, pt declining, will continue to encourage. Can follow-up with plastics as outpatient for consideration of flap.     #Dispo - PT recs [facility], but patient prefers home. Will need PT re-eval prior to dc. PMR consulted as well per gen surg recs.     D/w patient  D/w resident Dr. Paulson

## 2024-05-20 NOTE — BRIEF OPERATIVE NOTE - OPERATION/FINDINGS
pt is s/p right foot soft tissue mass excision   low concern for infection, no intraop findings of purulence of abscess   low concern for viability, adequate intraop bleeding

## 2024-05-20 NOTE — PROGRESS NOTE ADULT - ASSESSMENT
Pt is scheduled for R foot soft tissue mass removal with Dr. Waterhouse at 0730 . Patient is aware of procedure and is NPO since midnight.  CXR on sunrise.EKG on sunrise.  Medical clearance since 5/19 and documented in chart.  Consent signed and in chart.  H&P seen and acknowledged.     Procedure was explained to patient in detail. All alternatives, risks and complications were discussed. All questions answered.

## 2024-05-20 NOTE — PROGRESS NOTE ADULT - PROBLEM SELECTOR PLAN 2
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  pt with sacral wound noted  frequent repositioning  CTAP with abscess underlying sacral wound  s/p I&D 5/17 - serosanguinous drainage, possibly bursa cysts iso pressure wound Stage 4 sacral decub with wound vac.  pt with sacral wound noted  frequent repositioning  CTAP with abscess underlying sacral wound  s/p I&D 5/17 - serosanguinous drainage, possibly bursa cysts iso pressure wound    Plan:  -Culture without growth  -frequent repositioning  -Wound care consulted, appreciate reccs  -Physiatry, OT to see

## 2024-05-20 NOTE — PROGRESS NOTE ADULT - PROBLEM SELECTOR PLAN 3
pt with hx spina bifida, has motorized wheelchair at home    shunt managed by [facility] neurosurgery   CTM -pt with hx spina bifida, has motorized wheelchair at home   - shunt managed by [facility] neurosurgery   -No hydrocephalus on CT

## 2024-05-20 NOTE — PROGRESS NOTE ADULT - PROBLEM SELECTOR PLAN 1
pt with chronic R foot wound with white/yellow drainage  XR R foot with extensive soft tissue swelling  CT R foot with forming abscess   MR R foot with abscess   appreciate podiatry recs - OR Monday for removal of R foot abscess   [facility] for  shunt done   ID recs appreciated, c/w zosyn   pain control: tylenol, toradol 15 mod pain, tramadol 25 severe pain  PT - outpatient PT pt with chronic R foot wound with white/yellow drainage  XR R foot with extensive soft tissue swelling  CT R foot with forming abscess   MR R foot with abscess   Plan:  -Patient underwent right foot soft tissue mass excision, low concern for infection per podiatry  -ID recs appreciated, c/w zosyn for 24hrs after procedure  -Physiatry, OT to see  -pain control: tylenol, toradol 15 mod pain, tramadol 25 severe pain

## 2024-05-20 NOTE — BRIEF OPERATIVE NOTE - COMMENTS
pt is s/p right foot soft tissue mass excision   low concern for infection, no intraop findings of purulence of abscess   low concern for viability, adequate intraop bleeding   stable for discharge from podiatry   follow up information and wound care instruction in d/c note provider

## 2024-05-20 NOTE — PROGRESS NOTE ADULT - PROBLEM SELECTOR PLAN 4
hx of uterine fibroid, heavy periods  currently on period -- likely etiology of microscopic hematuria on UA  outpatient OBGYN f/u hx of uterine fibroid, heavy periods  currently on period -- likely etiology of microscopic hematuria on UA  outpatient OBGYN f/u    Plan:  -Patient found to be iron deficient  -PO iron started

## 2024-05-21 LAB
ALBUMIN SERPL ELPH-MCNC: 3.2 G/DL — LOW (ref 3.3–5)
ALP SERPL-CCNC: 47 U/L — SIGNIFICANT CHANGE UP (ref 40–120)
ALT FLD-CCNC: 8 U/L — SIGNIFICANT CHANGE UP (ref 4–33)
ANION GAP SERPL CALC-SCNC: 14 MMOL/L — SIGNIFICANT CHANGE UP (ref 7–14)
AST SERPL-CCNC: 10 U/L — SIGNIFICANT CHANGE UP (ref 4–32)
BILIRUB SERPL-MCNC: <0.2 MG/DL — SIGNIFICANT CHANGE UP (ref 0.2–1.2)
BUN SERPL-MCNC: 12 MG/DL — SIGNIFICANT CHANGE UP (ref 7–23)
CALCIUM SERPL-MCNC: 8.5 MG/DL — SIGNIFICANT CHANGE UP (ref 8.4–10.5)
CHLORIDE SERPL-SCNC: 105 MMOL/L — SIGNIFICANT CHANGE UP (ref 98–107)
CO2 SERPL-SCNC: 20 MMOL/L — LOW (ref 22–31)
CREAT SERPL-MCNC: 0.55 MG/DL — SIGNIFICANT CHANGE UP (ref 0.5–1.3)
CULTURE RESULTS: SIGNIFICANT CHANGE UP
EGFR: 114 ML/MIN/1.73M2 — SIGNIFICANT CHANGE UP
GLUCOSE SERPL-MCNC: 73 MG/DL — SIGNIFICANT CHANGE UP (ref 70–99)
HCT VFR BLD CALC: 25.4 % — LOW (ref 34.5–45)
HGB BLD-MCNC: 8.1 G/DL — LOW (ref 11.5–15.5)
MAGNESIUM SERPL-MCNC: 2 MG/DL — SIGNIFICANT CHANGE UP (ref 1.6–2.6)
MCHC RBC-ENTMCNC: 26 PG — LOW (ref 27–34)
MCHC RBC-ENTMCNC: 31.9 GM/DL — LOW (ref 32–36)
MCV RBC AUTO: 81.7 FL — SIGNIFICANT CHANGE UP (ref 80–100)
NRBC # BLD: 0 /100 WBCS — SIGNIFICANT CHANGE UP (ref 0–0)
NRBC # FLD: 0 K/UL — SIGNIFICANT CHANGE UP (ref 0–0)
PHOSPHATE SERPL-MCNC: 3.1 MG/DL — SIGNIFICANT CHANGE UP (ref 2.5–4.5)
PLATELET # BLD AUTO: 368 K/UL — SIGNIFICANT CHANGE UP (ref 150–400)
POTASSIUM SERPL-MCNC: 3.5 MMOL/L — SIGNIFICANT CHANGE UP (ref 3.5–5.3)
POTASSIUM SERPL-SCNC: 3.5 MMOL/L — SIGNIFICANT CHANGE UP (ref 3.5–5.3)
PROT SERPL-MCNC: 6.6 G/DL — SIGNIFICANT CHANGE UP (ref 6–8.3)
RBC # BLD: 3.11 M/UL — LOW (ref 3.8–5.2)
RBC # FLD: 16.7 % — HIGH (ref 10.3–14.5)
SODIUM SERPL-SCNC: 139 MMOL/L — SIGNIFICANT CHANGE UP (ref 135–145)
SPECIMEN SOURCE: SIGNIFICANT CHANGE UP
WBC # BLD: 4.88 K/UL — SIGNIFICANT CHANGE UP (ref 3.8–10.5)
WBC # FLD AUTO: 4.88 K/UL — SIGNIFICANT CHANGE UP (ref 3.8–10.5)

## 2024-05-21 PROCEDURE — 99232 SBSQ HOSP IP/OBS MODERATE 35: CPT | Mod: GC

## 2024-05-21 PROCEDURE — 99232 SBSQ HOSP IP/OBS MODERATE 35: CPT

## 2024-05-21 RX ADMIN — PIPERACILLIN AND TAZOBACTAM 25 GRAM(S): 4; .5 INJECTION, POWDER, LYOPHILIZED, FOR SOLUTION INTRAVENOUS at 14:29

## 2024-05-21 RX ADMIN — Medication 3 MILLIGRAM(S): at 22:01

## 2024-05-21 RX ADMIN — CHLORHEXIDINE GLUCONATE 1 APPLICATION(S): 213 SOLUTION TOPICAL at 11:31

## 2024-05-21 RX ADMIN — PIPERACILLIN AND TAZOBACTAM 25 GRAM(S): 4; .5 INJECTION, POWDER, LYOPHILIZED, FOR SOLUTION INTRAVENOUS at 22:02

## 2024-05-21 RX ADMIN — PIPERACILLIN AND TAZOBACTAM 25 GRAM(S): 4; .5 INJECTION, POWDER, LYOPHILIZED, FOR SOLUTION INTRAVENOUS at 05:36

## 2024-05-21 RX ADMIN — Medication 325 MILLIGRAM(S): at 11:29

## 2024-05-21 NOTE — PHYSICAL THERAPY INITIAL EVALUATION ADULT - DIAGNOSIS, PT EVAL
Decreased balance; Generalized weakness; Decreased functional mobility
decreased functional mobility

## 2024-05-21 NOTE — PROGRESS NOTE ADULT - ASSESSMENT
This is a 44F w/ PMHx of multiple bilateral foot surgeries, and  shunt (managed [facility] neurosurgery), uterine fibroids presenting with R foot wound on 5/12/24, has been ~7 months, now bloody, white/yellow drainage.   Fever to 100.5, no leukocytosis. CT w/ abscess 5.3 x 1.8 x 5.0 cm.     #R foot abscess   #Fever     Overall R foot abscess w/ drainage, fever, otherwise HD stable  On exam, L foot lateral ulcer likely from braces, not infected, R foot with large swelling, collection, with ulcer. Per patient, blood/purulent drainage   C/w Zosyn for broad coverage,    Spoke to podiatry attending, discussed case in detail, no findings of pus on his exam and during probe, does not seem infected. MRI with findings of abscess, pending podiatry intervention on 5/20/24. Superficial Cx w/ MSSA   CT A/P with L hip abscess, s/p I&D with surgery, dark bloody drainage, Cx no growth   Now s/p OR on 5/20 with findings of soft tissue mass, no purulence, no findings of infection     Plan:   1. Finish Zosyn 3.375 g q8 today  2. No ID contraindication for discharge    D/w attending       Thank you for this consult. Inpatient ID consult team will sign off.    Further changes in lab values, imaging studies, or clinical status will not be known to ID inpatient consultants unless specifically communicated by primary team.    Carlton Chaudhary MD  Attending Physician  Division of Infectious Diseases  Department of Medicine    Please contact through MS Teams message.  Office: [phone] (after 5 PM or weekend)

## 2024-05-21 NOTE — PHYSICAL THERAPY INITIAL EVALUATION ADULT - PLANNED THERAPY INTERVENTIONS, PT EVAL
stair training/balance training/bed mobility training/gait training/strengthening/transfer training
balance training/bed mobility training/gait training/strengthening/transfer training

## 2024-05-21 NOTE — PROGRESS NOTE ADULT - SUBJECTIVE AND OBJECTIVE BOX
JANETH REILLY, MRN [id number], 44yfemale      Patient is a 44y old  Female who presents with a chief complaint of right foot wound (21 May 2024 12:36)       INTERVAL HPI/OVERNIGHT EVENTS:  Patient seen and evaluated at bedside.  Pt is resting comfortable in NAD. Denies N/V/F/C.    Allergies    latex (Hives; Rash)  Zyrtec (Rash)    Intolerances        Vital Signs Last 24 Hrs  T(C): 36.9 (21 May 2024 06:14), Max: 36.9 (21 May 2024 06:14)  T(F): 98.4 (21 May 2024 06:14), Max: 98.4 (21 May 2024 06:14)  HR: 74 (21 May 2024 06:14) (67 - 78)  BP: 113/85 (21 May 2024 06:14) (113/85 - 127/80)  BP(mean): --  RR: 18 (21 May 2024 06:14) (17 - 18)  SpO2: 99% (21 May 2024 06:14) (99% - 100%)    Parameters below as of 21 May 2024 06:14  Patient On (Oxygen Delivery Method): room air        LABS:                        8.1    4.88  )-----------( 368      ( 21 May 2024 06:08 )             25.4     05-21    139  |  105  |  12  ----------------------------<  73  3.5   |  20<L>  |  0.55    Ca    8.5      21 May 2024 06:08  Phos  3.1     05-21  Mg     2.00     05-21    TPro  6.6  /  Alb  3.2<L>  /  TBili  <0.2  /  DBili  x   /  AST  10  /  ALT  8   /  AlkPhos  47  05-21    PT/INR - ( 20 May 2024 03:22 )   PT: 10.7 sec;   INR: 0.95 ratio         PTT - ( 20 May 2024 03:22 )  PTT:28.6 sec  Urinalysis Basic - ( 21 May 2024 06:08 )    Color: x / Appearance: x / SG: x / pH: x  Gluc: 73 mg/dL / Ketone: x  / Bili: x / Urobili: x   Blood: x / Protein: x / Nitrite: x   Leuk Esterase: x / RBC: x / WBC x   Sq Epi: x / Non Sq Epi: x / Bacteria: x      CAPILLARY BLOOD GLUCOSE          Lower Extremity Physical Exam:    Vascular: DP/PT 1/4, B/L, CFT <3 seconds B/L, Temperature gradient warm to cool, B/L.   Neuro: Epicritic sensation diminished but not absent to the level of digits, B/L.  Musculoskeletal/Ortho: drop foot, weak muscle strength, ROM diminished 2/2 spina bifida   Skin:   s/p right foot soft tissue mass excision  5/20, R foot dorsal midfoot surgical sites sutures intact, skin well coapted, about 3cc collected serosanguineous drainage expressed, no formed hematoma, skin flap appears warm and viable, no purulence..  L foot lateral ankle wound healed.       RADIOLOGY & ADDITIONAL TESTS:

## 2024-05-21 NOTE — PHYSICAL THERAPY INITIAL EVALUATION ADULT - ADDITIONAL COMMENTS
Pt states she lives in a private house with her mother and son, +2 steps to enter, resides on the main level. Pt primarily used a Single [place] Cane for all mobility but within the past 7-8 months has relied on use of a rolling walker.     Pt left semi-supine in bed, all lines intact, all needs in reach, bed alarm set, in NAD. ABDIEL Tapia aware. Heart rate 74 beats per minute. Pt stated she lives with her mom and son in a house with 2 stairs to enter; Pt resides on the first floor. Pt stated that prior to admission she ambulated short distances with a rollator. Pt uses a motorized scooter for community mobility. Pt stated she was able to perform stair mobility with supervision, but lately her family members have been carrying her up the stairs. Pt stated that she occasionally wears bilateral KAFOs to ambulate outside the home, but doesn't always wear them inside the home to ambulate.  Pt left semi-supine in bed, all lines intact, all needs in reach, bed alarm set, in NAD. ABDIEL Tapia aware. Heart rate 74 beats per minute.

## 2024-05-21 NOTE — PROGRESS NOTE ADULT - PROBLEM SELECTOR PLAN 1
pt with chronic R foot wound with white/yellow drainage  XR R foot with extensive soft tissue swelling  CT R foot with forming abscess   MR R foot with abscess   Plan:  -Patient underwent right foot soft tissue mass excision, low concern for infection per podiatry  -ID recs appreciated, c/w zosyn for 24hrs after procedure  -Physiatry, OT to see  -pain control: tylenol, toradol 15 mod pain, tramadol 25 severe pain pt with chronic R foot wound with white/yellow drainage  XR R foot with extensive soft tissue swelling  CT R foot with forming abscess   MR R foot with abscess   Plan:  -Patient underwent right foot soft tissue mass excision, low concern for infection per podiatry  -ID recs appreciated, c/w zosyn for 24hrs after procedure. Will stop tonight  -Patient amenable to acute rehab, will place referrals  -pain control: tylenol, toradol 15 mod pain, tramadol 25 severe pain

## 2024-05-21 NOTE — PROGRESS NOTE ADULT - PROBLEM SELECTOR PLAN 3
-pt with hx spina bifida, has motorized wheelchair at home   - shunt managed by [facility] neurosurgery   -No hydrocephalus on CT

## 2024-05-21 NOTE — PHYSICAL THERAPY INITIAL EVALUATION ADULT - GAIT DEVIATIONS NOTED, PT EVAL
decreased man
excessive hip flexion/decreased man/decreased step length/decreased stride length/decreased weight-shifting ability

## 2024-05-21 NOTE — PROGRESS NOTE ADULT - SUBJECTIVE AND OBJECTIVE BOX
ANESTHESIA POSTOP CHECK    44y Female POSTOP DAY 1 S/P Right foor mass resection    Vital Signs Last 24 Hrs  T(C): 36.9 (21 May 2024 06:14), Max: 36.9 (21 May 2024 06:14)  T(F): 98.4 (21 May 2024 06:14), Max: 98.4 (21 May 2024 06:14)  HR: 74 (21 May 2024 06:14) (67 - 78)  BP: 113/85 (21 May 2024 06:14) (113/85 - 127/80)  BP(mean): --  RR: 18 (21 May 2024 06:14) (17 - 18)  SpO2: 99% (21 May 2024 06:14) (99% - 100%)    Parameters below as of 21 May 2024 06:14  Patient On (Oxygen Delivery Method): room air      I&O's Summary      [x ] NO APPARENT ANESTHESIA COMPLICATIONS      Comments:

## 2024-05-21 NOTE — PHYSICAL THERAPY INITIAL EVALUATION ADULT - PERTINENT HX OF CURRENT PROBLEM, REHAB EVAL
44 year old Female with past medical history of spina bifida, multiple bilateral foot surgeries, and  shunt (managed [facility] neurosurgery), uterine fibroids presenting with Right foot wound.
Pt is a 44 year old female with history of spina bifida, multiple bilateral foot surgeries, and  shunt (managed [facility] neurosurgery), uterine fibroids presenting with right foot wound.

## 2024-05-21 NOTE — PHYSICAL THERAPY INITIAL EVALUATION ADULT - RANGE OF MOTION EXAMINATION, REHAB EVAL
bilateral upper extremity ROM was WFL (within functional limits)/bilateral lower extremity ROM was WFL (within functional limits)
bilateral upper extremity ROM was WFL (within functional limits)

## 2024-05-21 NOTE — PHYSICAL THERAPY INITIAL EVALUATION ADULT - LEVEL OF INDEPENDENCE: GAIT, REHAB EVAL
contact guard
Pt with difficulty sequencing using rolling walker/moderate assist (50% patients effort)

## 2024-05-21 NOTE — PROGRESS NOTE ADULT - SUBJECTIVE AND OBJECTIVE BOX
*******************************  Andrew Paulson, PGY-1  Internal Medicine  Contact via [employer] TEAMS    *******************************    PROGRESS NOTE:     Patient is a 44y old  Female who presents with a chief complaint of right foot wound (20 May 2024 13:49)      INTERVAL EVENTS: No acute overnight events.     SUBJECTIVE: Patient seen and examined at bedside. This morning, the patient is comfortable and doing well. No acute complaints. Denies fevers, chills, N/V/D, chest pain, SOB, abdominal pain.    MEDICATIONS  (STANDING):  chlorhexidine 2% Cloths 1 Application(s) Topical daily  enoxaparin Injectable 30 milliGRAM(s) SubCutaneous every 24 hours  ferrous    sulfate 325 milliGRAM(s) Oral daily  lidocaine 1%/epinephrine 1:100,000 Inj 20 milliLiter(s) Local Injection once  melatonin 3 milliGRAM(s) Oral at bedtime  mupirocin 2% Ointment 1 Application(s) Topical <User Schedule>  piperacillin/tazobactam IVPB.. 3.375 Gram(s) IV Intermittent every 8 hours  polyethylene glycol 3350 17 Gram(s) Oral daily  senna 2 Tablet(s) Oral at bedtime    MEDICATIONS  (PRN):  acetaminophen     Tablet .. 975 milliGRAM(s) Oral every 6 hours PRN Temp greater or equal to 38C (100.4F), Mild Pain (1 - 3)  bisacodyl 5 milliGRAM(s) Oral every 12 hours PRN Constipation  oxycodone    5 mG/acetaminophen 325 mG 1 Tablet(s) Oral every 6 hours PRN Moderate Pain (4 - 6)      CAPILLARY BLOOD GLUCOSE      POCT Blood Glucose.: 83 mg/dL (20 May 2024 12:06)    I&O's Summary      PHYSICAL EXAM:  Vital Signs Last 24 Hrs  T(C): 36.9 (21 May 2024 06:14), Max: 36.9 (21 May 2024 06:14)  T(F): 98.4 (21 May 2024 06:14), Max: 98.4 (21 May 2024 06:14)  HR: 74 (21 May 2024 06:14) (60 - 95)  BP: 113/85 (21 May 2024 06:14) (113/85 - 163/95)  BP(mean): 93 (20 May 2024 10:00) (85 - 110)  RR: 18 (21 May 2024 06:14) (13 - 18)  SpO2: 99% (21 May 2024 06:14) (95% - 100%)    Parameters below as of 21 May 2024 06:14  Patient On (Oxygen Delivery Method): room air        GENERAL: NAD, lying in bed comfortably  HEAD: Atraumatic, normocephalic  EYES: EOMI, PERRLA, conjunctiva and sclera clear  ENT: Moist mucous membranes  NECK: Supple, no JVD  HEART: S1, S2, Regular rate and rhythm, no murmurs, rubs, or gallops  LUNGS: Unlabored respirations, clear to auscultation bilaterally, no crackles, wheezing, or rhonchi  ABDOMEN: Soft, nontender, nondistended, +BS  EXTREMITIES: 2+ peripheral pulses bilaterally. No clubbing, cyanosis, or edema  NERVOUS SYSTEM:  A&Ox3, no focal deficits   SKIN: No rashes or lesions    LABS:                        8.6    4.25  )-----------( 382      ( 20 May 2024 03:22 )             26.4     05-20    136  |  104  |  14  ----------------------------<  82  3.8   |  22  |  0.54    Ca    8.6      20 May 2024 03:22  Phos  3.1     05-20  Mg     2.00     05-20      PT/INR - ( 20 May 2024 03:22 )   PT: 10.7 sec;   INR: 0.95 ratio         PTT - ( 20 May 2024 03:22 )  PTT:28.6 sec      Urinalysis Basic - ( 20 May 2024 03:22 )    Color: x / Appearance: x / SG: x / pH: x  Gluc: 82 mg/dL / Ketone: x  / Bili: x / Urobili: x   Blood: x / Protein: x / Nitrite: x   Leuk Esterase: x / RBC: x / WBC x   Sq Epi: x / Non Sq Epi: x / Bacteria: x          RADIOLOGY & ADDITIONAL TESTS:  Results Reviewed:   Imaging Personally Reviewed:  Electrocardiogram Personally Reviewed:  Tele: *******************************  Andrew Paulson, PGY-1  Internal Medicine  Contact via [employer] TEAMS    *******************************    PROGRESS NOTE:     Patient is a 44y old  Female who presents with a chief complaint of right foot wound (20 May 2024 13:49)      INTERVAL EVENTS: No acute overnight events.     SUBJECTIVE: Patient seen and examined at bedside. This morning, the patient is comfortable and doing well. No acute complaints. Denies fevers, chills, N/V/D, chest pain, SOB, abdominal pain.    MEDICATIONS  (STANDING):  chlorhexidine 2% Cloths 1 Application(s) Topical daily  enoxaparin Injectable 30 milliGRAM(s) SubCutaneous every 24 hours  ferrous    sulfate 325 milliGRAM(s) Oral daily  lidocaine 1%/epinephrine 1:100,000 Inj 20 milliLiter(s) Local Injection once  melatonin 3 milliGRAM(s) Oral at bedtime  mupirocin 2% Ointment 1 Application(s) Topical <User Schedule>  piperacillin/tazobactam IVPB.. 3.375 Gram(s) IV Intermittent every 8 hours  polyethylene glycol 3350 17 Gram(s) Oral daily  senna 2 Tablet(s) Oral at bedtime    MEDICATIONS  (PRN):  acetaminophen     Tablet .. 975 milliGRAM(s) Oral every 6 hours PRN Temp greater or equal to 38C (100.4F), Mild Pain (1 - 3)  bisacodyl 5 milliGRAM(s) Oral every 12 hours PRN Constipation  oxycodone    5 mG/acetaminophen 325 mG 1 Tablet(s) Oral every 6 hours PRN Moderate Pain (4 - 6)      CAPILLARY BLOOD GLUCOSE      POCT Blood Glucose.: 83 mg/dL (20 May 2024 12:06)    I&O's Summary      PHYSICAL EXAM:  Vital Signs Last 24 Hrs  T(C): 36.9 (21 May 2024 06:14), Max: 36.9 (21 May 2024 06:14)  T(F): 98.4 (21 May 2024 06:14), Max: 98.4 (21 May 2024 06:14)  HR: 74 (21 May 2024 06:14) (60 - 95)  BP: 113/85 (21 May 2024 06:14) (113/85 - 163/95)  BP(mean): 93 (20 May 2024 10:00) (85 - 110)  RR: 18 (21 May 2024 06:14) (13 - 18)  SpO2: 99% (21 May 2024 06:14) (95% - 100%)    Parameters below as of 21 May 2024 06:14  Patient On (Oxygen Delivery Method): room air        GENERAL: NAD, lying in bed comfortably  HEAD: Atraumatic, normocephalic  EYES: EOMI, conjunctiva and sclera clear  ENT: Moist mucous membranes  NECK: Supple, no JVD  HEART: S1, S2, Regular rate and rhythm, no murmurs, rubs, or gallops  LUNGS: Unlabored respirations, clear to auscultation bilaterally, no crackles, wheezing, or rhonchi  ABDOMEN: Soft, nontender, nondistended  EXTREMITIES: RLE dressing c/d/i  NERVOUS SYSTEM:  A&Ox3, no focal deficits   SKIN: No rashes or lesions    LABS:                        8.6    4.25  )-----------( 382      ( 20 May 2024 03:22 )             26.4     05-20    136  |  104  |  14  ----------------------------<  82  3.8   |  22  |  0.54    Ca    8.6      20 May 2024 03:22  Phos  3.1     05-20  Mg     2.00     05-20      PT/INR - ( 20 May 2024 03:22 )   PT: 10.7 sec;   INR: 0.95 ratio         PTT - ( 20 May 2024 03:22 )  PTT:28.6 sec      Urinalysis Basic - ( 20 May 2024 03:22 )    Color: x / Appearance: x / SG: x / pH: x  Gluc: 82 mg/dL / Ketone: x  / Bili: x / Urobili: x   Blood: x / Protein: x / Nitrite: x   Leuk Esterase: x / RBC: x / WBC x   Sq Epi: x / Non Sq Epi: x / Bacteria: x          RADIOLOGY & ADDITIONAL TESTS:  Results Reviewed:   Imaging Personally Reviewed:  Electrocardiogram Personally Reviewed:  Tele:

## 2024-05-21 NOTE — PHYSICAL THERAPY INITIAL EVALUATION ADULT - IMPAIRMENTS CONTRIBUTING TO GAIT DEVIATIONS, PT EVAL
impaired balance/impaired postural control/decreased strength
yes
impaired balance/decreased strength

## 2024-05-21 NOTE — PROGRESS NOTE ADULT - PROBLEM SELECTOR PLAN 2
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  pt with sacral wound noted  frequent repositioning  CTAP with abscess underlying sacral wound  s/p I&D 5/17 - serosanguinous drainage, possibly bursa cysts iso pressure wound    Plan:  -Culture without growth  -frequent repositioning  -Wound care consulted, appreciate reccs  -Physiatry, OT to see Stage 4 sacral decub with wound vac.  pt with sacral wound noted  frequent repositioning  CTAP with abscess underlying sacral wound  s/p I&D 5/17 - serosanguinous drainage, possibly bursa cysts iso pressure wound    Plan:  -Culture without growth  -frequent repositioning  -Wound care consulted, appreciate reccs

## 2024-05-21 NOTE — PHYSICAL THERAPY INITIAL EVALUATION ADULT - CRITERIA FOR SKILLED THERAPEUTIC INTERVENTIONS
impairments found/risk reduction/prevention/rehab potential
impairments found/functional limitations in following categories/risk reduction/prevention/rehab potential/therapy frequency/predicted duration of therapy intervention/anticipated discharge recommendation

## 2024-05-21 NOTE — PROGRESS NOTE ADULT - PROBLEM SELECTOR PLAN 4
hx of uterine fibroid, heavy periods  currently on period -- likely etiology of microscopic hematuria on UA  outpatient OBGYN f/u    Plan:  -Patient found to be iron deficient  -PO iron started

## 2024-05-21 NOTE — PHYSICAL THERAPY INITIAL EVALUATION ADULT - MANUAL MUSCLE TESTING RESULTS, REHAB EVAL
bilateral upper extremities: at least 3/5, bilateral hips: 3-/5, bilateral knees: at least 3/5, bilateral ankles: 1/5/grossly assessed due to
bilateral UE grossly 5/5 throughout; Bilateral LE grossly 3-/5 throughout

## 2024-05-21 NOTE — PHYSICAL THERAPY INITIAL EVALUATION ADULT - NSPTDISCHREC_GEN_A_CORE
home with outpatient PT. Will follow on unit/Outpatient PT
Discharge to rehab facility to address current functional limitation to optimize safety to allow pt. to reach their optimal level of function./Sub-acute Rehab

## 2024-05-21 NOTE — OCCUPATIONAL THERAPY INITIAL EVALUATION ADULT - GENERAL OBSERVATIONS, REHAB EVAL
Patient found semi-reclined in bed, NAD, and able to follow directions. Vitals: HR 70 BPM. Patient agreeable to participate in skilled OT evaluation.

## 2024-05-21 NOTE — OCCUPATIONAL THERAPY INITIAL EVALUATION ADULT - PERTINENT HX OF CURRENT PROBLEM, REHAB EVAL
Pt is a 44 year old Female with past medical history of spina bifida, multiple bilateral foot surgeries, and  shunt (managed [facility] neurosurgery), uterine fibroids presenting with Right foot wound.

## 2024-05-21 NOTE — PROGRESS NOTE ADULT - ATTENDING COMMENTS
44F hx of spina bifida, multiple bilateral foot surgeries, and  shunt (managed [facility] neurosurgery), uterine fibroids presenting with R foot wound s/p soft tissue mass excision with podiatry on 5/21, no concern for infection.     Patient seen and examined, states that her foot feels better today. Discussed plan for discharge planning home tomorrow, awaiting hospital bed delivery per CM. Patient is agreeable with this.     #R foot wound: MRI shows abscess, however s/p OR w/ podiatry on 5/20, s/p right foot soft tissue mass excision w/ low concern for infection, no intraop findings of purulence of abscess. No OM on MRI. ADAM with no PAD. Cleared for discharge from podiatry standpoint. Discussed w. ID Dr. Chaudhary today, to complete course of Zosyn today.     #Sacral Mass: CT shows "Thick walled fluid collection superficial to the left ischium measuring up to 7.9 cm with thick surrounding soft tissue component. Findings are concerning for abscess." s/p surgery bedside I&D.     #Stage 4 sacral decubitus: c/w wound vac and wound care. Wound care rec wound vac at home, pt declining, will continue to encourage. Can follow-up with plastics as outpatient for consideration of flap.     #Dispo - PT recs [facility]NA and PMR recommending acute rehab, but patient prefers home. Discussed during IDRs, CM assistance appreciated, plan for dc tomorrow after hospital bed delivery.      D/w patient  D/w resident Dr. Paulson

## 2024-05-21 NOTE — PHYSICAL THERAPY INITIAL EVALUATION ADULT - GENERAL OBSERVATIONS, REHAB EVAL
Pt received semi-supine in bed, +IV, +right foot dressing C/D/I, all lines intact, in NAD. Pt agreeable to participate in PT evaluation.
Pt received semi-supine, NAD. HR: 75 beats per minute

## 2024-05-21 NOTE — PROGRESS NOTE ADULT - SUBJECTIVE AND OBJECTIVE BOX
Infectious Diseases Follow Up:    Patient is a 44y old  Female who presents with a chief complaint of right foot wound (21 May 2024 07:29)      Interval History/ROS:  No acute events, some pain in the right foot.     Allergies  latex (Hives; Rash)  Zyrtec (Rash)        ANTIMICROBIALS:  piperacillin/tazobactam IVPB.. 3.375 every 8 hours      Current Abx:     Previous Abx     OTHER MEDS:  MEDICATIONS  (STANDING):  acetaminophen     Tablet .. 975 every 6 hours PRN  bisacodyl 5 every 12 hours PRN  enoxaparin Injectable 30 every 24 hours  melatonin 3 at bedtime  oxycodone    5 mG/acetaminophen 325 mG 1 every 6 hours PRN  polyethylene glycol 3350 17 daily  senna 2 at bedtime      Vital Signs Last 24 Hrs  T(C): 36.9 (21 May 2024 06:14), Max: 36.9 (21 May 2024 06:14)  T(F): 98.4 (21 May 2024 06:14), Max: 98.4 (21 May 2024 06:14)  HR: 74 (21 May 2024 06:14) (67 - 78)  BP: 113/85 (21 May 2024 06:14) (113/85 - 127/80)  BP(mean): --  RR: 18 (21 May 2024 06:14) (17 - 18)  SpO2: 99% (21 May 2024 06:14) (99% - 100%)    Parameters below as of 21 May 2024 06:14  Patient On (Oxygen Delivery Method): room air          PHYSICAL EXAM:  GENERAL: NAD, thin  HEAD:  Atraumatic, Normocephalic  EYES: EOMI, conjunctiva and sclera clear  NECK: Supple, No JVD  CHEST/LUNG: On RA, not in respiratory distress   EXTREMITIES:  b/l fee wrapped   PSYCH: AAOx3                            8.1    4.88  )-----------( 368      ( 21 May 2024 06:08 )             25.4       05-21    139  |  105  |  12  ----------------------------<  73  3.5   |  20<L>  |  0.55    Ca    8.5      21 May 2024 06:08  Phos  3.1     05-21  Mg     2.00     05-21    TPro  6.6  /  Alb  3.2<L>  /  TBili  <0.2  /  DBili  x   /  AST  10  /  ALT  8   /  AlkPhos  47  05-21      Urinalysis Basic - ( 21 May 2024 06:08 )    Color: x / Appearance: x / SG: x / pH: x  Gluc: 73 mg/dL / Ketone: x  / Bili: x / Urobili: x   Blood: x / Protein: x / Nitrite: x   Leuk Esterase: x / RBC: x / WBC x   Sq Epi: x / Non Sq Epi: x / Bacteria: x        MICROBIOLOGY:  v  .Abscess Hip - Left  05-16-24   No growth  --    Rare polymorphonuclear leukocytes seen per low power field  No organisms seen per oil power field      .Abscess R foot wound  05-14-24   Few Staphylococcus aureus  --  Staphylococcus aureus      Clean Catch Clean Catch (Midstream)  05-11-24   50,000 - 99,000 CFU/mL Proteus mirabilis  --  Proteus mirabilis      .Blood Blood-Peripheral  05-11-24   No growth at 5 days  --  --      .Blood Blood-Peripheral  05-11-24   No growth at 5 days  --  --                RADIOLOGY:

## 2024-05-21 NOTE — PROGRESS NOTE ADULT - ASSESSMENT
44y Female with s/p right foot soft tissue mass excision  5/20  - Patient seen and evaluated  - Afebrile,, no Leukocytosis   - s/p right foot soft tissue mass excision  5/20, R foot dorsal midfoot surgical sites sutures intact, skin well coapted, about 3cc serosanguineous collection drainage expressed, no formed hematoma, skin flap appears warm and viable, no purulence..  L foot lateral ankle wound healed.   - ID recs appreciated  - Podiatry stable for discharge, outpatient wound care recommendations, weight bearing status, and follow up info in Discharge provider note.   - Discussed with attending

## 2024-05-22 PROCEDURE — 99223 1ST HOSP IP/OBS HIGH 75: CPT

## 2024-05-22 PROCEDURE — 99231 SBSQ HOSP IP/OBS SF/LOW 25: CPT | Mod: GC

## 2024-05-22 RX ORDER — OXYCODONE HYDROCHLORIDE 5 MG/1
5 TABLET ORAL EVERY 6 HOURS
Refills: 0 | Status: DISCONTINUED | OUTPATIENT
Start: 2024-05-22 | End: 2024-05-29

## 2024-05-22 RX ORDER — ACETAMINOPHEN 500 MG
650 TABLET ORAL EVERY 6 HOURS
Refills: 0 | Status: DISCONTINUED | OUTPATIENT
Start: 2024-05-22 | End: 2024-06-06

## 2024-05-22 RX ORDER — CALAMINE AND ZINC OXIDE AND PHENOL 160; 10 MG/ML; MG/ML
1 LOTION TOPICAL
Refills: 0 | Status: DISCONTINUED | OUTPATIENT
Start: 2024-05-22 | End: 2024-05-23

## 2024-05-22 RX ADMIN — Medication 3 MILLIGRAM(S): at 21:43

## 2024-05-22 RX ADMIN — CALAMINE AND ZINC OXIDE AND PHENOL 1 APPLICATION(S): 160; 10 LOTION TOPICAL at 12:37

## 2024-05-22 RX ADMIN — CHLORHEXIDINE GLUCONATE 1 APPLICATION(S): 213 SOLUTION TOPICAL at 11:24

## 2024-05-22 RX ADMIN — Medication 325 MILLIGRAM(S): at 11:22

## 2024-05-22 NOTE — PROGRESS NOTE ADULT - ASSESSMENT
Assessment/Plan:    Wound care follow up for left ischium Stage 4 pressure injury   -s/p incision and drainage as per general surgery  -wound cultures no growth at 5 days   -Appreciate case management following up, referral sent for air loss bed mattress, roho seat cushion  -Continue with current topical recommendations   -Full note to follow         Continue low airloss support surface.  Continue to turn and position every 2 hours with z-joel fluidized positioning device.  Continue use of Complete Cair pressure offloading boots.  Continue Nutritional management as per RD recommendations.    Upon discharge follow up at outpatient [facility]. [address] [place] [address]. [phone].    Will continue to follow while inpatient. Please reconsult eralier if needed it.   Thank you.    Discussed findings and plan with primary team MD Khurram REY.   Patient seen with wound care attending Peyman head today, I was present during entire visit   MARBELLA PerezP-BC, CWOC    pager #[phone] or available in MS teams     If after 4PM or before 7:30AM on Mon-Friday or weekend/holiday please contact general surgery for urgent matters.   Team A- [id number]   Team B- [id number]  For non-urgent matters e-mail [email or web address]           Assessment/Plan: 44F MHx spina bifida, multiple bilateral foot surgeries, and  shunt (managed [facility] neurosurgery), uterine fibroids presenting with R foot wound. Patient has being seen by podiatry and ID for right foot with abscess/masses.     Wound care follow up for Left ischium Stage 4 pressure injury.     Left ischium pressure injury present on admission   -CT of the ABD and pelvis: Enlarged, myomatous uterus. Stool distends the colon and rectum. Posteriorly directed anal sphincter complex with surrounding soft   tissue infiltration. Thick walled fluid collection superficial to the left ischium measuring up to 7.9 cm with thick surrounding soft tissue component. Findings are concerning for abscess. Appreciate general surgery consult, management as per primary team   -s/p incision and drainage as per general surgery on 5/16, as per procedure note "Aspirated clear red fluid. Used punch biopsy and scissors to remove small area of thick rind. Probed defect with clamps and packed with 1" packing tape:   -wound cultures no growth at 5 days   --Overall no major changes since last seen, undermining slightly improved  -Tissue type remains the same exposing pink/red muscle appearing tissue   -No drainable abscess noted   -Well demarcated ulcer  -Appreciate case management following up, referral sent for air loss bed mattress, roho seat cushion  -Patient endorses (+) sensation from waist down, however was not aware of full thickness wound. to left ischium Uncertain of exact onset of wound however previous CT of the ABD and pelvis in 2023 reported a left ischium pressure injury.   -Denies pain or tenderness to area   -Previous CT of the ABD and pelvis (10/20/23):" Decubitus ulcer 2.4 cm deep overlying the left ischium"   -At this time will recommend to continue to hold off negative therapy dressing  patient declining VNS services at home therefore patient would not be able to receive Negative therapy  once discharge.   -+ Undermining circumferentially deepest extends 3cm at 9 o'clock (prev 3.5cm)  -No associated cellulitis   -No bone palpable or visible  - Topical recommendations: Clean wound and periwound skin with NS. Pat dry. Apply 3M advanced skin protectant to macerated periwound skin, allow to dry (3M advanced skin protectant requires 3x a week application only apply T.TH and saturdays, 3M advanced skin protectant thea KENNY [id number]). Lightly pack wound depth and undermining with Aquacel hydrofiber ribbon, make sure to leave 2" out at end visible for fully removal with next dressing change. Cover with silicone foam with border. Change daily or prn if soiled.   -Continue to offload as per hospital protocol   -Avoid diaper use, if patient reluctant to keeping diaper please provide vigilant perineal care  -Risks for moisture associated dermatitis in bilateral buttocks/perineum: Gently clean with skin cleanser. Pat dry. Apply a thin layer of Willian barrier moisture cream, apply twice a day or prn if soiled.   -Appreciate nutrition Consult for patient with underweight,  MVI & Vit C on board to promote wound healing      Right foot wound-Management as per podiatry team.   -Left lateral malleolus non fluctuant scab with surrounding scar tissue? surrounding scar tissue? bone deformity? Keep clean and dry, monitor for tissue type changes   -Continue to offload both heels with complete cair boots   -Follow up with podiatry outpatient as recommended     Left AC area acute  pruritis, no obvious rash at the time of assessment , no vesicles  -Patient reports previous tape remove earlier  in this area   -No tape directly over site   -IV site in left arm with no surrounding rash   -Patient receiving calamine lotion as per primary team, consider short course of topical steroids    Additional Recs   Continue turning and positioning w/ offloading assistive devices as per protocol  Continue w/ Pericare as per protocol  Continue w/ low air loss fluidized bed surface   Continue to turn and position every 2 hours with z-joel fluidized positioning device.  Continue Nutritional management as per RD recommendations, multivitamin and vitamin c on board  to assists with wound healing     Care as per medicine, will follow w/ you periodically during hospital stay, please reconsult earlier if needed     Upon discharge f/u as outpatient at [facility] [address] [facility] [phone]    Discussed findings and plan with primary team MD Khurram MORALES   Patient seen with wound care attending Peyman head today, I was present during entire visit   KATHY Perez-BC, CWN  pager #[phone] or available in MS teams     If after 4PM or before 7:30AM on Mon-Friday or weekend/holiday please contact general surgery for urgent matters.   Team A- [id number]   Team B- [id number]  For non-urgent matters e-mail [email or web address]           Assessment/Plan: 44F MHx spina bifida, multiple bilateral foot surgeries, and  shunt (managed [facility]), uterine fibroids presenting with R foot wound. Patient has being seen by podiatry and ID for right foot with abscess/masses.     Wound care follow up for Left ischium Stage 4 pressure injury.     Left ischium pressure injury present on admission   -CT of the ABD and pelvis: Enlarged, myomatous uterus. Stool distends the colon and rectum. Posteriorly directed anal sphincter complex with surrounding soft   tissue infiltration. Thick walled fluid collection superficial to the left ischium measuring up to 7.9 cm with thick surrounding soft tissue component. Findings are concerning for abscess. Appreciate general surgery consult, management as per primary team   -s/p incision and drainage as per general surgery on 5/16, as per procedure note "Aspirated clear red fluid. Used punch biopsy and scissors to remove small area of thick rind. Probed defect with clamps and packed with 1" packing tape:   -wound cultures no growth at 5 days   --Overall no major changes since last seen, undermining slightly improved  -Tissue type remains the same exposing pink/red muscle appearing tissue   -No drainable abscess noted   -Well demarcated ulcer  -Appreciate case management following up, referral sent for air loss bed mattress, roho seat cushion  -Patient endorses (+) sensation from waist down, however was not aware of full thickness wound. to left ischium Uncertain of exact onset of wound however previous CT of the ABD and pelvis in 2023 reported a left ischium pressure injury.   -Denies pain or tenderness to area   -Previous CT of the ABD and pelvis (10/20/23):" Decubitus ulcer 2.4 cm deep overlying the left ischium"   -At this time will recommend to continue to hold off negative therapy dressing  patient declining VNS services at home therefore patient would not be able to receive Negative therapy  once discharge.   -+ Undermining circumferentially deepest extends 3cm at 9 o'clock (prev 3.5cm)  -No associated cellulitis   -No bone palpable or visible  - Topical recommendations: Clean wound and periwound skin with NS. Pat dry. Apply 3M advanced skin protectant to macerated periwound skin, allow to dry (3M advanced skin protectant requires 3x a week application only apply T.TH and saturdays, 3M advanced skin protectant thea KENNY [id number]). Lightly pack wound depth and undermining with Aquacel hydrofiber ribbon, make sure to leave 2" out at end visible for fully removal with next dressing change. Cover with silicone foam with border. Change daily or prn if soiled.   -Continue to offload as per hospital protocol   -Avoid diaper use, if patient reluctant to keeping diaper please provide vigilant perineal care  -Risks for moisture associated dermatitis in bilateral buttocks/perineum: Gently clean with skin cleanser. Pat dry. Apply a thin layer of Willian barrier moisture cream, apply twice a day or prn if soiled.   -Appreciate nutrition Consult for patient with underweight,  MVI & Vit C on board to promote wound healing      Right foot wound-Management as per podiatry team.   -Left lateral malleolus non fluctuant scab with surrounding scar tissue? surrounding scar tissue? bone deformity? Keep clean and dry, monitor for tissue type changes. Please reconsult podiatry if tissue type depterirates.   -Continue to offload both heels with complete cair boots   -Follow up with podiatry outpatient as recommended     Left AC area acute  pruritis, no obvious rash at the time of assessment , no vesicles  -Patient reports previous tape remove earlier  in this area   -No tape directly over site   -IV site in left arm with no surrounding rash   -Patient receiving calamine lotion as per primary team, consider short course of topical steroids    Additional Recs   Continue turning and positioning w/ offloading assistive devices as per protocol  Continue w/ Pericare as per protocol  Continue w/ low air loss fluidized bed surface   Continue to turn and position every 2 hours with z-joel fluidized positioning device.  Continue Nutritional management as per RD recommendations, multivitamin and vitamin c on board  to assists with wound healing     Care as per medicine, will follow w/ you periodically during hospital stay, please reconsult earlier if needed     Upon discharge f/u as outpatient at [facility] [address] [place] [address] [phone]    Discussed findings and plan with primary team MD Khurram MORALES   Patient seen with wound care attending Peyman head today, I was present during entire visit   KATHY Perez-BC, CWN  pager #[phone] or available in MS teams     If after 4PM or before 7:30AM on Mon-Friday or weekend/holiday please contact general surgery for urgent matters.   Team A- [id number]   Team B- [id number]  For non-urgent matters e-mail [email or web address]

## 2024-05-22 NOTE — PROGRESS NOTE ADULT - PROBLEM SELECTOR PLAN 2
normal... 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  pt with sacral wound noted  frequent repositioning  CTAP with abscess underlying sacral wound  s/p I&D 5/17 - serosanguinous drainage, possibly bursa cysts iso pressure wound    Plan:  -Culture without growth  -frequent repositioning  -Wound care consulted, appreciate reccs

## 2024-05-22 NOTE — PROGRESS NOTE ADULT - ATTENDING COMMENTS
44F hx of spina bifida, multiple bilateral foot surgeries, and  shunt (managed [facility] neurosurgery), uterine fibroids presenting with R foot wound s/p soft tissue mass excision with podiatry on 5/21, no concern for infection.     Patient seen and examined, states that her arms feel very itchy around sites where her IVs were. She states she wants to go home, does not want to go to rehab directly from the hospital. Discussed we are awaiting DME delivery prior to discharge.     #R foot wound: MRI shows abscess, however s/p OR w/ podiatry on 5/20, s/p right foot soft tissue mass excision w/ low concern for infection, no intraop findings of purulence of abscess. No OM on MRI. ADAM with no PAD. Cleared for discharge from podiatry standpoint. ID following and completed course of Zosyn on 5/21.     #Sacral Mass: CT shows "Thick walled fluid collection superficial to the left ischium measuring up to 7.9 cm with thick surrounding soft tissue component. Findings are concerning for abscess." s/p surgery bedside I&D on 5/16, cultures NGTD.     #Stage 4 sacral decubitus: c/w wound vac and wound care. Wound care rec wound vac at home, pt declining, will continue to encourage. Can follow-up with plastics as outpatient for consideration of flap.     #Dispo - PT recs SHANA and PMR recommending acute rehab, but patient prefers home. Discussed during IDRs, CM assistance appreciated, plan for dc home today if DME is delivered. Patient is stable for discharge.      D/w patient  D/w resident Dr. Paulson 44F hx of spina bifida, multiple bilateral foot surgeries, and  shunt (managed [facility] neurosurgery), uterine fibroids presenting with R foot wound s/p soft tissue mass excision with podiatry on 5/21, no concern for infection.     Patient seen and examined, states that her arms feel very itchy around sites where her IVs were. She states she wants to go home, does not want to go to rehab directly from the hospital. Discussed we are awaiting DME delivery prior to discharge.     #R foot wound: MRI shows abscess, however s/p OR w/ podiatry on 5/20, s/p right foot soft tissue mass excision w/ low concern for infection, no intraop findings of purulence of abscess. No OM on MRI. ADAM with no PAD. Cleared for discharge from podiatry standpoint. ID following and completed course of Zosyn on 5/21.     #Sacral Mass: CT shows "Thick walled fluid collection superficial to the left ischium measuring up to 7.9 cm with thick surrounding soft tissue component. Findings are concerning for abscess." s/p surgery bedside I&D on 5/16, cultures NGTD.     #Stage 4 sacral decubitus: c/w wound vac and wound care. Wound care rec wound vac at home, pt declining, will continue to encourage. Can follow-up with plastics as outpatient for consideration of flap.     #Dispo - PT recs SHANA and PMR recommending acute rehab, but patient prefers home. Discussed during IDRs, CM assistance appreciated, plan for dc home today if DME is delivered. Patient is medically stable for discharge.      D/w patient  D/w resident Dr. Paulson

## 2024-05-22 NOTE — PROGRESS NOTE ADULT - SUBJECTIVE AND OBJECTIVE BOX
Patient is a 44y old  Female who presents with a chief complaint of right foot wound (22 May 2024 07:46)       INTERVAL HPI/OVERNIGHT EVENTS:  Patient seen and evaluated at bedside.  Pt is resting comfortable in NAD. Denies N/V/F/C.      Allergies    latex (Hives; Rash)  Zyrtec (Rash)    Intolerances        Vital Signs Last 24 Hrs  T(C): 36.6 (22 May 2024 04:55), Max: 37 (21 May 2024 21:28)  T(F): 97.9 (22 May 2024 04:55), Max: 98.6 (21 May 2024 21:28)  HR: 68 (22 May 2024 04:55) (68 - 75)  BP: 118/72 (22 May 2024 04:55) (118/72 - 136/82)  BP(mean): --  RR: 17 (22 May 2024 04:55) (17 - 18)  SpO2: 100% (22 May 2024 04:55) (98% - 100%)    Parameters below as of 22 May 2024 04:55  Patient On (Oxygen Delivery Method): room air        LABS:                        8.1    4.88  )-----------( 368      ( 21 May 2024 06:08 )             25.4     05-21    139  |  105  |  12  ----------------------------<  73  3.5   |  20<L>  |  0.55    Ca    8.5      21 May 2024 06:08  Phos  3.1     05-21  Mg     2.00     05-21    TPro  6.6  /  Alb  3.2<L>  /  TBili  <0.2  /  DBili  x   /  AST  10  /  ALT  8   /  AlkPhos  47  05-21      Urinalysis Basic - ( 21 May 2024 06:08 )    Color: x / Appearance: x / SG: x / pH: x  Gluc: 73 mg/dL / Ketone: x  / Bili: x / Urobili: x   Blood: x / Protein: x / Nitrite: x   Leuk Esterase: x / RBC: x / WBC x   Sq Epi: x / Non Sq Epi: x / Bacteria: x      CAPILLARY BLOOD GLUCOSE          Lower Extremity Physical Exam:  Vascular: DP/PT 1/4, B/L, CFT <3 seconds B/L, Temperature gradient warm to cool, B/L.   Neuro: Epicritic sensation diminished but not absent to the level of digits, B/L.  Musculoskeletal/Ortho: drop foot, weak muscle strength, ROM diminished 2/2 spina bifida   Skin:   5/20 s/p right foot soft tissue mass excision, R foot  sutures intact, skin well coapted, no drainage expressed, no hematoma, skin flap appears warm and viable, no purulence.  L foot no open wounds or sings of infection    RADIOLOGY & ADDITIONAL TESTS:

## 2024-05-22 NOTE — PROGRESS NOTE ADULT - ASSESSMENT
44y Female with s/p right foot soft tissue mass excision  (DOS 5/20)  - Patient seen and evaluated  - Afebrile, No Leukocytosis   - 5/20 s/p right foot soft tissue mass excision, R foot  sutures intact, skin well coapted, no drainage expressed, no hematoma, skin flap appears warm and viable, no purulence.  L foot no open wounds or sings of infection.   - ID recs appreciated  - Stable for discharge, keep dressing clean dry and intact  - Wound care recommendations, weight bearing status, and follow up info in Discharge provider note.   - Discussed with attending

## 2024-05-22 NOTE — PROGRESS NOTE ADULT - SUBJECTIVE AND OBJECTIVE BOX
[facility]-- WOUND TEAM -- FOLLOW UP NOTE  --------------------------------------------------------------------------------    subjective: Patient seen and examined at bedside.     Interval HPI/24 hour events:   Chart reviewed including labs and relevant images      Diet:  Diet, Regular (05-12-24 @ 09:21)      ROS: General/ SKIN/ see HPI  all other systems negative  pt unable to offer    ALLERGIES & MEDICATIONS  --------------------------------------------------------------------------------  Allergies    latex (Hives; Rash)  Zyrtec (Rash)    Intolerances          STANDING INPATIENT MEDICATIONS    calamine/zinc oxide Lotion 1 Application(s) Topical two times a day  chlorhexidine 2% Cloths 1 Application(s) Topical daily  enoxaparin Injectable 30 milliGRAM(s) SubCutaneous every 24 hours  ferrous    sulfate 325 milliGRAM(s) Oral daily  melatonin 3 milliGRAM(s) Oral at bedtime  mupirocin 2% Ointment 1 Application(s) Topical <User Schedule>  polyethylene glycol 3350 17 Gram(s) Oral daily  senna 2 Tablet(s) Oral at bedtime      PRN INPATIENT MEDICATION  acetaminophen     Tablet .. 650 milliGRAM(s) Oral every 6 hours PRN  bisacodyl 5 milliGRAM(s) Oral every 12 hours PRN  oxyCODONE    IR 5 milliGRAM(s) Oral every 6 hours PRN        Vital signs:  T(C): 37.2 (05-22-24 @ 14:10), Max: 37.2 (05-22-24 @ 14:10)  HR: 81 (05-22-24 @ 14:10) (68 - 81)  BP: 149/88 (05-22-24 @ 14:10) (118/72 - 149/88)  RR: 18 (05-22-24 @ 14:10) (17 - 18)  SpO2: 100% (05-22-24 @ 14:10) (100% - 100%)  Wt(kg): --          Physical exam:  General: NAD, A & O x 3, lethargic. Cachetic, temporal wasting, prominent bony prominences. Obese. Funtional quadrapalegic  HEENT: NC/NT, mucosa moist/dry. Poor dentation.  GI: Soft, NT/ND. + BS. Fecal incontinence.  : Indwelling goel catheter, condom cathete, penile pouch  Vascular: No temperature changes noted. Increased coolness from midfoot to distal toes. + (  ) DP/PT pulses. Capillary refill < 3 seconds. + Edema bilaterally. Thickened toenails. (+) hemosiderin staining.  Skin: moist, adequate skin turgor. Dry, poor skin turgor. Frail.  Psych: mood and demeanor appropriate      LABS/ CULTURES/ RADIOLOGY:              8.1    4.88  >-----------<  368      [05-21-24 @ 06:08]              25.4     139  |  105  |  12  ----------------------------<  73      [05-21-24 @ 06:08]  3.5   |  20  |  0.55        Ca     8.5     [05-21-24 @ 06:08]      Mg     2.00     [05-21-24 @ 06:08]      Phos  3.1     [05-21-24 @ 06:08]    TPro  6.6  /  Alb  3.2  /  TBili  <0.2  /  DBili  x   /  AST  10  /  ALT  8   /  AlkPhos  47  [05-21-24 @ 06:08]                                                 [facility]-- WOUND TEAM -- FOLLOW UP NOTE  --------------------------------------------------------------------------------    subjective: Patient seen and examined at bedside. Patient reports overall feeling ok, endorses she will be going home and is going to have help from her family. Patient is eager to see her son. Reports she had surgery the other day in her right foot and "it went well" Patient endorses today she has severe itching in the arms worse to left arm. patient endorses she had tape in this area and it was recently removed. Patient reports allergies to Zyrtec. Denies previous pruritis.     Interval HPI/24 hour events:   -->Patient complaining of acute itching in upper extremities today , primary team notified by nursing   -->pt is s/p right foot soft tissue mass excision on 5/20/24 by podiatry team   -->As per nursing noted patient refused complete cair boots   -->s/p CT of the ABD and pelvis with IV contrast obtained by primary team on 5/15/24   -->General surgery team consulted for L ischial pressure wound w/ possible abscess, s/p I&D at the bedside 5/16/24. Wound cultures. No growth at 5 days.   -->Dispo planning   Chart reviewed including labs and relevant images      Diet:  Diet, Regular (05-12-24 @ 09:21)    ROS: General/ SKIN/ see HPI  all other systems negative      ALLERGIES & MEDICATIONS  --------------------------------------------------------------------------------  Allergies    latex (Hives; Rash)  Zyrtec (Rash)    Intolerances    STANDING INPATIENT MEDICATIONS    calamine/zinc oxide Lotion 1 Application(s) Topical two times a day  chlorhexidine 2% Cloths 1 Application(s) Topical daily  enoxaparin Injectable 30 milliGRAM(s) SubCutaneous every 24 hours  ferrous    sulfate 325 milliGRAM(s) Oral daily  melatonin 3 milliGRAM(s) Oral at bedtime  mupirocin 2% Ointment 1 Application(s) Topical <User Schedule>  polyethylene glycol 3350 17 Gram(s) Oral daily  senna 2 Tablet(s) Oral at bedtime      PRN INPATIENT MEDICATION  acetaminophen     Tablet .. 650 milliGRAM(s) Oral every 6 hours PRN  bisacodyl 5 milliGRAM(s) Oral every 12 hours PRN  oxyCODONE    IR 5 milliGRAM(s) Oral every 6 hours PRN    Vital signs:  T(C): 37.2 (05-22-24 @ 14:10), Max: 37.2 (05-22-24 @ 14:10)  HR: 81 (05-22-24 @ 14:10) (68 - 81)  BP: 149/88 (05-22-24 @ 14:10) (118/72 - 149/88)  RR: 18 (05-22-24 @ 14:10) (17 - 18)  SpO2: 100% (05-22-24 @ 14:10) (100% - 100%)    Wt(kg): --43.6 (05-22-24)    Physical Exam   Constitutional: NAD/AOX3. Well kept.   Thin, (+) bony prominences to sacrum and bilateral trochanters/bilateral ischium   (+) low airloss support surface, (+) fluidized positioning devices, heels elevated with pillows   HEENT:  NC/AT, non icteric, mucosa moist, throat clear, trachea midline, neck supple  Cardiovascular: Rate regular   Respiratory: Equal chest expansion   Gastrointestinal soft NT/ND, fecal incontinence of soft stools, incontinence care provided.   : functional incontinence at times   Neuology : weakened strength & sensation grossly intact. Paraplegic.   Musculoskeletal:  limited aROM in lower extremities. Mild foot drop bilaterally   Vascular: LLE equally warm, no cyanosis, clubbing, edema  Right  foot with intact/dry dressing in place-Management as per podiatry   Left lateral malleolus previous shallow wound, appears like non fluctuant scab and surrounding scar tissue? bone deformity? Management as per podiatry.   Skin:  moist w/ good turgor  Lumbar spine, right ankle, Left achilles left dorsal foot, surgical scars, well approximated.   Left AC area with linear scratches, no open vesicles noted. Patient endorses scratching area, encouraged not to scratch area   No IV present in left AC area, distally in left forearm IV with dressing intact and dry, no vesicles, patient denies pruritis in this area.   Left ischium Stage 4 pressure injury   -Well demarcated ulcer   -+ Bony prominences   -[id number] (prev [id number] )  -Undermining circumferentially extends deepest 3cm at 9 o'clock, and 2.5cm at 12, 3 and 6 o'clock.   -No purulence express  -Moderate serous drainage, no odor   -Tissue type exposing 100% red/pale pink viable tissue appears like muscle?   -No bone palpable or visualized   -Periwound skin with thicken skin maceration circumferentially no increased warmth, no erythema, no edema.   Bilateral buttocks/inner buttocks with areas of hypopigmentation previous healed wounds suspect secondary to moisture associated dermatitis. No open wounds to sacrum.   Psych: calm and cooperative.   Patient insisted on wearing diapers reports she does not feel comfortable without diapers, reported she had tried the briefs but she feels the don't absorb as well. Patient able to turn in bed with minimal assistance.     LABS/ CULTURES/ RADIOLOGY:              8.1    4.88  >-----------<  368      [05-21-24 @ 06:08]              25.4   s  139  |  105  |  12  ----------------------------<  73      [05-21-24 @ 06:08]  3.5   |  20  |  0.55        Ca     8.5     [05-21-24 @ 06:08]      Mg     2.00     [05-21-24 @ 06:08]      Phos  3.1     [05-21-24 @ 06:08]    TPro  6.6  /  Alb  3.2  /  TBili  <0.2  /  DBili  x   /  AST  10  /  ALT  8   /  AlkPhos  47  [05-21-24 @ 06:08]          ACC: [id number] EXAM:  CT ABDOMEN AND PELVIS OC IC   ORDERED BY: FLORENTINO CORMIER     PROCEDURE DATE:  05/15/2024          INTERPRETATION:  CLINICAL INFORMATION: Left sacral injury with underlying   mass on exam.    COMPARISON: 10/20/2023    CONTRAST/COMPLICATIONS:  IV Contrast: Omnipaque 350  90 cc administered   10 cc discarded  Oral Contrast: NONE  Complications: None reported at time of study completion    PROCEDURE:  CT of the Abdomen and Pelvis was performed.  Sagittal and coronal reformats were performed.    FINDINGS:  LOWER CHEST: Within normal limits.    LIVER: 1.4 cm liver cyst and additional lesions too small to characterize  BILE DUCTS: Normal caliber.  GALLBLADDER: Within normal limits.  SPLEEN: Within normal limits.  PANCREAS: Within normal limits.  ADRENALS: Within normal limits.  KIDNEYS/URETERS: Within normal limits.    BLADDER: Within normal limits.  REPRODUCTIVE ORGANS: Enlarged, myomatous uterus measures 13.5 x 7.0 x 3.3   cm.    BOWEL: No bowel obstruction. Appendix is not visualized. Stool throughout   the colon and rectum. The anal sphincter complex is directed posteriorly.   Soft tissue infiltration is noted surrounding the renal stricture   complex. (3:71)  PERITONEUM: Ventricular peritoneal catheter coils in the anterior abdomen   before terminating in the pelvis.  VESSELS: Within normal limits.  RETROPERITONEUM/LYMPH NODES: No lymphadenopathy.  ABDOMINAL WALL: Fat-containing umbilical hernia. Left gluteal lesion   adjacent to the ischium extending medial to the gluteal fold and   posterior to the superficial gluteal skin measures 7.9 x 3.7 x 5.7 cm,   demonstrating a central fluid component measuring 4.9 x 1.8 x 3.4 cm.   Findings correspond with location of large ulceration present on prior CT.  BONES: Within normal limits.    IMPRESSION:  *  Enlarged, myomatous uterus.  *  Stool distends the colon and rectum.  *  Posteriorly directed anal sphincter complex with surrounding soft   tissue infiltration.  *  Thick walled fluid collection superficial to the left ischium   measuring up to 7.9 cm with thick surrounding soft tissue component.   Findings are concerning for abscess.      --- End of Report ---            LARISSA RHOADES MD; Attending Radiologist  This document has been electronically signed. May 16 2024 11:28AM      Culture - Abscess with Gram Stain (05.16.24 @ 19:12)   Gram Stain:   Rare polymorphonuclear leukocytes seen per low power field   No organisms seen per oil power field  Specimen Source: .Abscess Hip - Left  Culture Results:   No growth at 5 days

## 2024-05-22 NOTE — PROGRESS NOTE ADULT - PROBLEM SELECTOR PLAN 1
pt with chronic R foot wound with white/yellow drainage  XR R foot with extensive soft tissue swelling  CT R foot with forming abscess   MR R foot with abscess   Plan:  -Patient underwent right foot soft tissue mass excision, low concern for infection per podiatry  -ID recs appreciated, c/w zosyn for 24hrs after procedure. Will stop tonight  -Patient amenable to acute rehab, will place referrals  -pain control: tylenol, toradol 15 mod pain, tramadol 25 severe pain pt with chronic R foot wound with white/yellow drainage  XR R foot with extensive soft tissue swelling  CT R foot with forming abscess   MR R foot with abscess   Plan:  -Patient underwent right foot soft tissue mass excision, low concern for infection per podiatry  -ID recs appreciated, zosyn now stopped  -Patient would like to be discharged home. At the moment, not wanting to go to rehab  -pain control: oxy 5, tylenol

## 2024-05-22 NOTE — PROGRESS NOTE ADULT - SUBJECTIVE AND OBJECTIVE BOX
*******************************  Andrew Paulson, PGY-1  Internal Medicine  Contact via [employer] TEAMS    *******************************    PROGRESS NOTE:     Patient is a 44y old  Female who presents with a chief complaint of right foot wound (21 May 2024 12:36)      INTERVAL EVENTS: No acute overnight events.     SUBJECTIVE: Patient seen and examined at bedside. This morning, the patient is comfortable and doing well. No acute complaints. Denies fevers, chills, N/V/D, chest pain, SOB, abdominal pain.    MEDICATIONS  (STANDING):  chlorhexidine 2% Cloths 1 Application(s) Topical daily  enoxaparin Injectable 30 milliGRAM(s) SubCutaneous every 24 hours  ferrous    sulfate 325 milliGRAM(s) Oral daily  lidocaine 1%/epinephrine 1:100,000 Inj 20 milliLiter(s) Local Injection once  melatonin 3 milliGRAM(s) Oral at bedtime  mupirocin 2% Ointment 1 Application(s) Topical <User Schedule>  polyethylene glycol 3350 17 Gram(s) Oral daily  senna 2 Tablet(s) Oral at bedtime    MEDICATIONS  (PRN):  acetaminophen     Tablet .. 975 milliGRAM(s) Oral every 6 hours PRN Temp greater or equal to 38C (100.4F), Mild Pain (1 - 3)  bisacodyl 5 milliGRAM(s) Oral every 12 hours PRN Constipation  oxycodone    5 mG/acetaminophen 325 mG 1 Tablet(s) Oral every 6 hours PRN Moderate Pain (4 - 6)      CAPILLARY BLOOD GLUCOSE        I&O's Summary      PHYSICAL EXAM:  Vital Signs Last 24 Hrs  T(C): 36.6 (22 May 2024 04:55), Max: 37 (21 May 2024 21:28)  T(F): 97.9 (22 May 2024 04:55), Max: 98.6 (21 May 2024 21:28)  HR: 68 (22 May 2024 04:55) (68 - 75)  BP: 118/72 (22 May 2024 04:55) (118/72 - 136/82)  BP(mean): --  RR: 17 (22 May 2024 04:55) (17 - 18)  SpO2: 100% (22 May 2024 04:55) (98% - 100%)    Parameters below as of 22 May 2024 04:55  Patient On (Oxygen Delivery Method): room air        GENERAL: NAD, lying in bed comfortably  HEAD: Atraumatic, normocephalic  EYES: EOMI, PERRLA, conjunctiva and sclera clear  ENT: Moist mucous membranes  NECK: Supple, no JVD  HEART: S1, S2, Regular rate and rhythm, no murmurs, rubs, or gallops  LUNGS: Unlabored respirations, clear to auscultation bilaterally, no crackles, wheezing, or rhonchi  ABDOMEN: Soft, nontender, nondistended, +BS  EXTREMITIES: 2+ peripheral pulses bilaterally. No clubbing, cyanosis, or edema  NERVOUS SYSTEM:  A&Ox3, no focal deficits   SKIN: No rashes or lesions    LABS:                        8.1    4.88  )-----------( 368      ( 21 May 2024 06:08 )             25.4     05-21    139  |  105  |  12  ----------------------------<  73  3.5   |  20<L>  |  0.55    Ca    8.5      21 May 2024 06:08  Phos  3.1     05-21  Mg     2.00     05-21    TPro  6.6  /  Alb  3.2<L>  /  TBili  <0.2  /  DBili  x   /  AST  10  /  ALT  8   /  AlkPhos  47  05-21          Urinalysis Basic - ( 21 May 2024 06:08 )    Color: x / Appearance: x / SG: x / pH: x  Gluc: 73 mg/dL / Ketone: x  / Bili: x / Urobili: x   Blood: x / Protein: x / Nitrite: x   Leuk Esterase: x / RBC: x / WBC x   Sq Epi: x / Non Sq Epi: x / Bacteria: x          RADIOLOGY & ADDITIONAL TESTS:  Results Reviewed:   Imaging Personally Reviewed:  Electrocardiogram Personally Reviewed:  Tele: *******************************  Andrew Paulson, PGY-1  Internal Medicine  Contact via [employer] TEAMS    *******************************    PROGRESS NOTE:     Patient is a 44y old  Female who presents with a chief complaint of right foot wound (21 May 2024 12:36)      INTERVAL EVENTS: No acute overnight events.     SUBJECTIVE: Patient seen and examined at bedside. This morning, the patient is comfortable bt complains of some pain and burning sensation in her right foot. She states the pain was much worse overnight and is better now.    MEDICATIONS  (STANDING):  chlorhexidine 2% Cloths 1 Application(s) Topical daily  enoxaparin Injectable 30 milliGRAM(s) SubCutaneous every 24 hours  ferrous    sulfate 325 milliGRAM(s) Oral daily  lidocaine 1%/epinephrine 1:100,000 Inj 20 milliLiter(s) Local Injection once  melatonin 3 milliGRAM(s) Oral at bedtime  mupirocin 2% Ointment 1 Application(s) Topical <User Schedule>  polyethylene glycol 3350 17 Gram(s) Oral daily  senna 2 Tablet(s) Oral at bedtime    MEDICATIONS  (PRN):  acetaminophen     Tablet .. 975 milliGRAM(s) Oral every 6 hours PRN Temp greater or equal to 38C (100.4F), Mild Pain (1 - 3)  bisacodyl 5 milliGRAM(s) Oral every 12 hours PRN Constipation  oxycodone    5 mG/acetaminophen 325 mG 1 Tablet(s) Oral every 6 hours PRN Moderate Pain (4 - 6)      CAPILLARY BLOOD GLUCOSE        I&O's Summary      PHYSICAL EXAM:  Vital Signs Last 24 Hrs  T(C): 36.6 (22 May 2024 04:55), Max: 37 (21 May 2024 21:28)  T(F): 97.9 (22 May 2024 04:55), Max: 98.6 (21 May 2024 21:28)  HR: 68 (22 May 2024 04:55) (68 - 75)  BP: 118/72 (22 May 2024 04:55) (118/72 - 136/82)  BP(mean): --  RR: 17 (22 May 2024 04:55) (17 - 18)  SpO2: 100% (22 May 2024 04:55) (98% - 100%)    Parameters below as of 22 May 2024 04:55  Patient On (Oxygen Delivery Method): room air        GENERAL: NAD, lying in bed comfortably  HEAD: Atraumatic, normocephalic  EYES: EOMI, conjunctiva and sclera clear  ENT: Moist mucous membranes  NECK: Supple, no JVD  HEART: S1, S2, Regular rate and rhythm, no murmurs, rubs, or gallops  LUNGS: Unlabored respirations, clear to auscultation bilaterally, no crackles, wheezing, or rhonchi  ABDOMEN: Soft, nontender, nondistended  EXTREMITIES: RLE dressing c/d/i  NERVOUS SYSTEM:  A&Ox3, no focal deficits   SKIN: No rashes or lesions    LABS:                        8.1    4.88  )-----------( 368      ( 21 May 2024 06:08 )             25.4     05-21    139  |  105  |  12  ----------------------------<  73  3.5   |  20<L>  |  0.55    Ca    8.5      21 May 2024 06:08  Phos  3.1     05-21  Mg     2.00     05-21    TPro  6.6  /  Alb  3.2<L>  /  TBili  <0.2  /  DBili  x   /  AST  10  /  ALT  8   /  AlkPhos  47  05-21          Urinalysis Basic - ( 21 May 2024 06:08 )    Color: x / Appearance: x / SG: x / pH: x  Gluc: 73 mg/dL / Ketone: x  / Bili: x / Urobili: x   Blood: x / Protein: x / Nitrite: x   Leuk Esterase: x / RBC: x / WBC x   Sq Epi: x / Non Sq Epi: x / Bacteria: x          RADIOLOGY & ADDITIONAL TESTS:  Results Reviewed:   Imaging Personally Reviewed:  Electrocardiogram Personally Reviewed:  Tele:

## 2024-05-22 NOTE — PROGRESS NOTE ADULT - PROBLEM SELECTOR PLAN 3
-pt with hx spina bifida, has motorized wheelchair at home   - shunt managed by [facility] neurosurgery   -No hydrocephalus on CT

## 2024-05-23 PROCEDURE — 99231 SBSQ HOSP IP/OBS SF/LOW 25: CPT | Mod: GC

## 2024-05-23 RX ORDER — DIPHENHYDRAMINE HCL 50 MG
25 CAPSULE ORAL ONCE
Refills: 0 | Status: COMPLETED | OUTPATIENT
Start: 2024-05-23 | End: 2024-05-23

## 2024-05-23 RX ORDER — HYDROCORTISONE 1 %
1 OINTMENT (GRAM) TOPICAL
Refills: 0 | Status: DISCONTINUED | OUTPATIENT
Start: 2024-05-23 | End: 2024-06-06

## 2024-05-23 RX ORDER — CALAMINE AND ZINC OXIDE AND PHENOL 160; 10 MG/ML; MG/ML
1 LOTION TOPICAL
Refills: 0 | Status: DISCONTINUED | OUTPATIENT
Start: 2024-05-23 | End: 2024-06-06

## 2024-05-23 RX ADMIN — OXYCODONE HYDROCHLORIDE 5 MILLIGRAM(S): 5 TABLET ORAL at 23:54

## 2024-05-23 RX ADMIN — Medication 325 MILLIGRAM(S): at 11:17

## 2024-05-23 RX ADMIN — OXYCODONE HYDROCHLORIDE 5 MILLIGRAM(S): 5 TABLET ORAL at 22:54

## 2024-05-23 RX ADMIN — CALAMINE AND ZINC OXIDE AND PHENOL 1 APPLICATION(S): 160; 10 LOTION TOPICAL at 05:31

## 2024-05-23 RX ADMIN — Medication 1 APPLICATION(S): at 17:02

## 2024-05-23 RX ADMIN — Medication 3 MILLIGRAM(S): at 22:36

## 2024-05-23 RX ADMIN — Medication 25 MILLIGRAM(S): at 10:28

## 2024-05-23 RX ADMIN — CHLORHEXIDINE GLUCONATE 1 APPLICATION(S): 213 SOLUTION TOPICAL at 11:18

## 2024-05-23 NOTE — PROGRESS NOTE ADULT - PROBLEM SELECTOR PLAN 1
pt with chronic R foot wound with white/yellow drainage  XR R foot with extensive soft tissue swelling  CT R foot with forming abscess   MR R foot with abscess   Plan:  -Patient underwent right foot soft tissue mass excision, low concern for infection per podiatry  -ID recs appreciated, zosyn now stopped  -Patient would like to be discharged home. At the moment, not wanting to go to rehab  -pain control: oxy 5, tylenol pt with chronic R foot wound with white/yellow drainage  XR R foot with extensive soft tissue swelling  CT R foot with forming abscess   MR R foot with abscess   Plan:  -Patient underwent right foot soft tissue mass excision, low concern for infection per podiatry  -ID recs appreciated, zosyn now stopped  -Patient would like to be discharged home. At the moment, not wanting to go to rehab. Plan for dc home pending medical equipment delivery to house and wound care teaching for mother  -pain control: oxy 5, tylenol

## 2024-05-23 NOTE — PROGRESS NOTE ADULT - ATTENDING COMMENTS
44F hx of spina bifida, multiple bilateral foot surgeries, and  shunt (managed [facility] neurosurgery), uterine fibroids presenting with R foot wound s/p soft tissue mass excision with podiatry on 5/21, no concern for infection.     Patient seen and examined, states that her arms feel very itchy around sites where her IVs were, comes and goes. She states she wants to go home, does not want to go to rehab directly from the hospital. Discussed we are awaiting DME delivery prior to discharge.     #R foot wound: MRI shows abscess, however s/p OR w/ podiatry on 5/20, s/p right foot soft tissue mass excision w/ low concern for infection, no intraop findings of purulence of abscess. No OM on MRI. ADAM with no PAD. Cleared for discharge from podiatry standpoint. ID following and completed course of Zosyn on 5/21.     #Sacral Mass: CT shows "Thick walled fluid collection superficial to the left ischium measuring up to 7.9 cm with thick surrounding soft tissue component. Findings are concerning for abscess." s/p surgery bedside I&D on 5/16, cultures NGTD.     #Stage 4 sacral decubitus: c/w wound vac and wound care. Wound care rec wound vac at home, pt declining, will continue to encourage. Can follow-up with plastics as outpatient for consideration of flap.     #Contact dermatitis: start hydrocortisone cream on sites of rash     #Dispo - PT recs [facility] and PMR recommending acute rehab, but patient prefers home. Discussed during IDRs, CM assistance appreciated, plan for dc home once DME is delivered and patient's family completes wound care teaching. Patient is medically stable for discharge.      D/w patient  D/w resident Dr. Paulson 44F hx of spina bifida, multiple bilateral foot surgeries, and  shunt (managed [facility] neurosurgery), uterine fibroids presenting with R foot wound s/p soft tissue mass excision with podiatry on 5/21, no concern for infection.     Patient seen and examined, states that her arms feel very itchy around sites where her IVs were, comes and goes. She states she wants to go home, does not want to go to rehab directly from the hospital. Discussed we are awaiting DME delivery prior to discharge.     #R foot wound: MRI shows abscess, however s/p OR w/ podiatry on 5/20, s/p right foot soft tissue mass excision w/ low concern for infection, no intraop findings of purulence of abscess. No OM on MRI. ADAM with no PAD. Cleared for discharge from podiatry standpoint. ID following and completed course of Zosyn on 5/21.     #Sacral Mass: CT shows "Thick walled fluid collection superficial to the left ischium measuring up to 7.9 cm with thick surrounding soft tissue component. Findings are concerning for abscess." s/p surgery bedside I&D on 5/16, cultures NGTD.     #Stage 4 sacral decubitus: c/w wound vac and wound care. Wound care rec wound vac at home, pt declining, will continue to encourage. Can follow-up with plastics as outpatient for consideration of flap.     #Contact dermatitis: start hydrocortisone cream on sites of rash     #Dispo - PT recs [facility] and PMR recommending acute rehab, but patient prefers home. Discussed during IDRs, CM assistance appreciated, plan for dc home once DME is delivered and patient's family completes wound care teaching. Patient is medically stable for discharge.      D/w patient  D/w resident Dr. Paulson.

## 2024-05-23 NOTE — PROGRESS NOTE ADULT - PROBLEM SELECTOR PLAN 2
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  pt with sacral wound noted  frequent repositioning  CTAP with abscess underlying sacral wound  s/p I&D 5/17 - serosanguinous drainage, possibly bursa cysts iso pressure wound    Plan:  -Culture without growth  -frequent repositioning  -Wound care consulted, appreciate reccs

## 2024-05-23 NOTE — PROGRESS NOTE ADULT - PROBLEM SELECTOR PLAN 3
-pt with hx spina bifida, has motorized wheelchair at home   - shunt managed by [facility] neurosurgery   -No hydrocephalus on CT

## 2024-05-23 NOTE — PROGRESS NOTE ADULT - SUBJECTIVE AND OBJECTIVE BOX
*******************************  Andrew Paulson, PGY-1  Internal Medicine  Contact via [employer] TEAMS    *******************************    PROGRESS NOTE:     Patient is a 44y old  Female who presents with a chief complaint of right foot wound (22 May 2024 14:34)      INTERVAL EVENTS: No acute overnight events.     SUBJECTIVE: Patient seen and examined at bedside. This morning, the patient is comfortable and doing well. No acute complaints. Denies fevers, chills, N/V/D, chest pain, SOB, abdominal pain.    MEDICATIONS  (STANDING):  chlorhexidine 2% Cloths 1 Application(s) Topical daily  enoxaparin Injectable 30 milliGRAM(s) SubCutaneous every 24 hours  ferrous    sulfate 325 milliGRAM(s) Oral daily  melatonin 3 milliGRAM(s) Oral at bedtime  mupirocin 2% Ointment 1 Application(s) Topical <User Schedule>  polyethylene glycol 3350 17 Gram(s) Oral daily  senna 2 Tablet(s) Oral at bedtime    MEDICATIONS  (PRN):  acetaminophen     Tablet .. 650 milliGRAM(s) Oral every 6 hours PRN Temp greater or equal to 38C (100.4F), Mild Pain (1 - 3), Moderate Pain (4 - 6)  bisacodyl 5 milliGRAM(s) Oral every 12 hours PRN Constipation  calamine/zinc oxide Lotion 1 Application(s) Topical four times a day PRN Itching  oxyCODONE    IR 5 milliGRAM(s) Oral every 6 hours PRN Severe Pain (7 - 10)      CAPILLARY BLOOD GLUCOSE        I&O's Summary    22 May 2024 07:01  -  23 May 2024 06:33  --------------------------------------------------------  IN: 1190 mL / OUT: 0 mL / NET: 1190 mL        PHYSICAL EXAM:  Vital Signs Last 24 Hrs  T(C): 36.6 (23 May 2024 05:35), Max: 37.2 (22 May 2024 14:10)  T(F): 97.9 (23 May 2024 05:35), Max: 98.9 (22 May 2024 14:10)  HR: 72 (23 May 2024 05:35) (70 - 81)  BP: 121/88 (23 May 2024 05:35) (117/80 - 149/88)  BP(mean): --  RR: 18 (23 May 2024 05:35) (18 - 18)  SpO2: 98% (23 May 2024 05:35) (98% - 100%)    Parameters below as of 23 May 2024 05:35  Patient On (Oxygen Delivery Method): room air        GENERAL: NAD, lying in bed comfortably  HEAD: Atraumatic, normocephalic  EYES: EOMI, PERRLA, conjunctiva and sclera clear  ENT: Moist mucous membranes  NECK: Supple, no JVD  HEART: S1, S2, Regular rate and rhythm, no murmurs, rubs, or gallops  LUNGS: Unlabored respirations, clear to auscultation bilaterally, no crackles, wheezing, or rhonchi  ABDOMEN: Soft, nontender, nondistended, +BS  EXTREMITIES: 2+ peripheral pulses bilaterally. No clubbing, cyanosis, or edema  NERVOUS SYSTEM:  A&Ox3, no focal deficits   SKIN: No rashes or lesions    LABS:                      RADIOLOGY & ADDITIONAL TESTS:  Results Reviewed:   Imaging Personally Reviewed:  Electrocardiogram Personally Reviewed:  Tele: *******************************  Andrew Paulson, PGY-1  Internal Medicine  Contact via [employer] TEAMS    *******************************    PROGRESS NOTE:     Patient is a 44y old  Female who presents with a chief complaint of right foot wound (22 May 2024 14:34)      INTERVAL EVENTS: No acute overnight events.     SUBJECTIVE: Patient seen and examined at bedside. This morning, the patient's only complaint is an itching/burning sensation of bilateral forearms.    MEDICATIONS  (STANDING):  chlorhexidine 2% Cloths 1 Application(s) Topical daily  enoxaparin Injectable 30 milliGRAM(s) SubCutaneous every 24 hours  ferrous    sulfate 325 milliGRAM(s) Oral daily  melatonin 3 milliGRAM(s) Oral at bedtime  mupirocin 2% Ointment 1 Application(s) Topical <User Schedule>  polyethylene glycol 3350 17 Gram(s) Oral daily  senna 2 Tablet(s) Oral at bedtime    MEDICATIONS  (PRN):  acetaminophen     Tablet .. 650 milliGRAM(s) Oral every 6 hours PRN Temp greater or equal to 38C (100.4F), Mild Pain (1 - 3), Moderate Pain (4 - 6)  bisacodyl 5 milliGRAM(s) Oral every 12 hours PRN Constipation  calamine/zinc oxide Lotion 1 Application(s) Topical four times a day PRN Itching  oxyCODONE    IR 5 milliGRAM(s) Oral every 6 hours PRN Severe Pain (7 - 10)      CAPILLARY BLOOD GLUCOSE        I&O's Summary    22 May 2024 07:01  -  23 May 2024 06:33  --------------------------------------------------------  IN: 1190 mL / OUT: 0 mL / NET: 1190 mL        PHYSICAL EXAM:  Vital Signs Last 24 Hrs  T(C): 36.6 (23 May 2024 05:35), Max: 37.2 (22 May 2024 14:10)  T(F): 97.9 (23 May 2024 05:35), Max: 98.9 (22 May 2024 14:10)  HR: 72 (23 May 2024 05:35) (70 - 81)  BP: 121/88 (23 May 2024 05:35) (117/80 - 149/88)  BP(mean): --  RR: 18 (23 May 2024 05:35) (18 - 18)  SpO2: 98% (23 May 2024 05:35) (98% - 100%)    Parameters below as of 23 May 2024 05:35  Patient On (Oxygen Delivery Method): room air        GENERAL: NAD, lying in bed comfortably  HEAD: Atraumatic, normocephalic  EYES: EOMI, conjunctiva and sclera clear  ENT: Moist mucous membranes  NECK: Supple, no JVD  HEART: S1, S2, Regular rate and rhythm, no murmurs, rubs, or gallops  LUNGS: Unlabored respirations, clear to auscultation bilaterally, no crackles, wheezing, or rhonchi  ABDOMEN: Soft, nontender, nondistended  EXTREMITIES: No LE edema, R foot dressing c/d/i  NERVOUS SYSTEM:  A&Ox3, no focal deficits   SKIN: No rashes or lesions    LABS:                      RADIOLOGY & ADDITIONAL TESTS:  Results Reviewed:   Imaging Personally Reviewed:  Electrocardiogram Personally Reviewed:  Tele:

## 2024-05-24 ENCOUNTER — TRANSCRIPTION ENCOUNTER (OUTPATIENT)
Age: 44
End: 2024-05-24

## 2024-05-24 LAB — SURGICAL PATHOLOGY STUDY: SIGNIFICANT CHANGE UP

## 2024-05-24 PROCEDURE — 99231 SBSQ HOSP IP/OBS SF/LOW 25: CPT | Mod: GC

## 2024-05-24 RX ORDER — ACETAMINOPHEN 500 MG
650 TABLET ORAL ONCE
Refills: 0 | Status: DISCONTINUED | OUTPATIENT
Start: 2024-05-24 | End: 2024-05-24

## 2024-05-24 RX ORDER — ONDANSETRON 8 MG/1
4 TABLET, FILM COATED ORAL ONCE
Refills: 0 | Status: COMPLETED | OUTPATIENT
Start: 2024-05-24 | End: 2024-05-24

## 2024-05-24 RX ADMIN — Medication 650 MILLIGRAM(S): at 17:35

## 2024-05-24 RX ADMIN — Medication 1 APPLICATION(S): at 17:35

## 2024-05-24 RX ADMIN — Medication 325 MILLIGRAM(S): at 11:37

## 2024-05-24 RX ADMIN — Medication 1 APPLICATION(S): at 07:38

## 2024-05-24 RX ADMIN — Medication 650 MILLIGRAM(S): at 18:35

## 2024-05-24 RX ADMIN — Medication 3 MILLIGRAM(S): at 21:09

## 2024-05-24 RX ADMIN — ONDANSETRON 4 MILLIGRAM(S): 8 TABLET, FILM COATED ORAL at 17:35

## 2024-05-24 RX ADMIN — CHLORHEXIDINE GLUCONATE 1 APPLICATION(S): 213 SOLUTION TOPICAL at 11:35

## 2024-05-24 NOTE — DISCHARGE NOTE NURSING/CASE MANAGEMENT/SOCIAL WORK - NSDCPEFALRISK_GEN_ALL_CORE
For information on Fall & Injury Prevention, visit: [email or web address] OR  [email or web address] OR  https://www.cdc.gov/steadi/patient.html

## 2024-05-24 NOTE — DISCHARGE NOTE NURSING/CASE MANAGEMENT/SOCIAL WORK - PATIENT PORTAL LINK FT
You can access the FollowMyHealth Patient Portal offered by [facility] by registering at the following website: [email or web address]. By joining [facility]’s FollowMyHealth portal, you will also be able to view your health information using other applications (apps) compatible with our system.

## 2024-05-24 NOTE — PROGRESS NOTE ADULT - PROBLEM SELECTOR PLAN 1
pt with chronic R foot wound with white/yellow drainage  XR R foot with extensive soft tissue swelling  CT R foot with forming abscess   MR R foot with abscess   Plan:  -Patient underwent right foot soft tissue mass excision, low concern for infection per podiatry  -ID recs appreciated, zosyn now stopped  -Patient would like to be discharged home. At the moment, not wanting to go to rehab. Plan for dc home pending medical equipment delivery to house and wound care teaching for mother  -pain control: oxy 5, tylenol

## 2024-05-24 NOTE — PROGRESS NOTE ADULT - PROBLEM SELECTOR PLAN 3
-pt with hx spina bifida, has motorized wheelchair at home   - shunt managed by [facility] neurosurgery   -No hydrocephalus on CT

## 2024-05-24 NOTE — DISCHARGE NOTE NURSING/CASE MANAGEMENT/SOCIAL WORK - NSDCFUADDAPPT_GEN_ALL_CORE_FT
Podiatry Discharge Instructions:  Follow up: Please follow up with Dr. Waterhouse within 1 week of discharge from the hospital, please call [phone] for appointment and discuss that you recently were seen in the hospital.  Wound Care: Please leave your dressing clean dry intact until your follow up appointment   Weight bearing: Please weight bear as tolerated in a surgical shoe to right foot.  Antibiotics: Please continue as instructed.    APPTS ARE READY TO BE MADE: [X] YES    Best Family or Patient Contact (if needed):    Additional Information about above appointments (if needed):    1: Please make appointment with [facility] ([phone])  2: Please make appointment with Dr. Waterhouse of podiatry (call [phone]) and discuss that you recently were seen in the hospital.  3:     Other comments or requests:

## 2024-05-24 NOTE — PROGRESS NOTE ADULT - SUBJECTIVE AND OBJECTIVE BOX
*******************************  Andrew Paulson, PGY-1  Internal Medicine  Contact via [employer] TEAMS    *******************************    PROGRESS NOTE:     Patient is a 44y old  Female who presents with a chief complaint of right foot wound (23 May 2024 06:32)      INTERVAL EVENTS: No acute overnight events.     SUBJECTIVE: Patient seen and examined at bedside. This morning, the patient is comfortable and doing well. No acute complaints. Denies fevers, chills, N/V/D, chest pain, SOB, abdominal pain.    MEDICATIONS  (STANDING):  chlorhexidine 2% Cloths 1 Application(s) Topical daily  enoxaparin Injectable 30 milliGRAM(s) SubCutaneous every 24 hours  ferrous    sulfate 325 milliGRAM(s) Oral daily  hydrocortisone 1% Cream 1 Application(s) Topical two times a day  melatonin 3 milliGRAM(s) Oral at bedtime  mupirocin 2% Ointment 1 Application(s) Topical <User Schedule>  polyethylene glycol 3350 17 Gram(s) Oral daily  senna 2 Tablet(s) Oral at bedtime    MEDICATIONS  (PRN):  acetaminophen     Tablet .. 650 milliGRAM(s) Oral every 6 hours PRN Temp greater or equal to 38C (100.4F), Mild Pain (1 - 3), Moderate Pain (4 - 6)  bisacodyl 5 milliGRAM(s) Oral every 12 hours PRN Constipation  calamine/zinc oxide Lotion 1 Application(s) Topical four times a day PRN Itching  oxyCODONE    IR 5 milliGRAM(s) Oral every 6 hours PRN Severe Pain (7 - 10)      CAPILLARY BLOOD GLUCOSE        I&O's Summary    23 May 2024 07:01  -  24 May 2024 07:00  --------------------------------------------------------  IN: 1100 mL / OUT: 0 mL / NET: 1100 mL        PHYSICAL EXAM:  Vital Signs Last 24 Hrs  T(C): 36.7 (24 May 2024 05:45), Max: 37 (23 May 2024 21:45)  T(F): 98 (24 May 2024 05:45), Max: 98.6 (23 May 2024 21:45)  HR: 65 (24 May 2024 05:45) (65 - 84)  BP: 110/81 (24 May 2024 05:45) (110/81 - 135/80)  BP(mean): --  RR: 18 (24 May 2024 05:45) (17 - 18)  SpO2: 100% (24 May 2024 05:45) (96% - 100%)    Parameters below as of 24 May 2024 05:45  Patient On (Oxygen Delivery Method): room air        GENERAL: NAD, lying in bed comfortably  HEAD: Atraumatic, normocephalic  EYES: EOMI, PERRLA, conjunctiva and sclera clear  ENT: Moist mucous membranes  NECK: Supple, no JVD  HEART: S1, S2, Regular rate and rhythm, no murmurs, rubs, or gallops  LUNGS: Unlabored respirations, clear to auscultation bilaterally, no crackles, wheezing, or rhonchi  ABDOMEN: Soft, nontender, nondistended, +BS  EXTREMITIES: 2+ peripheral pulses bilaterally. No clubbing, cyanosis, or edema  NERVOUS SYSTEM:  A&Ox3, no focal deficits   SKIN: No rashes or lesions    LABS:                      RADIOLOGY & ADDITIONAL TESTS:  Results Reviewed:   Imaging Personally Reviewed:  Electrocardiogram Personally Reviewed:  Tele: *******************************  Andrew Paulson, PGY-1  Internal Medicine  Contact via [employer] TEAMS    *******************************    PROGRESS NOTE:     Patient is a 44y old  Female who presents with a chief complaint of right foot wound (23 May 2024 06:32)      INTERVAL EVENTS: No acute overnight events.     SUBJECTIVE: Patient seen and examined at bedside. This morning, the patient is comfortable and doing well. No acute complaints. Denies fevers, chills, N/V/D, chest pain, SOB, abdominal pain.    MEDICATIONS  (STANDING):  chlorhexidine 2% Cloths 1 Application(s) Topical daily  enoxaparin Injectable 30 milliGRAM(s) SubCutaneous every 24 hours  ferrous    sulfate 325 milliGRAM(s) Oral daily  hydrocortisone 1% Cream 1 Application(s) Topical two times a day  melatonin 3 milliGRAM(s) Oral at bedtime  mupirocin 2% Ointment 1 Application(s) Topical <User Schedule>  polyethylene glycol 3350 17 Gram(s) Oral daily  senna 2 Tablet(s) Oral at bedtime    MEDICATIONS  (PRN):  acetaminophen     Tablet .. 650 milliGRAM(s) Oral every 6 hours PRN Temp greater or equal to 38C (100.4F), Mild Pain (1 - 3), Moderate Pain (4 - 6)  bisacodyl 5 milliGRAM(s) Oral every 12 hours PRN Constipation  calamine/zinc oxide Lotion 1 Application(s) Topical four times a day PRN Itching  oxyCODONE    IR 5 milliGRAM(s) Oral every 6 hours PRN Severe Pain (7 - 10)      CAPILLARY BLOOD GLUCOSE        I&O's Summary    23 May 2024 07:01  -  24 May 2024 07:00  --------------------------------------------------------  IN: 1100 mL / OUT: 0 mL / NET: 1100 mL        PHYSICAL EXAM:  Vital Signs Last 24 Hrs  T(C): 36.7 (24 May 2024 05:45), Max: 37 (23 May 2024 21:45)  T(F): 98 (24 May 2024 05:45), Max: 98.6 (23 May 2024 21:45)  HR: 65 (24 May 2024 05:45) (65 - 84)  BP: 110/81 (24 May 2024 05:45) (110/81 - 135/80)  BP(mean): --  RR: 18 (24 May 2024 05:45) (17 - 18)  SpO2: 100% (24 May 2024 05:45) (96% - 100%)    Parameters below as of 24 May 2024 05:45  Patient On (Oxygen Delivery Method): room air        GENERAL: NAD, lying in bed comfortably  HEAD: Atraumatic, normocephalic  EYES: EOMI, conjunctiva and sclera clear  ENT: Moist mucous membranes  NECK: Supple, no JVD  HEART: S1, S2, Regular rate and rhythm, no murmurs, rubs, or gallops  LUNGS: Unlabored respirations, clear to auscultation bilaterally, no crackles, wheezing, or rhonchi  ABDOMEN: Soft, nontender, nondistended  EXTREMITIES: No LE edema, dressing on RLE c/d/i  NERVOUS SYSTEM:  A&Ox3, no focal deficits   SKIN: No rashes or lesions    LABS:                      RADIOLOGY & ADDITIONAL TESTS:  Results Reviewed:   Imaging Personally Reviewed:  Electrocardiogram Personally Reviewed:  Tele:

## 2024-05-25 LAB
ALBUMIN SERPL ELPH-MCNC: 3.4 G/DL — SIGNIFICANT CHANGE UP (ref 3.3–5)
ALP SERPL-CCNC: 53 U/L — SIGNIFICANT CHANGE UP (ref 40–120)
ALT FLD-CCNC: 10 U/L — SIGNIFICANT CHANGE UP (ref 4–33)
ANION GAP SERPL CALC-SCNC: 13 MMOL/L — SIGNIFICANT CHANGE UP (ref 7–14)
AST SERPL-CCNC: 12 U/L — SIGNIFICANT CHANGE UP (ref 4–32)
BILIRUB SERPL-MCNC: <0.2 MG/DL — SIGNIFICANT CHANGE UP (ref 0.2–1.2)
BUN SERPL-MCNC: 14 MG/DL — SIGNIFICANT CHANGE UP (ref 7–23)
CALCIUM SERPL-MCNC: 8.6 MG/DL — SIGNIFICANT CHANGE UP (ref 8.4–10.5)
CHLORIDE SERPL-SCNC: 103 MMOL/L — SIGNIFICANT CHANGE UP (ref 98–107)
CO2 SERPL-SCNC: 20 MMOL/L — LOW (ref 22–31)
CREAT SERPL-MCNC: 0.52 MG/DL — SIGNIFICANT CHANGE UP (ref 0.5–1.3)
EGFR: 116 ML/MIN/1.73M2 — SIGNIFICANT CHANGE UP
GLUCOSE SERPL-MCNC: 84 MG/DL — SIGNIFICANT CHANGE UP (ref 70–99)
HCT VFR BLD CALC: 29.2 % — LOW (ref 34.5–45)
HGB BLD-MCNC: 9.6 G/DL — LOW (ref 11.5–15.5)
MCHC RBC-ENTMCNC: 26.4 PG — LOW (ref 27–34)
MCHC RBC-ENTMCNC: 32.9 GM/DL — SIGNIFICANT CHANGE UP (ref 32–36)
MCV RBC AUTO: 80.2 FL — SIGNIFICANT CHANGE UP (ref 80–100)
NRBC # BLD: 0 /100 WBCS — SIGNIFICANT CHANGE UP (ref 0–0)
NRBC # FLD: 0 K/UL — SIGNIFICANT CHANGE UP (ref 0–0)
PLATELET # BLD AUTO: 418 K/UL — HIGH (ref 150–400)
POTASSIUM SERPL-MCNC: 3.9 MMOL/L — SIGNIFICANT CHANGE UP (ref 3.5–5.3)
POTASSIUM SERPL-SCNC: 3.9 MMOL/L — SIGNIFICANT CHANGE UP (ref 3.5–5.3)
PROT SERPL-MCNC: 7 G/DL — SIGNIFICANT CHANGE UP (ref 6–8.3)
RBC # BLD: 3.64 M/UL — LOW (ref 3.8–5.2)
RBC # FLD: 16.5 % — HIGH (ref 10.3–14.5)
SODIUM SERPL-SCNC: 136 MMOL/L — SIGNIFICANT CHANGE UP (ref 135–145)
WBC # BLD: 4.69 K/UL — SIGNIFICANT CHANGE UP (ref 3.8–10.5)
WBC # FLD AUTO: 4.69 K/UL — SIGNIFICANT CHANGE UP (ref 3.8–10.5)

## 2024-05-25 PROCEDURE — 99232 SBSQ HOSP IP/OBS MODERATE 35: CPT | Mod: GC

## 2024-05-25 RX ORDER — LOPERAMIDE HCL 2 MG
2 TABLET ORAL DAILY
Refills: 0 | Status: DISCONTINUED | OUTPATIENT
Start: 2024-05-25 | End: 2024-06-03

## 2024-05-25 RX ADMIN — SENNA PLUS 2 TABLET(S): 8.6 TABLET ORAL at 22:31

## 2024-05-25 RX ADMIN — Medication 325 MILLIGRAM(S): at 11:33

## 2024-05-25 RX ADMIN — Medication 1 APPLICATION(S): at 17:10

## 2024-05-25 RX ADMIN — Medication 2 MILLIGRAM(S): at 14:22

## 2024-05-25 RX ADMIN — Medication 3 MILLIGRAM(S): at 22:29

## 2024-05-25 RX ADMIN — CHLORHEXIDINE GLUCONATE 1 APPLICATION(S): 213 SOLUTION TOPICAL at 11:33

## 2024-05-25 RX ADMIN — ENOXAPARIN SODIUM 30 MILLIGRAM(S): 100 INJECTION SUBCUTANEOUS at 22:10

## 2024-05-25 RX ADMIN — Medication 1 APPLICATION(S): at 05:04

## 2024-05-25 NOTE — PROGRESS NOTE ADULT - SUBJECTIVE AND OBJECTIVE BOX
*******************************  Andrew Paulson, PGY-1  Internal Medicine  Contact via [employer] TEAMS    *******************************    PROGRESS NOTE:     Patient is a 44y old  Female who presents with a chief complaint of right foot wound (24 May 2024 08:20)      INTERVAL EVENTS: No acute overnight events.     SUBJECTIVE: Patient seen and examined at bedside. This morning, the patient is comfortable and doing well. No acute complaints. Denies fevers, chills, N/V/D, chest pain, SOB, abdominal pain.    MEDICATIONS  (STANDING):  chlorhexidine 2% Cloths 1 Application(s) Topical daily  enoxaparin Injectable 30 milliGRAM(s) SubCutaneous every 24 hours  ferrous    sulfate 325 milliGRAM(s) Oral daily  hydrocortisone 1% Cream 1 Application(s) Topical two times a day  melatonin 3 milliGRAM(s) Oral at bedtime  mupirocin 2% Ointment 1 Application(s) Topical <User Schedule>  polyethylene glycol 3350 17 Gram(s) Oral daily  senna 2 Tablet(s) Oral at bedtime    MEDICATIONS  (PRN):  acetaminophen     Tablet .. 650 milliGRAM(s) Oral every 6 hours PRN Temp greater or equal to 38C (100.4F), Mild Pain (1 - 3), Moderate Pain (4 - 6)  bisacodyl 5 milliGRAM(s) Oral every 12 hours PRN Constipation  calamine/zinc oxide Lotion 1 Application(s) Topical four times a day PRN Itching  oxyCODONE    IR 5 milliGRAM(s) Oral every 6 hours PRN Severe Pain (7 - 10)      CAPILLARY BLOOD GLUCOSE        I&O's Summary    24 May 2024 07:01  -  25 May 2024 07:00  --------------------------------------------------------  IN: 940 mL / OUT: 0 mL / NET: 940 mL        PHYSICAL EXAM:  Vital Signs Last 24 Hrs  T(C): 36.7 (25 May 2024 05:34), Max: 36.8 (24 May 2024 21:02)  T(F): 98 (25 May 2024 05:34), Max: 98.2 (24 May 2024 21:02)  HR: 68 (25 May 2024 05:34) (66 - 68)  BP: 111/79 (25 May 2024 05:34) (111/79 - 128/78)  BP(mean): --  RR: 17 (25 May 2024 05:34) (17 - 18)  SpO2: 100% (25 May 2024 05:34) (97% - 100%)    Parameters below as of 25 May 2024 05:34  Patient On (Oxygen Delivery Method): room air        GENERAL: NAD, lying in bed comfortably  HEAD: Atraumatic, normocephalic  EYES: EOMI, PERRLA, conjunctiva and sclera clear  ENT: Moist mucous membranes  NECK: Supple, no JVD  HEART: S1, S2, Regular rate and rhythm, no murmurs, rubs, or gallops  LUNGS: Unlabored respirations, clear to auscultation bilaterally, no crackles, wheezing, or rhonchi  ABDOMEN: Soft, nontender, nondistended, +BS  EXTREMITIES: 2+ peripheral pulses bilaterally. No clubbing, cyanosis, or edema  NERVOUS SYSTEM:  A&Ox3, no focal deficits   SKIN: No rashes or lesions    LABS:                      RADIOLOGY & ADDITIONAL TESTS:  Results Reviewed:   Imaging Personally Reviewed:  Electrocardiogram Personally Reviewed:  Tele: *******************************  Andrew Paulson, PGY-1  Internal Medicine  Contact via [employer] TEAMS    *******************************    PROGRESS NOTE:     Patient is a 44y old  Female who presents with a chief complaint of right foot wound (24 May 2024 08:20)      INTERVAL EVENTS: No acute overnight events.     SUBJECTIVE: Patient seen and examined at bedside. This morning, the patient is complaining of nausea, diarrhea, and right-sided abdominal pain.    MEDICATIONS  (STANDING):  chlorhexidine 2% Cloths 1 Application(s) Topical daily  enoxaparin Injectable 30 milliGRAM(s) SubCutaneous every 24 hours  ferrous    sulfate 325 milliGRAM(s) Oral daily  hydrocortisone 1% Cream 1 Application(s) Topical two times a day  melatonin 3 milliGRAM(s) Oral at bedtime  mupirocin 2% Ointment 1 Application(s) Topical <User Schedule>  polyethylene glycol 3350 17 Gram(s) Oral daily  senna 2 Tablet(s) Oral at bedtime    MEDICATIONS  (PRN):  acetaminophen     Tablet .. 650 milliGRAM(s) Oral every 6 hours PRN Temp greater or equal to 38C (100.4F), Mild Pain (1 - 3), Moderate Pain (4 - 6)  bisacodyl 5 milliGRAM(s) Oral every 12 hours PRN Constipation  calamine/zinc oxide Lotion 1 Application(s) Topical four times a day PRN Itching  oxyCODONE    IR 5 milliGRAM(s) Oral every 6 hours PRN Severe Pain (7 - 10)      CAPILLARY BLOOD GLUCOSE        I&O's Summary    24 May 2024 07:01  -  25 May 2024 07:00  --------------------------------------------------------  IN: 940 mL / OUT: 0 mL / NET: 940 mL        PHYSICAL EXAM:  Vital Signs Last 24 Hrs  T(C): 36.7 (25 May 2024 05:34), Max: 36.8 (24 May 2024 21:02)  T(F): 98 (25 May 2024 05:34), Max: 98.2 (24 May 2024 21:02)  HR: 68 (25 May 2024 05:34) (66 - 68)  BP: 111/79 (25 May 2024 05:34) (111/79 - 128/78)  BP(mean): --  RR: 17 (25 May 2024 05:34) (17 - 18)  SpO2: 100% (25 May 2024 05:34) (97% - 100%)    Parameters below as of 25 May 2024 05:34  Patient On (Oxygen Delivery Method): room air        GENERAL: NAD, lying in bed comfortably  HEAD: Atraumatic, normocephalic  EYES: EOMI, conjunctiva and sclera clear  ENT: Moist mucous membranes  NECK: Supple, no JVD  HEART: S1, S2, Regular rate and rhythm, no murmurs, rubs, or gallops  LUNGS: Unlabored respirations, clear to auscultation bilaterally, no crackles, wheezing, or rhonchi  ABDOMEN: Soft, nontender, nondistended  EXTREMITIES: No LE edema  NERVOUS SYSTEM:  A&Ox3, no focal deficits   SKIN: No rashes or lesions    LABS:                      RADIOLOGY & ADDITIONAL TESTS:  Results Reviewed:   Imaging Personally Reviewed:  Electrocardiogram Personally Reviewed:  Tele:

## 2024-05-25 NOTE — PROGRESS NOTE ADULT - ATTENDING COMMENTS
44W w/ spina bifida,  shunt, and multiple bilateral foor surgeries presenting with right foot wound now s/p soft tissue mass excision with podiatry (5/21/24) without concern for infection and I&D of ischial collection. Completed course of Zosyn. Now pending discharge. PT and PMR recommended acute rehab. Patient and mother initially wanted home, but now are interested in rehab. Today having some loose stool and lower abdominal pain, labs without leukocytosis or electrolyte abnormalities and vital signs without fever; will manage symptomatically. Appreciate SW involvement in discharge planning. 44W w/ spina bifida,  shunt, and multiple bilateral foor surgeries presenting with right foot wound now s/p soft tissue mass excision with podiatry (5/21/24) without concern for infection and I&D of ischial collection. Completed course of Zosyn. Now pending discharge. PT and PMR recommended acute rehab. Patient and mother initially wanted home, but now are interested in rehab. Today having some loose stool and lower abdominal pain, labs without leukocytosis or electrolyte abnormalities and vital signs without fever; will manage symptomatically. Appreciate SW involvement in discharge planning

## 2024-05-25 NOTE — PROGRESS NOTE ADULT - PROBLEM SELECTOR PLAN 1
pt with chronic R foot wound with white/yellow drainage  XR R foot with extensive soft tissue swelling  CT R foot with forming abscess   MR R foot with abscess   Plan:  -Patient underwent right foot soft tissue mass excision, low concern for infection per podiatry  -ID recs appreciated, zosyn now stopped  -Patient would like to be discharged home. At the moment, not wanting to go to rehab. Plan for dc home pending medical equipment delivery to house and wound care teaching for mother  -pain control: oxy 5, tylenol pt with chronic R foot wound with white/yellow drainage  XR R foot with extensive soft tissue swelling  CT R foot with forming abscess   MR R foot with abscess   Plan:  -Patient underwent right foot soft tissue mass excision, low concern for infection per podiatry  -ID recs appreciated, zosyn now stopped  -Patient would like to be discharged home but mother is unable to care for wounds and would rather patient go to [facility]. Will plan for dc to [facility] currently   -pain control: oxy 5, tylenol

## 2024-05-25 NOTE — PROGRESS NOTE ADULT - PROBLEM SELECTOR PLAN 3
-pt with hx spina bifida, has motorized wheelchair at home   - shunt managed by [facility] neurosurgery   -No hydrocephalus on CT

## 2024-05-26 PROCEDURE — 99232 SBSQ HOSP IP/OBS MODERATE 35: CPT | Mod: GC

## 2024-05-26 RX ORDER — ACETAMINOPHEN 500 MG
650 TABLET ORAL ONCE
Refills: 0 | Status: COMPLETED | OUTPATIENT
Start: 2024-05-26 | End: 2024-05-26

## 2024-05-26 RX ADMIN — OXYCODONE HYDROCHLORIDE 5 MILLIGRAM(S): 5 TABLET ORAL at 06:02

## 2024-05-26 RX ADMIN — Medication 1 APPLICATION(S): at 05:06

## 2024-05-26 RX ADMIN — CHLORHEXIDINE GLUCONATE 1 APPLICATION(S): 213 SOLUTION TOPICAL at 11:07

## 2024-05-26 RX ADMIN — OXYCODONE HYDROCHLORIDE 5 MILLIGRAM(S): 5 TABLET ORAL at 05:02

## 2024-05-26 RX ADMIN — Medication 325 MILLIGRAM(S): at 11:07

## 2024-05-26 RX ADMIN — Medication 1 APPLICATION(S): at 17:01

## 2024-05-26 RX ADMIN — OXYCODONE HYDROCHLORIDE 5 MILLIGRAM(S): 5 TABLET ORAL at 23:18

## 2024-05-26 RX ADMIN — Medication 650 MILLIGRAM(S): at 10:53

## 2024-05-26 RX ADMIN — Medication 650 MILLIGRAM(S): at 18:01

## 2024-05-26 RX ADMIN — OXYCODONE HYDROCHLORIDE 5 MILLIGRAM(S): 5 TABLET ORAL at 22:18

## 2024-05-26 RX ADMIN — Medication 650 MILLIGRAM(S): at 11:53

## 2024-05-26 RX ADMIN — Medication 3 MILLIGRAM(S): at 22:18

## 2024-05-26 RX ADMIN — Medication 650 MILLIGRAM(S): at 17:01

## 2024-05-26 RX ADMIN — Medication 2 MILLIGRAM(S): at 05:01

## 2024-05-26 NOTE — PROGRESS NOTE ADULT - ATTENDING COMMENTS
44W w/ spina bifida,  shunt, and multiple bilateral foot surgeries presenting with right foot wound now s/p soft tissue mass excision with podiatry (5/21/24) without concern for infection and I&D of ischial collection. Completed course of Zosyn. Now pending discharge. PT and PMR recommended acute rehab. Patient  perfers discharge home but her mother is unable to assist with wound care and so discharge plan currently unclear. Appreciate SW and CM involvement in discharge planning    Time-based billing (NON-critical care).     35 minutes spent on total encounter; more than 50% of the visit was spent counseling and / or coordinating care by the attending physician.  The necessity of the time spent during the encounter on this date of service was due to:     documentation in [place], reviewing chart and coordinating care with patient/resident and interdisciplinary staff (such as , social workers, etc) as well as reviewing vitals, laboratory data, radiology, medication list, consultants' recommendations and prior records. Interventions were performed as documented above.

## 2024-05-26 NOTE — PROGRESS NOTE ADULT - PROBLEM SELECTOR PLAN 1
pt with chronic R foot wound with white/yellow drainage  XR R foot with extensive soft tissue swelling  CT R foot with forming abscess   MR R foot with abscess   Plan:  -Patient underwent right foot soft tissue mass excision, low concern for infection per podiatry  -ID recs appreciated, zosyn now stopped  -Patient would like to be discharged home but mother is unable to care for wounds and would rather patient go to [facility]. Will plan for dc to [facility] currently   -pain control: oxy 5, tylenol

## 2024-05-26 NOTE — PROGRESS NOTE ADULT - SUBJECTIVE AND OBJECTIVE BOX
INTERVAL HPI/OVERNIGHT EVENTS:  Pt seen and examined at bedside. No acute overnight events or complaints.    VITAL SIGNS:  T(F): 98.9 (05-26-24 @ 05:06)  HR: 76 (05-26-24 @ 05:06)  BP: 100/78 (05-26-24 @ 05:06)  RR: 17 (05-26-24 @ 05:06)  SpO2: 100% (05-26-24 @ 05:06)  Wt(kg): --    PHYSICAL EXAM:    GENERAL: NAD, lying in bed comfortably  HEAD: Atraumatic, normocephalic  EYES: EOMI, conjunctiva and sclera clear  ENT: Moist mucous membranes  NECK: Supple, no JVD  HEART: S1, S2, Regular rate and rhythm, no murmurs, rubs, or gallops  LUNGS: Unlabored respirations, clear to auscultation bilaterally, no crackles, wheezing, or rhonchi  ABDOMEN: Soft, nontender, nondistended  EXTREMITIES: No LE edema  NERVOUS SYSTEM:  A&Ox3, no focal deficits   SKIN: No rashes or lesions      MEDICATIONS  (STANDING):  chlorhexidine 2% Cloths 1 Application(s) Topical daily  enoxaparin Injectable 30 milliGRAM(s) SubCutaneous every 24 hours  ferrous    sulfate 325 milliGRAM(s) Oral daily  hydrocortisone 1% Cream 1 Application(s) Topical two times a day  melatonin 3 milliGRAM(s) Oral at bedtime  mupirocin 2% Ointment 1 Application(s) Topical <User Schedule>  polyethylene glycol 3350 17 Gram(s) Oral daily  senna 2 Tablet(s) Oral at bedtime    MEDICATIONS  (PRN):  acetaminophen     Tablet .. 650 milliGRAM(s) Oral every 6 hours PRN Temp greater or equal to 38C (100.4F), Mild Pain (1 - 3), Moderate Pain (4 - 6)  bisacodyl 5 milliGRAM(s) Oral every 12 hours PRN Constipation  calamine/zinc oxide Lotion 1 Application(s) Topical four times a day PRN Itching  loperamide 2 milliGRAM(s) Oral daily PRN Diarrhea  oxyCODONE    IR 5 milliGRAM(s) Oral every 6 hours PRN Severe Pain (7 - 10)      Allergies    latex (Hives; Rash)  Zyrtec (Rash)    Intolerances        LABS:                        9.6    4.69  )-----------( 418      ( 25 May 2024 12:31 )             29.2     05-25    136  |  103  |  14  ----------------------------<  84  3.9   |  20<L>  |  0.52    Ca    8.6      25 May 2024 12:31    TPro  7.0  /  Alb  3.4  /  TBili  <0.2  /  DBili  x   /  AST  12  /  ALT  10  /  AlkPhos  53  05-25      Urinalysis Basic - ( 25 May 2024 12:31 )    Color: x / Appearance: x / SG: x / pH: x  Gluc: 84 mg/dL / Ketone: x  / Bili: x / Urobili: x   Blood: x / Protein: x / Nitrite: x   Leuk Esterase: x / RBC: x / WBC x   Sq Epi: x / Non Sq Epi: x / Bacteria: x        RADIOLOGY & ADDITIONAL TESTS:  Reviewed      ******************  Authored By: Debra Dodd MD PGY2  Internal Medicine  Pager: [phone] [facility]// [id number] [facility]  MS Teams Preferred  ******************

## 2024-05-26 NOTE — PROGRESS NOTE ADULT - PROBLEM SELECTOR PLAN 3
-pt with hx spina bifida, has motorized wheelchair at home   - shunt managed by [facility] neurosurgery   -No hydrocephalus on CT

## 2024-05-27 PROCEDURE — 99232 SBSQ HOSP IP/OBS MODERATE 35: CPT | Mod: GC

## 2024-05-27 RX ORDER — GABAPENTIN 400 MG/1
100 CAPSULE ORAL
Refills: 0 | Status: DISCONTINUED | OUTPATIENT
Start: 2024-05-27 | End: 2024-05-29

## 2024-05-27 RX ADMIN — GABAPENTIN 100 MILLIGRAM(S): 400 CAPSULE ORAL at 17:20

## 2024-05-27 RX ADMIN — Medication 1 APPLICATION(S): at 05:55

## 2024-05-27 RX ADMIN — OXYCODONE HYDROCHLORIDE 5 MILLIGRAM(S): 5 TABLET ORAL at 11:01

## 2024-05-27 RX ADMIN — CALAMINE AND ZINC OXIDE AND PHENOL 1 APPLICATION(S): 160; 10 LOTION TOPICAL at 00:44

## 2024-05-27 RX ADMIN — CHLORHEXIDINE GLUCONATE 1 APPLICATION(S): 213 SOLUTION TOPICAL at 11:01

## 2024-05-27 RX ADMIN — Medication 1 APPLICATION(S): at 17:21

## 2024-05-27 RX ADMIN — Medication 650 MILLIGRAM(S): at 08:57

## 2024-05-27 RX ADMIN — Medication 650 MILLIGRAM(S): at 09:57

## 2024-05-27 RX ADMIN — OXYCODONE HYDROCHLORIDE 5 MILLIGRAM(S): 5 TABLET ORAL at 12:01

## 2024-05-27 RX ADMIN — Medication 325 MILLIGRAM(S): at 11:01

## 2024-05-27 RX ADMIN — MUPIROCIN 1 APPLICATION(S): 20 OINTMENT TOPICAL at 07:13

## 2024-05-27 RX ADMIN — Medication 3 MILLIGRAM(S): at 21:57

## 2024-05-27 NOTE — PROGRESS NOTE ADULT - ATTENDING COMMENTS
44W w/ spina bifida,  shunt, and multiple bilateral foot surgeries presenting with right foot wound now s/p soft tissue mass excision with podiatry (5/21/24) without concern for infection and I&D of ischial collection. Completed course of Zosyn. Now pending discharge. PT and PMR recommended acute rehab. Initially plan was for discharge home, but now for rehab.    Time-based billing (NON-critical care).     35 minutes spent on total encounter; more than 50% of the visit was spent counseling and / or coordinating care by the attending physician.  The necessity of the time spent during the encounter on this date of service was due to:     documentation in [place], reviewing chart and coordinating care with patient/resident and interdisciplinary staff (such as , social workers, etc) as well as reviewing vitals, laboratory data, radiology, medication list, consultants' recommendations and prior records. Interventions were performed as documented above. 44W w/ spina bifida,  shunt, and multiple bilateral foot surgeries presenting with right foot wound now s/p soft tissue mass excision with podiatry (5/21/24) without concern for infection and I&D of ischial collection. Completed course of Zosyn. Now pending discharge. PT and PMR recommended acute rehab. Initially plan was for discharge home, but now for rehab.      Time-based billing (NON-critical care).     35 minutes spent on total encounter; more than 50% of the visit was spent counseling and / or coordinating care by the attending physician.  The necessity of the time spent during the encounter on this date of service was due to:     documentation in [place], reviewing chart and coordinating care with patient/resident and interdisciplinary staff (such as , social workers, etc) as well as reviewing vitals, laboratory data, radiology, medication list, consultants' recommendations and prior records. Interventions were performed as documented above.

## 2024-05-27 NOTE — PROGRESS NOTE ADULT - PROBLEM SELECTOR PLAN 1
pt with chronic R foot wound with white/yellow drainage  XR R foot with extensive soft tissue swelling  CT R foot with forming abscess   MR R foot with abscess   Plan:  -Patient underwent right foot soft tissue mass excision, low concern for infection per podiatry  -ID recs appreciated, zosyn now stopped  -Patient would like to be discharged home but mother is unable to care for wounds and would rather patient go to [facility]. Will plan for dc to [facility] currently   -pain control: oxy 5, tylenol pt with chronic R foot wound with white/yellow drainage  XR R foot with extensive soft tissue swelling  CT R foot with forming abscess   MR R foot with abscess   Plan:  -Patient underwent right foot soft tissue mass excision, low concern for infection per podiatry  -ID recs appreciated, zosyn now stopped  -Patient would like to be discharged home but mother is unable to care for wounds and would rather patient go to [facility]. Will plan for dc to [facility] currently   -pain control: oxy 5, tylenol  -Started gabapentin 100 BID for neuropathy

## 2024-05-27 NOTE — PROGRESS NOTE ADULT - SUBJECTIVE AND OBJECTIVE BOX
*******************************  Andrew Paulson, PGY-1  Internal Medicine  Contact via [employer] TEAMS    *******************************    PROGRESS NOTE:     Patient is a 44y old  Female who presents with a chief complaint of right foot wound (26 May 2024 07:46)      INTERVAL EVENTS: No acute overnight events.     SUBJECTIVE: Patient seen and examined at bedside. This morning, the patient is comfortable and doing well. No acute complaints. Denies fevers, chills, N/V/D, chest pain, SOB, abdominal pain.    MEDICATIONS  (STANDING):  chlorhexidine 2% Cloths 1 Application(s) Topical daily  enoxaparin Injectable 30 milliGRAM(s) SubCutaneous every 24 hours  ferrous    sulfate 325 milliGRAM(s) Oral daily  hydrocortisone 1% Cream 1 Application(s) Topical two times a day  melatonin 3 milliGRAM(s) Oral at bedtime  mupirocin 2% Ointment 1 Application(s) Topical <User Schedule>    MEDICATIONS  (PRN):  acetaminophen     Tablet .. 650 milliGRAM(s) Oral every 6 hours PRN Temp greater or equal to 38C (100.4F), Mild Pain (1 - 3), Moderate Pain (4 - 6)  bisacodyl 5 milliGRAM(s) Oral every 12 hours PRN Constipation  calamine/zinc oxide Lotion 1 Application(s) Topical four times a day PRN Itching  loperamide 2 milliGRAM(s) Oral daily PRN Diarrhea  oxyCODONE    IR 5 milliGRAM(s) Oral every 6 hours PRN Severe Pain (7 - 10)      CAPILLARY BLOOD GLUCOSE        I&O's Summary    26 May 2024 07:01  -  27 May 2024 07:00  --------------------------------------------------------  IN: 1300 mL / OUT: 0 mL / NET: 1300 mL        PHYSICAL EXAM:  Vital Signs Last 24 Hrs  T(C): 36.8 (27 May 2024 05:45), Max: 37 (26 May 2024 22:00)  T(F): 98.2 (27 May 2024 05:45), Max: 98.6 (26 May 2024 22:00)  HR: 69 (27 May 2024 05:45) (69 - 82)  BP: 100/66 (27 May 2024 05:45) (100/66 - 121/65)  BP(mean): --  RR: 18 (27 May 2024 05:45) (17 - 18)  SpO2: 100% (27 May 2024 05:45) (99% - 100%)    Parameters below as of 27 May 2024 05:45  Patient On (Oxygen Delivery Method): room air        GENERAL: NAD, lying in bed comfortably  HEAD: Atraumatic, normocephalic  EYES: EOMI, PERRLA, conjunctiva and sclera clear  ENT: Moist mucous membranes  NECK: Supple, no JVD  HEART: S1, S2, Regular rate and rhythm, no murmurs, rubs, or gallops  LUNGS: Unlabored respirations, clear to auscultation bilaterally, no crackles, wheezing, or rhonchi  ABDOMEN: Soft, nontender, nondistended, +BS  EXTREMITIES: 2+ peripheral pulses bilaterally. No clubbing, cyanosis, or edema  NERVOUS SYSTEM:  A&Ox3, no focal deficits   SKIN: No rashes or lesions    LABS:                        9.6    4.69  )-----------( 418      ( 25 May 2024 12:31 )             29.2     05-25    136  |  103  |  14  ----------------------------<  84  3.9   |  20<L>  |  0.52    Ca    8.6      25 May 2024 12:31    TPro  7.0  /  Alb  3.4  /  TBili  <0.2  /  DBili  x   /  AST  12  /  ALT  10  /  AlkPhos  53  05-25          Urinalysis Basic - ( 25 May 2024 12:31 )    Color: x / Appearance: x / SG: x / pH: x  Gluc: 84 mg/dL / Ketone: x  / Bili: x / Urobili: x   Blood: x / Protein: x / Nitrite: x   Leuk Esterase: x / RBC: x / WBC x   Sq Epi: x / Non Sq Epi: x / Bacteria: x          RADIOLOGY & ADDITIONAL TESTS:  Results Reviewed:   Imaging Personally Reviewed:  Electrocardiogram Personally Reviewed:  Tele: *******************************  Andrew Paulson, PGY-1  Internal Medicine  Contact via [employer] TEAMS    *******************************    PROGRESS NOTE:     Patient is a 44y old  Female who presents with a chief complaint of right foot wound (26 May 2024 07:46)      INTERVAL EVENTS: No acute overnight events.     SUBJECTIVE: Patient seen and examined at bedside. This morning, the patient complains of pain in bilateral thighs radiating downwards to her ankles.    MEDICATIONS  (STANDING):  chlorhexidine 2% Cloths 1 Application(s) Topical daily  enoxaparin Injectable 30 milliGRAM(s) SubCutaneous every 24 hours  ferrous    sulfate 325 milliGRAM(s) Oral daily  hydrocortisone 1% Cream 1 Application(s) Topical two times a day  melatonin 3 milliGRAM(s) Oral at bedtime  mupirocin 2% Ointment 1 Application(s) Topical <User Schedule>    MEDICATIONS  (PRN):  acetaminophen     Tablet .. 650 milliGRAM(s) Oral every 6 hours PRN Temp greater or equal to 38C (100.4F), Mild Pain (1 - 3), Moderate Pain (4 - 6)  bisacodyl 5 milliGRAM(s) Oral every 12 hours PRN Constipation  calamine/zinc oxide Lotion 1 Application(s) Topical four times a day PRN Itching  loperamide 2 milliGRAM(s) Oral daily PRN Diarrhea  oxyCODONE    IR 5 milliGRAM(s) Oral every 6 hours PRN Severe Pain (7 - 10)      CAPILLARY BLOOD GLUCOSE        I&O's Summary    26 May 2024 07:01  -  27 May 2024 07:00  --------------------------------------------------------  IN: 1300 mL / OUT: 0 mL / NET: 1300 mL        PHYSICAL EXAM:  Vital Signs Last 24 Hrs  T(C): 36.8 (27 May 2024 05:45), Max: 37 (26 May 2024 22:00)  T(F): 98.2 (27 May 2024 05:45), Max: 98.6 (26 May 2024 22:00)  HR: 69 (27 May 2024 05:45) (69 - 82)  BP: 100/66 (27 May 2024 05:45) (100/66 - 121/65)  BP(mean): --  RR: 18 (27 May 2024 05:45) (17 - 18)  SpO2: 100% (27 May 2024 05:45) (99% - 100%)    Parameters below as of 27 May 2024 05:45  Patient On (Oxygen Delivery Method): room air        GENERAL: NAD, lying in bed comfortably  HEAD: Atraumatic, normocephalic  EYES: EOMI, conjunctiva and sclera clear  ENT: Moist mucous membranes  NECK: Supple, no JVD  HEART: S1, S2, Regular rate and rhythm, no murmurs, rubs, or gallops  LUNGS: Unlabored respirations, clear to auscultation bilaterally, no crackles, wheezing, or rhonchi  ABDOMEN: Soft, nontender, nondistended  EXTREMITIES: No LE edema  NERVOUS SYSTEM:  A&Ox3, no focal deficits   SKIN: No rashes or lesions    LABS:                        9.6    4.69  )-----------( 418      ( 25 May 2024 12:31 )             29.2     05-25    136  |  103  |  14  ----------------------------<  84  3.9   |  20<L>  |  0.52    Ca    8.6      25 May 2024 12:31    TPro  7.0  /  Alb  3.4  /  TBili  <0.2  /  DBili  x   /  AST  12  /  ALT  10  /  AlkPhos  53  05-25          Urinalysis Basic - ( 25 May 2024 12:31 )    Color: x / Appearance: x / SG: x / pH: x  Gluc: 84 mg/dL / Ketone: x  / Bili: x / Urobili: x   Blood: x / Protein: x / Nitrite: x   Leuk Esterase: x / RBC: x / WBC x   Sq Epi: x / Non Sq Epi: x / Bacteria: x          RADIOLOGY & ADDITIONAL TESTS:  Results Reviewed:   Imaging Personally Reviewed:  Electrocardiogram Personally Reviewed:  Tele:

## 2024-05-27 NOTE — PROGRESS NOTE ADULT - PROBLEM SELECTOR PLAN 3
-pt with hx spina bifida, has motorized wheelchair at home   - shunt managed by [facility] neurosurgery   -No hydrocephalus on CT

## 2024-05-28 DIAGNOSIS — R19.7 DIARRHEA, UNSPECIFIED: ICD-10-CM

## 2024-05-28 PROCEDURE — 99232 SBSQ HOSP IP/OBS MODERATE 35: CPT | Mod: GC

## 2024-05-28 RX ORDER — ONDANSETRON 8 MG/1
4 TABLET, FILM COATED ORAL EVERY 8 HOURS
Refills: 0 | Status: DISCONTINUED | OUTPATIENT
Start: 2024-05-28 | End: 2024-06-06

## 2024-05-28 RX ADMIN — ONDANSETRON 4 MILLIGRAM(S): 8 TABLET, FILM COATED ORAL at 12:01

## 2024-05-28 RX ADMIN — Medication 2 MILLIGRAM(S): at 08:32

## 2024-05-28 RX ADMIN — Medication 3 MILLIGRAM(S): at 22:03

## 2024-05-28 RX ADMIN — Medication 650 MILLIGRAM(S): at 12:02

## 2024-05-28 RX ADMIN — Medication 1 APPLICATION(S): at 05:46

## 2024-05-28 RX ADMIN — GABAPENTIN 100 MILLIGRAM(S): 400 CAPSULE ORAL at 17:37

## 2024-05-28 RX ADMIN — OXYCODONE HYDROCHLORIDE 5 MILLIGRAM(S): 5 TABLET ORAL at 14:47

## 2024-05-28 RX ADMIN — Medication 325 MILLIGRAM(S): at 12:02

## 2024-05-28 RX ADMIN — Medication 650 MILLIGRAM(S): at 13:04

## 2024-05-28 RX ADMIN — GABAPENTIN 100 MILLIGRAM(S): 400 CAPSULE ORAL at 05:46

## 2024-05-28 RX ADMIN — CHLORHEXIDINE GLUCONATE 1 APPLICATION(S): 213 SOLUTION TOPICAL at 12:02

## 2024-05-28 RX ADMIN — OXYCODONE HYDROCHLORIDE 5 MILLIGRAM(S): 5 TABLET ORAL at 13:47

## 2024-05-28 RX ADMIN — Medication 1 APPLICATION(S): at 17:38

## 2024-05-28 NOTE — PROGRESS NOTE ADULT - PROBLEM SELECTOR PLAN 2
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  pt with sacral wound noted  frequent repositioning  CTAP with abscess underlying sacral wound  s/p I&D 5/17 - serosanguinous drainage, possibly bursa cysts iso pressure wound    Plan:  -Culture without growth  -frequent repositioning  -Wound care consulted, appreciate reccs -pt with hx spina bifida, has motorized wheelchair at home   - shunt managed by [facility] neurosurgery   -No hydrocephalus on CT

## 2024-05-28 NOTE — PROGRESS NOTE ADULT - PROBLEM SELECTOR PLAN 3
-pt with hx spina bifida, has motorized wheelchair at home   - shunt managed by [facility] neurosurgery   -No hydrocephalus on CT hx of uterine fibroid, heavy periods  currently on period -- likely etiology of microscopic hematuria on UA  outpatient OBGYN f/u    Plan:  -Patient found to be iron deficient  -PO iron started

## 2024-05-28 NOTE — PROGRESS NOTE ADULT - ATTENDING COMMENTS
44W w/ spina bifida,  shunt, and multiple bilateral foot surgeries presenting with right foot wound now s/p soft tissue mass excision with podiatry (5/21/24) without concern for infection and I&D of ischial collection. Completed course of Zosyn. Now pending discharge. PT and PMR recommended acute rehab. Initially plan was for discharge home, but now for rehab. -Right foot wound, s/p soft tissue mass excision, low concern for infection per podiatry, c/w wound care    -Stage 4 sacral decub, CTAP with abscess underlying sacral wound, s/p I&D of ischial collection, completed course of Zosyn, c/w wound care   -Intermittent diarrhea, not watery and no leukocytosis, low concern for Cdiff, c/w Imodium prn   -PT and PMR recommended acute rehab. Initially plan was for discharge home, but now for rehab.

## 2024-05-28 NOTE — PROGRESS NOTE ADULT - ASSESSMENT
44y Female with s/p right foot soft tissue mass excision  (DOS 5/20)  - Patient seen and evaluated  - Afebrile, No Leukocytosis   - 5/20 s/p right foot soft tissue mass excision, R foot  sutures intact, skin well coapted, no drainage expressed, no hematoma, skin flap appears warm and viable, no purulence.  L foot no open wounds or sign of infection.   - ID recs appreciated  - Stable for discharge, keep dressing clean dry and intact on RIGHT foot   - Wound care recommendations, weight bearing status, and follow up info in Discharge provider note.   - Discussed with attending

## 2024-05-28 NOTE — PROGRESS NOTE ADULT - PROBLEM SELECTOR PLAN 4
hx of uterine fibroid, heavy periods  currently on period -- likely etiology of microscopic hematuria on UA  outpatient OBGYN f/u    Plan:  -Patient found to be iron deficient  -PO iron started -C/w immodium  -Zofran for nausea

## 2024-05-28 NOTE — PROGRESS NOTE ADULT - PROBLEM SELECTOR PLAN 1
pt with chronic R foot wound with white/yellow drainage  XR R foot with extensive soft tissue swelling  CT R foot with forming abscess   MR R foot with abscess   Plan:  -Patient underwent right foot soft tissue mass excision, low concern for infection per podiatry  -ID recs appreciated, zosyn now stopped  -Patient would like to be discharged home but mother is unable to care for wounds and would rather patient go to [facility]. 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  pt with sacral wound noted  frequent repositioning  CTAP with abscess underlying sacral wound  s/p I&D 5/17 - serosanguinous drainage, possibly bursa cysts iso pressure wound    Plan:  -Culture without growth  -frequent repositioning  -Wound care consulted, appreciate reccs

## 2024-05-28 NOTE — PROGRESS NOTE ADULT - SUBJECTIVE AND OBJECTIVE BOX
Patient is a 44y old  Female who presents with a chief complaint of right foot wound (28 May 2024 07:12)       INTERVAL HPI/OVERNIGHT EVENTS:  Patient seen and evaluated at bedside.  Pt is resting comfortable in NAD. Denies N/V/F/C.      Allergies    latex (Hives; Rash)  Zyrtec (Rash)    Intolerances        Vital Signs Last 24 Hrs  T(C): 37.2 (28 May 2024 05:36), Max: 37.2 (27 May 2024 21:57)  T(F): 99 (28 May 2024 05:36), Max: 99 (28 May 2024 05:36)  HR: 79 (28 May 2024 05:36) (72 - 81)  BP: 111/86 (28 May 2024 05:36) (111/86 - 131/65)  BP(mean): --  RR: 18 (28 May 2024 05:36) (17 - 18)  SpO2: 100% (28 May 2024 05:36) (100% - 100%)    Parameters below as of 28 May 2024 05:36  Patient On (Oxygen Delivery Method): room air        LABS:              CAPILLARY BLOOD GLUCOSE          Lower Extremity Physical Exam:  Vascular: DP/PT 1/4, B/L, CFT <3 seconds B/L, Temperature gradient warm to cool, B/L.   Neuro: Epicritic sensation diminished but not absent to the level of digits, B/L.  Musculoskeletal/Ortho: drop foot, weak muscle strength, ROM diminished 2/2 spina bifida   Skin:   5/20 s/p right foot soft tissue mass excision, R foot  sutures intact, skin well coapted, no drainage expressed, no hematoma, skin flap appears warm and viable, no purulence.  L foot no open wounds or signsof infection    RADIOLOGY & ADDITIONAL TESTS:

## 2024-05-28 NOTE — PROGRESS NOTE ADULT - SUBJECTIVE AND OBJECTIVE BOX
*******************************  Andrew Paulson, PGY-1  Internal Medicine  Contact via [employer] TEAMS    *******************************    PROGRESS NOTE:     Patient is a 44y old  Female who presents with a chief complaint of right foot wound (27 May 2024 07:51)      INTERVAL EVENTS: No acute overnight events.     SUBJECTIVE: Patient seen and examined at bedside. This morning, the patient is comfortable and doing well. No acute complaints. Denies fevers, chills, N/V/D, chest pain, SOB, abdominal pain.    MEDICATIONS  (STANDING):  chlorhexidine 2% Cloths 1 Application(s) Topical daily  enoxaparin Injectable 30 milliGRAM(s) SubCutaneous every 24 hours  ferrous    sulfate 325 milliGRAM(s) Oral daily  gabapentin 100 milliGRAM(s) Oral two times a day  hydrocortisone 1% Cream 1 Application(s) Topical two times a day  melatonin 3 milliGRAM(s) Oral at bedtime  mupirocin 2% Ointment 1 Application(s) Topical <User Schedule>    MEDICATIONS  (PRN):  acetaminophen     Tablet .. 650 milliGRAM(s) Oral every 6 hours PRN Temp greater or equal to 38C (100.4F), Mild Pain (1 - 3), Moderate Pain (4 - 6)  bisacodyl 5 milliGRAM(s) Oral every 12 hours PRN Constipation  calamine/zinc oxide Lotion 1 Application(s) Topical four times a day PRN Itching  loperamide 2 milliGRAM(s) Oral daily PRN Diarrhea  oxyCODONE    IR 5 milliGRAM(s) Oral every 6 hours PRN Severe Pain (7 - 10)      CAPILLARY BLOOD GLUCOSE        I&O's Summary    27 May 2024 07:01  -  28 May 2024 07:00  --------------------------------------------------------  IN: 1300 mL / OUT: 0 mL / NET: 1300 mL        PHYSICAL EXAM:  Vital Signs Last 24 Hrs  T(C): 37.2 (28 May 2024 05:36), Max: 37.2 (27 May 2024 21:57)  T(F): 99 (28 May 2024 05:36), Max: 99 (28 May 2024 05:36)  HR: 79 (28 May 2024 05:36) (72 - 81)  BP: 111/86 (28 May 2024 05:36) (111/86 - 131/65)  BP(mean): --  RR: 18 (28 May 2024 05:36) (17 - 18)  SpO2: 100% (28 May 2024 05:36) (100% - 100%)    Parameters below as of 28 May 2024 05:36  Patient On (Oxygen Delivery Method): room air        GENERAL: NAD, lying in bed comfortably  HEAD: Atraumatic, normocephalic  EYES: EOMI, PERRLA, conjunctiva and sclera clear  ENT: Moist mucous membranes  NECK: Supple, no JVD  HEART: S1, S2, Regular rate and rhythm, no murmurs, rubs, or gallops  LUNGS: Unlabored respirations, clear to auscultation bilaterally, no crackles, wheezing, or rhonchi  ABDOMEN: Soft, nontender, nondistended, +BS  EXTREMITIES: 2+ peripheral pulses bilaterally. No clubbing, cyanosis, or edema  NERVOUS SYSTEM:  A&Ox3, no focal deficits   SKIN: No rashes or lesions    LABS:                      RADIOLOGY & ADDITIONAL TESTS:  Results Reviewed:   Imaging Personally Reviewed:  Electrocardiogram Personally Reviewed:  Tele: *******************************  Andrew Paulson, PGY-1  Internal Medicine  Contact via [employer] TEAMS    *******************************    PROGRESS NOTE:     Patient is a 44y old  Female who presents with a chief complaint of right foot wound (27 May 2024 07:51)      INTERVAL EVENTS: No acute overnight events.     SUBJECTIVE: Patient seen and examined at bedside. This morning, the patient reports 2 episodes of diarrhea (non-watery) as well as nausea  cramping right sided abdominal pain.    MEDICATIONS  (STANDING):  chlorhexidine 2% Cloths 1 Application(s) Topical daily  enoxaparin Injectable 30 milliGRAM(s) SubCutaneous every 24 hours  ferrous    sulfate 325 milliGRAM(s) Oral daily  gabapentin 100 milliGRAM(s) Oral two times a day  hydrocortisone 1% Cream 1 Application(s) Topical two times a day  melatonin 3 milliGRAM(s) Oral at bedtime  mupirocin 2% Ointment 1 Application(s) Topical <User Schedule>    MEDICATIONS  (PRN):  acetaminophen     Tablet .. 650 milliGRAM(s) Oral every 6 hours PRN Temp greater or equal to 38C (100.4F), Mild Pain (1 - 3), Moderate Pain (4 - 6)  bisacodyl 5 milliGRAM(s) Oral every 12 hours PRN Constipation  calamine/zinc oxide Lotion 1 Application(s) Topical four times a day PRN Itching  loperamide 2 milliGRAM(s) Oral daily PRN Diarrhea  oxyCODONE    IR 5 milliGRAM(s) Oral every 6 hours PRN Severe Pain (7 - 10)      CAPILLARY BLOOD GLUCOSE        I&O's Summary    27 May 2024 07:01  -  28 May 2024 07:00  --------------------------------------------------------  IN: 1300 mL / OUT: 0 mL / NET: 1300 mL        PHYSICAL EXAM:  Vital Signs Last 24 Hrs  T(C): 37.2 (28 May 2024 05:36), Max: 37.2 (27 May 2024 21:57)  T(F): 99 (28 May 2024 05:36), Max: 99 (28 May 2024 05:36)  HR: 79 (28 May 2024 05:36) (72 - 81)  BP: 111/86 (28 May 2024 05:36) (111/86 - 131/65)  BP(mean): --  RR: 18 (28 May 2024 05:36) (17 - 18)  SpO2: 100% (28 May 2024 05:36) (100% - 100%)    Parameters below as of 28 May 2024 05:36  Patient On (Oxygen Delivery Method): room air        GENERAL: NAD, lying in bed comfortably  HEAD: Atraumatic, normocephalic  EYES: EOMI, conjunctiva and sclera clear  ENT: Moist mucous membranes  NECK: Supple, no JVD  HEART: S1, S2, Regular rate and rhythm, no murmurs, rubs, or gallops  LUNGS: Unlabored respirations, clear to auscultation bilaterally, no crackles, wheezing, or rhonchi  ABDOMEN: Soft, nontender, nondistended  EXTREMITIES: No LE edema  NERVOUS SYSTEM:  A&Ox3, no focal deficits   SKIN: No rashes or lesions    LABS:                      RADIOLOGY & ADDITIONAL TESTS:  Results Reviewed:   Imaging Personally Reviewed:  Electrocardiogram Personally Reviewed:  Tele:

## 2024-05-29 PROCEDURE — 99232 SBSQ HOSP IP/OBS MODERATE 35: CPT | Mod: GC

## 2024-05-29 PROCEDURE — 99232 SBSQ HOSP IP/OBS MODERATE 35: CPT

## 2024-05-29 RX ORDER — GABAPENTIN 400 MG/1
100 CAPSULE ORAL THREE TIMES A DAY
Refills: 0 | Status: DISCONTINUED | OUTPATIENT
Start: 2024-05-29 | End: 2024-06-06

## 2024-05-29 RX ORDER — GABAPENTIN 400 MG/1
1 CAPSULE ORAL
Qty: 0 | Refills: 0 | DISCHARGE
Start: 2024-05-29

## 2024-05-29 RX ADMIN — Medication 1 APPLICATION(S): at 06:30

## 2024-05-29 RX ADMIN — CHLORHEXIDINE GLUCONATE 1 APPLICATION(S): 213 SOLUTION TOPICAL at 11:01

## 2024-05-29 RX ADMIN — OXYCODONE HYDROCHLORIDE 5 MILLIGRAM(S): 5 TABLET ORAL at 11:56

## 2024-05-29 RX ADMIN — GABAPENTIN 100 MILLIGRAM(S): 400 CAPSULE ORAL at 14:05

## 2024-05-29 RX ADMIN — Medication 1 APPLICATION(S): at 21:10

## 2024-05-29 RX ADMIN — GABAPENTIN 100 MILLIGRAM(S): 400 CAPSULE ORAL at 06:41

## 2024-05-29 RX ADMIN — Medication 325 MILLIGRAM(S): at 11:01

## 2024-05-29 RX ADMIN — Medication 3 MILLIGRAM(S): at 22:10

## 2024-05-29 RX ADMIN — GABAPENTIN 100 MILLIGRAM(S): 400 CAPSULE ORAL at 22:12

## 2024-05-29 RX ADMIN — OXYCODONE HYDROCHLORIDE 5 MILLIGRAM(S): 5 TABLET ORAL at 10:56

## 2024-05-29 NOTE — PROGRESS NOTE ADULT - SUBJECTIVE AND OBJECTIVE BOX
*******************************  Andrew Paulson, PGY-1  Internal Medicine  Contact via [employer] TEAMS    *******************************    PROGRESS NOTE:     Patient is a 44y old  Female who presents with a chief complaint of right foot wound (28 May 2024 08:37)      INTERVAL EVENTS: No acute overnight events.     SUBJECTIVE: Patient seen and examined at bedside. This morning, the patient is comfortable and doing well. No acute complaints. Denies fevers, chills, N/V/D, chest pain, SOB, abdominal pain.    MEDICATIONS  (STANDING):  chlorhexidine 2% Cloths 1 Application(s) Topical daily  enoxaparin Injectable 30 milliGRAM(s) SubCutaneous every 24 hours  ferrous    sulfate 325 milliGRAM(s) Oral daily  gabapentin 100 milliGRAM(s) Oral two times a day  hydrocortisone 1% Cream 1 Application(s) Topical two times a day  melatonin 3 milliGRAM(s) Oral at bedtime  mupirocin 2% Ointment 1 Application(s) Topical <User Schedule>    MEDICATIONS  (PRN):  acetaminophen     Tablet .. 650 milliGRAM(s) Oral every 6 hours PRN Temp greater or equal to 38C (100.4F), Mild Pain (1 - 3), Moderate Pain (4 - 6)  bisacodyl 5 milliGRAM(s) Oral every 12 hours PRN Constipation  calamine/zinc oxide Lotion 1 Application(s) Topical four times a day PRN Itching  loperamide 2 milliGRAM(s) Oral daily PRN Diarrhea  ondansetron    Tablet 4 milliGRAM(s) Oral every 8 hours PRN Nausea and/or Vomiting  oxyCODONE    IR 5 milliGRAM(s) Oral every 6 hours PRN Severe Pain (7 - 10)      CAPILLARY BLOOD GLUCOSE        I&O's Summary    27 May 2024 07:01  -  28 May 2024 07:00  --------------------------------------------------------  IN: 1300 mL / OUT: 0 mL / NET: 1300 mL        PHYSICAL EXAM:  Vital Signs Last 24 Hrs  T(C): 37 (29 May 2024 05:25), Max: 37 (28 May 2024 13:28)  T(F): 98.6 (29 May 2024 05:25), Max: 98.6 (28 May 2024 13:28)  HR: 65 (29 May 2024 05:25) (65 - 76)  BP: 106/76 (29 May 2024 05:25) (106/76 - 123/68)  BP(mean): --  RR: 18 (29 May 2024 05:25) (18 - 18)  SpO2: 100% (29 May 2024 05:25) (99% - 100%)    Parameters below as of 29 May 2024 05:25  Patient On (Oxygen Delivery Method): room air        GENERAL: NAD, lying in bed comfortably  HEAD: Atraumatic, normocephalic  EYES: EOMI, PERRLA, conjunctiva and sclera clear  ENT: Moist mucous membranes  NECK: Supple, no JVD  HEART: S1, S2, Regular rate and rhythm, no murmurs, rubs, or gallops  LUNGS: Unlabored respirations, clear to auscultation bilaterally, no crackles, wheezing, or rhonchi  ABDOMEN: Soft, nontender, nondistended, +BS  EXTREMITIES: 2+ peripheral pulses bilaterally. No clubbing, cyanosis, or edema  NERVOUS SYSTEM:  A&Ox3, no focal deficits   SKIN: No rashes or lesions    LABS:                      RADIOLOGY & ADDITIONAL TESTS:  Results Reviewed:   Imaging Personally Reviewed:  Electrocardiogram Personally Reviewed:  Tele: *******************************  Andrew Paulson, PGY-1  Internal Medicine  Contact via [employer] TEAMS    *******************************    PROGRESS NOTE:     Patient is a 44y old  Female who presents with a chief complaint of right foot wound (28 May 2024 08:37)      INTERVAL EVENTS: No acute overnight events.     SUBJECTIVE: Patient seen and examined at bedside. This morning, the patient continues to complain of diarrhea (4 episodes yesterday and 2 today, non-watery, non-bloody) and leg pain.     MEDICATIONS  (STANDING):  chlorhexidine 2% Cloths 1 Application(s) Topical daily  enoxaparin Injectable 30 milliGRAM(s) SubCutaneous every 24 hours  ferrous    sulfate 325 milliGRAM(s) Oral daily  gabapentin 100 milliGRAM(s) Oral two times a day  hydrocortisone 1% Cream 1 Application(s) Topical two times a day  melatonin 3 milliGRAM(s) Oral at bedtime  mupirocin 2% Ointment 1 Application(s) Topical <User Schedule>    MEDICATIONS  (PRN):  acetaminophen     Tablet .. 650 milliGRAM(s) Oral every 6 hours PRN Temp greater or equal to 38C (100.4F), Mild Pain (1 - 3), Moderate Pain (4 - 6)  bisacodyl 5 milliGRAM(s) Oral every 12 hours PRN Constipation  calamine/zinc oxide Lotion 1 Application(s) Topical four times a day PRN Itching  loperamide 2 milliGRAM(s) Oral daily PRN Diarrhea  ondansetron    Tablet 4 milliGRAM(s) Oral every 8 hours PRN Nausea and/or Vomiting  oxyCODONE    IR 5 milliGRAM(s) Oral every 6 hours PRN Severe Pain (7 - 10)      CAPILLARY BLOOD GLUCOSE        I&O's Summary    27 May 2024 07:01  -  28 May 2024 07:00  --------------------------------------------------------  IN: 1300 mL / OUT: 0 mL / NET: 1300 mL        PHYSICAL EXAM:  Vital Signs Last 24 Hrs  T(C): 37 (29 May 2024 05:25), Max: 37 (28 May 2024 13:28)  T(F): 98.6 (29 May 2024 05:25), Max: 98.6 (28 May 2024 13:28)  HR: 65 (29 May 2024 05:25) (65 - 76)  BP: 106/76 (29 May 2024 05:25) (106/76 - 123/68)  BP(mean): --  RR: 18 (29 May 2024 05:25) (18 - 18)  SpO2: 100% (29 May 2024 05:25) (99% - 100%)    Parameters below as of 29 May 2024 05:25  Patient On (Oxygen Delivery Method): room air        GENERAL: NAD, lying in bed comfortably  HEAD: Atraumatic, normocephalic  EYES: EOMI, conjunctiva and sclera clear  ENT: Moist mucous membranes  NECK: Supple, no JVD  HEART: S1, S2, Regular rate and rhythm, no murmurs, rubs, or gallops  LUNGS: Unlabored respirations, clear to auscultation bilaterally, no crackles, wheezing, or rhonchi  ABDOMEN: Soft, nontender, nondistended  EXTREMITIES: No LE edema  NERVOUS SYSTEM:  A&Ox3, no focal deficits   SKIN: No rashes or lesions    LABS:                      RADIOLOGY & ADDITIONAL TESTS:  Results Reviewed:   Imaging Personally Reviewed:  Electrocardiogram Personally Reviewed:  Tele:

## 2024-05-29 NOTE — PROGRESS NOTE ADULT - PROBLEM SELECTOR PLAN 2
-pt with hx spina bifida, has motorized wheelchair at home   - shunt managed by [facility] neurosurgery   -No hydrocephalus on CT

## 2024-05-29 NOTE — PROGRESS NOTE ADULT - SUBJECTIVE AND OBJECTIVE BOX
Patient is a 44y old  Female who presents with a chief complaint of right foot wound (29 May 2024 06:15)      HPI:  44F MHx spina bifida, multiple bilateral foot surgeries, and  shunt (managed [facility] neurosurgery), uterine fibroids presenting with R foot wound. Per patient, has had R foot wound for past 6-7 months, denies past trauma or injury. Last foot surgery was at age 15, not able to say what type of surgery. Foot wound presented first as a palpable mass that started draining, drainage was mostly bloody, for past few days has been white/yellow and very painful- now developed into area with central ulceration and assc redness in the past. Can ambulate at home, small distances, but has struggled with this due to pain. Endorsing abdominal pain due to active menstruation. Denies any fevers, chills, CP, SOB, n/v/d.     In the ED, T 100.5, , /100, 99% RA.  (12 May 2024 09:51)      REVIEW OF SYSTEMS  weakness  leg cramping    PAST MEDICAL & SURGICAL HISTORY  Spina bifida    Fibroids    Wound of right foot    Wound of left foot    No significant past surgical history    S/P  shunt    S/P foot surgery, left    S/P foot surgery, right       CURRENT FUNCTIONAL STATUS  5/29    Bed Mobility  Bed Mobility Training Rolling/Turning: independent  Bed Mobility Training Scooting: independent  Bed Mobility Training Bridging: independent  Bed Mobility Training Sit-to-Supine: independent  Bed Mobility Training Supine-to-Sit: independent  Bed Mobility Training Limitations: decreased flexibility;  decreased strength    Sit-Stand Transfer Training  Transfer Training Sit-to-Stand Transfer: stand-by assist;  1 person assist;  weight-bearing as tolerated   rolling walker  Transfer Training Stand-to-Sit Transfer: stand-by assist;  1 person assist;  weight-bearing as tolerated   rolling walker  Sit-to-Stand Transfer Training Transfer Safety Analysis: decreased step length;  decreased balance;  rolling walker    Gait Training  Gait Training: stand-by assist;  rolling walker;  45 feet;  weight-bearing as tolerated  Gait Analysis: 3-point gait   decreased man;  decreased strength;  decreased flexibility;  rolling walker    Therapeutic Exercise  Therapeutic Exercise Detail: Pt performed Active ROM Bilateral LE's.       FAMILY HISTORY   No pertinent family history in first degree relatives       VITALS  T(C): 37 (05-29-24 @ 05:25), Max: 37 (05-28-24 @ 13:28)  HR: 65 (05-29-24 @ 05:25) (65 - 76)  BP: 106/76 (05-29-24 @ 05:25) (106/76 - 123/68)  RR: 18 (05-29-24 @ 05:25) (18 - 18)  SpO2: 100% (05-29-24 @ 05:25) (99% - 100%)  Wt(kg): --    ALLERGIES  latex (Hives; Rash)  Zyrtec (Rash)      MEDICATIONS   acetaminophen     Tablet .. 650 milliGRAM(s) Oral every 6 hours PRN  calamine/zinc oxide Lotion 1 Application(s) Topical four times a day PRN  chlorhexidine 2% Cloths 1 Application(s) Topical daily  enoxaparin Injectable 30 milliGRAM(s) SubCutaneous every 24 hours  ferrous    sulfate 325 milliGRAM(s) Oral daily  gabapentin 100 milliGRAM(s) Oral three times a day  hydrocortisone 1% Cream 1 Application(s) Topical two times a day  loperamide 2 milliGRAM(s) Oral daily PRN  melatonin 3 milliGRAM(s) Oral at bedtime  mupirocin 2% Ointment 1 Application(s) Topical <User Schedule>  ondansetron    Tablet 4 milliGRAM(s) Oral every 8 hours PRN  oxyCODONE    IR 5 milliGRAM(s) Oral every 6 hours PRN      ----------------------------------------------------------------------------------------  PHYSICAL EXAM   Constitutional - NAD, Comfortable  HEENT - NCAT, MMM  Neck - Supple  Chest - CTA bilaterally  Cardiovascular - RRR, S1S2  Abdomen -  Soft, NTND  Extremities -  left foot dressed  Neurologic Exam -                    Cognitive - Awake, Alert, Oriented to self, place, date, year, situation     Communication - Fluent, No dysarthria        Cranial Nerves - mild L facial weakness     Motor -                     LEFT    UE - ShAB 5/5, EF 5/5, EE 5/5, WE 5/5,  5/5                    RIGHT UE - ShAB 5/5, EF 5/5, EE 5/5, WE 5/5,  5/5                    LEFT    LE - HF 4-/5, KE 4/5, DF 1/5, PF 1/5                    RIGHT LE - HF 4-/5, KE 4/5, DF 1/5, PF 1/5        Sensory - decreased LT both feet   Psychiatric - Affect WNL      ----------------------------------------------------------------------------------------  ASSESSMENT/PLAN   The patient is a 45y/o female PMHx spina bifida/paraparesis, multiple bilateral foot surgeries, and  shunt, uterine fibroids presenting with R foot wound, found to have  R foot and L ischial abscess,  OR 5/20 for R foot I&D with podiatry with functional, gait, ADL impairments. Patient with functional decline over the last 5-6 months 2/2 RLE wound.    Disposition - Patient improving with bedside therapy.  cleared for discharge home, recommend home care vs outpatient therapy.  Patient reports having a motorized wheelchair and can follow up at the wheelchair clinic at [facility] for optimization.  PT - ROM, Bed mobility, Transfers, Ambulation with assistive device  OT - ADLs, ROM    Precautions - Falls, sensory BLE  DVT Prophylaxis - Lovenox  Weight bearing status - WBAT  Skin - Turn Q2hrs  Diet - Regular

## 2024-05-29 NOTE — PROGRESS NOTE ADULT - ATTENDING COMMENTS
44W w/ spina bifida,  shunt, and multiple bilateral foot surgeries presenting with right foot wound now s/p soft tissue mass excision with podiatry (5/21/24) without concern for infection and I&D of ischial collection. Completed course of Zosyn. Now pending discharge. PT and PMR recommended acute rehab. Initially plan was for discharge home, family unable to do wound care so plan changed to [facility].    Patient endorsing diarrhea, will check GI PCR today. All stool softeners have been discontinued.   Gabapentin added for neuropathic leg pain. 44W w/ spina bifida,  shunt, and multiple bilateral foot surgeries presenting with right foot wound now s/p soft tissue mass excision with podiatry (5/21/24) without concern for infection and I&D of ischial collection. Completed course of Zosyn. Now pending discharge. PT and PMR recommended acute rehab. Initially plan was for discharge home, family unable to do wound care so plan changed to [facility].    Patient endorsing diarrhea, will check GI PCR today. All stool softeners have been discontinued  Gabapentin added for neuropathic leg pain

## 2024-05-29 NOTE — PROGRESS NOTE ADULT - PROBLEM SELECTOR PLAN 4
-C/w immodium  -Zofran for nausea -C/w immodium  -Zofran for nausea  -GI PCR sent  -Will f/u labs in AM

## 2024-05-30 LAB
ANION GAP SERPL CALC-SCNC: 12 MMOL/L — SIGNIFICANT CHANGE UP (ref 7–14)
BUN SERPL-MCNC: 13 MG/DL — SIGNIFICANT CHANGE UP (ref 7–23)
CALCIUM SERPL-MCNC: 8.5 MG/DL — SIGNIFICANT CHANGE UP (ref 8.4–10.5)
CHLORIDE SERPL-SCNC: 105 MMOL/L — SIGNIFICANT CHANGE UP (ref 98–107)
CO2 SERPL-SCNC: 20 MMOL/L — LOW (ref 22–31)
CREAT SERPL-MCNC: 0.5 MG/DL — SIGNIFICANT CHANGE UP (ref 0.5–1.3)
EGFR: 117 ML/MIN/1.73M2 — SIGNIFICANT CHANGE UP
GLUCOSE SERPL-MCNC: 84 MG/DL — SIGNIFICANT CHANGE UP (ref 70–99)
HCT VFR BLD CALC: 25.7 % — LOW (ref 34.5–45)
HGB BLD-MCNC: 8.2 G/DL — LOW (ref 11.5–15.5)
MAGNESIUM SERPL-MCNC: 2 MG/DL — SIGNIFICANT CHANGE UP (ref 1.6–2.6)
MCHC RBC-ENTMCNC: 26 PG — LOW (ref 27–34)
MCHC RBC-ENTMCNC: 31.9 GM/DL — LOW (ref 32–36)
MCV RBC AUTO: 81.6 FL — SIGNIFICANT CHANGE UP (ref 80–100)
NRBC # BLD: 0 /100 WBCS — SIGNIFICANT CHANGE UP (ref 0–0)
NRBC # FLD: 0 K/UL — SIGNIFICANT CHANGE UP (ref 0–0)
PHOSPHATE SERPL-MCNC: 3.2 MG/DL — SIGNIFICANT CHANGE UP (ref 2.5–4.5)
PLATELET # BLD AUTO: 423 K/UL — HIGH (ref 150–400)
POTASSIUM SERPL-MCNC: 4.1 MMOL/L — SIGNIFICANT CHANGE UP (ref 3.5–5.3)
POTASSIUM SERPL-SCNC: 4.1 MMOL/L — SIGNIFICANT CHANGE UP (ref 3.5–5.3)
RBC # BLD: 3.15 M/UL — LOW (ref 3.8–5.2)
RBC # FLD: 16.9 % — HIGH (ref 10.3–14.5)
SODIUM SERPL-SCNC: 137 MMOL/L — SIGNIFICANT CHANGE UP (ref 135–145)
WBC # BLD: 4.73 K/UL — SIGNIFICANT CHANGE UP (ref 3.8–10.5)
WBC # FLD AUTO: 4.73 K/UL — SIGNIFICANT CHANGE UP (ref 3.8–10.5)

## 2024-05-30 PROCEDURE — 73630 X-RAY EXAM OF FOOT: CPT | Mod: 26,LT

## 2024-05-30 PROCEDURE — 99232 SBSQ HOSP IP/OBS MODERATE 35: CPT | Mod: GC

## 2024-05-30 PROCEDURE — 99232 SBSQ HOSP IP/OBS MODERATE 35: CPT

## 2024-05-30 RX ORDER — OXYCODONE HYDROCHLORIDE 5 MG/1
5 TABLET ORAL EVERY 8 HOURS
Refills: 0 | Status: DISCONTINUED | OUTPATIENT
Start: 2024-05-30 | End: 2024-06-06

## 2024-05-30 RX ADMIN — Medication 1 APPLICATION(S): at 05:15

## 2024-05-30 RX ADMIN — OXYCODONE HYDROCHLORIDE 5 MILLIGRAM(S): 5 TABLET ORAL at 21:34

## 2024-05-30 RX ADMIN — CHLORHEXIDINE GLUCONATE 1 APPLICATION(S): 213 SOLUTION TOPICAL at 11:26

## 2024-05-30 RX ADMIN — GABAPENTIN 100 MILLIGRAM(S): 400 CAPSULE ORAL at 14:25

## 2024-05-30 RX ADMIN — OXYCODONE HYDROCHLORIDE 5 MILLIGRAM(S): 5 TABLET ORAL at 11:24

## 2024-05-30 RX ADMIN — OXYCODONE HYDROCHLORIDE 5 MILLIGRAM(S): 5 TABLET ORAL at 12:24

## 2024-05-30 RX ADMIN — GABAPENTIN 100 MILLIGRAM(S): 400 CAPSULE ORAL at 21:34

## 2024-05-30 RX ADMIN — Medication 1 APPLICATION(S): at 19:45

## 2024-05-30 RX ADMIN — Medication 3 MILLIGRAM(S): at 21:34

## 2024-05-30 RX ADMIN — Medication 325 MILLIGRAM(S): at 11:24

## 2024-05-30 RX ADMIN — OXYCODONE HYDROCHLORIDE 5 MILLIGRAM(S): 5 TABLET ORAL at 22:34

## 2024-05-30 RX ADMIN — GABAPENTIN 100 MILLIGRAM(S): 400 CAPSULE ORAL at 06:00

## 2024-05-30 NOTE — PROGRESS NOTE ADULT - PROBLEM SELECTOR PLAN 5
DVT: lovenox   Diet: regular  Dispo: pending med optimization, PT rec outpatient pt DVT: lovenox   Diet: regular  Dispo: pending [facility] for wound care, PT rec outpatient pt

## 2024-05-30 NOTE — PROGRESS NOTE ADULT - ATTENDING COMMENTS
44W w/ spina bifida,  shunt, and multiple bilateral foot surgeries presenting with right foot wound now s/p soft tissue mass excision with podiatry (5/21/24) without concern for infection and I&D of ischial collection. Completed course of Zosyn. Now pending discharge. PT and PMR recommended acute rehab, but now recs changed to home PT. Initially plan was for discharge home, family unable to do wound care so plan changed to [facility]. [place] referrals sent per discussion with SW during IDRs. Medically stable for discharge.     Patient reports diarrhea resolved. Can send GI PCR if recurs.   Gabapentin added for neuropathic leg pain

## 2024-05-30 NOTE — PROGRESS NOTE ADULT - SUBJECTIVE AND OBJECTIVE BOX
PROGRESS NOTE:     Patient is a 44y old  Female who presents with a chief complaint of right foot wound (28 May 2024 08:37)      INTERVAL EVENTS: No acute overnight events.     SUBJECTIVE: Patient seen and examined at bedside.      MEDICATIONS  (STANDING):     chlorhexidine 2% Cloths 1 Application(s) Topical daily  enoxaparin Injectable 30 milliGRAM(s) SubCutaneous every 24 hours  ferrous    sulfate 325 milliGRAM(s) Oral daily  gabapentin 100 milliGRAM(s) Oral three times a day  hydrocortisone 1% Cream 1 Application(s) Topical two times a day  melatonin 3 milliGRAM(s) Oral at bedtime  mupirocin 2% Ointment 1 Application(s) Topical <User Schedule>    MEDICATIONS  (PRN):  acetaminophen     Tablet .. 650 milliGRAM(s) Oral every 6 hours PRN Temp greater or equal to 38C (100.4F), Mild Pain (1 - 3), Moderate Pain (4 - 6)  calamine/zinc oxide Lotion 1 Application(s) Topical four times a day PRN Itching  loperamide 2 milliGRAM(s) Oral daily PRN Diarrhea  ondansetron    Tablet 4 milliGRAM(s) Oral every 8 hours PRN Nausea and/or Vomiting        CAPILLARY BLOOD GLUCOSE        I&O's Summary    27 May 2024 07:01  -  28 May 2024 07:00  --------------------------------------------------------  IN: 1300 mL / OUT: 0 mL / NET: 1300 mL        PHYSICAL EXAM:  Vital Signs Last 24 Hrs  T(C): 37.2 (30 May 2024 05:27), Max: 37.2 (29 May 2024 21:35)  T(F): 99 (30 May 2024 05:27), Max: 99 (30 May 2024 05:27)  HR: 73 (30 May 2024 05:27) (67 - 80)  BP: 109/69 (30 May 2024 05:27) (109/69 - 123/84)  BP(mean): --  RR: 18 (30 May 2024 05:27) (18 - 18)  SpO2: 100% (30 May 2024 05:27) (99% - 100%)    Parameters below as of 30 May 2024 05:27  Patient On (Oxygen Delivery Method): room air          GENERAL: NAD, lying in bed comfortably  HEAD: Atraumatic, normocephalic  EYES: EOMI, conjunctiva and sclera clear  ENT: Moist mucous membranes  NECK: Supple, no JVD  HEART: S1, S2, Regular rate and rhythm, no murmurs, rubs, or gallops  LUNGS: Unlabored respirations, clear to auscultation bilaterally, no crackles, wheezing, or rhonchi  ABDOMEN: Soft, nontender, nondistended  EXTREMITIES: No LE edema  NERVOUS SYSTEM:  A&Ox3, no focal deficits   SKIN: No rashes or lesions    LABS:                      RADIOLOGY & ADDITIONAL TESTS:  Results Reviewed:   Imaging Personally Reviewed:  Electrocardiogram Personally Reviewed:  Tele:

## 2024-05-30 NOTE — PROGRESS NOTE ADULT - PROBLEM SELECTOR PLAN 2
-pt with hx spina bifida, has motorized wheelchair at home   - shunt managed by [facility] neurosurgery   -No hydrocephalus on CT

## 2024-05-30 NOTE — PROGRESS NOTE ADULT - ASSESSMENT
Assessment/Plan: 44F MHx spina bifida, multiple bilateral foot surgeries, and  shunt (managed [facility] neurosurgery), uterine fibroids presenting with R foot wound. Patient has being seen by podiatry and ID for right foot with abscess/masses.     Wound care follow up for Left ischium Stage 4 pressure injury.     Left ischium pressure injury present on admission   -No major changes since last seen, depth slowly improving, undermining remains circumferentially deepest extends 3.8cm at 7 and 9 o'clock (prev 3cm)  -CT of the ABD and pelvis: Enlarged, myomatous uterus. Stool distends the colon and rectum. Posteriorly directed anal sphincter complex with surrounding soft   tissue infiltration. Thick walled fluid collection superficial to the left ischium measuring up to 7.9 cm with thick surrounding soft tissue component. Findings are concerning for abscess. Appreciate general surgery consult, management as per primary team   -s/p incision and drainage as per general surgery on 5/16, as per procedure note "Aspirated clear red fluid. Used punch biopsy and scissors to remove small area of thick rind. Probed defect with clamps and packed with 1" packing tape:   -wound cultures no growth at 5 days   --Overall no major changes since last seen, undermining slightly improved  -Tissue type remains the same exposing pink/red muscle appearing tissue? Upon palpation tissue feels like a mass?   -No drainable abscess noted   -Well demarcated ulcer  -Appreciate case management following up, referral sent for air loss bed mattress, roho seat cushion  -Patient endorses (+) sensation from waist down, however was not aware of full thickness wound. to left ischium Uncertain of exact onset of wound however previous CT of the ABD and pelvis in 2023 reported a left ischium pressure injury.   -Denies pain or tenderness to area   -Previous CT of the ABD and pelvis (10/20/23):" Decubitus ulcer 2.4 cm deep overlying the left ischium"   -No associated cellulitis   -No bone palpable or visible  -Delayed wound healing likely secondary to friction/shearing forces, muscle wasting, mostly bedbound state, increase moisture and diaper use. Patient AOX3, and requesting to keep diaper in place for dignity purposes. Advised patient that diapers may increase moisture and subsequently delay wound healing.   - Topical recommendations: Clean wound and periwound skin with NS. Pat dry. Apply 3M advanced skin protectant to macerated periwound skin, allow to dry (3M advanced skin protectant requires 3x a week application only apply T.TH and saturdays, [employer] advanced skin protectant thea PS [id number]). Lightly pack wound depth and undermining with Aquacel hydrofiber ribbon, make sure to leave 2" out at end visible for fully removal with next dressing change. Cover with silicone foam with border. Change daily or prn if soiled.   -Continue to offload as per hospital protocol   -Avoid diaper use, if patient reluctant to keeping diaper please provide vigilant perineal care  -Risks for moisture associated dermatitis in bilateral buttocks/perineum: Gently clean with skin cleanser. Pat dry. Apply a thin layer of Willian barrier moisture cream, apply twice a day or prn if soiled.   -Appreciate nutrition Consult for patient with underweight,  MVI & Vit C on board to promote wound healing      Right foot wound-Management as per podiatry team.   -Left lateral malleolus non fluctuant scab with surrounding scar tissue? bone deformity? Keep clean and dry, monitor for tissue type changes. Please reconsult podiatry if tissue type deteriorates  -Patient expressing concern about "lump" in left lateral malleolus and discoloration of surrounding skin. No fluctuance or bogginess on exam. No drainage. Also reporting the left lateral malleolus " feels warm"   -No tissue type changes palpable.   -Consider X rays of left foot   -Continue to offload both heels with complete cair boots   -Follow up with podiatry outpatient as recommended     Left AC pruritis resolving, no blisters noted on today's assessment  -Advise patient against scratching   -Management as per primary team     Additional Recs   Continue turning and positioning w/ offloading assistive devices as per protocol  Continue w/ Pericare as per protocol  Continue w/ low air loss fluidized bed surface   Continue to turn and position every 2 hours with z-joel fluidized positioning device.  Continue Nutritional management as per RD recommendations, multivitamin and vitamin c on board  to assists with wound healing     Upon discharge follow up at outpatient [facility]. [address] [place] [address]. [phone].    Will continue to follow while inpatient. Please reconsult earlier if needed   Thank you.    Discussed findings and plan with primary team  Roxie Patterson, KATHY-BC, CWN  pager #[phone] or available in MS teams     If after 4PM or before 7:30AM on Mon-Friday or weekend/holiday please contact general surgery for urgent matters.   Team A- [id number]   Team B- [id number]  For non-urgent matters e-mail [email or web address]    I spent 35 minutes face-to-face with this patient of which more than 50% of the time was spent counseling/coordinating care of this patient. Assessment/Plan: 44F MHx spina bifida, multiple bilateral foot surgeries, and  shunt (managed [facility]), uterine fibroids presenting with R foot wound. Patient has being seen by podiatry and ID for right foot with abscess/masses.     Wound care follow up for Left ischium Stage 4 pressure injury.     Left ischium pressure injury present on admission   -No major changes since last seen, depth slowly improving, undermining remains circumferentially deepest extends 3.8cm at 7 and 9 o'clock (prev 3cm)  -CT of the ABD and pelvis: Enlarged, myomatous uterus. Stool distends the colon and rectum. Posteriorly directed anal sphincter complex with surrounding soft   tissue infiltration. Thick walled fluid collection superficial to the left ischium measuring up to 7.9 cm with thick surrounding soft tissue component. Findings are concerning for abscess. Appreciate general surgery consult, management as per primary team   -s/p incision and drainage as per general surgery on 5/16, as per procedure note "Aspirated clear red fluid. Used punch biopsy and scissors to remove small area of thick rind. Probed defect with clamps and packed with 1" packing tape:   -wound cultures no growth at 5 days   --Overall no major changes since last seen, undermining slightly improved  -Tissue type remains the same exposing pink/red muscle appearing tissue? Upon palpation tissue feels like a mass?   -No drainable abscess noted   -Well demarcated ulcer  -Appreciate case management following up, referral sent for air loss bed mattress, roho seat cushion  -Patient endorses (+) sensation from waist down, however was not aware of full thickness wound. to left ischium Uncertain of exact onset of wound however previous CT of the ABD and pelvis in 2023 reported a left ischium pressure injury.   -Denies pain or tenderness to area   -Previous CT of the ABD and pelvis (10/20/23):" Decubitus ulcer 2.4 cm deep overlying the left ischium"   -No associated cellulitis   -No bone palpable or visible  -Delayed wound healing likely secondary to friction/shearing forces, muscle wasting, mostly bedbound state, increase moisture and diaper use. Patient AOX3, and requesting to keep diaper in place for dignity purposes. Advised patient that diapers may increase moisture and subsequently delay wound healing  -Advised patient to follow up closely at wound care center outpatient    -Given moderate serous drainage, recommend to continue packing with Aquacel   - Topical recommendations: Clean wound and periwound skin with NS. Pat dry. Apply 3M advanced skin protectant to macerated periwound skin, allow to dry (3M advanced skin protectant requires 3x a week application only apply T.TH and saturdays, 3M advanced skin protectant abdoulayealton PS [id number]). Lightly pack wound depth and undermining with Aquacel hydrofiber ribbon, make sure to leave 2" out at end visible for fully removal with next dressing change. Cover with silicone foam with border. Change daily or prn if soiled.   -Continue to offload as per hospital protocol   -Avoid diaper use, if patient reluctant to keeping diaper please provide vigilant perineal care  -Risks for moisture associated dermatitis in bilateral buttocks/perineum: Gently clean with skin cleanser. Pat dry. Apply a thin layer of Willian barrier moisture cream, apply twice a day or prn if soiled.   -Once discharge to home patient should qualify for group 2 air mattress and Roho seat cushion to continue to offload. Patient should limit seating time to less than 2 consecutive hours   -Appreciate nutrition Consult for patient with underweight,  MVI & Vit C on board to promote wound healing      Right foot wound-Management as per podiatry team.   -Left lateral malleolus non fluctuant scab with surrounding scar tissue? bone deformity? Keep clean and dry, monitor for tissue type changes. Please reconsult podiatry if tissue type deteriorates  -Patient expressing concern about "lump" in left lateral malleolus and discoloration of surrounding skin. No fluctuance or bogginess on exam. No drainage. Also reporting the left lateral malleolus " feels warm"   -No tissue type changes palpable.   -Consider X rays of left foot   -Continue to offload both heels with complete cair boots   -Follow up with podiatry outpatient as recommended     Left AC pruritis resolving, no blisters noted on today's assessment  -Advise patient against scratching   -Management as per primary team     Additional Recs   Continue turning and positioning w/ offloading assistive devices as per protocol  Continue w/ Pericare as per protocol  Continue w/ low air loss fluidized bed surface   Continue to turn and position every 2 hours with z-joel fluidized positioning device.  Continue Nutritional management as per RD recommendations, multivitamin and vitamin c on board  to assists with wound healing     Upon discharge follow up at outpatient [facility]. [address] [facility]. [phone].    Will continue to follow while inpatient. Please reconsult earlier if needed   Thank you.    Discussed findings and plan with primary team  Roxie Patterson, KATHY-BC, CWN  pager #[phone] or available in MS teams     If after 4PM or before 7:30AM on Mon-Friday or weekend/holiday please contact general surgery for urgent matters.   Team A- [id number]   Team B- [id number]  For non-urgent matters e-mail [email or web address]    I spent 35 minutes face-to-face with this patient of which more than 50% of the time was spent counseling/coordinating care of this patient. Assessment/Plan: 44F MHx spina bifida, multiple bilateral foot surgeries, and  shunt (managed [facility]), uterine fibroids presenting with R foot wound. Patient has being seen by podiatry and ID for right foot with abscess/masses.     Wound care follow up for Left ischium Stage 4 pressure injury.     Left ischium pressure injury present on admission   -No major changes since last seen, depth slowly improving, undermining remains circumferentially deepest extends 3.8cm at 7 and 9 o'clock (prev 3cm)  -On palpation red tissue feels Rim like?   -CT of the ABD and pelvis: Enlarged, myomatous uterus. Stool distends the colon and rectum. Posteriorly directed anal sphincter complex with surrounding soft   tissue infiltration. Thick walled fluid collection superficial to the left ischium measuring up to 7.9 cm with thick surrounding soft tissue component. Findings are concerning for abscess. Appreciate general surgery consult, management as per primary team   -s/p incision and drainage as per general surgery on 5/16, as per procedure note "Aspirated clear red fluid. Used punch biopsy and scissors to remove small area of thick rind. Probed defect with clamps and packed with 1" packing tape:   -wound cultures no growth at 5 days   --Overall no major changes since last seen, undermining slightly improved  -Tissue type remains the same exposing pink/red muscle appearing tissue? Upon palpation tissue feels like a mass?   -No drainable abscess noted   -Well demarcated ulcer  -Appreciate case management following up, referral sent for air loss bed mattress, roho seat cushion  -Patient endorses (+) sensation from waist down, however was not aware of full thickness wound. to left ischium Uncertain of exact onset of wound however previous CT of the ABD and pelvis in 2023 reported a left ischium pressure injury.   -Denies pain or tenderness to area   -Previous CT of the ABD and pelvis (10/20/23):" Decubitus ulcer 2.4 cm deep overlying the left ischium"   -No associated cellulitis   -No bone palpable or visible  -Delayed wound healing likely secondary to friction/shearing forces, muscle wasting, mostly bedbound state, increase moisture and diaper use. Patient AOX3, and requesting to keep diaper in place for dignity purposes. Advised patient that diapers may increase moisture and subsequently delay wound healing  -Advised patient to follow up closely at wound care center outpatient. Macerated edges patient may benefit from debridement of wound edges outpatient   -Given moderate serous drainage, recommend to continue packing with Aquacel   - Topical recommendations: Clean wound and periwound skin with NS. Pat dry. Apply 3M advanced skin protectant to macerated periwound skin, allow to dry (3M advanced skin protectant requires 3x a week application only apply T.TH and saturdays, 3M advanced skin protectant abdoulayealton PS [id number]). Lightly pack wound depth and undermining with Aquacel hydrofiber ribbon, make sure to leave 2" out at end visible for fully removal with next dressing change. Cover with silicone foam with border. Change daily or prn if soiled.   -Continue to offload as per hospital protocol   -Avoid diaper use, if patient reluctant to keeping diaper please provide vigilant perineal care  -Risks for moisture associated dermatitis in bilateral buttocks/perineum: Gently clean with skin cleanser. Pat dry. Apply a thin layer of Willian barrier moisture cream, apply twice a day or prn if soiled.   -Once discharge to home patient should qualify for group 2 air mattress and Roho seat cushion to continue to offload. Patient should limit seating time to less than 2 consecutive hours   -Appreciate nutrition Consult for patient with underweight,  MVI & Vit C on board to promote wound healing      Right foot wound-Management as per podiatry team.   -Left lateral malleolus non fluctuant scab with surrounding scar tissue? bone deformity? Keep clean and dry, monitor for tissue type changes. Please reconsult podiatry if tissue type deteriorates  -Patient expressing concern about "lump" in left lateral malleolus and discoloration of surrounding skin. No fluctuance or bogginess on exam. No drainage. Also reporting the left lateral malleolus " feels warm"   -No tissue type changes palpable.   -Consider X rays of left foot   -Continue to offload both heels with complete cair boots   -Follow up with podiatry outpatient as recommended     Left AC pruritis resolving, no blisters noted on today's assessment  -Advise patient against scratching   -Management as per primary team     Additional Recs   Continue turning and positioning w/ offloading assistive devices as per protocol  Continue w/ Pericare as per protocol  Continue w/ low air loss fluidized bed surface   Continue to turn and position every 2 hours with z-joel fluidized positioning device.  Continue Nutritional management as per RD recommendations, multivitamin and vitamin c on board  to assists with wound healing     Upon discharge follow up at outpatient [facility]. [address] [place] [address]. [phone].    Will continue to follow while inpatient. Please reconsult earlier if needed   Thank you.    Discussed findings and plan with primary team  Roxie Patterson, KATHY-BC, JANEN  pager #[phone] or available in MS teams     If after 4PM or before 7:30AM on Mon-Friday or weekend/holiday please contact general surgery for urgent matters.   Team A- [id number]   Team B- [id number]  For non-urgent matters e-mail [email or web address]    I spent 35 minutes face-to-face with this patient of which more than 50% of the time was spent counseling/coordinating care of this patient.

## 2024-05-30 NOTE — PROGRESS NOTE ADULT - SUBJECTIVE AND OBJECTIVE BOX
[facility]-- WOUND TEAM -- FOLLOW UP NOTE  --------------------------------------------------------------------------------    subjective: Patient seen and examined at bedside. Patient endorses feeling ok, endorses pruritis to her left arm has improved significantly, endorsese she has being applying hydrocortisone as recommended endorses hips and leg pain a few days ago, also endorses transient  let foot pain on Sunday. Patient endorses she is concerned about the discoloration of her left foot and a "bump". She endorses this is how the other wound started in her right foot. As far as the left gluteal wound she endorses she has no pain, unsure of progression of the wound. Patient still requesting to wear diapers. Patient endorses she walked yesterday in the hallway with PT, also endorses she has being walking to the bathroom as well. Patient denies recent diarrhea/N/V.     Interval HPI/24 hour events:   -->Appreciate podiatry continues to follow up   -->Appreciate nutrition continues to follow up for Unintended weight loss  -->Pending discharge to rehab   Chart reviewed including labs and relevant images    Diet:  Diet, Regular (05-12-24 @ 09:21)    ROS: General/ SKIN/ see above   all other systems negative    ALLERGIES & MEDICATIONS  --------------------------------------------------------------------------------  Allergies    latex (Hives; Rash)  Zyrtec (Rash)    Intolerances    STANDING INPATIENT MEDICATIONS    chlorhexidine 2% Cloths 1 Application(s) Topical daily  enoxaparin Injectable 30 milliGRAM(s) SubCutaneous every 24 hours  ferrous    sulfate 325 milliGRAM(s) Oral daily  gabapentin 100 milliGRAM(s) Oral three times a day  hydrocortisone 1% Cream 1 Application(s) Topical two times a day  melatonin 3 milliGRAM(s) Oral at bedtime  mupirocin 2% Ointment 1 Application(s) Topical <User Schedule>    PRN INPATIENT MEDICATION  acetaminophen     Tablet .. 650 milliGRAM(s) Oral every 6 hours PRN  calamine/zinc oxide Lotion 1 Application(s) Topical four times a day PRN  loperamide 2 milliGRAM(s) Oral daily PRN  ondansetron    Tablet 4 milliGRAM(s) Oral every 8 hours PRN  oxyCODONE    IR 5 milliGRAM(s) Oral every 8 hours PRN    Vital signs:  T(C): 37.2 (05-30-24 @ 05:27), Max: 37.2 (05-29-24 @ 21:35)  HR: 73 (05-30-24 @ 05:27) (67 - 80)  BP: 109/69 (05-30-24 @ 05:27) (109/69 - 123/84)  RR: 18 (05-30-24 @ 05:27) (18 - 18)  SpO2: 100% (05-30-24 @ 05:27) (99% - 100%-  Weight (kg): 44.2 (05-29-24 @ 11:41)      05-29-24 @ 07:01  -  05-30-24 @ 07:00  --------------------------------------------------------  IN: 600 mL / OUT: 0 mL / NET: 600 mL      Physical Exam   Constitutional: NAD/AOX3. Well kept, patient with make up.   Thin, (+) bony prominences to sacrum and bilateral trochanters/bilateral ischium   (+) low airloss support surface, (+) fluidized positioning devices, heels elevated with pillows   HEENT:  NC/AT, non icteric, mucosa moist, throat clear, trachea midline, neck supple  Cardiovascular: Rate regular   Respiratory: Equal chest expansion   Gastrointestinal soft NT/ND   : functional incontinence at times   Neuology : weakened strength.  Paraplegic.   Musculoskeletal:  limited aROM in lower extremities. Mild foot drop bilaterally   Vascular: LLE equally warm, no cyanosis, clubbing, edema. + 2 DP pulses.   Right  foot with intact/dry dressing in place-Management as per podiatry   Left lateral malleolus previous shallow wound, appears like non fluctuant scab and surrounding scar tissue? bone deformity? Management as per podiatry  ABD pad and kerlix placed.   Skin:  moist w/ good turgor  Linear healed scratches in left AC area and left lateral thigh.   Lumbar spine, right ankle, Left achilles left dorsal foot, surgical scars, well approximated.   Left ischium Stage 4 pressure injury   -Well demarcated ulcer   -+ Bony prominences   -[id number] (prev [id number])   -Undermining circumferentially extends deepest 3.8cm at 7 and 9 o'clock. At 12 o'clock extends 3cm and at 6 o'clock extends 3.5cm   -No purulence express  -Moderate serous drainage, no odor   -Tissue type exposing 100% red/pink viable tissue appears like muscle?   -No bone palpable or visualized   -Periwound skin with thicken skin maceration circumferentially no increased warmth, no erythema, no edema.   Bilateral buttocks/inner buttocks with areas of hypopigmentation previous healed wounds suspect secondary to moisture associated dermatitis. No open wounds to sacrum.   Psych: calm and cooperative.   Patient insisted on wearing diapers reports she does not feel comfortable without diapers, reported she had tried the briefs but she feels the don't absorb as well. Patient able to turn in bed with minimal assistance.     LABS/ CULTURES/ RADIOLOGY:              8.2    4.73  >-----------<  423      [05-30-24 @ 04:50]              25.7     137  |  105  |  13  ----------------------------<  84      [05-30-24 @ 04:50]  4.1   |  20  |  0.50        Ca     8.5     [05-30-24 @ 04:50]      Mg     2.00     [05-30-24 @ 04:50]      Phos  3.2     [05-30-24 @ 04:50]        ACC: [id number] EXAM:  CT ABDOMEN AND PELVIS OC IC   ORDERED BY: FLORENTINO CORMIER     PROCEDURE DATE:  05/15/2024          INTERPRETATION:  CLINICAL INFORMATION: Left sacral injury with underlying   mass on exam.    COMPARISON: 10/20/2023    CONTRAST/COMPLICATIONS:  IV Contrast: Omnipaque 350  90 cc administered   10 cc discarded  Oral Contrast: NONE  Complications: None reported at time of study completion    PROCEDURE:  CT of the Abdomen and Pelvis was performed.  Sagittal and coronal reformats were performed.    FINDINGS:  LOWER CHEST: Within normal limits.    LIVER: 1.4 cm liver cyst and additional lesions too small to characterize  BILE DUCTS: Normal caliber.  GALLBLADDER: Within normal limits.  SPLEEN: Within normal limits.  PANCREAS: Within normal limits.  ADRENALS: Within normal limits.  KIDNEYS/URETERS: Within normal limits.    BLADDER: Within normal limits.  REPRODUCTIVE ORGANS: Enlarged, myomatous uterus measures 13.5 x 7.0 x 3.3   cm.    BOWEL: No bowel obstruction. Appendix is not visualized. Stool throughout   the colon and rectum. The anal sphincter complex is directed posteriorly.   Soft tissue infiltration is noted surrounding the renal stricture   complex. (3:71)  PERITONEUM: Ventricular peritoneal catheter coils in the anterior abdomen   before terminating in the pelvis.  VESSELS: Within normal limits.  RETROPERITONEUM/LYMPH NODES: No lymphadenopathy.  ABDOMINAL WALL: Fat-containing umbilical hernia. Left gluteal lesion   adjacent to the ischium extending medial to the gluteal fold and   posterior to the superficial gluteal skin measures 7.9 x 3.7 x 5.7 cm,   demonstrating a central fluid component measuring 4.9 x 1.8 x 3.4 cm.   Findings correspond with location of large ulceration present on prior CT.  BONES: Within normal limits.    IMPRESSION:  *  Enlarged, myomatous uterus.  *  Stool distends the colon and rectum.  *  Posteriorly directed anal sphincter complex with surrounding soft   tissue infiltration.  *  Thick walled fluid collection superficial to the left ischium   measuring up to 7.9 cm with thick surrounding soft tissue component.   Findings are concerning for abscess.      --- End of Report ---      LARISSA RHOADES MD; Attending Radiologist  This document has been electronically signed. May 16 2024 11:28AM

## 2024-05-31 PROCEDURE — 99232 SBSQ HOSP IP/OBS MODERATE 35: CPT | Mod: GC

## 2024-05-31 RX ADMIN — OXYCODONE HYDROCHLORIDE 5 MILLIGRAM(S): 5 TABLET ORAL at 13:28

## 2024-05-31 RX ADMIN — Medication 1 APPLICATION(S): at 17:13

## 2024-05-31 RX ADMIN — Medication 325 MILLIGRAM(S): at 11:25

## 2024-05-31 RX ADMIN — GABAPENTIN 100 MILLIGRAM(S): 400 CAPSULE ORAL at 22:56

## 2024-05-31 RX ADMIN — GABAPENTIN 100 MILLIGRAM(S): 400 CAPSULE ORAL at 14:32

## 2024-05-31 RX ADMIN — Medication 1 APPLICATION(S): at 06:05

## 2024-05-31 RX ADMIN — Medication 3 MILLIGRAM(S): at 22:56

## 2024-05-31 RX ADMIN — OXYCODONE HYDROCHLORIDE 5 MILLIGRAM(S): 5 TABLET ORAL at 12:28

## 2024-05-31 RX ADMIN — GABAPENTIN 100 MILLIGRAM(S): 400 CAPSULE ORAL at 06:05

## 2024-05-31 RX ADMIN — CHLORHEXIDINE GLUCONATE 1 APPLICATION(S): 213 SOLUTION TOPICAL at 11:27

## 2024-05-31 NOTE — PROGRESS NOTE ADULT - ASSESSMENT
3/22/2021 2:31 PM    Mr. Allyson León. [address] [facility]         Dear Mr Amber Melchor,    My name is Mando Womack RN, Associate Care Manager for [employer] and I have been trying to reach you. The Associate Care Management (ACM) program is a free-of-charge confidential service provided to our associates and their family members covered by the [facility] and the [facility]. The program will provide an associate and his/her family with the [address] expertise to assist in navigating the health care delivery system, provider services, and their overall care needsso as to assure and improve health care interactions and enhance the quality of life. This program is designed to provide you with the opportunity to have a [employer] partner with you for the following services:     1) when you come home from the hospital or emergency room   2) when help is needed to manage your disease   3) when you need assistance coordinating services or appointments  4) when you need additional education, resources or assistance reaching your Be Well Health Program goals/requirements such as Be Well With Diabetes      [address] [place] [address] is dedicated to empowering the good health of its community and improving the quality of care and care experiences for associates and their families. We are committed to safeguarding patient confidentiality and privacy, assuring that every associate has the respect he or she deserves in managing their health. The information shared with your care manager will not be shared with anyone else aside from those you identify as part of your care team, and will only be used to assist you with any identified care needs. Please contact me if you would like this service provided to you.      Sincerely,    Ghazala Cruz RN, BSN, [address] [place] [address]   [address] [place] [address] 44F presents s/p right foot soft tissue mass excision  (DOS 5/20)  - Patient seen and evaluated  - Afebrile, No Leukocytosis   - 5/20 s/p right foot soft tissue mass excision: sutures intact, skin well coapted, no drainage expressed, no hematoma, skin flap appears warm and viable, no purulence.    - Using a sterile suture removal kit, all sutures to the dorsal right foot were removed atraumatically. Patient tolerated well.  - ID recs appreciated  - Patient is stable for discharge from podiatry standpoint  - Wound care recommendations, weight bearing status, and follow up info in Discharge provider note.   - Discussed with attending  Alex 7  [id number]  [facility] [phone] Fax [email or web address]

## 2024-05-31 NOTE — PROGRESS NOTE ADULT - SUBJECTIVE AND OBJECTIVE BOX
Patient is a 44y old  Female who presents with a chief complaint of right foot wound (31 May 2024 06:42)       INTERVAL HPI/OVERNIGHT EVENTS:  Patient seen and evaluated at bedside.  Pt is resting comfortable in NAD. Denies N/V/F/C.    Allergies    latex (Hives; Rash)  Zyrtec (Rash)    Intolerances        Vital Signs Last 24 Hrs  T(C): 37.2 (31 May 2024 05:40), Max: 37.2 (31 May 2024 05:40)  T(F): 98.9 (31 May 2024 05:40), Max: 98.9 (31 May 2024 05:40)  HR: 69 (31 May 2024 05:40) (69 - 89)  BP: 113/72 (31 May 2024 05:40) (113/72 - 129/91)  BP(mean): --  RR: 18 (31 May 2024 05:40) (18 - 18)  SpO2: 100% (31 May 2024 05:40) (98% - 100%)    Parameters below as of 31 May 2024 05:40  Patient On (Oxygen Delivery Method): room air        LABS:                        8.2    4.73  )-----------( 423      ( 30 May 2024 04:50 )             25.7     05-30    137  |  105  |  13  ----------------------------<  84  4.1   |  20<L>  |  0.50    Ca    8.5      30 May 2024 04:50  Phos  3.2     05-30  Mg     2.00     05-30        Urinalysis Basic - ( 30 May 2024 04:50 )    Color: x / Appearance: x / SG: x / pH: x  Gluc: 84 mg/dL / Ketone: x  / Bili: x / Urobili: x   Blood: x / Protein: x / Nitrite: x   Leuk Esterase: x / RBC: x / WBC x   Sq Epi: x / Non Sq Epi: x / Bacteria: x      CAPILLARY BLOOD GLUCOSE          Lower Extremity Physical Exam:  Vascular: DP/PT 1/4, B/L, CFT <3 seconds B/L, Temperature gradient warm to cool, B/L.   Neuro: Epicritic sensation diminished but not absent to the level of digits, B/L.  Musculoskeletal/Ortho: drop foot, weak muscle strength, ROM diminished 2/2 spina bifida   Skin: 5/20 s/p right foot soft tissue mass excision: sutures intact, skin well coapted, no drainage expressed, no hematoma, skin flap appears warm and viable, no purulence.      RADIOLOGY & ADDITIONAL TESTS:

## 2024-05-31 NOTE — PROGRESS NOTE ADULT - PROBLEM SELECTOR PLAN 1
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  pt with sacral wound noted  frequent repositioning  CTAP with abscess underlying sacral wound  s/p I&D 5/17 - serosanguinous drainage, possibly bursa cysts iso pressure wound    Plan:  -Culture without growth  -frequent repositioning  -Wound care consulted, appreciate reccs Stage 4 sacral decub with wound vac.  pt with sacral wound noted  frequent repositioning  CTAP with abscess underlying sacral wound  s/p I&D 5/17 - serosanguinous drainage, possibly bursa cysts iso pressure wound    Plan:  -Culture without growth  -frequent repositioning  -Wound care consulted, appreciate reccs  -Podiatry re-evaluated left leg, no acute intervention necessary  -Left leg XR without change Stage 4 sacral decub with wound vac.  pt with sacral wound noted  frequent repositioning  CTAP with abscess underlying sacral wound  s/p I&D 5/17 - serosanguinous drainage, possibly bursa cysts iso pressure wound    Plan:  -Culture without growth  -frequent repositioning  -Wound care consulted, appreciate reccs  -Will need wound vac for sacral ulcer  -Podiatry re-evaluated left leg, no acute intervention necessary  -Left leg XR without change

## 2024-05-31 NOTE — PROGRESS NOTE ADULT - ATTENDING COMMENTS
44W w/ spina bifida,  shunt, and multiple bilateral foot surgeries presenting with right foot wound now s/p soft tissue mass excision with podiatry (5/21/24) without concern for infection and I&D of ischial collection. Completed course of Zosyn. Now pending discharge. PT and PMR recommended acute rehab, but now recs changed to home PT. Initially plan was for discharge home, family unable to do wound care so plan changed to [facility], [place].    Patient seen and examined, wound care NP Roxie at bedside as well. Discussed that ischial wound is not healing as expected, wound vac is recommended. Long discussion with patient, she is agreeable with this. Discussed that [facility] will manage wound vac and if she is discharged home with wound vac, then visiting RN will manage, patient and family do not need to manage wound vac. She is in agreement.     Endorsing pain on L lateral malleolus. Xrays reviewed and stable. Podiatry to re-eval.     DC planning to [facility], [place] pending auth

## 2024-05-31 NOTE — PROGRESS NOTE ADULT - PROBLEM SELECTOR PLAN 4
-C/w immodium  -Zofran for nausea  -GI PCR sent  -Will f/u labs in AM -C/w immodium  -Zofran for nausea  -GI PCR sent  -Resolved

## 2024-05-31 NOTE — PROGRESS NOTE ADULT - SUBJECTIVE AND OBJECTIVE BOX
*******************************  Andrew Paulson, PGY-1  Internal Medicine  Contact via [employer] TEAMS    *******************************    PROGRESS NOTE:     Patient is a 44y old  Female who presents with a chief complaint of right foot wound (30 May 2024 13:00)      INTERVAL EVENTS: No acute overnight events.     SUBJECTIVE: Patient seen and examined at bedside. This morning, the patient is comfortable and doing well. No acute complaints. Denies fevers, chills, N/V/D, chest pain, SOB, abdominal pain.    MEDICATIONS  (STANDING):  chlorhexidine 2% Cloths 1 Application(s) Topical daily  enoxaparin Injectable 30 milliGRAM(s) SubCutaneous every 24 hours  ferrous    sulfate 325 milliGRAM(s) Oral daily  gabapentin 100 milliGRAM(s) Oral three times a day  hydrocortisone 1% Cream 1 Application(s) Topical two times a day  melatonin 3 milliGRAM(s) Oral at bedtime  mupirocin 2% Ointment 1 Application(s) Topical <User Schedule>    MEDICATIONS  (PRN):  acetaminophen     Tablet .. 650 milliGRAM(s) Oral every 6 hours PRN Temp greater or equal to 38C (100.4F), Mild Pain (1 - 3), Moderate Pain (4 - 6)  calamine/zinc oxide Lotion 1 Application(s) Topical four times a day PRN Itching  loperamide 2 milliGRAM(s) Oral daily PRN Diarrhea  ondansetron    Tablet 4 milliGRAM(s) Oral every 8 hours PRN Nausea and/or Vomiting  oxyCODONE    IR 5 milliGRAM(s) Oral every 8 hours PRN Severe Pain (7 - 10)      CAPILLARY BLOOD GLUCOSE        I&O's Summary    29 May 2024 07:01  -  30 May 2024 07:00  --------------------------------------------------------  IN: 600 mL / OUT: 0 mL / NET: 600 mL    30 May 2024 07:01  -  31 May 2024 06:43  --------------------------------------------------------  IN: 250 mL / OUT: 0 mL / NET: 250 mL        PHYSICAL EXAM:  Vital Signs Last 24 Hrs  T(C): 37.2 (31 May 2024 05:40), Max: 37.2 (31 May 2024 05:40)  T(F): 98.9 (31 May 2024 05:40), Max: 98.9 (31 May 2024 05:40)  HR: 69 (31 May 2024 05:40) (69 - 89)  BP: 113/72 (31 May 2024 05:40) (113/72 - 129/91)  BP(mean): --  RR: 18 (31 May 2024 05:40) (18 - 18)  SpO2: 100% (31 May 2024 05:40) (98% - 100%)    Parameters below as of 31 May 2024 05:40  Patient On (Oxygen Delivery Method): room air        GENERAL: NAD, lying in bed comfortably  HEAD: Atraumatic, normocephalic  EYES: EOMI, PERRLA, conjunctiva and sclera clear  ENT: Moist mucous membranes  NECK: Supple, no JVD  HEART: S1, S2, Regular rate and rhythm, no murmurs, rubs, or gallops  LUNGS: Unlabored respirations, clear to auscultation bilaterally, no crackles, wheezing, or rhonchi  ABDOMEN: Soft, nontender, nondistended, +BS  EXTREMITIES: 2+ peripheral pulses bilaterally. No clubbing, cyanosis, or edema  NERVOUS SYSTEM:  A&Ox3, no focal deficits   SKIN: No rashes or lesions    LABS:                        8.2    4.73  )-----------( 423      ( 30 May 2024 04:50 )             25.7     05-30    137  |  105  |  13  ----------------------------<  84  4.1   |  20<L>  |  0.50    Ca    8.5      30 May 2024 04:50  Phos  3.2     05-30  Mg     2.00     05-30            Urinalysis Basic - ( 30 May 2024 04:50 )    Color: x / Appearance: x / SG: x / pH: x  Gluc: 84 mg/dL / Ketone: x  / Bili: x / Urobili: x   Blood: x / Protein: x / Nitrite: x   Leuk Esterase: x / RBC: x / WBC x   Sq Epi: x / Non Sq Epi: x / Bacteria: x          RADIOLOGY & ADDITIONAL TESTS:  Results Reviewed:   Imaging Personally Reviewed:  Electrocardiogram Personally Reviewed:  Tele: *******************************  Andrew Paulson, PGY-1  Internal Medicine  Contact via [employer] TEAMS    *******************************    PROGRESS NOTE:     Patient is a 44y old  Female who presents with a chief complaint of right foot wound (30 May 2024 13:00)      INTERVAL EVENTS: No acute overnight events.     SUBJECTIVE: Patient seen and examined at bedside. This morning, the patient continues to endorse pain in her LLE. Her diarrhea has resolved.    MEDICATIONS  (STANDING):  chlorhexidine 2% Cloths 1 Application(s) Topical daily  enoxaparin Injectable 30 milliGRAM(s) SubCutaneous every 24 hours  ferrous    sulfate 325 milliGRAM(s) Oral daily  gabapentin 100 milliGRAM(s) Oral three times a day  hydrocortisone 1% Cream 1 Application(s) Topical two times a day  melatonin 3 milliGRAM(s) Oral at bedtime  mupirocin 2% Ointment 1 Application(s) Topical <User Schedule>    MEDICATIONS  (PRN):  acetaminophen     Tablet .. 650 milliGRAM(s) Oral every 6 hours PRN Temp greater or equal to 38C (100.4F), Mild Pain (1 - 3), Moderate Pain (4 - 6)  calamine/zinc oxide Lotion 1 Application(s) Topical four times a day PRN Itching  loperamide 2 milliGRAM(s) Oral daily PRN Diarrhea  ondansetron    Tablet 4 milliGRAM(s) Oral every 8 hours PRN Nausea and/or Vomiting  oxyCODONE    IR 5 milliGRAM(s) Oral every 8 hours PRN Severe Pain (7 - 10)      CAPILLARY BLOOD GLUCOSE        I&O's Summary    29 May 2024 07:01  -  30 May 2024 07:00  --------------------------------------------------------  IN: 600 mL / OUT: 0 mL / NET: 600 mL    30 May 2024 07:01  -  31 May 2024 06:43  --------------------------------------------------------  IN: 250 mL / OUT: 0 mL / NET: 250 mL        PHYSICAL EXAM:  Vital Signs Last 24 Hrs  T(C): 37.2 (31 May 2024 05:40), Max: 37.2 (31 May 2024 05:40)  T(F): 98.9 (31 May 2024 05:40), Max: 98.9 (31 May 2024 05:40)  HR: 69 (31 May 2024 05:40) (69 - 89)  BP: 113/72 (31 May 2024 05:40) (113/72 - 129/91)  BP(mean): --  RR: 18 (31 May 2024 05:40) (18 - 18)  SpO2: 100% (31 May 2024 05:40) (98% - 100%)    Parameters below as of 31 May 2024 05:40  Patient On (Oxygen Delivery Method): room air        GENERAL: NAD, lying in bed comfortably  HEAD: Atraumatic, normocephalic  EYES: EOMI, conjunctiva and sclera clear  ENT: Moist mucous membranes  NECK: Supple, no JVD  HEART: S1, S2, Regular rate and rhythm, no murmurs, rubs, or gallops  LUNGS: Unlabored respirations, clear to auscultation bilaterally, no crackles, wheezing, or rhonchi  ABDOMEN: Soft, nontender, nondistended  EXTREMITIES: No LE edema  NERVOUS SYSTEM:  A&Ox3, no focal deficits   SKIN: No rashes or lesions    LABS:                        8.2    4.73  )-----------( 423      ( 30 May 2024 04:50 )             25.7     05-30    137  |  105  |  13  ----------------------------<  84  4.1   |  20<L>  |  0.50    Ca    8.5      30 May 2024 04:50  Phos  3.2     05-30  Mg     2.00     05-30            Urinalysis Basic - ( 30 May 2024 04:50 )    Color: x / Appearance: x / SG: x / pH: x  Gluc: 84 mg/dL / Ketone: x  / Bili: x / Urobili: x   Blood: x / Protein: x / Nitrite: x   Leuk Esterase: x / RBC: x / WBC x   Sq Epi: x / Non Sq Epi: x / Bacteria: x          RADIOLOGY & ADDITIONAL TESTS:  Results Reviewed:   Imaging Personally Reviewed:  Electrocardiogram Personally Reviewed:  Tele:

## 2024-05-31 NOTE — ADVANCED PRACTICE NURSE CONSULT - REASON FOR CONSULT
Patient seen on skin care rounds for NPWT/VAC placement to left ischium. Wound care team will continue to see patient on M W F schedule. 
Patient seen on skin care rounds for NPWT/VAC placement to left ischium. Wound care team will continue to see patient on M W F schedule.

## 2024-05-31 NOTE — ADVANCED PRACTICE NURSE CONSULT - RECOMMEDATIONS
Continue with topical recommendations. In the event that NWPT is off longer than 2 hours, please remove and apply default dressing: Cleanse with NS, pat dry. Apply Liquid barrier film to periwound skin (allow to dry). Loosely pack cavity with hydrofiber (Aquacel) as primary dressing to absorb and fill dead space. Cover with Cover with silicone foam with border. Change daily and PRN if soiled.   Please contact Wound/Ostomy Care Service Line if we can be of further assistance (ext [phone]). 
Please contact Wound/Ostomy Care Service Line if we can be of further assistance (ext [phone]).

## 2024-06-01 LAB
APPEARANCE UR: CLEAR — SIGNIFICANT CHANGE UP
BACTERIA # UR AUTO: NEGATIVE /HPF — SIGNIFICANT CHANGE UP
BILIRUB UR-MCNC: NEGATIVE — SIGNIFICANT CHANGE UP
CAST: 0 /LPF — SIGNIFICANT CHANGE UP (ref 0–4)
COLOR SPEC: YELLOW — SIGNIFICANT CHANGE UP
DIFF PNL FLD: ABNORMAL
GLUCOSE UR QL: NEGATIVE MG/DL — SIGNIFICANT CHANGE UP
KETONES UR-MCNC: NEGATIVE MG/DL — SIGNIFICANT CHANGE UP
LEUKOCYTE ESTERASE UR-ACNC: ABNORMAL
NITRITE UR-MCNC: NEGATIVE — SIGNIFICANT CHANGE UP
PH UR: 7.5 — SIGNIFICANT CHANGE UP (ref 5–8)
PROT UR-MCNC: NEGATIVE MG/DL — SIGNIFICANT CHANGE UP
RBC CASTS # UR COMP ASSIST: 2 /HPF — SIGNIFICANT CHANGE UP (ref 0–4)
REVIEW: SIGNIFICANT CHANGE UP
SP GR SPEC: 1.01 — SIGNIFICANT CHANGE UP (ref 1–1.03)
SQUAMOUS # UR AUTO: 1 /HPF — SIGNIFICANT CHANGE UP (ref 0–5)
UROBILINOGEN FLD QL: 0.2 MG/DL — SIGNIFICANT CHANGE UP (ref 0.2–1)
WBC UR QL: 20 /HPF — HIGH (ref 0–5)

## 2024-06-01 PROCEDURE — 99232 SBSQ HOSP IP/OBS MODERATE 35: CPT | Mod: GC

## 2024-06-01 RX ORDER — SIMETHICONE 80 MG/1
80 TABLET, CHEWABLE ORAL THREE TIMES A DAY
Refills: 0 | Status: DISCONTINUED | OUTPATIENT
Start: 2024-06-01 | End: 2024-06-06

## 2024-06-01 RX ORDER — POLYETHYLENE GLYCOL 3350 17 G/17G
17 POWDER, FOR SOLUTION ORAL DAILY
Refills: 0 | Status: DISCONTINUED | OUTPATIENT
Start: 2024-06-01 | End: 2024-06-04

## 2024-06-01 RX ADMIN — Medication 650 MILLIGRAM(S): at 14:08

## 2024-06-01 RX ADMIN — OXYCODONE HYDROCHLORIDE 5 MILLIGRAM(S): 5 TABLET ORAL at 10:12

## 2024-06-01 RX ADMIN — Medication 3 MILLIGRAM(S): at 22:21

## 2024-06-01 RX ADMIN — CHLORHEXIDINE GLUCONATE 1 APPLICATION(S): 213 SOLUTION TOPICAL at 11:36

## 2024-06-01 RX ADMIN — ONDANSETRON 4 MILLIGRAM(S): 8 TABLET, FILM COATED ORAL at 11:34

## 2024-06-01 RX ADMIN — Medication 1 APPLICATION(S): at 17:06

## 2024-06-01 RX ADMIN — Medication 325 MILLIGRAM(S): at 11:36

## 2024-06-01 RX ADMIN — SIMETHICONE 80 MILLIGRAM(S): 80 TABLET, CHEWABLE ORAL at 23:23

## 2024-06-01 RX ADMIN — Medication 650 MILLIGRAM(S): at 13:08

## 2024-06-01 RX ADMIN — Medication 1 APPLICATION(S): at 05:30

## 2024-06-01 RX ADMIN — OXYCODONE HYDROCHLORIDE 5 MILLIGRAM(S): 5 TABLET ORAL at 09:10

## 2024-06-01 RX ADMIN — GABAPENTIN 100 MILLIGRAM(S): 400 CAPSULE ORAL at 13:10

## 2024-06-01 RX ADMIN — GABAPENTIN 100 MILLIGRAM(S): 400 CAPSULE ORAL at 05:28

## 2024-06-01 RX ADMIN — GABAPENTIN 100 MILLIGRAM(S): 400 CAPSULE ORAL at 22:20

## 2024-06-01 RX ADMIN — OXYCODONE HYDROCHLORIDE 5 MILLIGRAM(S): 5 TABLET ORAL at 23:23

## 2024-06-01 NOTE — PROGRESS NOTE ADULT - PROBLEM SELECTOR PLAN 2
-pt with hx spina bifida, has motorized wheelchair at home   - shunt managed by [facility] neurosurgery   -No hydrocephalus on CT

## 2024-06-01 NOTE — PROGRESS NOTE ADULT - SUBJECTIVE AND OBJECTIVE BOX
*******************************  Andrew Paulson, PGY-1  Internal Medicine  Contact via [employer] TE[employer]    *******************************    PROGRESS NOTE:     Patient is a 44y old  Female who presents with a chief complaint of right foot wound (31 May 2024 08:03)      INTERVAL EVENTS: No acute overnight events.     SUBJECTIVE: Patient seen and examined at bedside. This morning, the patient is comfortable and doing well. No acute complaints. Denies fevers, chills, N/V/D, chest pain, SOB, abdominal pain.    MEDICATIONS  (STANDING):  chlorhexidine 2% Cloths 1 Application(s) Topical daily  enoxaparin Injectable 30 milliGRAM(s) SubCutaneous every 24 hours  ferrous    sulfate 325 milliGRAM(s) Oral daily  gabapentin 100 milliGRAM(s) Oral three times a day  hydrocortisone 1% Cream 1 Application(s) Topical two times a day  melatonin 3 milliGRAM(s) Oral at bedtime  mupirocin 2% Ointment 1 Application(s) Topical <User Schedule>    MEDICATIONS  (PRN):  acetaminophen     Tablet .. 650 milliGRAM(s) Oral every 6 hours PRN Temp greater or equal to 38C (100.4F), Mild Pain (1 - 3), Moderate Pain (4 - 6)  calamine/zinc oxide Lotion 1 Application(s) Topical four times a day PRN Itching  loperamide 2 milliGRAM(s) Oral daily PRN Diarrhea  ondansetron    Tablet 4 milliGRAM(s) Oral every 8 hours PRN Nausea and/or Vomiting  oxyCODONE    IR 5 milliGRAM(s) Oral every 8 hours PRN Severe Pain (7 - 10)      CAPILLARY BLOOD GLUCOSE        I&O's Summary    30 May 2024 07:01  -  31 May 2024 07:00  --------------------------------------------------------  IN: 500 mL / OUT: 0 mL / NET: 500 mL    31 May 2024 07:01  -  01 Jun 2024 06:11  --------------------------------------------------------  IN: 1350 mL / OUT: 1 mL / NET: 1349 mL        PHYSICAL EXAM:  Vital Signs Last 24 Hrs  T(C): 36.8 (01 Jun 2024 05:25), Max: 37.2 (31 May 2024 22:05)  T(F): 98.2 (01 Jun 2024 05:25), Max: 99 (31 May 2024 22:05)  HR: 82 (01 Jun 2024 05:25) (82 - 88)  BP: 123/83 (01 Jun 2024 05:25) (123/83 - 128/89)  BP(mean): --  RR: 18 (01 Jun 2024 05:25) (18 - 18)  SpO2: 98% (01 Jun 2024 05:25) (98% - 100%)    Parameters below as of 01 Jun 2024 05:25  Patient On (Oxygen Delivery Method): room air        GENERAL: NAD, lying in bed comfortably  HEAD: Atraumatic, normocephalic  EYES: EOMI, PERRLA, conjunctiva and sclera clear  ENT: Moist mucous membranes  NECK: Supple, no JVD  HEART: S1, S2, Regular rate and rhythm, no murmurs, rubs, or gallops  LUNGS: Unlabored respirations, clear to auscultation bilaterally, no crackles, wheezing, or rhonchi  ABDOMEN: Soft, nontender, nondistended, +BS  EXTREMITIES: 2+ peripheral pulses bilaterally. No clubbing, cyanosis, or edema  NERVOUS SYSTEM:  A&Ox3, no focal deficits   SKIN: No rashes or lesions    LABS:                      RADIOLOGY & ADDITIONAL TESTS:  Results Reviewed:   Imaging Personally Reviewed:  Electrocardiogram Personally Reviewed:  Tele:

## 2024-06-01 NOTE — PROGRESS NOTE ADULT - ATTENDING COMMENTS
44W w/ spina bifida,  shunt, and multiple bilateral foot surgeries presenting with right foot wound now s/p soft tissue mass excision with podiatry (5/21/24) without concern for infection and I&D of ischial collection. Completed course of Zosyn. Now pending discharge. PT and PMR recommended acute rehab, but now recs changed to home PT. Initially plan was for discharge home, family unable to do wound care so plan changed to [facility].    Endorsing pain on L lateral malleolus. Xrays reviewed and stable.     Endorsing urinary frequency and incontinence x 2. Urinalysis ordered. Simethicone ordered for gas.     DC planning to [facility] pending auth.

## 2024-06-01 NOTE — PROGRESS NOTE ADULT - PROBLEM SELECTOR PLAN 1
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  pt with sacral wound noted  frequent repositioning  CTAP with abscess underlying sacral wound  s/p I&D 5/17 - serosanguinous drainage, possibly bursa cysts iso pressure wound    Plan:  -Culture without growth  -frequent repositioning  -Wound care consulted, appreciate reccs  -Will need wound vac for sacral ulcer  -Podiatry re-evaluated left leg, no acute intervention necessary  -Left leg XR without change

## 2024-06-02 PROCEDURE — 99232 SBSQ HOSP IP/OBS MODERATE 35: CPT

## 2024-06-02 RX ADMIN — GABAPENTIN 100 MILLIGRAM(S): 400 CAPSULE ORAL at 13:53

## 2024-06-02 RX ADMIN — POLYETHYLENE GLYCOL 3350 17 GRAM(S): 17 POWDER, FOR SOLUTION ORAL at 12:10

## 2024-06-02 RX ADMIN — Medication 1 APPLICATION(S): at 17:39

## 2024-06-02 RX ADMIN — Medication 1 APPLICATION(S): at 05:30

## 2024-06-02 RX ADMIN — Medication 325 MILLIGRAM(S): at 12:10

## 2024-06-02 RX ADMIN — OXYCODONE HYDROCHLORIDE 5 MILLIGRAM(S): 5 TABLET ORAL at 00:23

## 2024-06-02 RX ADMIN — GABAPENTIN 100 MILLIGRAM(S): 400 CAPSULE ORAL at 05:29

## 2024-06-02 RX ADMIN — Medication 3 MILLIGRAM(S): at 23:29

## 2024-06-02 RX ADMIN — OXYCODONE HYDROCHLORIDE 5 MILLIGRAM(S): 5 TABLET ORAL at 09:12

## 2024-06-02 RX ADMIN — GABAPENTIN 100 MILLIGRAM(S): 400 CAPSULE ORAL at 23:28

## 2024-06-02 RX ADMIN — OXYCODONE HYDROCHLORIDE 5 MILLIGRAM(S): 5 TABLET ORAL at 10:12

## 2024-06-02 RX ADMIN — CALAMINE AND ZINC OXIDE AND PHENOL 1 APPLICATION(S): 160; 10 LOTION TOPICAL at 15:39

## 2024-06-02 RX ADMIN — SIMETHICONE 80 MILLIGRAM(S): 80 TABLET, CHEWABLE ORAL at 15:37

## 2024-06-02 NOTE — PROVIDER CONTACT NOTE (OTHER) - SITUATION
Pt refusing Z flow boots
Patient accidentally disconnected self from wound vac. Wound care contacted, per their recommendation wound is covered with aquacel and foam. Wound care will reattach on 5/15. ACP made aware.
Pt c/o 7/10 R foot pain despite tramadol at 19:31
Pt accidentally removed WoundVac when trying to ambulate to comode. Wet to dry dressing applied.
Pt complaining of itchiness and burning on arms

## 2024-06-02 NOTE — PROVIDER CONTACT NOTE (OTHER) - BACKGROUND
Pt was admitted for local skin infection
Pt admitted for local infection of skin and subcutaneous tissue. Hx of spina bifida and uterine fibroid.
Pt was admitted for local skin infection
45 y/o F PMHx spina bifida,  shunt, R foot wound
Patient admitted with infection of R foot. PMH spina bifida,  shunt.

## 2024-06-02 NOTE — PROGRESS NOTE ADULT - SUBJECTIVE AND OBJECTIVE BOX
*******************************  Andrew Paulson, PGY-1  Internal Medicine  Contact via [employer] TEAMS    *******************************    PROGRESS NOTE:     Patient is a 44y old  Female who presents with a chief complaint of right foot wound (2024 06:11)      INTERVAL EVENTS: No acute overnight events.     SUBJECTIVE: Patient seen and examined at bedside. This morning, the patient is comfortable and doing well. No acute complaints. Denies fevers, chills, N/V/D, chest pain, SOB, abdominal pain.    MEDICATIONS  (STANDING):  chlorhexidine 2% Cloths 1 Application(s) Topical daily  enoxaparin Injectable 30 milliGRAM(s) SubCutaneous every 24 hours  ferrous    sulfate 325 milliGRAM(s) Oral daily  gabapentin 100 milliGRAM(s) Oral three times a day  hydrocortisone 1% Cream 1 Application(s) Topical two times a day  melatonin 3 milliGRAM(s) Oral at bedtime  mupirocin 2% Ointment 1 Application(s) Topical <User Schedule>  polyethylene glycol 3350 17 Gram(s) Oral daily    MEDICATIONS  (PRN):  acetaminophen     Tablet .. 650 milliGRAM(s) Oral every 6 hours PRN Temp greater or equal to 38C (100.4F), Mild Pain (1 - 3), Moderate Pain (4 - 6)  calamine/zinc oxide Lotion 1 Application(s) Topical four times a day PRN Itching  loperamide 2 milliGRAM(s) Oral daily PRN Diarrhea  ondansetron    Tablet 4 milliGRAM(s) Oral every 8 hours PRN Nausea and/or Vomiting  oxyCODONE    IR 5 milliGRAM(s) Oral every 8 hours PRN Severe Pain (7 - 10)  simethicone 80 milliGRAM(s) Chew three times a day PRN Gas      CAPILLARY BLOOD GLUCOSE        I&O's Summary      PHYSICAL EXAM:  Vital Signs Last 24 Hrs  T(C): 36.9 (2024 05:08), Max: 37 (2024 15:06)  T(F): 98.5 (2024 05:08), Max: 98.6 (2024 15:06)  HR: 73 (2024 05:08) (71 - 82)  BP: 123/86 (2024 05:08) (110/60 - 123/86)  BP(mean): --  RR: 17 (2024 05:08) (17 - 18)  SpO2: 100% (2024 05:08) (100% - 100%)    Parameters below as of 2024 05:08  Patient On (Oxygen Delivery Method): room air        GENERAL: NAD, lying in bed comfortably  HEAD: Atraumatic, normocephalic  EYES: EOMI, PERRLA, conjunctiva and sclera clear  ENT: Moist mucous membranes  NECK: Supple, no JVD  HEART: S1, S2, Regular rate and rhythm, no murmurs, rubs, or gallops  LUNGS: Unlabored respirations, clear to auscultation bilaterally, no crackles, wheezing, or rhonchi  ABDOMEN: Soft, nontender, nondistended, +BS  EXTREMITIES: 2+ peripheral pulses bilaterally. No clubbing, cyanosis, or edema  NERVOUS SYSTEM:  A&Ox3, no focal deficits   SKIN: No rashes or lesions    LABS:                Urinalysis Basic - ( 2024 17:28 )    Color: Yellow / Appearance: Clear / S.006 / pH: x  Gluc: x / Ketone: Negative mg/dL  / Bili: Negative / Urobili: 0.2 mg/dL   Blood: x / Protein: Negative mg/dL / Nitrite: Negative   Leuk Esterase: Small / RBC: 2 /HPF / WBC 20 /HPF   Sq Epi: x / Non Sq Epi: 1 /HPF / Bacteria: Negative /HPF          RADIOLOGY & ADDITIONAL TESTS:  Results Reviewed:   Imaging Personally Reviewed:  Electrocardiogram Personally Reviewed:  Tele: *******************************  Andrew Paulson, PGY-1  Internal Medicine  Contact via [employer] TEAMS    *******************************    PROGRESS NOTE:     Patient is a 44y old  Female who presents with a chief complaint of right foot wound (2024 06:11)      INTERVAL EVENTS: No acute overnight events.     SUBJECTIVE: Patient seen and examined at bedside. This morning, the patient continues to note abdominal cramping and pain in her legs.    MEDICATIONS  (STANDING):  chlorhexidine 2% Cloths 1 Application(s) Topical daily  enoxaparin Injectable 30 milliGRAM(s) SubCutaneous every 24 hours  ferrous    sulfate 325 milliGRAM(s) Oral daily  gabapentin 100 milliGRAM(s) Oral three times a day  hydrocortisone 1% Cream 1 Application(s) Topical two times a day  melatonin 3 milliGRAM(s) Oral at bedtime  mupirocin 2% Ointment 1 Application(s) Topical <User Schedule>  polyethylene glycol 3350 17 Gram(s) Oral daily    MEDICATIONS  (PRN):  acetaminophen     Tablet .. 650 milliGRAM(s) Oral every 6 hours PRN Temp greater or equal to 38C (100.4F), Mild Pain (1 - 3), Moderate Pain (4 - 6)  calamine/zinc oxide Lotion 1 Application(s) Topical four times a day PRN Itching  loperamide 2 milliGRAM(s) Oral daily PRN Diarrhea  ondansetron    Tablet 4 milliGRAM(s) Oral every 8 hours PRN Nausea and/or Vomiting  oxyCODONE    IR 5 milliGRAM(s) Oral every 8 hours PRN Severe Pain (7 - 10)  simethicone 80 milliGRAM(s) Chew three times a day PRN Gas      CAPILLARY BLOOD GLUCOSE        I&O's Summary      PHYSICAL EXAM:  Vital Signs Last 24 Hrs  T(C): 36.9 (2024 05:08), Max: 37 (2024 15:06)  T(F): 98.5 (2024 05:08), Max: 98.6 (2024 15:06)  HR: 73 (2024 05:08) (71 - 82)  BP: 123/86 (2024 05:08) (110/60 - 123/86)  BP(mean): --  RR: 17 (2024 05:08) (17 - 18)  SpO2: 100% (2024 05:08) (100% - 100%)    Parameters below as of 2024 05:08  Patient On (Oxygen Delivery Method): room air        GENERAL: NAD, lying in bed comfortably  HEAD: Atraumatic, normocephalic  EYES: EOMI, conjunctiva and sclera clear  ENT: Moist mucous membranes  NECK: Supple, no JVD  HEART: S1, S2, Regular rate and rhythm, no murmurs, rubs, or gallops  LUNGS: Unlabored respirations, clear to auscultation bilaterally, no crackles, wheezing, or rhonchi  ABDOMEN: Soft, nontender, nondistended  EXTREMITIES: No LE edema  NERVOUS SYSTEM:  A&Ox3, no focal deficits   SKIN: No rashes or lesions    LABS:                Urinalysis Basic - ( 2024 17:28 )    Color: Yellow / Appearance: Clear / S.006 / pH: x  Gluc: x / Ketone: Negative mg/dL  / Bili: Negative / Urobili: 0.2 mg/dL   Blood: x / Protein: Negative mg/dL / Nitrite: Negative   Leuk Esterase: Small / RBC: 2 /HPF / WBC 20 /HPF   Sq Epi: x / Non Sq Epi: 1 /HPF / Bacteria: Negative /HPF          RADIOLOGY & ADDITIONAL TESTS:  Results Reviewed:   Imaging Personally Reviewed:  Electrocardiogram Personally Reviewed:  Tele:

## 2024-06-02 NOTE — PROVIDER CONTACT NOTE (OTHER) - REASON
Patient disconnected self from wound vac.
7/10 R foot pain
Pt complaining of itchiness and burning on arms
Pt refusing Z flow boots
WoundVac removed by accident

## 2024-06-02 NOTE — PROVIDER CONTACT NOTE (OTHER) - ASSESSMENT
7/10 R foot pain; no other pain; no acute distress
Pt accidentally removed WoundVac when trying to ambulate to comode. Wet to dry dressing applied. Pt is otherwise stable
Pt is otherwise stable
Pt axox4, refusing Z flow boots. Pt turns and shift occasionally in bed. Pt educated on increased DTI risks, pt understood education.
yes
Pt AOx4. VSS. Wound vac previously connected to stage 4 on ischium, currently disconnected by patient.  Wound covered with aquacel and foam per wound care recommendations. Wound care can reconnect tomorrow.

## 2024-06-02 NOTE — PROVIDER CONTACT NOTE (OTHER) - RECOMMENDATIONS
Notify MD
Order calamine lotion
Provider notified; pt agreeable to transport to CT scan despite pain; no new orders at this time
Wet to dry dressing. MD notified

## 2024-06-02 NOTE — PROVIDER CONTACT NOTE (OTHER) - ACTION/TREATMENT ORDERED:
No new orders at this time.
MD aware. Wound care nurse will evaluate tomorrow. No new orders made at this time. Care plan continues
MD said he would order calamine lotion and possibly benadryl if needed, POC ongoing
MD made aware

## 2024-06-02 NOTE — PROGRESS NOTE ADULT - PROBLEM SELECTOR PLAN 2
-pt with hx spina bifida, has motorized wheelchair at home   - shunt managed by [facility] neurosurgery   -No hydrocephalus on CT

## 2024-06-02 NOTE — PROGRESS NOTE ADULT - ATTENDING COMMENTS
44W w/ spina bifida,  shunt, and multiple bilateral foot surgeries presenting with right foot wound now s/p soft tissue mass excision with podiatry (5/21/24) without concern for infection and I&D of ischial collection. Completed course of Zosyn. Now pending discharge. PT and PMR recommended acute rehab, but now recs changed to home PT. Initially plan was for discharge home, family unable to do wound care so plan changed to [facility].    Endorsing pain on L lateral malleolus. Xrays reviewed and stable.     Endorsing urinary frequency and incontinence x 2. Urinalysis ordered with sterile pyuria. Simethicone ordered for gas. W/ continued urinary complaints, bladder scan ordered. Repeating UA w/ Urine culture.    Wound vac accidentally dislodged. Will be reapplied Monday by wound care team.     DC planning to [facility] pending auth.

## 2024-06-03 DIAGNOSIS — K59.00 CONSTIPATION, UNSPECIFIED: ICD-10-CM

## 2024-06-03 PROCEDURE — 99232 SBSQ HOSP IP/OBS MODERATE 35: CPT | Mod: GC

## 2024-06-03 RX ORDER — SIMETHICONE 80 MG/1
1 TABLET, CHEWABLE ORAL
Qty: 0 | Refills: 0 | DISCHARGE
Start: 2024-06-03

## 2024-06-03 RX ORDER — ACETAMINOPHEN 500 MG
2 TABLET ORAL
Qty: 0 | Refills: 0 | DISCHARGE
Start: 2024-06-03

## 2024-06-03 RX ORDER — CEFTRIAXONE 500 MG/1
1000 INJECTION, POWDER, FOR SOLUTION INTRAMUSCULAR; INTRAVENOUS EVERY 24 HOURS
Refills: 0 | Status: DISCONTINUED | OUTPATIENT
Start: 2024-06-04 | End: 2024-06-04

## 2024-06-03 RX ORDER — POLYETHYLENE GLYCOL 3350 17 G/17G
17 POWDER, FOR SOLUTION ORAL
Qty: 0 | Refills: 0 | DISCHARGE
Start: 2024-06-03

## 2024-06-03 RX ORDER — CEFTRIAXONE 500 MG/1
INJECTION, POWDER, FOR SOLUTION INTRAMUSCULAR; INTRAVENOUS
Refills: 0 | Status: DISCONTINUED | OUTPATIENT
Start: 2024-06-03 | End: 2024-06-04

## 2024-06-03 RX ORDER — CEFTRIAXONE 500 MG/1
1000 INJECTION, POWDER, FOR SOLUTION INTRAMUSCULAR; INTRAVENOUS ONCE
Refills: 0 | Status: COMPLETED | OUTPATIENT
Start: 2024-06-03 | End: 2024-06-03

## 2024-06-03 RX ADMIN — CEFTRIAXONE 1000 MILLIGRAM(S): 500 INJECTION, POWDER, FOR SOLUTION INTRAMUSCULAR; INTRAVENOUS at 19:00

## 2024-06-03 RX ADMIN — Medication 1 APPLICATION(S): at 06:00

## 2024-06-03 RX ADMIN — CHLORHEXIDINE GLUCONATE 1 APPLICATION(S): 213 SOLUTION TOPICAL at 11:57

## 2024-06-03 RX ADMIN — OXYCODONE HYDROCHLORIDE 5 MILLIGRAM(S): 5 TABLET ORAL at 17:23

## 2024-06-03 RX ADMIN — OXYCODONE HYDROCHLORIDE 5 MILLIGRAM(S): 5 TABLET ORAL at 07:22

## 2024-06-03 RX ADMIN — Medication 10 MILLIGRAM(S): at 09:39

## 2024-06-03 RX ADMIN — Medication 325 MILLIGRAM(S): at 11:58

## 2024-06-03 RX ADMIN — GABAPENTIN 100 MILLIGRAM(S): 400 CAPSULE ORAL at 05:59

## 2024-06-03 RX ADMIN — GABAPENTIN 100 MILLIGRAM(S): 400 CAPSULE ORAL at 13:51

## 2024-06-03 RX ADMIN — OXYCODONE HYDROCHLORIDE 5 MILLIGRAM(S): 5 TABLET ORAL at 08:22

## 2024-06-03 RX ADMIN — Medication 1 APPLICATION(S): at 19:00

## 2024-06-03 RX ADMIN — GABAPENTIN 100 MILLIGRAM(S): 400 CAPSULE ORAL at 22:19

## 2024-06-03 RX ADMIN — OXYCODONE HYDROCHLORIDE 5 MILLIGRAM(S): 5 TABLET ORAL at 16:24

## 2024-06-03 RX ADMIN — CALAMINE AND ZINC OXIDE AND PHENOL 1 APPLICATION(S): 160; 10 LOTION TOPICAL at 21:44

## 2024-06-03 RX ADMIN — SIMETHICONE 80 MILLIGRAM(S): 80 TABLET, CHEWABLE ORAL at 05:59

## 2024-06-03 RX ADMIN — Medication 3 MILLIGRAM(S): at 22:19

## 2024-06-03 RX ADMIN — POLYETHYLENE GLYCOL 3350 17 GRAM(S): 17 POWDER, FOR SOLUTION ORAL at 11:58

## 2024-06-03 NOTE — PROGRESS NOTE ADULT - PROBLEM SELECTOR PLAN 4
-C/w immodium  -Zofran for nausea  -GI PCR sent  -Resolved Patient with diarrhea earlier in hospital course, now states she has not had a BM for 3 days. With lower quadrant abd pain.  - Has not used PRN opiates  - Miralax

## 2024-06-03 NOTE — PROGRESS NOTE ADULT - SUBJECTIVE AND OBJECTIVE BOX
Patient is a 44y old  Female who presents with a chief complaint of right foot wound (2024 07:55)    SUBJECTIVE / OVERNIGHT EVENTS: Patient seen and examined at bedside.    MEDICATIONS  (STANDING):  chlorhexidine 2% Cloths 1 Application(s) Topical daily  enoxaparin Injectable 30 milliGRAM(s) SubCutaneous every 24 hours  ferrous    sulfate 325 milliGRAM(s) Oral daily  gabapentin 100 milliGRAM(s) Oral three times a day  hydrocortisone 1% Cream 1 Application(s) Topical two times a day  melatonin 3 milliGRAM(s) Oral at bedtime  mupirocin 2% Ointment 1 Application(s) Topical <User Schedule>  polyethylene glycol 3350 17 Gram(s) Oral daily    MEDICATIONS  (PRN):  acetaminophen     Tablet .. 650 milliGRAM(s) Oral every 6 hours PRN Temp greater or equal to 38C (100.4F), Mild Pain (1 - 3), Moderate Pain (4 - 6)  calamine/zinc oxide Lotion 1 Application(s) Topical four times a day PRN Itching  loperamide 2 milliGRAM(s) Oral daily PRN Diarrhea  ondansetron    Tablet 4 milliGRAM(s) Oral every 8 hours PRN Nausea and/or Vomiting  oxyCODONE    IR 5 milliGRAM(s) Oral every 8 hours PRN Severe Pain (7 - 10)  simethicone 80 milliGRAM(s) Chew three times a day PRN Gas    Vital Signs Last 24 Hrs  T(C): 37.3 (2024 21:30), Max: 37.4 (2024 14:25)  T(F): 99.1 (2024 21:30), Max: 99.4 (2024 14:25)  HR: 83 (2024 21:30) (83 - 90)  BP: 118/89 (2024 21:30) (118/89 - 126/87)  BP(mean): --  RR: 18 (2024 21:30) (18 - 18)  SpO2: 98% (2024 21:30) (98% - 100%)    Parameters below as of 2024 21:30  Patient On (Oxygen Delivery Method): room air    CAPILLARY BLOOD GLUCOSE    I&O's Summary    PHYSICAL EXAM:  GENERAL: NAD, lying in bed comfortably  HEAD: Atraumatic, normocephalic  EYES: EOMI, conjunctiva and sclera clear  ENT: Moist mucous membranes  NECK: Supple, no JVD  HEART: S1, S2, Regular rate and rhythm, no murmurs, rubs, or gallops  LUNGS: Unlabored respirations, clear to auscultation bilaterally, no crackles, wheezing, or rhonchi  ABDOMEN: Soft, nontender, nondistended  EXTREMITIES: No LE edema  NERVOUS SYSTEM:  A&Ox3, no focal deficits   SKIN: No rashes or lesions    LABS:    Urinalysis Basic - ( 2024 17:28 )    Color: Yellow / Appearance: Clear / S.006 / pH: x  Gluc: x / Ketone: Negative mg/dL  / Bili: Negative / Urobili: 0.2 mg/dL   Blood: x / Protein: Negative mg/dL / Nitrite: Negative   Leuk Esterase: Small / RBC: 2 /HPF / WBC 20 /HPF   Sq Epi: x / Non Sq Epi: 1 /HPF / Bacteria: Negative /HPF    RADIOLOGY & ADDITIONAL TESTS:    Imaging Personally Reviewed:    Consultant(s) Notes Reviewed:      Care Discussed with Consultants/Other Providers:    Lewis Ga MD, Internal Medicine Resident Patient is a 44y old  Female who presents with a chief complaint of right foot wound (2024 07:55)    SUBJECTIVE / OVERNIGHT EVENTS: Patient seen and examined at bedside. Patient endorsing weakness in b/l legs this AM, along with pain in lower abd and lower back. Still with full sensation in b/l legs and no saddle anesthesia. States she had an episode of incontinence overnight and has made little urine since. Bladder scans ON and this AM wnl. Otherwise this AM denies CP, SOB, subjective fever, chills, n/v/d.    MEDICATIONS  (STANDING):  chlorhexidine 2% Cloths 1 Application(s) Topical daily  enoxaparin Injectable 30 milliGRAM(s) SubCutaneous every 24 hours  ferrous    sulfate 325 milliGRAM(s) Oral daily  gabapentin 100 milliGRAM(s) Oral three times a day  hydrocortisone 1% Cream 1 Application(s) Topical two times a day  melatonin 3 milliGRAM(s) Oral at bedtime  mupirocin 2% Ointment 1 Application(s) Topical <User Schedule>  polyethylene glycol 3350 17 Gram(s) Oral daily    MEDICATIONS  (PRN):  acetaminophen     Tablet .. 650 milliGRAM(s) Oral every 6 hours PRN Temp greater or equal to 38C (100.4F), Mild Pain (1 - 3), Moderate Pain (4 - 6)  calamine/zinc oxide Lotion 1 Application(s) Topical four times a day PRN Itching  loperamide 2 milliGRAM(s) Oral daily PRN Diarrhea  ondansetron    Tablet 4 milliGRAM(s) Oral every 8 hours PRN Nausea and/or Vomiting  oxyCODONE    IR 5 milliGRAM(s) Oral every 8 hours PRN Severe Pain (7 - 10)  simethicone 80 milliGRAM(s) Chew three times a day PRN Gas    Vital Signs Last 24 Hrs  T(C): 37.3 (2024 21:30), Max: 37.4 (2024 14:25)  T(F): 99.1 (2024 21:30), Max: 99.4 (2024 14:25)  HR: 83 (2024 21:30) (83 - 90)  BP: 118/89 (2024 21:30) (118/89 - 126/87)  BP(mean): --  RR: 18 (2024 21:30) (18 - 18)  SpO2: 98% (2024 21:30) (98% - 100%)    Parameters below as of 2024 21:30  Patient On (Oxygen Delivery Method): room air    CAPILLARY BLOOD GLUCOSE    I&O's Summary    PHYSICAL EXAM:  GENERAL: NAD, lying in bed comfortably  HEAD: Atraumatic, normocephalic  EYES: EOMI, conjunctiva and sclera clear  ENT: Moist mucous membranes  NECK: Supple, no JVD  HEART: S1, S2, Regular rate and rhythm, no murmurs, rubs, or gallops  LUNGS: Unlabored respirations, clear to auscultation bilaterally, no crackles, wheezing, or rhonchi  ABDOMEN: Soft, nontender, nondistended  EXTREMITIES: No LE edema  NERVOUS SYSTEM: A&Ox3, no saddle anesthesia. Full sensation in b/l legs. 1/5 strength in b/l this AM; wnl on repeat physical later in AM.  SKIN: No rashes or lesions    LABS:    Urinalysis Basic - ( 2024 17:28 )    Color: Yellow / Appearance: Clear / S.006 / pH: x  Gluc: x / Ketone: Negative mg/dL  / Bili: Negative / Urobili: 0.2 mg/dL   Blood: x / Protein: Negative mg/dL / Nitrite: Negative   Leuk Esterase: Small / RBC: 2 /HPF / WBC 20 /HPF   Sq Epi: x / Non Sq Epi: 1 /HPF / Bacteria: Negative /HPF    RADIOLOGY & ADDITIONAL TESTS:    Imaging Personally Reviewed:    Consultant(s) Notes Reviewed:      Care Discussed with Consultants/Other Providers:    Lewis Ga MD, Internal Medicine Resident

## 2024-06-03 NOTE — PROGRESS NOTE ADULT - ATTENDING COMMENTS
44W w/ spina bifida,  shunt, and multiple bilateral foot surgeries presenting with right foot wound now s/p soft tissue mass excision with podiatry (5/21/24) without concern for infection and I&D of ischial collection. Completed course of Zosyn.     Endorsing urinary frequency and incontinence x 2. Urinalysis ordered with sterile pyuria. Simethicone ordered for gas. W/ continued urinary complaints, bladder scan ordered.  Urine culture pending    Wound vac accidentally dislodged. Reapplied Monday by wound care team.     DC planning to [facility] pending auth.

## 2024-06-03 NOTE — PROGRESS NOTE ADULT - ASSESSMENT
44F MHx spina bifida, multiple bilateral foot surgeries, and  shunt (managed [facility] neurosurgery), uterine fibroids presenting with R foot wound, found to have  R foot and L ischial abscess, pending OR 5/20 for R foot I&D with podiatry. 44F MHx spina bifida, multiple bilateral foot surgeries, and  shunt (managed [facility] neurosurgery), uterine fibroids presenting with R foot wound, found to have  R foot and L ischial abscess, s/p R foot sx with podiatry 5/20

## 2024-06-03 NOTE — PROGRESS NOTE ADULT - PROBLEM SELECTOR PROBLEM 4
Diarrhea Constipation Enbrel Counseling:  I discussed with the patient the risks of etanercept including but not limited to myelosuppression, immunosuppression, autoimmune hepatitis, demyelinating diseases, lymphoma, and infections.  The patient understands that monitoring is required including a PPD at baseline and must alert us or the primary physician if symptoms of infection or other concerning signs are noted.

## 2024-06-03 NOTE — PROGRESS NOTE ADULT - PROBLEM SELECTOR PLAN 1
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  pt with sacral wound noted  frequent repositioning  CTAP with abscess underlying sacral wound  s/p I&D 5/17 - serosanguinous drainage, possibly bursa cysts iso pressure wound    Plan:  -Culture without growth  -frequent repositioning  -Wound care consulted, appreciate reccs  -Will need wound vac for sacral ulcer  -Podiatry re-evaluated left leg, no acute intervention necessary  -Left leg XR without change Stage 4 sacral/gluteal decub with wound vac. CTAP with abscess underlying sacral wound. s/p I&D 5/17 - serosanguinous drainage, possibly bursa cysts iso pressure wound    Plan:  - Culture (5/16) without growth  - Frequent repositioning  - Wound care consulted, appreciate reccs  - Podiatry re-evaluated left leg, no acute intervention necessary  - Left leg XR without change  - Wound vac replaced today (6/3)

## 2024-06-03 NOTE — CHART NOTE - NSCHARTNOTESELECT_GEN_ALL_CORE
[employer] Lift/Event Note
dc note/Event Note
Nutrition Services
Nutrition Services
wound care/Off Service Note

## 2024-06-03 NOTE — PROGRESS NOTE ADULT - PROBLEM SELECTOR PLAN 2
-pt with hx spina bifida, has motorized wheelchair at home   - shunt managed by [facility] neurosurgery   -No hydrocephalus on CT - Pt with hx spina bifida, has motorized wheelchair at home   -  shunt managed by [facility] neurosurgery   - No hydrocephalus on CT

## 2024-06-03 NOTE — PROGRESS NOTE ADULT - PROBLEM SELECTOR PLAN 3
hx of uterine fibroid, heavy periods  currently on period -- likely etiology of microscopic hematuria on UA  outpatient OBGYN f/u    Plan:  -Patient found to be iron deficient  -PO iron started Hx of uterine fibroid, heavy periods. Currently on period -- likely etiology of microscopic hematuria on UA    Plan:  - Patient found to be iron deficient  - PO iron started  - Outpatient OBGYN f/u

## 2024-06-03 NOTE — CHART NOTE - NSCHARTNOTEFT_GEN_A_CORE
Discussed with wound care attending Peyman head and medical attending and patient about left ischial stage 4 pressure injury status. Concerned that left ischium wound is not healing and rim like tissue on palpation. Spoke to wound care attending and given absences of fevers, leukocytosis, fluctuance. No local signs of infection, low probability of fluid re recollection after incision and draining by general surgery. Discussed given lack of signs and symptoms a repeat CT scan would not be valuable at this time. If clinical signs of infection occur consider repeat CT scan. Recommending to trial Negative therapy dressing with white foam to expedite wound healing. Came to bedside to discuss with patient in the presence of medical attending, patient agrees to try it and continue it once discharge. Will order vac to initiated today.
Pt seen for follow-up.     Medical Course:  Per chart review, Pt is 44 years old female with PMH: Spina bifida, multiple bilateral foot surgeries, and  shunt, uterine fibroids presenting with R foot wound, found to have  R foot and L ischial abscess, s/p R foot sx with podiatry 5/20.    Nutrition Course:  Patient seen at bedside. Patient reported good appetite/PO intake in house. Per nursing flowsheet variable PO intakes % noted.  Pt receives Orgain 11oz, 3x daily with good intake reported. Food preferences reviewed. Pt reported that she is not truly lactose intolerance. Pt reported that its only milk that she cannot tolerate. Pt would like to receive yogurt, pancakes and french toast as she did before and milk substitute. No GI distress or chewing/swallowing difficulties reported.  Per RN flowsheet, patient with left ankle/foot, right ankle/foot edema 1+. Per wound care (5/30), noted w/ Left ischium Stage 4 pressure injury.  Nutritional related labs within normal limits. Pt ordered for ferrous sulfate for micronutrient support. RDN services remain available as needed.       Diet Prescription: Diet, Regular:   Lactose Restricted (Milk Sugar Intoler.) (06-02-24 @ 18:14)    Pertinent Medications: MEDICATIONS  (STANDING):  bisacodyl Suppository 10 milliGRAM(s) Rectal daily  chlorhexidine 2% Cloths 1 Application(s) Topical daily  enoxaparin Injectable 30 milliGRAM(s) SubCutaneous every 24 hours  ferrous    sulfate 325 milliGRAM(s) Oral daily  gabapentin 100 milliGRAM(s) Oral three times a day  hydrocortisone 1% Cream 1 Application(s) Topical two times a day  melatonin 3 milliGRAM(s) Oral at bedtime  mupirocin 2% Ointment 1 Application(s) Topical <User Schedule>  polyethylene glycol 3350 17 Gram(s) Oral daily    MEDICATIONS  (PRN):  acetaminophen     Tablet .. 650 milliGRAM(s) Oral every 6 hours PRN Temp greater or equal to 38C (100.4F), Mild Pain (1 - 3), Moderate Pain (4 - 6)  calamine/zinc oxide Lotion 1 Application(s) Topical four times a day PRN Itching  ondansetron    Tablet 4 milliGRAM(s) Oral every 8 hours PRN Nausea and/or Vomiting  oxyCODONE    IR 5 milliGRAM(s) Oral every 8 hours PRN Severe Pain (7 - 10)  simethicone 80 milliGRAM(s) Chew three times a day PRN Gas    Pertinent Labs:  05-30 Phos 3.2 mg/dL    Weight: Weight (kg): 44.2 (05/29), 43.6kg (5/22)  Weight Assessment: Weight increased x7 days (1.3%). Continue to monitor weight trend.   Height: 62in / 157.5cm  IBW: 110lbs / 50kg +/-10%  BMI: 17,8kg/m^2 based on ABW.     Physical Assessment, per flowsheets:  Edema: Left ankle/foot, right ankle/foot edema 1+.   Pressure Injury: Left ischium Stage 4 pressure injury.  Appearance: BMI reflective of physical appearance.     Estimated Needs:   [X] No change since previous assessment.    Previous Nutrition Diagnosis:   [X] Unintended weight loss  Nutrition Diagnosis is [X] ongoing   New Nutrition Diagnosis: [X] not applicable     Education:  [X] Not warrant at present.     Interventions:   - Consider changing diet to Regular as per Pt. (Provide Lactaid milk, or milk substitute).    - Nutrition department to continue to provide Orgain 11oz 3x daily (240kcal/16g prot/serving).   - Consider Multivitamin and vitamin C to aid with wound healing.   - Please consistently document % PO intake in nursing flowsheets to assess adequacy of nutritional intake/monitor need for further nutritional intervention(s).   - Monitor weights, diet tolerance, skin integrity, BMs, pertinent labs.   - RDN services remain available as needed.   - Nutrition department to honor food preferences as feasible.     Jaziel Cerrato RD, CDN  Available on [employer] Teams, Pager [phone]
Source: Patient [x ]    Family [ ]     other [x ] chart review    Patient seen for length of stay nutrition f/u. 44 year old female with a PMH of spina bifida, multiple bilateral foot surgeries, and  shunt (managed [facility] neurosurgery), uterine fibroids presenting with R foot wound, found to have R foot and L ischial abscess s/p 5/20 for R foot I&D per chart.    Patient reports good appetite. Intakes are % per RN flow sheet. Consumes Orgain and Nutrament supplement from home as well. Food preferences reviewed. No GI distress or chewing/swallowing difficulties reported. Noted w/ +1 L/R foot + ankle edema per RN flow sheet. Per wound care (5/13), noted w/ Left ischium Stage 4 pressure injury.    Diet : Diet, Regular (05-12-24 @ 09:21)    Current Weight: no new weight to assess  43.2 kg (5/12)    Pertinent Medications: MEDICATIONS  (STANDING):  chlorhexidine 2% Cloths 1 Application(s) Topical daily  enoxaparin Injectable 30 milliGRAM(s) SubCutaneous every 24 hours  ferrous    sulfate 325 milliGRAM(s) Oral daily  melatonin 3 milliGRAM(s) Oral at bedtime  mupirocin 2% Ointment 1 Application(s) Topical <User Schedule>  polyethylene glycol 3350 17 Gram(s) Oral daily  senna 2 Tablet(s) Oral at bedtime    MEDICATIONS  (PRN):  acetaminophen     Tablet .. 650 milliGRAM(s) Oral every 6 hours PRN Temp greater or equal to 38C (100.4F), Mild Pain (1 - 3), Moderate Pain (4 - 6)  bisacodyl 5 milliGRAM(s) Oral every 12 hours PRN Constipation  oxyCODONE    IR 5 milliGRAM(s) Oral every 6 hours PRN Severe Pain (7 - 10)    Pertinent Labs:  05-21 Na139 mmol/L Glu 73 mg/dL K+ 3.5 mmol/L Cr  0.55 mg/dL BUN 12 mg/dL 05-21 Phos 3.1 mg/dL 05-21 Alb 3.2 g/dL<L>    Estimated Needs: [ x] no change since previous assessment    Previous Nutrition Diagnosis: Unintended weight loss    Nutrition Diagnosis is [x ] ongoing  [ ] resolved [ ] not applicable     Education:    [  ] Given today    Type of education provided:    [ x ] Given on previous assessment by RD    [  ] Not applicable 2/2 cognitive deficit    [  ] Pt refusal of education offered    [  ] Not applicable 2/2 current prognosis    [  ] Not warranted at present    Recommend  - continue diet as ordered  - nutrition department will continue to provide Orgain (240 kcal, 16 g pro) to help w/ wound healing  - addition of multivitamin and vitamin C for wound healing  - obtain current weight and monitor PO intake    Monitoring and Evaluation:     [ x] PO intake [x ] Tolerance to diet prescription [ x] weights [x ] follow up per protocol    Viabhav Espinal, [address] or TEAMS
Attempted to see the patient face to face, however the patient was not present to discuss care at that time. Outreached the phone numbers on file and left a voicemail for the patient to return our call.  [phone]
Patient will need a LOW AIR LOSS MATTRESS to help in the healing and prevent further tissue injury. Patient cannot independently make change in body position significant enough to alleviate pressure due to spina bifida (q05.9) and left ischium stage IV pressure ulcer (L89.324).    A NATHALY LIFT is necessary for transfer from bed to /c due to spina bifida (q05.9). Without the use of a lift, the patient would be bed confined.

## 2024-06-03 NOTE — PROGRESS NOTE ADULT - PROBLEM SELECTOR PLAN 5
DVT: lovenox   Diet: regular  Dispo: pending med optimization, PT rec outpatient pt DVT: lovenox   Diet: regular  Dispo: Pending [facility] placement for skilled nursing DVT: lovenox   Diet: regular  Dispo: Pending [facility]ona

## 2024-06-04 LAB
ANION GAP SERPL CALC-SCNC: 13 MMOL/L — SIGNIFICANT CHANGE UP (ref 7–14)
BUN SERPL-MCNC: 14 MG/DL — SIGNIFICANT CHANGE UP (ref 7–23)
CALCIUM SERPL-MCNC: 8.9 MG/DL — SIGNIFICANT CHANGE UP (ref 8.4–10.5)
CHLORIDE SERPL-SCNC: 102 MMOL/L — SIGNIFICANT CHANGE UP (ref 98–107)
CO2 SERPL-SCNC: 20 MMOL/L — LOW (ref 22–31)
CREAT SERPL-MCNC: 0.46 MG/DL — LOW (ref 0.5–1.3)
EGFR: 119 ML/MIN/1.73M2 — SIGNIFICANT CHANGE UP
GLUCOSE SERPL-MCNC: 81 MG/DL — SIGNIFICANT CHANGE UP (ref 70–99)
HCT VFR BLD CALC: 31.5 % — LOW (ref 34.5–45)
HGB BLD-MCNC: 10.3 G/DL — LOW (ref 11.5–15.5)
MAGNESIUM SERPL-MCNC: 1.9 MG/DL — SIGNIFICANT CHANGE UP (ref 1.6–2.6)
MCHC RBC-ENTMCNC: 26.5 PG — LOW (ref 27–34)
MCHC RBC-ENTMCNC: 32.7 GM/DL — SIGNIFICANT CHANGE UP (ref 32–36)
MCV RBC AUTO: 81.2 FL — SIGNIFICANT CHANGE UP (ref 80–100)
NRBC # BLD: 0 /100 WBCS — SIGNIFICANT CHANGE UP (ref 0–0)
NRBC # FLD: 0 K/UL — SIGNIFICANT CHANGE UP (ref 0–0)
PHOSPHATE SERPL-MCNC: 3.4 MG/DL — SIGNIFICANT CHANGE UP (ref 2.5–4.5)
PLATELET # BLD AUTO: 405 K/UL — HIGH (ref 150–400)
POTASSIUM SERPL-MCNC: 3.9 MMOL/L — SIGNIFICANT CHANGE UP (ref 3.5–5.3)
POTASSIUM SERPL-SCNC: 3.9 MMOL/L — SIGNIFICANT CHANGE UP (ref 3.5–5.3)
RBC # BLD: 3.88 M/UL — SIGNIFICANT CHANGE UP (ref 3.8–5.2)
RBC # FLD: 17 % — HIGH (ref 10.3–14.5)
SODIUM SERPL-SCNC: 135 MMOL/L — SIGNIFICANT CHANGE UP (ref 135–145)
WBC # BLD: 4.33 K/UL — SIGNIFICANT CHANGE UP (ref 3.8–10.5)
WBC # FLD AUTO: 4.33 K/UL — SIGNIFICANT CHANGE UP (ref 3.8–10.5)

## 2024-06-04 PROCEDURE — 99232 SBSQ HOSP IP/OBS MODERATE 35: CPT | Mod: GC

## 2024-06-04 RX ORDER — POLYETHYLENE GLYCOL 3350 17 G/17G
17 POWDER, FOR SOLUTION ORAL
Refills: 0 | Status: DISCONTINUED | OUTPATIENT
Start: 2024-06-04 | End: 2024-06-06

## 2024-06-04 RX ORDER — OXYCODONE HYDROCHLORIDE 5 MG/1
1 TABLET ORAL
Qty: 0 | Refills: 0 | DISCHARGE
Start: 2024-06-04

## 2024-06-04 RX ORDER — SENNA PLUS 8.6 MG/1
2 TABLET ORAL
Qty: 0 | Refills: 0 | DISCHARGE
Start: 2024-06-04

## 2024-06-04 RX ORDER — FERROUS SULFATE 325(65) MG
1 TABLET ORAL
Qty: 0 | Refills: 0 | DISCHARGE
Start: 2024-06-04

## 2024-06-04 RX ORDER — HYDROCORTISONE 1 %
1 OINTMENT (GRAM) TOPICAL
Qty: 0 | Refills: 0 | DISCHARGE
Start: 2024-06-04

## 2024-06-04 RX ORDER — SENNA PLUS 8.6 MG/1
2 TABLET ORAL
Refills: 0 | Status: DISCONTINUED | OUTPATIENT
Start: 2024-06-04 | End: 2024-06-06

## 2024-06-04 RX ORDER — CALAMINE AND ZINC OXIDE AND PHENOL 160; 10 MG/ML; MG/ML
1 LOTION TOPICAL
Qty: 0 | Refills: 0 | DISCHARGE
Start: 2024-06-04

## 2024-06-04 RX ORDER — PIPERACILLIN AND TAZOBACTAM 4; .5 G/20ML; G/20ML
3.38 INJECTION, POWDER, LYOPHILIZED, FOR SOLUTION INTRAVENOUS ONCE
Refills: 0 | Status: COMPLETED | OUTPATIENT
Start: 2024-06-04 | End: 2024-06-04

## 2024-06-04 RX ORDER — LIDOCAINE 4 G/100G
1 CREAM TOPICAL EVERY 24 HOURS
Refills: 0 | Status: DISCONTINUED | OUTPATIENT
Start: 2024-06-04 | End: 2024-06-06

## 2024-06-04 RX ORDER — LANOLIN ALCOHOL/MO/W.PET/CERES
1 CREAM (GRAM) TOPICAL
Qty: 0 | Refills: 0 | DISCHARGE
Start: 2024-06-04

## 2024-06-04 RX ORDER — LIDOCAINE 4 G/100G
1 CREAM TOPICAL
Qty: 0 | Refills: 0 | DISCHARGE
Start: 2024-06-04

## 2024-06-04 RX ORDER — ENOXAPARIN SODIUM 100 MG/ML
30 INJECTION SUBCUTANEOUS
Qty: 0 | Refills: 0 | DISCHARGE
Start: 2024-06-04

## 2024-06-04 RX ORDER — PIPERACILLIN AND TAZOBACTAM 4; .5 G/20ML; G/20ML
3.38 INJECTION, POWDER, LYOPHILIZED, FOR SOLUTION INTRAVENOUS EVERY 8 HOURS
Refills: 0 | Status: DISCONTINUED | OUTPATIENT
Start: 2024-06-05 | End: 2024-06-06

## 2024-06-04 RX ADMIN — OXYCODONE HYDROCHLORIDE 5 MILLIGRAM(S): 5 TABLET ORAL at 08:10

## 2024-06-04 RX ADMIN — POLYETHYLENE GLYCOL 3350 17 GRAM(S): 17 POWDER, FOR SOLUTION ORAL at 09:52

## 2024-06-04 RX ADMIN — Medication 3 MILLIGRAM(S): at 23:29

## 2024-06-04 RX ADMIN — GABAPENTIN 100 MILLIGRAM(S): 400 CAPSULE ORAL at 14:01

## 2024-06-04 RX ADMIN — GABAPENTIN 100 MILLIGRAM(S): 400 CAPSULE ORAL at 23:29

## 2024-06-04 RX ADMIN — LIDOCAINE 1 PATCH: 4 CREAM TOPICAL at 19:30

## 2024-06-04 RX ADMIN — CHLORHEXIDINE GLUCONATE 1 APPLICATION(S): 213 SOLUTION TOPICAL at 11:44

## 2024-06-04 RX ADMIN — LIDOCAINE 1 PATCH: 4 CREAM TOPICAL at 19:09

## 2024-06-04 RX ADMIN — Medication 1 APPLICATION(S): at 17:53

## 2024-06-04 RX ADMIN — GABAPENTIN 100 MILLIGRAM(S): 400 CAPSULE ORAL at 06:33

## 2024-06-04 RX ADMIN — PIPERACILLIN AND TAZOBACTAM 200 GRAM(S): 4; .5 INJECTION, POWDER, LYOPHILIZED, FOR SOLUTION INTRAVENOUS at 18:36

## 2024-06-04 RX ADMIN — LIDOCAINE 1 PATCH: 4 CREAM TOPICAL at 09:52

## 2024-06-04 RX ADMIN — OXYCODONE HYDROCHLORIDE 5 MILLIGRAM(S): 5 TABLET ORAL at 09:00

## 2024-06-04 RX ADMIN — Medication 325 MILLIGRAM(S): at 11:44

## 2024-06-04 RX ADMIN — CEFTRIAXONE 100 MILLIGRAM(S): 500 INJECTION, POWDER, FOR SOLUTION INTRAMUSCULAR; INTRAVENOUS at 17:52

## 2024-06-04 RX ADMIN — PIPERACILLIN AND TAZOBACTAM 25 GRAM(S): 4; .5 INJECTION, POWDER, LYOPHILIZED, FOR SOLUTION INTRAVENOUS at 23:30

## 2024-06-04 RX ADMIN — Medication 10 MILLIGRAM(S): at 11:44

## 2024-06-04 NOTE — PROGRESS NOTE ADULT - ATTENDING COMMENTS
44W w/ spina bifida,  shunt, and multiple bilateral foot surgeries presenting with right foot wound now s/p soft tissue mass excision with podiatry (5/21/24) without concern for infection and I&D of ischial collection. Completed course of Zosyn.     Endorsing urinary frequency and incontinence x 2. Urinalysis ordered with sterile pyuria. Simethicone ordered for gas. W/ continued urinary complaints, bladder scan ordered.  Urine culture GNR, will complete short course abx, symptoms improved    Wound vac accidentally dislodged. Reapplied Monday by wound care team.     DC planning to [facility] pending auth.

## 2024-06-04 NOTE — PROGRESS NOTE ADULT - PROBLEM SELECTOR PLAN 3
Hx of uterine fibroid, heavy periods. Currently on period -- likely etiology of microscopic hematuria on UA    Plan:  - Patient found to be iron deficient  - PO iron started  - Outpatient OBGYN f/u

## 2024-06-04 NOTE — PROGRESS NOTE ADULT - PROBLEM SELECTOR PLAN 4
Patient with diarrhea earlier in hospital course, now states she has not had a BM for 3 days. With lower quadrant abd pain.  - Has not used PRN opiates  - Miralax

## 2024-06-04 NOTE — PROGRESS NOTE ADULT - SUBJECTIVE AND OBJECTIVE BOX
Patient is a 44y old  Female who presents with a chief complaint of right foot wound (03 Jun 2024 07:44)    SUBJECTIVE / OVERNIGHT EVENTS: Patient seen and examined at bedside. No overnight events.     MEDICATIONS  (STANDING):  bisacodyl Suppository 10 milliGRAM(s) Rectal daily  cefTRIAXone   IVPB      cefTRIAXone   IVPB 1000 milliGRAM(s) IV Intermittent every 24 hours  chlorhexidine 2% Cloths 1 Application(s) Topical daily  enoxaparin Injectable 30 milliGRAM(s) SubCutaneous every 24 hours  ferrous    sulfate 325 milliGRAM(s) Oral daily  gabapentin 100 milliGRAM(s) Oral three times a day  hydrocortisone 1% Cream 1 Application(s) Topical two times a day  melatonin 3 milliGRAM(s) Oral at bedtime  mupirocin 2% Ointment 1 Application(s) Topical <User Schedule>  polyethylene glycol 3350 17 Gram(s) Oral daily    MEDICATIONS  (PRN):  acetaminophen     Tablet .. 650 milliGRAM(s) Oral every 6 hours PRN Temp greater or equal to 38C (100.4F), Mild Pain (1 - 3), Moderate Pain (4 - 6)  calamine/zinc oxide Lotion 1 Application(s) Topical four times a day PRN Itching  ondansetron    Tablet 4 milliGRAM(s) Oral every 8 hours PRN Nausea and/or Vomiting  oxyCODONE    IR 5 milliGRAM(s) Oral every 8 hours PRN Severe Pain (7 - 10)  simethicone 80 milliGRAM(s) Chew three times a day PRN Gas    Vital Signs Last 24 Hrs  T(C): 36.8 (04 Jun 2024 05:35), Max: 36.9 (03 Jun 2024 21:33)  T(F): 98.2 (04 Jun 2024 05:35), Max: 98.5 (03 Jun 2024 21:33)  HR: 69 (04 Jun 2024 05:35) (68 - 88)  BP: 116/76 (04 Jun 2024 05:35) (100/66 - 121/84)  BP(mean): --  RR: 18 (04 Jun 2024 05:35) (18 - 18)  SpO2: 100% (04 Jun 2024 05:35) (100% - 100%)    Parameters below as of 04 Jun 2024 05:35  Patient On (Oxygen Delivery Method): room air    CAPILLARY BLOOD GLUCOSE    I&O's Summary    03 Jun 2024 07:01  -  04 Jun 2024 07:00  --------------------------------------------------------  IN: 240 mL / OUT: 0 mL / NET: 240 mL    PHYSICAL EXAM:  GENERAL: NAD, lying in bed comfortably  HEAD: Atraumatic, normocephalic  EYES: EOMI, conjunctiva and sclera clear  ENT: Moist mucous membranes  NECK: Supple, no JVD  HEART: S1, S2, Regular rate and rhythm, no murmurs, rubs, or gallops  LUNGS: Unlabored respirations, clear to auscultation bilaterally, no crackles, wheezing, or rhonchi  ABDOMEN: Soft, nontender, nondistended  EXTREMITIES: No LE edema  NERVOUS SYSTEM: A&Ox3, no saddle anesthesia. Full sensation in b/l legs. 1/5 strength in b/l this AM; wnl on repeat physical later in AM.  SKIN: No rashes or lesions    LABS:    RADIOLOGY & ADDITIONAL TESTS:    Imaging Personally Reviewed:    Consultant(s) Notes Reviewed:      Care Discussed with Consultants/Other Providers:    Lewis Ga MD, Internal Medicine Resident

## 2024-06-04 NOTE — PROGRESS NOTE ADULT - TIME BILLING
Time spent on review of vital signs, labs, prior documentation, consultant notes. Patient interviewed and examined during this encounter. Plan of care was discussed with patient, and ACP/resident. Encounter documented.
Review of records (labs, vitals, imaging, notes), examination of patient, discussion with patient and mother, discussion with care team
Time spent on review of vital signs, labs, prior documentation, consultant notes. Patient interviewed and examined during this encounter. Plan of care was discussed with patient, and ACP/resident. Encounter documented.
Review of records (labs, vitals, imaging, notes), examination of patient, discussion with patient and mother, discussion with care team

## 2024-06-04 NOTE — PROGRESS NOTE ADULT - ASSESSMENT
44F MHx spina bifida, multiple bilateral foot surgeries, and  shunt (managed [facility] neurosurgery), uterine fibroids presenting with R foot wound, found to have  R foot and L ischial abscess, s/p R foot sx with podiatry 5/20

## 2024-06-04 NOTE — PROGRESS NOTE ADULT - PROBLEM SELECTOR PLAN 1
Stage 4 sacral/gluteal decub with wound vac. CTAP with abscess underlying sacral wound. s/p I&D 5/17 - serosanguinous drainage, possibly bursa cysts iso pressure wound    Plan:  - Culture (5/16) without growth  - Frequent repositioning  - Wound care consulted, appreciate reccs  - Podiatry re-evaluated left leg, no acute intervention necessary  - Left leg XR without change  - Wound vac replaced today (6/3)

## 2024-06-04 NOTE — PROGRESS NOTE ADULT - PROBLEM SELECTOR PLAN 2
- Pt with hx spina bifida, has motorized wheelchair at home   -  shunt managed by [facility] neurosurgery   - No hydrocephalus on CT

## 2024-06-05 DIAGNOSIS — N39.0 URINARY TRACT INFECTION, SITE NOT SPECIFIED: ICD-10-CM

## 2024-06-05 LAB
-  AMIKACIN: SIGNIFICANT CHANGE UP
-  AZTREONAM: SIGNIFICANT CHANGE UP
-  CEFEPIME: SIGNIFICANT CHANGE UP
-  CEFTAZIDIME: SIGNIFICANT CHANGE UP
-  CIPROFLOXACIN: SIGNIFICANT CHANGE UP
-  IMIPENEM: SIGNIFICANT CHANGE UP
-  LEVOFLOXACIN: SIGNIFICANT CHANGE UP
-  MEROPENEM: SIGNIFICANT CHANGE UP
-  PIPERACILLIN/TAZOBACTAM: SIGNIFICANT CHANGE UP
CULTURE RESULTS: ABNORMAL
METHOD TYPE: SIGNIFICANT CHANGE UP
ORGANISM # SPEC MICROSCOPIC CNT: ABNORMAL
ORGANISM # SPEC MICROSCOPIC CNT: ABNORMAL
SPECIMEN SOURCE: SIGNIFICANT CHANGE UP

## 2024-06-05 PROCEDURE — 99232 SBSQ HOSP IP/OBS MODERATE 35: CPT

## 2024-06-05 PROCEDURE — 99232 SBSQ HOSP IP/OBS MODERATE 35: CPT | Mod: GC

## 2024-06-05 RX ADMIN — LIDOCAINE 1 PATCH: 4 CREAM TOPICAL at 07:50

## 2024-06-05 RX ADMIN — OXYCODONE HYDROCHLORIDE 5 MILLIGRAM(S): 5 TABLET ORAL at 09:51

## 2024-06-05 RX ADMIN — Medication 325 MILLIGRAM(S): at 13:02

## 2024-06-05 RX ADMIN — CHLORHEXIDINE GLUCONATE 1 APPLICATION(S): 213 SOLUTION TOPICAL at 13:03

## 2024-06-05 RX ADMIN — GABAPENTIN 100 MILLIGRAM(S): 400 CAPSULE ORAL at 13:01

## 2024-06-05 RX ADMIN — GABAPENTIN 100 MILLIGRAM(S): 400 CAPSULE ORAL at 06:24

## 2024-06-05 RX ADMIN — Medication 10 MILLIGRAM(S): at 13:02

## 2024-06-05 RX ADMIN — POLYETHYLENE GLYCOL 3350 17 GRAM(S): 17 POWDER, FOR SOLUTION ORAL at 17:10

## 2024-06-05 RX ADMIN — LIDOCAINE 1 PATCH: 4 CREAM TOPICAL at 19:25

## 2024-06-05 RX ADMIN — OXYCODONE HYDROCHLORIDE 5 MILLIGRAM(S): 5 TABLET ORAL at 08:51

## 2024-06-05 RX ADMIN — GABAPENTIN 100 MILLIGRAM(S): 400 CAPSULE ORAL at 21:11

## 2024-06-05 RX ADMIN — SIMETHICONE 80 MILLIGRAM(S): 80 TABLET, CHEWABLE ORAL at 13:39

## 2024-06-05 RX ADMIN — Medication 650 MILLIGRAM(S): at 06:23

## 2024-06-05 RX ADMIN — Medication 3 MILLIGRAM(S): at 21:11

## 2024-06-05 RX ADMIN — Medication 1 APPLICATION(S): at 17:11

## 2024-06-05 RX ADMIN — Medication 650 MILLIGRAM(S): at 07:23

## 2024-06-05 RX ADMIN — PIPERACILLIN AND TAZOBACTAM 25 GRAM(S): 4; .5 INJECTION, POWDER, LYOPHILIZED, FOR SOLUTION INTRAVENOUS at 13:06

## 2024-06-05 RX ADMIN — SENNA PLUS 2 TABLET(S): 8.6 TABLET ORAL at 19:09

## 2024-06-05 RX ADMIN — SENNA PLUS 2 TABLET(S): 8.6 TABLET ORAL at 06:25

## 2024-06-05 RX ADMIN — PIPERACILLIN AND TAZOBACTAM 25 GRAM(S): 4; .5 INJECTION, POWDER, LYOPHILIZED, FOR SOLUTION INTRAVENOUS at 06:31

## 2024-06-05 RX ADMIN — OXYCODONE HYDROCHLORIDE 5 MILLIGRAM(S): 5 TABLET ORAL at 17:09

## 2024-06-05 RX ADMIN — OXYCODONE HYDROCHLORIDE 5 MILLIGRAM(S): 5 TABLET ORAL at 18:09

## 2024-06-05 RX ADMIN — PIPERACILLIN AND TAZOBACTAM 25 GRAM(S): 4; .5 INJECTION, POWDER, LYOPHILIZED, FOR SOLUTION INTRAVENOUS at 21:17

## 2024-06-05 NOTE — PROGRESS NOTE ADULT - ASSESSMENT
44F MHx spina bifida, multiple bilateral foot surgeries, and  shunt (managed [facility] neurosurgery), uterine fibroids presenting with R foot wound, found to have  R foot and L ischial abscess, s/p R foot sx with podiatry 5/20.

## 2024-06-05 NOTE — PROGRESS NOTE ADULT - SUBJECTIVE AND OBJECTIVE BOX
[facility]-- WOUND TEAM -- FOLLOW UP NOTE  --------------------------------------------------------------------------------    subjective: Patient seen and examined at bedside. Per patient the NPWT/VAC has not been maintaining seal, when toileting for BMs she has been removing the dressing herself stating "it gets soiled." Wears diapers for urinary incontinence, per patient's preference. Denies noting excessive and/or purulent drainage, denies pain and/or tenderness to wound, reports "I didn't even know I had it." Denies SOB, CP, abdominal pain, n/v, fever, chills.    Interval HPI/24 hour events:   -6/4 NPWT/VAC seal compromised, NPWT/VAC removed temporary dressing placed.       Chart reviewed including labs and relevant images      Diet:  Diet, Regular:   Lactose Restricted (Milk Sugar Intoler.) (06-02-24 @ 18:14)      ROS: General, skin  See above, all other systems negative.    ALLERGIES & MEDICATIONS  --------------------------------------------------------------------------------  Allergies    latex (Hives; Rash)  Zyrtec (Rash)    Intolerances          STANDING INPATIENT MEDICATIONS    bisacodyl Suppository 10 milliGRAM(s) Rectal daily  chlorhexidine 2% Cloths 1 Application(s) Topical daily  enoxaparin Injectable 30 milliGRAM(s) SubCutaneous every 24 hours  ferrous    sulfate 325 milliGRAM(s) Oral daily  gabapentin 100 milliGRAM(s) Oral three times a day  hydrocortisone 1% Cream 1 Application(s) Topical two times a day  lidocaine   4% Patch 1 Patch Transdermal every 24 hours  melatonin 3 milliGRAM(s) Oral at bedtime  mupirocin 2% Ointment 1 Application(s) Topical <User Schedule>  piperacillin/tazobactam IVPB.. 3.375 Gram(s) IV Intermittent every 8 hours  polyethylene glycol 3350 17 Gram(s) Oral two times a day  senna 2 Tablet(s) Oral two times a day      PRN INPATIENT MEDICATION  acetaminophen     Tablet .. 650 milliGRAM(s) Oral every 6 hours PRN  calamine/zinc oxide Lotion 1 Application(s) Topical four times a day PRN  ondansetron    Tablet 4 milliGRAM(s) Oral every 8 hours PRN  oxyCODONE    IR 5 milliGRAM(s) Oral every 8 hours PRN  simethicone 80 milliGRAM(s) Chew three times a day PRN        Vital signs:  T(C): 36.6 (06-05-24 @ 05:38), Max: 37.3 (06-04-24 @ 15:01)  HR: 71 (06-05-24 @ 05:38) (71 - 86)  BP: 119/64 (06-05-24 @ 05:38) (116/81 - 129/94)  RR: 18 (06-05-24 @ 05:38) (18 - 18)  SpO2: 100% (06-05-24 @ 05:38) (100% - 100%)  Wt(kg): 43.6kg (05-)        06-04-24 @ 07:01  -  06-05-24 @ 07:00  --------------------------------------------------------  IN: 890 mL / OUT: 2 mL / NET: 888 mL      Physical Exam   Constitutional: NAD, A&Ox3. Thin, (+) protruding bony prominences to sacrum and bilateral trochanters/bilateral ischium   (+) low airloss support surface, (+) fluidized positioning devices, heels elevated with pillows. Independently mobile in bed.  HEENT: NC/AT, non icteric, mucosa moist  Cardiovascular: Rate regular   Respiratory: nonlabored, room air, equal chest expansion   Gastrointestinal: soft NT/ND   : functionally incontinent, wearing diaper.  Musculoskeletal: full aROM to bilateral UE, limited aROM to lower extremities. Mild foot drop bilaterally.  Skin:  moist w/ good turgor, bilateral feet with dressings c/d/i  Lumbar spine, right ankle, Left achilles left dorsal foot, surgical scars, well healed.  Left ischium Stage 4 pressure injury   -1.8cmx1.5cmx2.5cm (prev [id number]-->[id number])   -Undermining from 6-11 o'clock extending 2cm  -100% soft granular base.  -Moderate serosanguineous drainage, no purulent drainage, no odor.  -(+) epibole circumferentially, extensive epibole from 12-5 o'clock that continue internally along wound wall, initially appears as undermining, once palpated area is 100% re-epithelialized. Wound edges described above callused and severely macerated.  **Aquacel ribbon and silicone foam with border applied.   Bilateral buttocks/inner buttocks areas of hypopigmentation, no open ulcerations.  Psych: calm and cooperative.         LABS/ CULTURES/ RADIOLOGY:              10.3   4.33  >-----------<  405      [06-04-24 @ 07:25]              31.5     135  |  102  |  14  ----------------------------<  81      [06-04-24 @ 07:25]  3.9   |  20  |  0.46        Ca     8.9     [06-04-24 @ 07:25]      Mg     1.90     [06-04-24 @ 07:25]      Phos  3.4     [06-04-24 @ 07:25]      Culture - Abscess with Gram Stain (05.16.24 @ 19:12)   Gram Stain:   Rare polymorphonuclear leukocytes seen per low power field   No organisms seen per oil power field  Specimen Source: .Abscess Hip - Left  Culture Results:   No growth at 5 days

## 2024-06-05 NOTE — PROGRESS NOTE ADULT - ATTENDING COMMENTS
44W w/ spina bifida,  shunt, and multiple bilateral foot surgeries presenting with right foot wound now s/p soft tissue mass excision with podiatry (5/21/24) without concern for infection and I&D of ischial collection. Completed course of Zosyn.     Endorsing urinary frequency and incontinence x 2. Urinalysis ordered with sterile pyuria. Simethicone ordered for gas. W/ continued urinary complaints, bladder scan ordered.  Urine culture GNR, will complete short course abx, symptoms improved    Wound vac removed by wound care team.     DC planning to [facility] pending auth.  Discussed plan at length with patient.  DC time 40 minutes

## 2024-06-05 NOTE — PROGRESS NOTE ADULT - PROBLEM SELECTOR PLAN 2
Patient with Proteus on UCx from 5/11, s/p vancomycin (5/11) and zosyn (5/11 - 5/21). Patient with new lower abdominal and lower back pain on 6/2.  - UCx (6/2) growing Pseudomonas  - F/u sensitivities  - S/p CTX (6/3-6/4), switch to Zosyn (6/4 - ) for pseudomonas coverage  - C/w miralax, senna, dulcolax suppository

## 2024-06-05 NOTE — PROGRESS NOTE ADULT - PROBLEM SELECTOR PLAN 4
Hx of uterine fibroid, heavy periods. Currently on period -- likely etiology of microscopic hematuria on UA  - Patient found to be iron deficient  - PO iron started  - Outpatient OBGYN f/u

## 2024-06-05 NOTE — PROGRESS NOTE ADULT - SUBJECTIVE AND OBJECTIVE BOX
Patient is a 44y old  Female who presents with a chief complaint of right foot wound    SUBJECTIVE / OVERNIGHT EVENTS: Patient seen and examined at bedside. No overnight events.    MEDICATIONS  (STANDING):  bisacodyl Suppository 10 milliGRAM(s) Rectal daily  chlorhexidine 2% Cloths 1 Application(s) Topical daily  enoxaparin Injectable 30 milliGRAM(s) SubCutaneous every 24 hours  ferrous    sulfate 325 milliGRAM(s) Oral daily  gabapentin 100 milliGRAM(s) Oral three times a day  hydrocortisone 1% Cream 1 Application(s) Topical two times a day  lidocaine   4% Patch 1 Patch Transdermal every 24 hours  melatonin 3 milliGRAM(s) Oral at bedtime  mupirocin 2% Ointment 1 Application(s) Topical <User Schedule>  piperacillin/tazobactam IVPB.. 3.375 Gram(s) IV Intermittent every 8 hours  polyethylene glycol 3350 17 Gram(s) Oral two times a day  senna 2 Tablet(s) Oral two times a day    MEDICATIONS  (PRN):  acetaminophen     Tablet .. 650 milliGRAM(s) Oral every 6 hours PRN Temp greater or equal to 38C (100.4F), Mild Pain (1 - 3), Moderate Pain (4 - 6)  calamine/zinc oxide Lotion 1 Application(s) Topical four times a day PRN Itching  ondansetron    Tablet 4 milliGRAM(s) Oral every 8 hours PRN Nausea and/or Vomiting  oxyCODONE    IR 5 milliGRAM(s) Oral every 8 hours PRN Severe Pain (7 - 10)  simethicone 80 milliGRAM(s) Chew three times a day PRN Gas    Vital Signs Last 24 Hrs  T(C): 36.6 (05 Jun 2024 05:38), Max: 37.3 (04 Jun 2024 15:01)  T(F): 97.9 (05 Jun 2024 05:38), Max: 99.1 (04 Jun 2024 15:01)  HR: 71 (05 Jun 2024 05:38) (71 - 86)  BP: 119/64 (05 Jun 2024 05:38) (116/81 - 129/94)  BP(mean): --  RR: 18 (05 Jun 2024 05:38) (18 - 18)  SpO2: 100% (05 Jun 2024 05:38) (100% - 100%)    Parameters below as of 05 Jun 2024 05:38  Patient On (Oxygen Delivery Method): room air    CAPILLARY BLOOD GLUCOSE    I&O's Summary    04 Jun 2024 07:01  -  05 Jun 2024 07:00  --------------------------------------------------------  IN: 890 mL / OUT: 2 mL / NET: 888 mL    PHYSICAL EXAM:  GENERAL: NAD, lying in bed comfortably  HEAD: Atraumatic, normocephalic  EYES: EOMI, conjunctiva and sclera clear  ENT: Moist mucous membranes  NECK: Supple, no JVD  HEART: S1, S2, Regular rate and rhythm, no murmurs, rubs, or gallops  LUNGS: Unlabored respirations, clear to auscultation bilaterally, no crackles, wheezing, or rhonchi  ABDOMEN: Soft, nontender, nondistended  EXTREMITIES: No LE edema  NERVOUS SYSTEM: A&Ox3, no saddle anesthesia. Full sensation in b/l legs. 1/5 strength in b/l this AM; wnl on repeat physical later in AM.  SKIN: No rashes or lesions    LABS:                        10.3   4.33  )-----------( 405      ( 04 Jun 2024 07:25 )             31.5     06-04    135  |  102  |  14  ----------------------------<  81  3.9   |  20<L>  |  0.46<L>    Ca    8.9      04 Jun 2024 07:25  Phos  3.4     06-04  Mg     1.90     06-04    Urinalysis Basic - ( 04 Jun 2024 07:25 )    Color: x / Appearance: x / SG: x / pH: x  Gluc: 81 mg/dL / Ketone: x  / Bili: x / Urobili: x   Blood: x / Protein: x / Nitrite: x   Leuk Esterase: x / RBC: x / WBC x   Sq Epi: x / Non Sq Epi: x / Bacteria: x    RADIOLOGY & ADDITIONAL TESTS:    Imaging Personally Reviewed:    Consultant(s) Notes Reviewed:      Care Discussed with Consultants/Other Providers:    Lewis Ga MD, Internal Medicine Resident Patient is a 44y old  Female who presents with a chief complaint of right foot wound    SUBJECTIVE / OVERNIGHT EVENTS: Patient seen and examined at bedside. No overnight events. This AM, patient still with lower abdominal and lower back pain, although slightly improved compared to the day prior. Denies further episodes of incontinence. States had one large, hard BM overnight. This AM denies CP, SOB, subjective fever, chills, n/v/d.    MEDICATIONS  (STANDING):  bisacodyl Suppository 10 milliGRAM(s) Rectal daily  chlorhexidine 2% Cloths 1 Application(s) Topical daily  enoxaparin Injectable 30 milliGRAM(s) SubCutaneous every 24 hours  ferrous    sulfate 325 milliGRAM(s) Oral daily  gabapentin 100 milliGRAM(s) Oral three times a day  hydrocortisone 1% Cream 1 Application(s) Topical two times a day  lidocaine   4% Patch 1 Patch Transdermal every 24 hours  melatonin 3 milliGRAM(s) Oral at bedtime  mupirocin 2% Ointment 1 Application(s) Topical <User Schedule>  piperacillin/tazobactam IVPB.. 3.375 Gram(s) IV Intermittent every 8 hours  polyethylene glycol 3350 17 Gram(s) Oral two times a day  senna 2 Tablet(s) Oral two times a day    MEDICATIONS  (PRN):  acetaminophen     Tablet .. 650 milliGRAM(s) Oral every 6 hours PRN Temp greater or equal to 38C (100.4F), Mild Pain (1 - 3), Moderate Pain (4 - 6)  calamine/zinc oxide Lotion 1 Application(s) Topical four times a day PRN Itching  ondansetron    Tablet 4 milliGRAM(s) Oral every 8 hours PRN Nausea and/or Vomiting  oxyCODONE    IR 5 milliGRAM(s) Oral every 8 hours PRN Severe Pain (7 - 10)  simethicone 80 milliGRAM(s) Chew three times a day PRN Gas    Vital Signs Last 24 Hrs  T(C): 36.6 (05 Jun 2024 05:38), Max: 37.3 (04 Jun 2024 15:01)  T(F): 97.9 (05 Jun 2024 05:38), Max: 99.1 (04 Jun 2024 15:01)  HR: 71 (05 Jun 2024 05:38) (71 - 86)  BP: 119/64 (05 Jun 2024 05:38) (116/81 - 129/94)  BP(mean): --  RR: 18 (05 Jun 2024 05:38) (18 - 18)  SpO2: 100% (05 Jun 2024 05:38) (100% - 100%)    Parameters below as of 05 Jun 2024 05:38  Patient On (Oxygen Delivery Method): room air    CAPILLARY BLOOD GLUCOSE    I&O's Summary    04 Jun 2024 07:01  -  05 Jun 2024 07:00  --------------------------------------------------------  IN: 890 mL / OUT: 2 mL / NET: 888 mL    PHYSICAL EXAM:  GENERAL: NAD, lying in bed comfortably  HEAD: Atraumatic, normocephalic  EYES: EOMI, conjunctiva and sclera clear  ENT: Moist mucous membranes  NECK: Supple, no JVD  HEART: S1, S2, Regular rate and rhythm, no murmurs, rubs, or gallops  LUNGS: Unlabored respirations, clear to auscultation bilaterally, no crackles, wheezing, or rhonchi  ABDOMEN: Soft, nontender, nondistended  EXTREMITIES: No LE edema  NERVOUS SYSTEM: A&Ox3, no saddle anesthesia. Full sensation in b/l legs. Reduced strength in b/l legs, 4/5.  SKIN: No rashes or lesions    LABS:                        10.3   4.33  )-----------( 405      ( 04 Jun 2024 07:25 )             31.5     06-04    135  |  102  |  14  ----------------------------<  81  3.9   |  20<L>  |  0.46<L>    Ca    8.9      04 Jun 2024 07:25  Phos  3.4     06-04  Mg     1.90     06-04    Urinalysis Basic - ( 04 Jun 2024 07:25 )    Color: x / Appearance: x / SG: x / pH: x  Gluc: 81 mg/dL / Ketone: x  / Bili: x / Urobili: x   Blood: x / Protein: x / Nitrite: x   Leuk Esterase: x / RBC: x / WBC x   Sq Epi: x / Non Sq Epi: x / Bacteria: x    Lewis Ga MD, Internal Medicine Resident Yes

## 2024-06-05 NOTE — PROGRESS NOTE ADULT - ASSESSMENT
Assessment/Plan: 44F MHx spina bifida, multiple bilateral foot surgeries, and  shunt (managed [facility] neurosurgery), uterine fibroids presenting with R foot wound, found to have  R foot and L ischial abscess, s/p left ischium abscess I&D 5/16 and  R foot sx with podiatry 5/20.    Wound care follow up for Left ischium Stage 4 pressure injury present on arrival with abscess s/p I&D 5/16/24.   -Dimensions and tissue type improving, undermining no longer circumferential, now from 6 - 11 o'clock extending 2cm.   -No palpable mass or obvious drainage collections.  -Wound edges callused and severely macerated with (+) epibole that extending internally along wall of wound from 12- 5 o'clock  -Moderate serosanguineous drainage, no purulent drainage, no odor.  -No s/s of acute soft tissue infection.  -Wound culture no growth at 5 days   -Patient endorses (+) sensation from waist down, per records and endorsed by patient today she was not aware of full thickness wound, additionally showed no s/s of sensation during wound cleansing and management. Extent of sensation is questionable.  -Delayed wound healing likely secondary to friction/shearing forces, muscle wasting, mostly bedbound state, increase moisture, diaper use, and decreased sensation. Patient AOX3, and requesting to keep diaper in place for dignity purposes. Per records patient was educated regarding risks of diaper usage, continued to educate patient today that usage may increase moisture and subsequently delay wound healing, may also pose risk of possible infection.  -Previously used NPWT/VAC therapy, however treatement was interrupted several times by patient despite education. Patient reports that when toileting, even when using bathroom for BMs, NPWT/VAC dressing becomes soiled and therefore she removes dressing independently. Upon discussion patient prefers use of alternative dressing "it's easier to have it changes if it becomes dirty."  -Will discontinue VAC dressing today and use absorptive packing.  -Topical recommendations: Clean wound and periwound skin with NS. Pat dry. Apply Liquid barrier film to periwound skin. Lightly pack wound depth and undermining with Aquacel hydrofiber ribbon, make sure to leave 2" out at end visible to ensure full removal of packing with subsequent dressing changes. Cover with silicone foam with border. Change daily or prn if soiled.   -Advised patient to follow up closely at wound care center outpatient. Macerated edges patient may benefit from debridement of wound edges outpatient   -Continue to offload pressure; low airloss support surface, t&P per protocol with use of fluidized positioning devices. Continue to educate patient and benefits of single breathable incontinence pads and risks of diaper usage. If patient continues to prefer diaper usage, please follow up with frequent rounding for perineal care.  -Risks for moisture associated dermatitis in bilateral buttocks/perineum: Gently clean with skin cleanser. Pat dry. Apply a thin layer of Willian barrier moisture cream, apply twice a day or prn if soiled.   -Appreciate nutrition recommendations for optimization.  -Upon discharge should qualify for group 2 air mattress and Roho seat cushion to continue to offload. Patient should limit seating time to less than 2 consecutive hours. Case management/social work to please follow up.    B/L feet- management per podiatry, appreciate Podiatry recommendations.   -Recommend to offload heels with complete cair boots or fluidized positioning device.    Upon discharge follow up at outpatient [facility]. [address] [place] [address]. [phone].  Findings and plan discussed in detail with patient, primary RN, and primary team. All questions and concerns addressed to meet patient's satisfaction.  Will continue to follow periodically while inpatient. When medically cleared, ok to discharge from wound care (left ischium) perspective.  Remainder of care per primary team.  Thank you.    PETER Patel, CWN   pager #[phone]    If after 4PM or before 7:30AM on Mon-Friday or weekend/holiday please contact general surgery for urgent matters.   Team A- [id number]   Team B- [id number]  For non-urgent matters e-mail [email or web address]    I spent 35 minutes face-to-face with this patient of which more than 50% of the time was spent counseling/coordinating care of this patient.

## 2024-06-05 NOTE — PROGRESS NOTE ADULT - PROBLEM SELECTOR PLAN 3
- Pt with hx spina bifida, has motorized wheelchair at home   -  shunt managed by [facility] neurosurgery   - No hydrocephalus on CT

## 2024-06-05 NOTE — PROGRESS NOTE ADULT - PROBLEM SELECTOR PLAN 5
Patient with diarrhea earlier in hospital course, now states she has not had a BM for 3 days. With lower quadrant abd pain.  - Has not used PRN opiates  - Miralax, senna, dulcolax suppository

## 2024-06-05 NOTE — PROGRESS NOTE ADULT - PROBLEM SELECTOR PLAN 1
Stage 4 sacral/gluteal decub with wound vac. CTAP with abscess underlying sacral wound. s/p I&D 5/17 - serosanguinous drainage, possibly bursa cysts iso pressure wound    Plan:  - Culture (5/16) without growth  - Frequent repositioning  - Wound care consulted, appreciate recs  - Podiatry re-evaluated left leg, no acute intervention necessary  - Left leg XR without change  - Wound vac replaced 6/3 and functioning well

## 2024-06-06 VITALS
HEART RATE: 94 BPM | SYSTOLIC BLOOD PRESSURE: 138 MMHG | TEMPERATURE: 99 F | DIASTOLIC BLOOD PRESSURE: 97 MMHG | OXYGEN SATURATION: 99 % | RESPIRATION RATE: 18 BRPM

## 2024-06-06 PROCEDURE — 99239 HOSP IP/OBS DSCHRG MGMT >30: CPT | Mod: GC

## 2024-06-06 RX ADMIN — OXYCODONE HYDROCHLORIDE 5 MILLIGRAM(S): 5 TABLET ORAL at 08:00

## 2024-06-06 RX ADMIN — OXYCODONE HYDROCHLORIDE 5 MILLIGRAM(S): 5 TABLET ORAL at 06:42

## 2024-06-06 RX ADMIN — PIPERACILLIN AND TAZOBACTAM 25 GRAM(S): 4; .5 INJECTION, POWDER, LYOPHILIZED, FOR SOLUTION INTRAVENOUS at 13:18

## 2024-06-06 RX ADMIN — Medication 650 MILLIGRAM(S): at 13:17

## 2024-06-06 RX ADMIN — GABAPENTIN 100 MILLIGRAM(S): 400 CAPSULE ORAL at 13:17

## 2024-06-06 RX ADMIN — GABAPENTIN 100 MILLIGRAM(S): 400 CAPSULE ORAL at 06:42

## 2024-06-06 RX ADMIN — Medication 1 APPLICATION(S): at 06:43

## 2024-06-06 RX ADMIN — Medication 650 MILLIGRAM(S): at 13:45

## 2024-06-06 RX ADMIN — SENNA PLUS 2 TABLET(S): 8.6 TABLET ORAL at 06:49

## 2024-06-06 RX ADMIN — Medication 325 MILLIGRAM(S): at 12:32

## 2024-06-06 RX ADMIN — CHLORHEXIDINE GLUCONATE 1 APPLICATION(S): 213 SOLUTION TOPICAL at 12:53

## 2024-06-06 RX ADMIN — PIPERACILLIN AND TAZOBACTAM 25 GRAM(S): 4; .5 INJECTION, POWDER, LYOPHILIZED, FOR SOLUTION INTRAVENOUS at 06:43

## 2024-06-06 RX ADMIN — POLYETHYLENE GLYCOL 3350 17 GRAM(S): 17 POWDER, FOR SOLUTION ORAL at 06:50

## 2024-06-06 NOTE — PROGRESS NOTE ADULT - ATTENDING COMMENTS
44W w/ spina bifida,  shunt, and multiple bilateral foot surgeries presenting with right foot wound now s/p soft tissue mass excision with podiatry (5/21/24) without concern for infection and I&D of ischial collection. Completed course of Zosyn.     Endorsing urinary frequency and incontinence x 2. Urinalysis ordered with sterile pyuria. Simethicone ordered for gas. W/ continued urinary complaints, bladder scan ordered.  Urine culture GNR, will complete short course abx, symptoms improved    Wound vac removed by wound care team.     DC planning to [facility] today.  Discussed plan at length with patient.  DC time 40 minutes

## 2024-06-06 NOTE — PROGRESS NOTE ADULT - PROBLEM SELECTOR PLAN 1
Stage 4 sacral/gluteal decub with wound vac. CTAP with abscess underlying sacral wound. s/p I&D 5/17 - serosanguinous drainage, possibly bursa cysts iso pressure wound    Plan:  - Culture (5/16) without growth  - Frequent repositioning  - Wound care consulted, appreciate recs  - Podiatry re-evaluated left leg, no acute intervention necessary  - Left leg XR without change  - Wound vac replaced 6/3 and functioning well Stage 4 sacral/gluteal decub with wound vac. CTAP with abscess underlying sacral wound. s/p I&D 5/17 - serosanguinous drainage, possibly bursa cysts iso pressure wound    Plan:  - Culture (5/16) without growth  - Frequent repositioning  - Wound care consulted, appreciate recs  - Podiatry re-evaluated left leg, no acute intervention necessary  - Left leg XR without change  - Wound vac replaced 6/3 and functioning well; removed on 6/5, not required upon discharge.

## 2024-06-06 NOTE — PROGRESS NOTE ADULT - SUBJECTIVE AND OBJECTIVE BOX
Patient is a 44y old Female who presents with a chief complaint of right foot wound    SUBJECTIVE / OVERNIGHT EVENTS: Patient seen and examined at bedside. No overnight events.     MEDICATIONS  (STANDING):  bisacodyl Suppository 10 milliGRAM(s) Rectal daily  chlorhexidine 2% Cloths 1 Application(s) Topical daily  enoxaparin Injectable 30 milliGRAM(s) SubCutaneous every 24 hours  ferrous    sulfate 325 milliGRAM(s) Oral daily  gabapentin 100 milliGRAM(s) Oral three times a day  hydrocortisone 1% Cream 1 Application(s) Topical two times a day  lidocaine   4% Patch 1 Patch Transdermal every 24 hours  melatonin 3 milliGRAM(s) Oral at bedtime  mupirocin 2% Ointment 1 Application(s) Topical <User Schedule>  piperacillin/tazobactam IVPB.. 3.375 Gram(s) IV Intermittent every 8 hours  polyethylene glycol 3350 17 Gram(s) Oral two times a day  senna 2 Tablet(s) Oral two times a day    MEDICATIONS  (PRN):  acetaminophen     Tablet .. 650 milliGRAM(s) Oral every 6 hours PRN Temp greater or equal to 38C (100.4F), Mild Pain (1 - 3), Moderate Pain (4 - 6)  calamine/zinc oxide Lotion 1 Application(s) Topical four times a day PRN Itching  ondansetron    Tablet 4 milliGRAM(s) Oral every 8 hours PRN Nausea and/or Vomiting  oxyCODONE    IR 5 milliGRAM(s) Oral every 8 hours PRN Severe Pain (7 - 10)  simethicone 80 milliGRAM(s) Chew three times a day PRN Gas    Vital Signs Last 24 Hrs  T(C): 37.5 (06 Jun 2024 05:44), Max: 37.5 (06 Jun 2024 05:44)  T(F): 99.5 (06 Jun 2024 05:44), Max: 99.5 (06 Jun 2024 05:44)  HR: 76 (06 Jun 2024 05:44) (74 - 80)  BP: 133/89 (06 Jun 2024 05:44) (117/84 - 133/89)  BP(mean): --  RR: 18 (06 Jun 2024 05:44) (18 - 18)  SpO2: 96% (05 Jun 2024 21:30) (96% - 100%)    Parameters below as of 06 Jun 2024 05:44  Patient On (Oxygen Delivery Method): room air    CAPILLARY BLOOD GLUCOSE    I&O's Summary    PHYSICAL EXAM:  GENERAL: NAD, lying in bed comfortably  HEAD: Atraumatic, normocephalic  EYES: EOMI, conjunctiva and sclera clear  ENT: Moist mucous membranes  NECK: Supple, no JVD  HEART: S1, S2, Regular rate and rhythm, no murmurs, rubs, or gallops  LUNGS: Unlabored respirations, clear to auscultation bilaterally, no crackles, wheezing, or rhonchi  ABDOMEN: Soft, nontender, nondistended  EXTREMITIES: No LE edema, circular stage 4 DTI on L glute, c/d/i.  NERVOUS SYSTEM: A&Ox3, no saddle anesthesia. Full sensation in b/l legs. Reduced strength in b/l legs, 4/5.  SKIN: No rashes or lesions    LABS:    RADIOLOGY & ADDITIONAL TESTS:    Imaging Personally Reviewed:    Consultant(s) Notes Reviewed:      Care Discussed with Consultants/Other Providers:    Lewis Ga MD, Internal Medicine Resident Patient is a 44y old Female who presents with a chief complaint of right foot wound    SUBJECTIVE / OVERNIGHT EVENTS: Patient seen and examined at bedside. No overnight events. Patient with persistent abdominal and lower back pain, although unsure if improved compared to the day prior. Wound vac removed overnight as patient deemed to no longer require. This AM denies CP, SOB, subjective fever, chills, n/v/d.    MEDICATIONS  (STANDING):  bisacodyl Suppository 10 milliGRAM(s) Rectal daily  chlorhexidine 2% Cloths 1 Application(s) Topical daily  enoxaparin Injectable 30 milliGRAM(s) SubCutaneous every 24 hours  ferrous    sulfate 325 milliGRAM(s) Oral daily  gabapentin 100 milliGRAM(s) Oral three times a day  hydrocortisone 1% Cream 1 Application(s) Topical two times a day  lidocaine   4% Patch 1 Patch Transdermal every 24 hours  melatonin 3 milliGRAM(s) Oral at bedtime  mupirocin 2% Ointment 1 Application(s) Topical <User Schedule>  piperacillin/tazobactam IVPB.. 3.375 Gram(s) IV Intermittent every 8 hours  polyethylene glycol 3350 17 Gram(s) Oral two times a day  senna 2 Tablet(s) Oral two times a day    MEDICATIONS  (PRN):  acetaminophen     Tablet .. 650 milliGRAM(s) Oral every 6 hours PRN Temp greater or equal to 38C (100.4F), Mild Pain (1 - 3), Moderate Pain (4 - 6)  calamine/zinc oxide Lotion 1 Application(s) Topical four times a day PRN Itching  ondansetron    Tablet 4 milliGRAM(s) Oral every 8 hours PRN Nausea and/or Vomiting  oxyCODONE    IR 5 milliGRAM(s) Oral every 8 hours PRN Severe Pain (7 - 10)  simethicone 80 milliGRAM(s) Chew three times a day PRN Gas    Vital Signs Last 24 Hrs  T(C): 37.5 (06 Jun 2024 05:44), Max: 37.5 (06 Jun 2024 05:44)  T(F): 99.5 (06 Jun 2024 05:44), Max: 99.5 (06 Jun 2024 05:44)  HR: 76 (06 Jun 2024 05:44) (74 - 80)  BP: 133/89 (06 Jun 2024 05:44) (117/84 - 133/89)  BP(mean): --  RR: 18 (06 Jun 2024 05:44) (18 - 18)  SpO2: 96% (05 Jun 2024 21:30) (96% - 100%)    Parameters below as of 06 Jun 2024 05:44  Patient On (Oxygen Delivery Method): room air    CAPILLARY BLOOD GLUCOSE    I&O's Summary    PHYSICAL EXAM:  GENERAL: NAD, lying in bed comfortably  HEAD: Atraumatic, normocephalic  EYES: EOMI, conjunctiva and sclera clear  ENT: Moist mucous membranes  NECK: Supple, no JVD  HEART: S1, S2, Regular rate and rhythm, no murmurs, rubs, or gallops  LUNGS: Unlabored respirations, clear to auscultation bilaterally, no crackles, wheezing, or rhonchi  ABDOMEN: Soft, nontender, nondistended  EXTREMITIES: No LE edema, circular stage 4 DTI on L glute, c/d/i. Wound vac removed.  NERVOUS SYSTEM: A&Ox3, no saddle anesthesia. Full sensation in b/l legs. Reduced strength in b/l legs, 4/5.  SKIN: No rashes or lesions    LABS:    RADIOLOGY & ADDITIONAL TESTS:    Imaging Personally Reviewed:    Consultant(s) Notes Reviewed:      Care Discussed with Consultants/Other Providers:    Lewis Ga MD, Internal Medicine Resident

## 2024-06-06 NOTE — PROGRESS NOTE ADULT - PROBLEM SELECTOR PLAN 6
DVT: lovenox   Diet: regular  Dispo: Pending [facility] bed availability DVT: lovenox   Diet: regular  Dispo: [facility]

## 2024-06-06 NOTE — PROGRESS NOTE ADULT - PROBLEM SELECTOR PROBLEM 5
Prophylactic measure
Constipation
Prophylactic measure
Constipation
Prophylactic measure

## 2024-06-06 NOTE — PROGRESS NOTE ADULT - NUTRITIONAL ASSESSMENT
This patient has been assessed with a concern for Malnutrition and has been determined to have a diagnosis/diagnoses of Underweight (BMI < 19).    This patient is being managed with:   Diet Regular-  Entered: May 12 2024  9:21AM  
This patient has been assessed with a concern for Malnutrition and has been determined to have a diagnosis/diagnoses of Underweight (BMI < 19).    This patient is being managed with:   Diet Regular-  Lactose Restricted (Milk Sugar Intoler.)  Entered: Jun 2 2024  6:12PM  
This patient has been assessed with a concern for Malnutrition and has been determined to have a diagnosis/diagnoses of Underweight (BMI < 19).    This patient is being managed with:   Diet Regular-  Entered: May 12 2024  9:21AM  
This patient has been assessed with a concern for Malnutrition and has been determined to have a diagnosis/diagnoses of Underweight (BMI < 19).    This patient is being managed with:   Diet Regular-  Entered: May 12 2024  9:21AM  
This patient has been assessed with a concern for Malnutrition and has been determined to have a diagnosis/diagnoses of Underweight (BMI < 19).    This patient is being managed with:   Diet Regular-  Lactose Restricted (Milk Sugar Intoler.)  Entered: Jun 2 2024  6:12PM  
This patient has been assessed with a concern for Malnutrition and has been determined to have a diagnosis/diagnoses of Underweight (BMI < 19).    This patient is being managed with:   Diet Regular-  Entered: May 12 2024  9:21AM  
This patient has been assessed with a concern for Malnutrition and has been determined to have a diagnosis/diagnoses of Underweight (BMI < 19).    This patient is being managed with:   Diet Regular-  Lactose Restricted (Milk Sugar Intoler.)  Entered: Jun 2 2024  6:12PM  
This patient has been assessed with a concern for Malnutrition and has been determined to have a diagnosis/diagnoses of Underweight (BMI < 19).    This patient is being managed with:   Diet Regular-  Entered: May 12 2024  9:21AM  

## 2024-06-06 NOTE — PROGRESS NOTE ADULT - REASON FOR ADMISSION
right foot wound

## 2024-06-06 NOTE — PROGRESS NOTE ADULT - PROVIDER SPECIALTY LIST ADULT
Infectious Disease
Internal Medicine
Podiatry
Rehab Medicine
Wound Care
Wound Care
Infectious Disease
Infectious Disease
Internal Medicine
Podiatry
Anesthesia
Infectious Disease
Internal Medicine
Internal Medicine
Podiatry
Infectious Disease
Podiatry
Surgery
Wound Care
Wound Care
Internal Medicine

## 2024-06-06 NOTE — PROGRESS NOTE ADULT - PROBLEM SELECTOR PLAN 3
- Pt with hx spina bifida, has motorized wheelchair at home   -  shunt managed by [facility] neurosurgery   - No hydrocephalus on CT

## 2024-06-07 RX ORDER — LEVOFLOXACIN 5 MG/ML
1 INJECTION, SOLUTION INTRAVENOUS
Qty: 0 | Refills: 0 | DISCHARGE
Start: 2024-06-07 | End: 2024-06-09

## 2024-08-17 ENCOUNTER — EMERGENCY (EMERGENCY)
Facility: HOSPITAL | Age: 44
LOS: 1 days | Discharge: HOME | End: 2024-08-17
Attending: STUDENT IN AN ORGANIZED HEALTH CARE EDUCATION/TRAINING PROGRAM | Admitting: PERSONAL EMERGENCY RESPONSE ATTENDANT
Payer: MEDICAID

## 2024-08-17 VITALS
DIASTOLIC BLOOD PRESSURE: 89 MMHG | SYSTOLIC BLOOD PRESSURE: 132 MMHG | OXYGEN SATURATION: 100 % | RESPIRATION RATE: 20 BRPM | TEMPERATURE: 98 F | HEART RATE: 80 BPM

## 2024-08-17 VITALS
TEMPERATURE: 99 F | DIASTOLIC BLOOD PRESSURE: 101 MMHG | SYSTOLIC BLOOD PRESSURE: 161 MMHG | RESPIRATION RATE: 16 BRPM | HEART RATE: 121 BPM | OXYGEN SATURATION: 97 %

## 2024-08-17 DIAGNOSIS — Z98.890 OTHER SPECIFIED POSTPROCEDURAL STATES: Chronic | ICD-10-CM

## 2024-08-17 DIAGNOSIS — Z98.2 PRESENCE OF CEREBROSPINAL FLUID DRAINAGE DEVICE: Chronic | ICD-10-CM

## 2024-08-17 PROBLEM — S91.301A UNSPECIFIED OPEN WOUND, RIGHT FOOT, INITIAL ENCOUNTER: Chronic | Status: ACTIVE | Noted: 2024-05-12

## 2024-08-17 PROBLEM — D21.9 BENIGN NEOPLASM OF CONNECTIVE AND OTHER SOFT TISSUE, UNSPECIFIED: Chronic | Status: ACTIVE | Noted: 2024-05-12

## 2024-08-17 PROBLEM — S91.302A UNSPECIFIED OPEN WOUND, LEFT FOOT, INITIAL ENCOUNTER: Chronic | Status: ACTIVE | Noted: 2024-05-12

## 2024-08-17 LAB
ALBUMIN SERPL ELPH-MCNC: 3.6 G/DL — SIGNIFICANT CHANGE UP (ref 3.3–5)
ALP SERPL-CCNC: 71 U/L — SIGNIFICANT CHANGE UP (ref 40–120)
ALT FLD-CCNC: 8 U/L — SIGNIFICANT CHANGE UP (ref 4–33)
ANION GAP SERPL CALC-SCNC: 12 MMOL/L — SIGNIFICANT CHANGE UP (ref 7–14)
AST SERPL-CCNC: 11 U/L — SIGNIFICANT CHANGE UP (ref 4–32)
BILIRUB SERPL-MCNC: <0.2 MG/DL — SIGNIFICANT CHANGE UP (ref 0.2–1.2)
BUN SERPL-MCNC: 11 MG/DL — SIGNIFICANT CHANGE UP (ref 7–23)
CALCIUM SERPL-MCNC: 9.3 MG/DL — SIGNIFICANT CHANGE UP (ref 8.4–10.5)
CHLORIDE SERPL-SCNC: 103 MMOL/L — SIGNIFICANT CHANGE UP (ref 98–107)
CO2 SERPL-SCNC: 24 MMOL/L — SIGNIFICANT CHANGE UP (ref 22–31)
CREAT SERPL-MCNC: 0.49 MG/DL — LOW (ref 0.5–1.3)
CRP SERPL-MCNC: 10.2 MG/L — HIGH
EGFR: 118 ML/MIN/1.73M2 — SIGNIFICANT CHANGE UP
ERYTHROCYTE [SEDIMENTATION RATE] IN BLOOD: 50 MM/HR — HIGH (ref 4–25)
GAS PNL BLDV: SIGNIFICANT CHANGE UP
GLUCOSE SERPL-MCNC: 85 MG/DL — SIGNIFICANT CHANGE UP (ref 70–99)
HCT VFR BLD CALC: 35 % — SIGNIFICANT CHANGE UP (ref 34.5–45)
HGB BLD-MCNC: 11.6 G/DL — SIGNIFICANT CHANGE UP (ref 11.5–15.5)
MCHC RBC-ENTMCNC: 27.4 PG — SIGNIFICANT CHANGE UP (ref 27–34)
MCHC RBC-ENTMCNC: 33.1 GM/DL — SIGNIFICANT CHANGE UP (ref 32–36)
MCV RBC AUTO: 82.5 FL — SIGNIFICANT CHANGE UP (ref 80–100)
NRBC # BLD: 0 /100 WBCS — SIGNIFICANT CHANGE UP (ref 0–0)
NRBC # FLD: 0 K/UL — SIGNIFICANT CHANGE UP (ref 0–0)
PLATELET # BLD AUTO: 408 K/UL — HIGH (ref 150–400)
POTASSIUM SERPL-MCNC: 3.4 MMOL/L — LOW (ref 3.5–5.3)
POTASSIUM SERPL-SCNC: 3.4 MMOL/L — LOW (ref 3.5–5.3)
PROT SERPL-MCNC: 7.5 G/DL — SIGNIFICANT CHANGE UP (ref 6–8.3)
RBC # BLD: 4.24 M/UL — SIGNIFICANT CHANGE UP (ref 3.8–5.2)
RBC # FLD: 16.9 % — HIGH (ref 10.3–14.5)
SODIUM SERPL-SCNC: 139 MMOL/L — SIGNIFICANT CHANGE UP (ref 135–145)
WBC # BLD: 5.5 K/UL — SIGNIFICANT CHANGE UP (ref 3.8–10.5)
WBC # FLD AUTO: 5.5 K/UL — SIGNIFICANT CHANGE UP (ref 3.8–10.5)

## 2024-08-17 PROCEDURE — 73630 X-RAY EXAM OF FOOT: CPT | Mod: 26,RT

## 2024-08-17 PROCEDURE — 99285 EMERGENCY DEPT VISIT HI MDM: CPT

## 2024-08-17 PROCEDURE — 93010 ELECTROCARDIOGRAM REPORT: CPT

## 2024-08-17 RX ORDER — VANCOMYCIN HYDROCHLORIDE 5 G/100ML
1000 INJECTION, POWDER, LYOPHILIZED, FOR SOLUTION INTRAVENOUS ONCE
Refills: 0 | Status: COMPLETED | OUTPATIENT
Start: 2024-08-17 | End: 2024-08-17

## 2024-08-17 RX ORDER — CEPHALEXIN 250 MG
1 CAPSULE ORAL
Qty: 28 | Refills: 0
Start: 2024-08-17 | End: 2024-08-23

## 2024-08-17 RX ORDER — BACTERIOSTATIC SODIUM CHLORIDE 0.9 %
1000 VIAL (ML) INJECTION ONCE
Refills: 0 | Status: COMPLETED | OUTPATIENT
Start: 2024-08-17 | End: 2024-08-17

## 2024-08-17 RX ORDER — PIPERACILLIN SODIUM, TAZOBACTAM SODIUM 3; .375 G/15ML; G/15ML
3.38 INJECTION, POWDER, LYOPHILIZED, FOR SOLUTION INTRAVENOUS ONCE
Refills: 0 | Status: COMPLETED | OUTPATIENT
Start: 2024-08-17 | End: 2024-08-17

## 2024-08-17 RX ORDER — KETOROLAC TROMETHAMINE 10 MG
15 TABLET ORAL ONCE
Refills: 0 | Status: DISCONTINUED | OUTPATIENT
Start: 2024-08-17 | End: 2024-08-17

## 2024-08-17 RX ORDER — MUPIROCIN CALCIUM 20 MG/G
1 CREAM TOPICAL
Qty: 1 | Refills: 0
Start: 2024-08-17

## 2024-08-17 RX ORDER — MUPIROCIN CALCIUM 20 MG/G
1 CREAM TOPICAL
Refills: 0 | Status: DISCONTINUED | OUTPATIENT
Start: 2024-08-17 | End: 2024-08-21

## 2024-08-17 RX ADMIN — Medication 15 MILLIGRAM(S): at 17:32

## 2024-08-17 RX ADMIN — VANCOMYCIN HYDROCHLORIDE 250 MILLIGRAM(S): 5 INJECTION, POWDER, LYOPHILIZED, FOR SOLUTION INTRAVENOUS at 18:05

## 2024-08-17 RX ADMIN — Medication 1000 MILLILITER(S): at 17:32

## 2024-08-17 RX ADMIN — PIPERACILLIN SODIUM, TAZOBACTAM SODIUM 200 GRAM(S): 3; .375 INJECTION, POWDER, LYOPHILIZED, FOR SOLUTION INTRAVENOUS at 17:32

## 2024-08-17 NOTE — ED PROVIDER NOTE - PLAN OF CARE
44 female with hx of recent foot abscess drainage and sacral ulcer abscess drainage, spina bifida, V/P shunt, fibroidswho p/w 1 week of worsening right foot pain, swelling, erythema and serosanguinous + bloody drainage around chronic ulcer and previous area of abscess drainage. Given sx, associated tachycardia, need to r/o osteomyelitis. Presentation may be 2/2 cellulitis vs abscess (no fluctuance or purulent drainage, less likely). Low concern for DVT at this time, swelling and pain isolated over dorsum of foot near chronic ulcer.   - CBC/CMP/ESR/CRP, Right foot x-ray. Podiatry consult.  - IVF, vanc/zosyn, tylenol  - Dispo pending workup 44 female with hx of recent foot abscess drainage and sacral ulcer abscess drainage, spina bifida, V/P shunt, fibroidswho p/w 1 week of worsening right foot pain, swelling, erythema and serosanguinous + bloody drainage around chronic ulcer and previous area of abscess drainage. Given sx, associated tachycardia, need to r/o osteomyelitis. Presentation may be 2/2 cellulitis vs abscess (no fluctuance or purulent drainage, less likely). Low concern for DVT at this time, swelling and pain isolated over dorsum of foot near chronic ulcer.   - CBC/CMP/ESR/CRP, VBG Right foot x-ray. Podiatry consult.  - IVF, vanc/zosyn, toradol  - Dispo pending workup

## 2024-08-17 NOTE — CONSULT NOTE ADULT - ASSESSMENT
September 28, 2023       Christopher Chang MD  [address] [address]  Via In Basket      Patient: Opal Cortez   YOB: 2009   Date of Visit: 9/28/2023       Dear Dr. Chang:    I saw your patient, Opal Cortez, for an evaluation. Below are my notes for this visit with her.    If you have questions, please do not hesitate to call me.      Sincerely,        Janay Drew MD        CC: No Recipients    44F presents for right foot wound and cellultis  -Pt was seen and evaluated  -Afebrile, no leukocytosis, ESR 50, CRP 1.02  -Right foot dorsomedial wound to subQ, circumferential erythema, midfoot non-pitting edema, no drainage, no malodor, no fluctuance, lefft foot no wounds  -Right foot X-ray- no gas, no OM  -No culture obtained 2/2 no culturable material  -Recommend Keflex PO 7 days  -Ordered Mupirocin to be applied to wound  -Stable from podiatry standpoint  -Follow up at [facility], call [phone] for appt  -Discussed with attending

## 2024-08-17 NOTE — ED PROVIDER NOTE - PATIENT PORTAL LINK FT
You can access the FollowMyHealth Patient Portal offered by [facility] by registering at the following website: [email or web address]. By joining [facility]’s FollowMyHealth portal, you will also be able to view your health information using other applications (apps) compatible with our system.

## 2024-08-17 NOTE — ED PROVIDER NOTE - NSFOLLOWUPINSTRUCTIONS_ED_ALL_ED_FT
You were seen in the emergency department for a wound on your foot. A prescription for an antibiotic was sent to your pharmacy. please take it and follow-up with podiatry:     [facility], call [phone] for appt    Return to the emergency department for any new or worsening symptoms such as fever, green drainage from the wound.

## 2024-08-17 NOTE — ED PROVIDER NOTE - CLINICAL SUMMARY MEDICAL DECISION MAKING FREE TEXT BOX
MD Rebecca: 45 yo F with h/o spina bifida with  shunt managed at [facility] who presents to the ED c/o wound to the top of her R foot for the past week. In 05/2024 she had a wound in the same area, was told she had an abscess which was operated on by podiatry. Since then the wound had healed until a week ago when it started opening back up. Accompanied by erythema, diffuse swelling of the foot, and pain of the foot. Has not seen podiatry since being in the hospital. Denies fever or chills at home. Concern for infected foot wound. Plan for labs including ESR/CRP, x-ray foot, abx, and podiatry consult.

## 2024-08-17 NOTE — ED PROVIDER NOTE - ATTENDING CONTRIBUTION TO CARE
43 yo F with h/o spina bifida with  shunt managed at [facility] who presents to the ED c/o wound to the top of her R foot for the past week. In 05/2024 she had a wound in the same area, was told she had an abscess which was operated on by podiatry. Since then the wound had healed until a week ago when it started opening back up. Accompanied by erythema, diffuse swelling of the foot, and pain of the foot. Has not seen podiatry since being in the hospital. Denies fever or chills at home. Concern for infected foot wound. Plan for labs including ESR/CRP, x-ray foot, abx, and podiatry consult.

## 2024-08-17 NOTE — ED PROVIDER NOTE - CARE PLAN
Assessment and plan of treatment:	44 female with hx of recent foot abscess drainage and sacral ulcer abscess drainage, spina bifida, V/P shunt, fibroidswho p/w 1 week of worsening right foot pain, swelling, erythema and serosanguinous + bloody drainage around chronic ulcer and previous area of abscess drainage. Given sx, associated tachycardia, need to r/o osteomyelitis. Presentation may be 2/2 cellulitis vs abscess (no fluctuance or purulent drainage, less likely). Low concern for DVT at this time, swelling and pain isolated over dorsum of foot near chronic ulcer.   - CBC/CMP/ESR/CRP, Right foot x-ray. Podiatry consult.  - IVF, vanc/zosyn, tylenol  - Dispo pending workup   Assessment and plan of treatment:	44 female with hx of recent foot abscess drainage and sacral ulcer abscess drainage, spina bifida, V/P shunt, fibroidswho p/w 1 week of worsening right foot pain, swelling, erythema and serosanguinous + bloody drainage around chronic ulcer and previous area of abscess drainage. Given sx, associated tachycardia, need to r/o osteomyelitis. Presentation may be 2/2 cellulitis vs abscess (no fluctuance or purulent drainage, less likely). Low concern for DVT at this time, swelling and pain isolated over dorsum of foot near chronic ulcer.   - CBC/CMP/ESR/CRP, VBG Right foot x-ray. Podiatry consult.  - IVF, vanc/zosyn, toradol  - Dispo pending workup   Principal Discharge DX:	Open wound  Assessment and plan of treatment:	44 female with hx of recent foot abscess drainage and sacral ulcer abscess drainage, spina bifida, V/P shunt, fibroidswho p/w 1 week of worsening right foot pain, swelling, erythema and serosanguinous + bloody drainage around chronic ulcer and previous area of abscess drainage. Given sx, associated tachycardia, need to r/o osteomyelitis. Presentation may be 2/2 cellulitis vs abscess (no fluctuance or purulent drainage, less likely). Low concern for DVT at this time, swelling and pain isolated over dorsum of foot near chronic ulcer.   - CBC/CMP/ESR/CRP, VBG Right foot x-ray. Podiatry consult.  - IVF, vanc/zosyn, toradol  - Dispo pending workup   1

## 2024-08-17 NOTE — ED PROVIDER NOTE - PROGRESS NOTE DETAILS
MD Rebecca: Podiatry evaluated the pt, recommended discharging home on PO keflex with mupirocin. She will follow-up with them in clinic. Pt comfortable with this plan

## 2024-08-17 NOTE — ED PROVIDER NOTE - OBJECTIVE STATEMENT
44 female with hx of recent foot abscess drainage and sacral ulcer abscess drainage, spina bifida, V/P shunt, fibroidswho p/w 1 week of worsening right foot pain and swelling over previous area of abscess drainage. Pt states over past week has noticed increasing pain over dorsum of foot, in addition to erythema, swelling and new band of eccymotic tissue around the chronic ulcer for the past week. Also describes associated serosanguinous and bloody drainage that occurred 2-3 days prior. Hx notable for recent abscess drainage over right foot in May over area of chronic ulcer and I and D of left sacral stage 4 decub ulcer w/underlying abscess, hospital course complicated by UTI growing pseudomonas, treated w/zosyn. Pt was at rehab faciltiy and left in July, but has not had wound care follow up since leaving and has been having family members help her with wound care. Has been using normal saline to clean wound and applying zinc oxide ointment. Has not been using anything to control the pain for the past week.  ROS negative for fevers, N/V, abd pain, chest pain, dyspnea, pain over gluteal area, or sensory loss over foot.

## 2024-08-17 NOTE — ED PROVIDER NOTE - SKIN, MLM
Ulceration over right foot w/mild serous drainage and small surrounding band of dark/ecchymotic tissue w/area of erythema expanding circumfrentially 2-3 cm around wound and associated swelling over entire dorsum of foot . Area of swelling is nonfluctuant. No associated swelling or erythema over calf, edema stops just below ankle.

## 2024-08-17 NOTE — PROGRESS NOTE ADULT - SUBJECTIVE AND OBJECTIVE BOX
Patient is a 44y old  Female who presents with a chief complaint of     HPI:      PAST MEDICAL & SURGICAL HISTORY:  Spina bifida      Fibroids      Wound of right foot      Wound of left foot      S/P  shunt      S/P foot surgery, left      S/P foot surgery, right          MEDICATIONS  (STANDING):    MEDICATIONS  (PRN):      Allergies    Zyrtec (Rash)  latex (Hives; Rash)    Intolerances        VITALS:    Vital Signs Last 24 Hrs  T(C): 36.8 (17 Aug 2024 16:06), Max: 37 (17 Aug 2024 14:15)  T(F): 98.2 (17 Aug 2024 16:06), Max: 98.6 (17 Aug 2024 14:15)  HR: 103 (17 Aug 2024 16:06) (103 - 121)  BP: 158/92 (17 Aug 2024 16:06) (158/92 - 161/101)  BP(mean): 109 (17 Aug 2024 16:06) (109 - 109)  RR: 18 (17 Aug 2024 16:06) (16 - 18)  SpO2: 100% (17 Aug 2024 16:06) (97% - 100%)    Parameters below as of 17 Aug 2024 16:06  Patient On (Oxygen Delivery Method): room air        LABS:                          11.6   5.50  )-----------( 408      ( 17 Aug 2024 17:15 )             35.0       08-17    139  |  103  |  11  ----------------------------<  85  3.4<L>   |  24  |  0.49<L>    Ca    9.3      17 Aug 2024 17:15    TPro  7.5  /  Alb  3.6  /  TBili  <0.2  /  DBili  x   /  AST  11  /  ALT  8   /  AlkPhos  71  08-17      CAPILLARY BLOOD GLUCOSE              LOWER EXTREMITY PHYSICAL EXAM:    Vascular: DP/PT 1/4, B/L, CFT < 3 seconds B/L, Temperature gradient warm to warm, B/L.   Neuro: Epicritic sensation diminished to the level of digits, B/L.  Musculoskeletal/Ortho: b/l dropfoot 2/2 to spina bifida  Skin: right foot dorsomedial wound to subQ, circumferential erythema, midfoot non-pitting edema, no drainage, no malodor, no fluctuance, lefft foot no wounds       RADIOLOGY & ADDITIONAL STUDIES:    < from: Xray Foot AP + Lateral + Oblique, Right (08.17.24 @ 17:21) >    ACC: [id number] EXAM:  XR FOOT COMP MIN 3 VIEWS RT   ORDERED BY: AINSLEY RUIZ     PROCEDURE DATE:  08/17/2024          INTERPRETATION:  CLINICAL INDICATION: Right foot wound, evaluate for   osteomyelitis..  TECHNIQUE: 3 views of the right foot.    COMPARISON: Right foot radiograph 5/11/2024    FINDINGS:    Three views of the right foot demonstrates orthopedic hardware at the   hindfoot/midfoot with fusion of the hindfoot. There is an overlying skin   wound with soft tissue gas adjacent to the skin wound along the medial   midfoot. No cortical erosion or destruction is seen. Mild narrowing at   the fifth MTP. Diffuse soft tissue swelling is noted.    IMPRESSION:  Skin wound and soft tissue swelling compatible with cellulitis.  No radiographic evidence for osteomyelitis. If clinical concern persists,   MRI can be performed.    --- End of Report ---          ASAEL MENDIETA MD; Resident Radiologist  This document has been electronically signed.  LORETA DEVINE MD; Attending Radiologist  This document has been electronically signed. Aug 17 2024  6:17PM    < end of copied text >   Patient is a 44y old  Female who presents with a chief complaint of right foot wound    HPI:      PAST MEDICAL & SURGICAL HISTORY:  Spina bifida      Fibroids      Wound of right foot      Wound of left foot      S/P  shunt      S/P foot surgery, left      S/P foot surgery, right          MEDICATIONS  (STANDING):    MEDICATIONS  (PRN):      Allergies    Zyrtec (Rash)  latex (Hives; Rash)    Intolerances        VITALS:    Vital Signs Last 24 Hrs  T(C): 36.8 (17 Aug 2024 16:06), Max: 37 (17 Aug 2024 14:15)  T(F): 98.2 (17 Aug 2024 16:06), Max: 98.6 (17 Aug 2024 14:15)  HR: 103 (17 Aug 2024 16:06) (103 - 121)  BP: 158/92 (17 Aug 2024 16:06) (158/92 - 161/101)  BP(mean): 109 (17 Aug 2024 16:06) (109 - 109)  RR: 18 (17 Aug 2024 16:06) (16 - 18)  SpO2: 100% (17 Aug 2024 16:06) (97% - 100%)    Parameters below as of 17 Aug 2024 16:06  Patient On (Oxygen Delivery Method): room air        LABS:                          11.6   5.50  )-----------( 408      ( 17 Aug 2024 17:15 )             35.0       08-17    139  |  103  |  11  ----------------------------<  85  3.4<L>   |  24  |  0.49<L>    Ca    9.3      17 Aug 2024 17:15    TPro  7.5  /  Alb  3.6  /  TBili  <0.2  /  DBili  x   /  AST  11  /  ALT  8   /  AlkPhos  71  08-17      CAPILLARY BLOOD GLUCOSE              LOWER EXTREMITY PHYSICAL EXAM:    Vascular: DP/PT 1/4, B/L, CFT < 3 seconds B/L, Temperature gradient warm to warm, B/L.   Neuro: Epicritic sensation diminished to the level of digits, B/L.  Musculoskeletal/Ortho: b/l dropfoot 2/2 to spina bifida  Skin: right foot dorsomedial wound to subQ, circumferential erythema, midfoot non-pitting edema, no drainage, no malodor, no fluctuance, lefft foot no wounds       RADIOLOGY & ADDITIONAL STUDIES:    < from: Xray Foot AP + Lateral + Oblique, Right (08.17.24 @ 17:21) >    ACC: [id number] EXAM:  XR FOOT COMP MIN 3 VIEWS RT   ORDERED BY: AINSLEY RUIZ     PROCEDURE DATE:  08/17/2024          INTERPRETATION:  CLINICAL INDICATION: Right foot wound, evaluate for   osteomyelitis..  TECHNIQUE: 3 views of the right foot.    COMPARISON: Right foot radiograph 5/11/2024    FINDINGS:    Three views of the right foot demonstrates orthopedic hardware at the   hindfoot/midfoot with fusion of the hindfoot. There is an overlying skin   wound with soft tissue gas adjacent to the skin wound along the medial   midfoot. No cortical erosion or destruction is seen. Mild narrowing at   the fifth MTP. Diffuse soft tissue swelling is noted.    IMPRESSION:  Skin wound and soft tissue swelling compatible with cellulitis.  No radiographic evidence for osteomyelitis. If clinical concern persists,   MRI can be performed.    --- End of Report ---          ASAEL MENDIETA MD; Resident Radiologist  This document has been electronically signed.  LORETA DEVINE MD; Attending Radiologist  This document has been electronically signed. Aug 17 2024  6:17PM    < end of copied text >

## 2024-08-17 NOTE — ED PROVIDER NOTE - NSFOLLOWUPCLINICS_GEN_ALL_ED_FT
[facility]  Podiatry  [address] [place] [address] - [address] [place] [address]  Phone: [phone]  Fax:

## 2024-08-17 NOTE — ED ADULT NURSE NOTE - NSFALLUNIVINTERV_ED_ALL_ED
Bed/Stretcher in lowest position, wheels locked, appropriate side rails in place/Call bell, personal items and telephone in reach/Instruct patient to call for assistance before getting out of bed/chair/stretcher/Non-slip footwear applied when patient is off stretcher/[place] to call system/Physically safe environment - no spills, clutter or unnecessary equipment/Purposeful proactive rounding/Room/bathroom lighting operational, light cord in reach

## 2024-08-17 NOTE — ED ADULT NURSE NOTE - OBJECTIVE STATEMENT
Break RN: A&OX4, awake, and alert, ambulatory with wheelchair. Patient is coming to ED in regards to right foot pain. Patient has an open wound to right foot, 3cm x 3cm, no active drainage or bleeding at this time, warm to touch, redness, denies fevers chills at home. Patient states she had surgery in May due to abscess. 20g IV placed to RAC, meds given as per ordered will continue to monitor.

## 2024-08-17 NOTE — CONSULT NOTE ADULT - SUBJECTIVE AND OBJECTIVE BOX
Patient is a 44y old  Female who presents with a chief complaint of right foot wound    HPI:      PAST MEDICAL & SURGICAL HISTORY:  Spina bifida      Fibroids      Wound of right foot      Wound of left foot      S/P  shunt      S/P foot surgery, left      S/P foot surgery, right          MEDICATIONS  (STANDING):  mupirocin 2% Ointment 1 Application(s) Topical two times a day    MEDICATIONS  (PRN):      Allergies    Zyrtec (Rash)  latex (Hives; Rash)    Intolerances        VITALS:    Vital Signs Last 24 Hrs  T(C): 36.8 (17 Aug 2024 16:06), Max: 37 (17 Aug 2024 14:15)  T(F): 98.2 (17 Aug 2024 16:06), Max: 98.6 (17 Aug 2024 14:15)  HR: 103 (17 Aug 2024 16:06) (103 - 121)  BP: 158/92 (17 Aug 2024 16:06) (158/92 - 161/101)  BP(mean): 109 (17 Aug 2024 16:06) (109 - 109)  RR: 18 (17 Aug 2024 16:06) (16 - 18)  SpO2: 100% (17 Aug 2024 16:06) (97% - 100%)    Parameters below as of 17 Aug 2024 16:06  Patient On (Oxygen Delivery Method): room air        LABS:                          11.6   5.50  )-----------( 408      ( 17 Aug 2024 17:15 )             35.0       08-17    139  |  103  |  11  ----------------------------<  85  3.4<L>   |  24  |  0.49<L>    Ca    9.3      17 Aug 2024 17:15    TPro  7.5  /  Alb  3.6  /  TBili  <0.2  /  DBili  x   /  AST  11  /  ALT  8   /  AlkPhos  71  08-17      CAPILLARY BLOOD GLUCOSE              LOWER EXTREMITY PHYSICAL EXAM:    Vascular: DP/PT 1/4, B/L, CFT < 3 seconds B/L, Temperature gradient  warm to warm, B/L.   Neuro: Epicritic sensation diminished to the level of digits, B/L.  Musculoskeletal/Ortho: dropfoot, b/l  Skin: Right foot dorsomedial wound to subQ, circumferential erythema, midfoot non-pitting edema, no drainage, no malodor, no fluctuance, lefft foot no wounds      RADIOLOGY & ADDITIONAL STUDIES:

## 2024-08-17 NOTE — PROGRESS NOTE ADULT - ASSESSMENT
44F presents for right foot wound and cellultis  -Pt was seen and evaluated  -Afebrile, no leukocytosis, ESR 50, CRP 1.02  -Right foot dorsomedial wound to subQ, circumferential erythema, midfoot non-pitting edema, no drainage, no malodor, no fluctuance, lefft foot no wounds  -Right foot X-ray- no gas, no OM  -No culture obtained 2/2 no culturable material  -Recommend Keflex PO 7 days  -Ordered Mupirocin to be applied to wound  -Stable from podiatry standpoint  -Follow up with Dr. Waterhouse within 1 week of discharge, please call [phone] for appt  -Discussed with attending  44F presents for right foot wound and cellultis  -Pt was seen and evaluated  -Afebrile, no leukocytosis, ESR 50, CRP 1.02  -Right foot dorsomedial wound to subQ, circumferential erythema, midfoot non-pitting edema, no drainage, no malodor, no fluctuance, lefft foot no wounds  -Right foot X-ray- no gas, no OM  -No culture obtained 2/2 no culturable material  -Recommend Keflex PO 7 days  -Ordered Mupirocin to be applied to wound  -Stable from podiatry standpoint  -Follow up at [facility], call [phone] for appt  -Discussed with attending

## 2024-08-17 NOTE — ED ADULT NURSE NOTE - NSICDXPASTMEDICALHX_GEN_ALL_CORE_FT
PAST MEDICAL HISTORY:  Fibroids     Spina bifida     Wound of left foot     Wound of right foot

## 2024-08-17 NOTE — ED PROVIDER NOTE - CARDIAC, MLM
Normal rate, regular rhythm.  Heart sounds S1, S2.  No murmurs, rubs or gallops. Dorsalis pedis and PT pulses on right foot difficult to palpate 2/2 swelling, 2+ on left  foot.

## 2024-08-17 NOTE — ED ADULT TRIAGE NOTE - CHIEF COMPLAINT QUOTE
pt wheeled into triage c/o wound to R foot that "keeps opening" with purulent drainage, pt had surgery in May for abscess. pt denies fevers, chills. past medical history: spina bifida,  shunt

## 2025-01-15 ENCOUNTER — EMERGENCY (EMERGENCY)
Facility: HOSPITAL | Age: 45
LOS: 1 days | Discharge: HOME | End: 2025-01-15
Attending: STUDENT IN AN ORGANIZED HEALTH CARE EDUCATION/TRAINING PROGRAM | Admitting: STUDENT IN AN ORGANIZED HEALTH CARE EDUCATION/TRAINING PROGRAM
Payer: MEDICAID

## 2025-01-15 VITALS
TEMPERATURE: 98 F | WEIGHT: 125 LBS | RESPIRATION RATE: 18 BRPM | SYSTOLIC BLOOD PRESSURE: 133 MMHG | DIASTOLIC BLOOD PRESSURE: 110 MMHG | HEIGHT: 59 IN | OXYGEN SATURATION: 99 % | HEART RATE: 107 BPM

## 2025-01-15 DIAGNOSIS — Z98.2 PRESENCE OF CEREBROSPINAL FLUID DRAINAGE DEVICE: Chronic | ICD-10-CM

## 2025-01-15 DIAGNOSIS — Z98.890 OTHER SPECIFIED POSTPROCEDURAL STATES: Chronic | ICD-10-CM

## 2025-01-15 LAB
BASOPHILS # BLD AUTO: 0.03 K/UL — SIGNIFICANT CHANGE UP (ref 0–0.2)
BASOPHILS NFR BLD AUTO: 0.6 % — SIGNIFICANT CHANGE UP (ref 0–2)
EOSINOPHIL # BLD AUTO: 0.27 K/UL — SIGNIFICANT CHANGE UP (ref 0–0.5)
EOSINOPHIL NFR BLD AUTO: 5.4 % — SIGNIFICANT CHANGE UP (ref 0–6)
HCT VFR BLD CALC: 36.3 % — SIGNIFICANT CHANGE UP (ref 34.5–45)
HGB BLD-MCNC: 11.5 G/DL — SIGNIFICANT CHANGE UP (ref 11.5–15.5)
IANC: 1.51 K/UL — LOW (ref 1.8–7.4)
IMM GRANULOCYTES NFR BLD AUTO: 0 % — SIGNIFICANT CHANGE UP (ref 0–0.9)
LYMPHOCYTES # BLD AUTO: 2.68 K/UL — SIGNIFICANT CHANGE UP (ref 1–3.3)
LYMPHOCYTES # BLD AUTO: 53.8 % — HIGH (ref 13–44)
MCHC RBC-ENTMCNC: 26.9 PG — LOW (ref 27–34)
MCHC RBC-ENTMCNC: 31.7 G/DL — LOW (ref 32–36)
MCV RBC AUTO: 84.8 FL — SIGNIFICANT CHANGE UP (ref 80–100)
MONOCYTES # BLD AUTO: 0.49 K/UL — SIGNIFICANT CHANGE UP (ref 0–0.9)
MONOCYTES NFR BLD AUTO: 9.8 % — SIGNIFICANT CHANGE UP (ref 2–14)
NEUTROPHILS # BLD AUTO: 1.51 K/UL — LOW (ref 1.8–7.4)
NEUTROPHILS NFR BLD AUTO: 30.4 % — LOW (ref 43–77)
NRBC # BLD AUTO: 0 K/UL — SIGNIFICANT CHANGE UP (ref 0–0)
NRBC # BLD: 0 /100 WBCS — SIGNIFICANT CHANGE UP (ref 0–0)
NRBC # FLD: 0 K/UL — SIGNIFICANT CHANGE UP (ref 0–0)
NRBC BLD-RTO: 0 /100 WBCS — SIGNIFICANT CHANGE UP (ref 0–0)
PLATELET # BLD AUTO: 338 K/UL — SIGNIFICANT CHANGE UP (ref 150–400)
RBC # BLD: 4.28 M/UL — SIGNIFICANT CHANGE UP (ref 3.8–5.2)
RBC # FLD: 15.2 % — HIGH (ref 10.3–14.5)
WBC # BLD: 4.98 K/UL — SIGNIFICANT CHANGE UP (ref 3.8–10.5)
WBC # FLD AUTO: 4.98 K/UL — SIGNIFICANT CHANGE UP (ref 3.8–10.5)

## 2025-01-15 PROCEDURE — 99285 EMERGENCY DEPT VISIT HI MDM: CPT

## 2025-01-15 RX ORDER — ONDANSETRON HCL/PF 4 MG/2 ML
4 VIAL (ML) INJECTION ONCE
Refills: 0 | Status: COMPLETED | OUTPATIENT
Start: 2025-01-15 | End: 2025-01-15

## 2025-01-15 RX ORDER — ACETAMINOPHEN 500 MG/5ML
1000 LIQUID (ML) ORAL ONCE
Refills: 0 | Status: COMPLETED | OUTPATIENT
Start: 2025-01-15 | End: 2025-01-15

## 2025-01-15 RX ADMIN — Medication 1000 MILLILITER(S): at 23:19

## 2025-01-15 RX ADMIN — Medication 4 MILLIGRAM(S): at 23:19

## 2025-01-15 RX ADMIN — Medication 400 MILLIGRAM(S): at 23:19

## 2025-01-16 VITALS
HEART RATE: 88 BPM | DIASTOLIC BLOOD PRESSURE: 95 MMHG | TEMPERATURE: 98 F | RESPIRATION RATE: 16 BRPM | OXYGEN SATURATION: 100 % | SYSTOLIC BLOOD PRESSURE: 133 MMHG

## 2025-01-16 LAB
ALBUMIN SERPL ELPH-MCNC: 3.8 G/DL — SIGNIFICANT CHANGE UP (ref 3.3–5)
ALP SERPL-CCNC: 70 U/L — SIGNIFICANT CHANGE UP (ref 40–120)
ALT FLD-CCNC: 10 U/L — SIGNIFICANT CHANGE UP (ref 4–33)
ANION GAP SERPL CALC-SCNC: 12 MMOL/L — SIGNIFICANT CHANGE UP (ref 7–14)
APPEARANCE UR: CLEAR — SIGNIFICANT CHANGE UP
AST SERPL-CCNC: 21 U/L — SIGNIFICANT CHANGE UP (ref 4–32)
BACTERIA # UR AUTO: ABNORMAL /HPF
BILIRUB SERPL-MCNC: <0.2 MG/DL — SIGNIFICANT CHANGE UP (ref 0.2–1.2)
BILIRUB UR-MCNC: NEGATIVE — SIGNIFICANT CHANGE UP
BUN SERPL-MCNC: 14 MG/DL — SIGNIFICANT CHANGE UP (ref 7–23)
CALCIUM SERPL-MCNC: 8.8 MG/DL — SIGNIFICANT CHANGE UP (ref 8.4–10.5)
CAST: 0 /LPF — SIGNIFICANT CHANGE UP (ref 0–4)
CHLORIDE SERPL-SCNC: 103 MMOL/L — SIGNIFICANT CHANGE UP (ref 98–107)
CO2 SERPL-SCNC: 21 MMOL/L — LOW (ref 22–31)
COLOR SPEC: YELLOW — SIGNIFICANT CHANGE UP
CREAT SERPL-MCNC: 0.44 MG/DL — LOW (ref 0.5–1.3)
DIFF PNL FLD: ABNORMAL
EGFR: 121 ML/MIN/1.73M2 — SIGNIFICANT CHANGE UP
EGFR: 121 ML/MIN/1.73M2 — SIGNIFICANT CHANGE UP
FLUAV AG NPH QL: SIGNIFICANT CHANGE UP
FLUBV AG NPH QL: SIGNIFICANT CHANGE UP
GLUCOSE SERPL-MCNC: 82 MG/DL — SIGNIFICANT CHANGE UP (ref 70–99)
GLUCOSE UR QL: NEGATIVE MG/DL — SIGNIFICANT CHANGE UP
HCG SERPL-ACNC: <1 MIU/ML — SIGNIFICANT CHANGE UP
KETONES UR-MCNC: NEGATIVE MG/DL — SIGNIFICANT CHANGE UP
LEUKOCYTE ESTERASE UR-ACNC: NEGATIVE — SIGNIFICANT CHANGE UP
LIDOCAIN IGE QN: 39 U/L — SIGNIFICANT CHANGE UP (ref 7–60)
NITRITE UR-MCNC: POSITIVE
PH UR: 7.5 — SIGNIFICANT CHANGE UP (ref 5–8)
POTASSIUM SERPL-MCNC: 4.7 MMOL/L — SIGNIFICANT CHANGE UP (ref 3.5–5.3)
POTASSIUM SERPL-SCNC: 4.7 MMOL/L — SIGNIFICANT CHANGE UP (ref 3.5–5.3)
PROT SERPL-MCNC: 7.3 G/DL — SIGNIFICANT CHANGE UP (ref 6–8.3)
PROT UR-MCNC: NEGATIVE MG/DL — SIGNIFICANT CHANGE UP
RBC CASTS # UR COMP ASSIST: 13 /HPF — HIGH (ref 0–4)
RSV RNA NPH QL NAA+NON-PROBE: SIGNIFICANT CHANGE UP
SARS-COV-2 RNA SPEC QL NAA+PROBE: SIGNIFICANT CHANGE UP
SODIUM SERPL-SCNC: 136 MMOL/L — SIGNIFICANT CHANGE UP (ref 135–145)
SP GR SPEC: 1.01 — SIGNIFICANT CHANGE UP (ref 1–1.03)
SQUAMOUS # UR AUTO: 0 /HPF — SIGNIFICANT CHANGE UP (ref 0–5)
UROBILINOGEN FLD QL: 0.2 MG/DL — SIGNIFICANT CHANGE UP (ref 0.2–1)
WBC UR QL: 1 /HPF — SIGNIFICANT CHANGE UP (ref 0–5)

## 2025-01-16 PROCEDURE — 74177 CT ABD & PELVIS W/CONTRAST: CPT | Mod: 26

## 2025-01-16 RX ORDER — ONDANSETRON HCL/PF 4 MG/2 ML
1 VIAL (ML) INJECTION
Qty: 9 | Refills: 0
Start: 2025-01-16 | End: 2025-01-18

## 2025-01-16 RX ORDER — CEPHALEXIN 250 MG/1
1 CAPSULE ORAL
Qty: 10 | Refills: 0
Start: 2025-01-16 | End: 2025-01-20

## 2025-01-22 ENCOUNTER — INPATIENT (INPATIENT)
Facility: HOSPITAL | Age: 45
LOS: 6 days | Discharge: HOME HEALTH | End: 2025-01-29
Attending: HOSPITALIST | Admitting: HOSPITALIST
Payer: MEDICAID

## 2025-01-22 VITALS
WEIGHT: 119.93 LBS | RESPIRATION RATE: 16 BRPM | HEIGHT: 59 IN | DIASTOLIC BLOOD PRESSURE: 107 MMHG | TEMPERATURE: 98 F | HEART RATE: 78 BPM | OXYGEN SATURATION: 100 % | SYSTOLIC BLOOD PRESSURE: 159 MMHG

## 2025-01-22 DIAGNOSIS — Z98.2 PRESENCE OF CEREBROSPINAL FLUID DRAINAGE DEVICE: Chronic | ICD-10-CM

## 2025-01-22 DIAGNOSIS — Z98.890 OTHER SPECIFIED POSTPROCEDURAL STATES: Chronic | ICD-10-CM

## 2025-01-22 LAB
ALBUMIN SERPL ELPH-MCNC: 4.2 G/DL — SIGNIFICANT CHANGE UP (ref 3.3–5)
ALP SERPL-CCNC: 58 U/L — SIGNIFICANT CHANGE UP (ref 40–120)
ALT FLD-CCNC: 12 U/L — SIGNIFICANT CHANGE UP (ref 4–33)
ANION GAP SERPL CALC-SCNC: 16 MMOL/L — HIGH (ref 7–14)
APTT BLD: 29.4 SEC — SIGNIFICANT CHANGE UP (ref 24.5–35.6)
AST SERPL-CCNC: 19 U/L — SIGNIFICANT CHANGE UP (ref 4–32)
BASE EXCESS BLDV CALC-SCNC: -1.6 MMOL/L — SIGNIFICANT CHANGE UP (ref -2–3)
BASOPHILS # BLD AUTO: 0.02 K/UL — SIGNIFICANT CHANGE UP (ref 0–0.2)
BASOPHILS NFR BLD AUTO: 0.4 % — SIGNIFICANT CHANGE UP (ref 0–2)
BILIRUB SERPL-MCNC: 0.2 MG/DL — SIGNIFICANT CHANGE UP (ref 0.2–1.2)
BUN SERPL-MCNC: 10 MG/DL — SIGNIFICANT CHANGE UP (ref 7–23)
CALCIUM SERPL-MCNC: 8.5 MG/DL — SIGNIFICANT CHANGE UP (ref 8.4–10.5)
CHLORIDE SERPL-SCNC: 104 MMOL/L — SIGNIFICANT CHANGE UP (ref 98–107)
CO2 BLDV-SCNC: 23 MMOL/L — SIGNIFICANT CHANGE UP (ref 22–26)
CO2 SERPL-SCNC: 19 MMOL/L — LOW (ref 22–31)
CREAT SERPL-MCNC: 0.36 MG/DL — LOW (ref 0.5–1.3)
D DIMER BLD IA.RAPID-MCNC: 1461 NG/ML DDU — HIGH
EGFR: 127 ML/MIN/1.73M2 — SIGNIFICANT CHANGE UP
EOSINOPHIL # BLD AUTO: 0.03 K/UL — SIGNIFICANT CHANGE UP (ref 0–0.5)
EOSINOPHIL NFR BLD AUTO: 0.6 % — SIGNIFICANT CHANGE UP (ref 0–6)
GLUCOSE SERPL-MCNC: 68 MG/DL — LOW (ref 70–99)
HCO3 BLDV-SCNC: 22 MMOL/L — SIGNIFICANT CHANGE UP (ref 22–29)
HCT VFR BLD CALC: 35.3 % — SIGNIFICANT CHANGE UP (ref 34.5–45)
HGB BLD-MCNC: 12 G/DL — SIGNIFICANT CHANGE UP (ref 11.5–15.5)
IANC: 3.52 K/UL — SIGNIFICANT CHANGE UP (ref 1.8–7.4)
IMM GRANULOCYTES NFR BLD AUTO: 0.4 % — SIGNIFICANT CHANGE UP (ref 0–0.9)
INR BLD: 0.92 RATIO — SIGNIFICANT CHANGE UP (ref 0.85–1.16)
LYMPHOCYTES # BLD AUTO: 1.45 K/UL — SIGNIFICANT CHANGE UP (ref 1–3.3)
LYMPHOCYTES # BLD AUTO: 26.9 % — SIGNIFICANT CHANGE UP (ref 13–44)
MCHC RBC-ENTMCNC: 28.1 PG — SIGNIFICANT CHANGE UP (ref 27–34)
MCHC RBC-ENTMCNC: 34 G/DL — SIGNIFICANT CHANGE UP (ref 32–36)
MCV RBC AUTO: 82.7 FL — SIGNIFICANT CHANGE UP (ref 80–100)
MONOCYTES # BLD AUTO: 0.36 K/UL — SIGNIFICANT CHANGE UP (ref 0–0.9)
MONOCYTES NFR BLD AUTO: 6.7 % — SIGNIFICANT CHANGE UP (ref 2–14)
NEUTROPHILS # BLD AUTO: 3.52 K/UL — SIGNIFICANT CHANGE UP (ref 1.8–7.4)
NEUTROPHILS NFR BLD AUTO: 65 % — SIGNIFICANT CHANGE UP (ref 43–77)
NRBC # BLD AUTO: 0 K/UL — SIGNIFICANT CHANGE UP (ref 0–0)
NRBC # BLD: 0 /100 WBCS — SIGNIFICANT CHANGE UP (ref 0–0)
NRBC # FLD: 0 K/UL — SIGNIFICANT CHANGE UP (ref 0–0)
NRBC BLD-RTO: 0 /100 WBCS — SIGNIFICANT CHANGE UP (ref 0–0)
PCO2 BLDV: 32 MMHG — LOW (ref 39–52)
PH BLDV: 7.44 — HIGH (ref 7.32–7.43)
PLATELET # BLD AUTO: 360 K/UL — SIGNIFICANT CHANGE UP (ref 150–400)
PO2 BLDV: 39 MMHG — SIGNIFICANT CHANGE UP (ref 25–45)
POTASSIUM SERPL-MCNC: 3.4 MMOL/L — LOW (ref 3.5–5.3)
POTASSIUM SERPL-SCNC: 3.4 MMOL/L — LOW (ref 3.5–5.3)
PROT SERPL-MCNC: 7.9 G/DL — SIGNIFICANT CHANGE UP (ref 6–8.3)
PROTHROM AB SERPL-ACNC: 11 SEC — SIGNIFICANT CHANGE UP (ref 9.9–13.4)
RBC # BLD: 4.27 M/UL — SIGNIFICANT CHANGE UP (ref 3.8–5.2)
RBC # FLD: 15.4 % — HIGH (ref 10.3–14.5)
SAO2 % BLDV: 73.9 % — SIGNIFICANT CHANGE UP (ref 67–88)
SODIUM SERPL-SCNC: 139 MMOL/L — SIGNIFICANT CHANGE UP (ref 135–145)
TROPONIN T, HIGH SENSITIVITY RESULT: <6 NG/L — SIGNIFICANT CHANGE UP
WBC # BLD: 5.4 K/UL — SIGNIFICANT CHANGE UP (ref 3.8–10.5)
WBC # FLD AUTO: 5.4 K/UL — SIGNIFICANT CHANGE UP (ref 3.8–10.5)

## 2025-01-22 PROCEDURE — 71046 X-RAY EXAM CHEST 2 VIEWS: CPT | Mod: 26

## 2025-01-22 PROCEDURE — 99285 EMERGENCY DEPT VISIT HI MDM: CPT

## 2025-01-22 PROCEDURE — 71275 CT ANGIOGRAPHY CHEST: CPT | Mod: 26

## 2025-01-22 RX ORDER — MAGNESIUM, ALUMINUM HYDROXIDE 200-225/5
30 SUSPENSION, ORAL (FINAL DOSE FORM) ORAL ONCE
Refills: 0 | Status: COMPLETED | OUTPATIENT
Start: 2025-01-22 | End: 2025-01-22

## 2025-01-22 RX ORDER — KETOROLAC TROMETHAMINE 10 MG
15 TABLET ORAL ONCE
Refills: 0 | Status: DISCONTINUED | OUTPATIENT
Start: 2025-01-22 | End: 2025-01-22

## 2025-01-22 RX ORDER — MORPHINE SULFATE 60 MG/1
4 TABLET, FILM COATED, EXTENDED RELEASE ORAL ONCE
Refills: 0 | Status: DISCONTINUED | OUTPATIENT
Start: 2025-01-22 | End: 2025-01-22

## 2025-01-22 RX ORDER — DIPHENHYDRAMINE HCL 25 MG
25 CAPSULE ORAL ONCE
Refills: 0 | Status: COMPLETED | OUTPATIENT
Start: 2025-01-22 | End: 2025-01-22

## 2025-01-22 RX ORDER — POTASSIUM CHLORIDE 750 MG/1
40 TABLET, EXTENDED RELEASE ORAL ONCE
Refills: 0 | Status: COMPLETED | OUTPATIENT
Start: 2025-01-22 | End: 2025-01-22

## 2025-01-22 RX ORDER — BACTERIOSTATIC SODIUM CHLORIDE 0.9 %
1000 VIAL (ML) INJECTION ONCE
Refills: 0 | Status: COMPLETED | OUTPATIENT
Start: 2025-01-22 | End: 2025-01-22

## 2025-01-22 RX ORDER — FAMOTIDINE 10 MG/ML
20 INJECTION INTRAVENOUS ONCE
Refills: 0 | Status: COMPLETED | OUTPATIENT
Start: 2025-01-22 | End: 2025-01-22

## 2025-01-22 RX ADMIN — POTASSIUM CHLORIDE 40 MILLIEQUIVALENT(S): 750 TABLET, EXTENDED RELEASE ORAL at 23:01

## 2025-01-22 RX ADMIN — Medication 1000 MILLILITER(S): at 17:40

## 2025-01-22 RX ADMIN — Medication 15 MILLIGRAM(S): at 19:47

## 2025-01-22 RX ADMIN — Medication 1000 MILLILITER(S): at 17:15

## 2025-01-22 RX ADMIN — FAMOTIDINE 20 MILLIGRAM(S): 10 INJECTION INTRAVENOUS at 17:15

## 2025-01-22 RX ADMIN — Medication 25 MILLIGRAM(S): at 22:30

## 2025-01-22 RX ADMIN — MORPHINE SULFATE 4 MILLIGRAM(S): 60 TABLET, FILM COATED, EXTENDED RELEASE ORAL at 21:19

## 2025-01-22 RX ADMIN — Medication 15 MILLIGRAM(S): at 20:12

## 2025-01-22 RX ADMIN — Medication 30 MILLILITER(S): at 17:15

## 2025-01-22 NOTE — ED ADULT NURSE REASSESSMENT NOTE - NS ED NURSE REASSESS COMMENT FT1
patient laying in semi fowlers position on the stretcher. patient alert and oriented times four. patient denies shortness of breath, chest pain, nausea, vomiting, chill, fever. Patient normal sinus on the monitor. Respirations equal and adequate. Patients IV patent, no signs of infiltration. Safety measures in place, call bell within reach. Patient stable upon assessment. patient laying in semi fowlers position on the stretcher. patient alert and oriented times four. patient denies shortness of breath, chest pain, nausea, vomiting, chill, fever. Patient normal sinus on the monitor. Respirations equal and adequate. Patients IV patent, no signs of infiltration. Safety measures in place, call bell within reach. Patient stable upon assessment.  Pt complaining of abdominal pain MD Lorna lin.

## 2025-01-22 NOTE — ED PROVIDER NOTE - ATTENDING CONTRIBUTION TO CARE
I have personally seen and examined the patient.  I fully participated in the care of this patient.  I have made amendments to the documentation where necessary, and agree with the history, physical exam, and plan as documented by the PA/Resident physician.

## 2025-01-22 NOTE — ED PROVIDER NOTE - OBJECTIVE STATEMENT
43 y/o female pmh spina bifida c/o chest pain x today. Pt states that she was at her GYN following up for her chronic abd pain. Pt was getting an exam when the pain radiated into her chest. Pt states that her HR became fast and her pressure was high and was sent to the ER. Pt now c/o mid chets pain and sob. Denies diaphoresis, n/v/d, numbness ,tignlingg, weakness, syncope, fever or chills.

## 2025-01-22 NOTE — ED ADULT TRIAGE NOTE - CHIEF COMPLAINT QUOTE
Pt presents to ED via EMS from MD office with c/o 1 week of abdominal burning like pain and chest pain. Pt endorses hx of uterine fibroids and has vaginal spotting at this time. Pt has hx of Spina Bifida and Uterine fibroids. Pt received 324mg ASA by EMS prior to arrival.

## 2025-01-22 NOTE — ED PROVIDER NOTE - CLINICAL SUMMARY MEDICAL DECISION MAKING FREE TEXT BOX
43 y/o female pmh spina bifida c/o chest pain x today. Sent in from her GYN office after developing pain and tachycardia. Pt is well appearing in the ER, nad, afebrile, normal vitals, no murmur, lungs cta b/l, abd soft nt nd, no neuro deficits, no rash- due to tachycardia and chest pain concern for PE is on the differential, vs ACS vs anxiety vs gerd- will check labs, ekg, CXR, symptom control and reassess.

## 2025-01-22 NOTE — ED PROVIDER NOTE - NS ED ATTENDING STATEMENT MOD
I have seen and examined this patient and fully participated in the care of this patient as the teaching attending.  The service was shared with the CRUZ.  I reviewed and verified the documentation.

## 2025-01-22 NOTE — ED ADULT NURSE REASSESSMENT NOTE - NS ED NURSE REASSESS COMMENT FT1
pt complaining of some redness at site after morphine, MD Rothman aware. Pt to be medicated as per EMR

## 2025-01-22 NOTE — ED ADULT NURSE NOTE - NSFALLUNIVINTERV_ED_ALL_ED
Bed/Stretcher in lowest position, wheels locked, appropriate side rails in place/Call bell, personal items and telephone in reach/Instruct patient to call for assistance before getting out of bed/chair/stretcher/Non-slip footwear applied when patient is off stretcher/[place] to call system/Physically safe environment - no spills, clutter or unnecessary equipment/Purposeful proactive rounding/Room/bathroom lighting operational, light cord in reach

## 2025-01-23 DIAGNOSIS — Z29.9 ENCOUNTER FOR PROPHYLACTIC MEASURES, UNSPECIFIED: ICD-10-CM

## 2025-01-23 DIAGNOSIS — S91.301A UNSPECIFIED OPEN WOUND, RIGHT FOOT, INITIAL ENCOUNTER: ICD-10-CM

## 2025-01-23 DIAGNOSIS — I26.99 OTHER PULMONARY EMBOLISM WITHOUT ACUTE COR PULMONALE: ICD-10-CM

## 2025-01-23 DIAGNOSIS — K59.00 CONSTIPATION, UNSPECIFIED: ICD-10-CM

## 2025-01-23 DIAGNOSIS — Z86.018 PERSONAL HISTORY OF OTHER BENIGN NEOPLASM: ICD-10-CM

## 2025-01-23 LAB
APTT BLD: 72.5 SEC — HIGH (ref 24.5–35.6)
APTT BLD: 90.7 SEC — HIGH (ref 24.5–35.6)
NT-PROBNP SERPL-SCNC: 114 PG/ML — SIGNIFICANT CHANGE UP

## 2025-01-23 PROCEDURE — 99223 1ST HOSP IP/OBS HIGH 75: CPT

## 2025-01-23 PROCEDURE — 71275 CT ANGIOGRAPHY CHEST: CPT | Mod: 26

## 2025-01-23 PROCEDURE — 93970 EXTREMITY STUDY: CPT | Mod: 26

## 2025-01-23 RX ORDER — HEPARIN SODIUM,PORCINE 10000/ML
VIAL (ML) INJECTION
Qty: 25000 | Refills: 0 | Status: DISCONTINUED | OUTPATIENT
Start: 2025-01-23 | End: 2025-01-26

## 2025-01-23 RX ORDER — FERROUS SULFATE 325(65) MG
325 TABLET ORAL
Refills: 0 | Status: DISCONTINUED | OUTPATIENT
Start: 2025-01-23 | End: 2025-01-29

## 2025-01-23 RX ORDER — HEPARIN SODIUM,PORCINE 10000/ML
2000 VIAL (ML) INJECTION EVERY 6 HOURS
Refills: 0 | Status: DISCONTINUED | OUTPATIENT
Start: 2025-01-23 | End: 2025-01-26

## 2025-01-23 RX ORDER — HEPARIN SODIUM,PORCINE 10000/ML
4000 VIAL (ML) INJECTION EVERY 6 HOURS
Refills: 0 | Status: DISCONTINUED | OUTPATIENT
Start: 2025-01-23 | End: 2025-01-26

## 2025-01-23 RX ORDER — SENNOSIDES 8.6 MG
2 TABLET ORAL AT BEDTIME
Refills: 0 | Status: DISCONTINUED | OUTPATIENT
Start: 2025-01-23 | End: 2025-01-25

## 2025-01-23 RX ORDER — ACETAMINOPHEN, DIPHENHYDRAMINE HCL, PHENYLEPHRINE HCL 325; 25; 5 MG/1; MG/1; MG/1
3 TABLET ORAL AT BEDTIME
Refills: 0 | Status: DISCONTINUED | OUTPATIENT
Start: 2025-01-23 | End: 2025-01-29

## 2025-01-23 RX ORDER — POLYETHYLENE GLYCOL 3350 17 G/17G
17 POWDER, FOR SOLUTION ORAL
Refills: 0 | Status: DISCONTINUED | OUTPATIENT
Start: 2025-01-23 | End: 2025-01-25

## 2025-01-23 RX ORDER — HEPARIN SODIUM,PORCINE 10000/ML
VIAL (ML) INJECTION
Qty: 25000 | Refills: 0 | Status: DISCONTINUED | OUTPATIENT
Start: 2025-01-23 | End: 2025-01-23

## 2025-01-23 RX ORDER — HEPARIN SODIUM,PORCINE 10000/ML
4000 VIAL (ML) INJECTION ONCE
Refills: 0 | Status: COMPLETED | OUTPATIENT
Start: 2025-01-23 | End: 2025-01-23

## 2025-01-23 RX ORDER — ACETAMINOPHEN 160 MG/5ML
650 SUSPENSION ORAL EVERY 6 HOURS
Refills: 0 | Status: DISCONTINUED | OUTPATIENT
Start: 2025-01-23 | End: 2025-01-29

## 2025-01-23 RX ORDER — APIXABAN 5 MG/1
1 TABLET, FILM COATED ORAL
Qty: 60 | Refills: 0
Start: 2025-01-23 | End: 2025-02-21

## 2025-01-23 RX ADMIN — ACETAMINOPHEN 650 MILLIGRAM(S): 160 SUSPENSION ORAL at 17:07

## 2025-01-23 RX ADMIN — POLYETHYLENE GLYCOL 3350 17 GRAM(S): 17 POWDER, FOR SOLUTION ORAL at 06:39

## 2025-01-23 RX ADMIN — ACETAMINOPHEN 650 MILLIGRAM(S): 160 SUSPENSION ORAL at 17:19

## 2025-01-23 RX ADMIN — Medication 900 UNIT(S)/HR: at 10:17

## 2025-01-23 RX ADMIN — ACETAMINOPHEN, DIPHENHYDRAMINE HCL, PHENYLEPHRINE HCL 3 MILLIGRAM(S): 325; 25; 5 TABLET ORAL at 22:01

## 2025-01-23 RX ADMIN — Medication 900 UNIT(S)/HR: at 17:58

## 2025-01-23 RX ADMIN — Medication 900 UNIT(S)/HR: at 20:15

## 2025-01-23 RX ADMIN — Medication 2 TABLET(S): at 22:01

## 2025-01-23 RX ADMIN — Medication 900 UNIT(S)/HR: at 08:34

## 2025-01-23 RX ADMIN — Medication 900 UNIT(S)/HR: at 02:18

## 2025-01-23 RX ADMIN — POLYETHYLENE GLYCOL 3350 17 GRAM(S): 17 POWDER, FOR SOLUTION ORAL at 17:08

## 2025-01-23 RX ADMIN — Medication 4000 UNIT(S): at 02:17

## 2025-01-23 NOTE — PROGRESS NOTE ADULT - PROBLEM SELECTOR PLAN 3
- Pt with hx spina bifida, has motorized wheelchair at home   -  shunt managed by [facility] neurosurgery   - No hydrocephalus on CT -Hx of uterine fibroids w/ heavy periods  -c/w home iron supplementation every other day for better absorption   -f/u w/ OBGYN outpatient.

## 2025-01-23 NOTE — H&P ADULT - PROBLEM SELECTOR PLAN 1
::No evidence of heart strain.   -CT Angio Chest PE: Right lower lobe segmental pulmonary embolism.  -PESI: Class I, Very Low Risk: 0-1.6% 30-day mortality in this group  -DVT Duplex b/l LE: ordered  -Troponin T: <6  -Pro-BNP: ordered  -PT/INR 11/0.92  -Hypercoagulable panel given unprovoked PE  -Age-appropriate cancer screening outpatient   -Heparin ggt. Can transition to DOAC after treatment considerations  -Monitor for sings or symptoms of bleeding

## 2025-01-23 NOTE — PATIENT PROFILE ADULT - NSPROSPHOSPCHAPLAINYN_GEN_A_NUR
SUBJECTIVE:  Yolande De Leon is an 57 year old  Presents for:  Chief Complaint   Patient presents with   • Follow-up     On colpo   • Office Visit       History of Chief Complaint: Yolande is here for followup from her colposcopy.  The colposcopy biopsy results were reviewed and discussed.  It showed:    Pathologic Diagnosis      A. Cervix @ 11:00, biopsy:  - Cervix with HPV changes and mild dysplasia (ZHANNA-I) (see note).  - Squamous metaplasia of endocervix with chronic inflammation.     B. Endocervix, curettings:  - Fragments of endocervical mucosa with no pathologic changes, negative for dysplasia.     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    Electronically signed by Jade Hubbard MD on 2024 at 0538       Past Medical History:   Diagnosis Date   • Anemia, iron deficiency    • Essential (primary) hypertension    • High cholesterol    • Thyroid disease        Past Surgical History:   Procedure Laterality Date   • Colonoscopy     • [place] tooth extraction         Current Outpatient Medications   Medication Sig Dispense Refill   • rosuvastatin (CRESTOR) 10 MG tablet      • Ferrous Sulfate (IRON PO)      • estradiol (ESTRACE) 0.1 MG/GM vaginal cream Place 1 gram via applicator vaginally nightly for 7 days, then twice weekly thereafter 42.5 g 3   • telmisartan (MICARDIS) 80 MG tablet Take 80 mg by mouth daily.     • SYNTHROID 75 MCG tablet      • Vitamin D, Ergocalciferol, 1.25 mg (50,000 units) capsule        No current facility-administered medications for this visit.     ALLERGIES:   Allergen Reactions   • Honeydew Melons   (Food) THROAT SWELLING   • Penicillin V Potassium ANAPHYLAXIS   • Ammonia HIVES and RASH   • Amoxicillin HIVES   • Cefaclor HIVES       Social History     Tobacco Use   • Smoking status: Never   • Smokeless tobacco: Never   Substance Use Topics   • Alcohol use: Yes     Comment: social       Review Of  Systems  Review of Systems   Constitutional: Negative.    Genitourinary:         As above.       OBJECTIVE:  Visit Vitals  /73   Pulse (!) 60   Temp 97.9 °F (36.6 °C) (Temporal)   LMP 08/28/2021     Physical Exam  Constitutional:       Appearance: Normal appearance. She is obese.   Neurological:      General: No focal deficit present.      Mental Status: She is alert and oriented to person, place, and time.   Psychiatric:         Mood and Affect: Mood normal.         Behavior: Behavior normal.         Thought Content: Thought content normal.         Judgment: Judgment normal.   Vitals reviewed.         ASSESSMENT/PLAN:  Diagnoses and all orders for this visit:  Dysplasia of cervix, low grade (ZHANNA 1)    The ZHANNA 1 is not regressing.  We discussed LEEP vs cryo.  The ECC is negative.  She prefers to have cryo done.  We will schedule.     no

## 2025-01-23 NOTE — PATIENT PROFILE ADULT - FALL HARM RISK - HARM RISK INTERVENTIONS
Assistance with ambulation/Assistance OOB with selected safe patient handling equipment/Communicate Risk of Fall with Harm to all staff/Discuss with provider need for PT consult/Monitor for mental status changes/Monitor gait and stability/Provide patient with walking aids - walker, cane, crutches/Reinforce activity limits and safety measures with patient and family/Reorient to person, place and time as needed/Review medications for side effects contributing to fall risk/Sit up slowly, dangle for a short time, stand at bedside before walking/Tailored Fall Risk Interventions/Toileting schedule using arm’s reach rule for commode and bathroom/Visual Cue: Yellow wristband and red socks/Bed in lowest position, wheels locked, appropriate side rails in place/Call bell, personal items and telephone in reach/Instruct patient to call for assistance before getting out of bed or chair/Non-slip footwear when patient is out of bed/[place] to call system/Physically safe environment - no spills, clutter or unnecessary equipment/Purposeful Proactive Rounding/Room/bathroom lighting operational, light cord in reach

## 2025-01-23 NOTE — H&P ADULT - NSHPREVIEWOFSYSTEMS_GEN_ALL_CORE
- CONSTITUTIONAL: Denies weight loss, fever and chills.  - HEENT: Denies changes in vision and hearing.  - RESPIRATORY: +SOB .No cough.  - CV: Denies palpitations. +pleuritic CP.  - GI: Denies abdominal pain, nausea, vomiting and diarrhea.  - : Denies dysuria and urinary frequency.  - MSK: Denies myalgia and joint pain.  - SKIN: Denies rash and pruritus.  - NEUROLOGICAL: Denies headache and syncope.  - PSYCHIATRIC: Denies recent changes in mood. Denies anxiety and depression.    A 12-point review of systems was completed and is otherwise negative except as noted in the HPI.

## 2025-01-23 NOTE — PROGRESS NOTE ADULT - ATTENDING COMMENTS
44 y.o. F w/ a hx of spina bifida, multiple bilateral foot surgeries,  shunt, uterine fibroids hospitalized for segmental PE's.     Patient reports some vaginal spotting. Reports she was lost to follow up with wound care since October as she was sick.     # Segmental PE: Wily and pro-BNP not elevated. Suspect provoked iso immobility. Will need to follow up with PCP for age appropriate cancer screening. Cont Heparin drip with plans to transition to DOAC pending coverage.   # Foot wound: Tan colored drainage noted on dressing. Check foot Xray. n/a 44 y.o. F w/ a hx of spina bifida, multiple bilateral foot surgeries,  shunt, uterine fibroids hospitalized for segmental PE's.     Patient reports some vaginal spotting. Reports she was lost to follow up with wound care since October as she was sick.     # Segmental PE: Wily and pro-BNP not elevated. Suspect provoked iso immobility. Will need to follow up with PCP for age appropriate cancer screening. Cont Heparin drip with plans to transition to DOAC pending coverage.   # Foot wound: Tan colored drainage noted on dressing. Check foot Xray.  # Ischial wound: No s/s of infection noted. Wound care c/s

## 2025-01-23 NOTE — H&P ADULT - NSHPPHYSICALEXAM_GEN_ALL_CORE
- GENERAL: Alert and oriented x 3. No acute distress  - EYES: EOMI. Anicteric.  - HENT: Moist mucous membranes. No scleral icterus. No cervical lymphadenopathy.  - LUNGS: Clear to auscultation bilaterally. No accessory muscle use.  - CARDIOVASCULAR: Regular rate and rhythm. No murmur. No JVD.  - ABDOMEN: Soft, non-tender  - EXTREMITIES: No edema. Non-tender.  - SKIN: No rashes or lesions. Warm.  - NEUROLOGIC: No focal neurological deficits. CN II-XII grossly intact, but not individually tested.  - PSYCHIATRIC: Cooperative. Appropriate mood and affect.

## 2025-01-23 NOTE — PROGRESS NOTE ADULT - PROBLEM SELECTOR PLAN 4
Hx of uterine fibroid, heavy periods. Currently on period -- likely etiology of microscopic hematuria on UA  - Patient found to be iron deficient  - PO iron started  - Outpatient OBGYN f/u VTE PPx: Heparin gtt  Nutrition: Regular Diet  Fluids: None  Electrolytes: Maintain K>4, Mag >2, Phos > 3  Access: PIV  Dispo: pending clinical improvement.

## 2025-01-23 NOTE — PROGRESS NOTE ADULT - PROBLEM SELECTOR PLAN 1
Stage 4 sacral/gluteal decub with wound vac. CTAP with abscess underlying sacral wound. s/p I&D 5/17 - serosanguinous drainage, possibly bursa cysts iso pressure wound    Plan:  - Culture (5/16) without growth  - Frequent repositioning  - Wound care consulted, appreciate recs  - Podiatry re-evaluated left leg, no acute intervention necessary  - Left leg XR without change  - Wound vac replaced 6/3 and functioning well; removed on 6/5, not required upon discharge. -CT Angio Chest PE: Right lower lobe segmental pulmonary embolism.  -PESI: Class I, Very Low Risk: 0-1.6% 30-day mortality in this group  -DVT Duplex b/l LE: ordered  -Troponin T: <6  -Pro-BNP: ordered  -PT/INR 11/0.92  -Hypercoagulable panel given unprovoked PE  -Age-appropriate cancer screening outpatient   -Heparin ggt. Can transition to DOAC after treatment considerations  -Monitor for sings or symptoms of bleeding.

## 2025-01-23 NOTE — H&P ADULT - PROBLEM SELECTOR PLAN 2
-Hx of uterine fibroids w/ heavy periods  -c/w home iron supplementation every other day for better absorption   -f/u w/ OBGYN outpatient

## 2025-01-23 NOTE — H&P ADULT - PROBLEM/PLAN-3
DISPLAY PLAN FREE TEXT
no dermatitis, no environmental allergies, no food allergies, no immunosuppressive disorder, and no pruritus.

## 2025-01-23 NOTE — PROGRESS NOTE ADULT - SUBJECTIVE AND OBJECTIVE BOX
***Plan not finalized until attending attestation***    Buster Santizo MD (PGY-1)  Internal Medicine  Contact via [employer] TEAMS    ******************************************    PROGRESS NOTE:     Patient is a 44y old  Female who presents with a chief complaint of PE (23 Jan 2025 04:41)      INTERVAL EVENTS: No acute overnight events.     SUBJECTIVE: Patient seen and examined at bedside. This morning, the patient is comfortable and doing well. No acute complaints.    MEDICATIONS  (STANDING):  ferrous    sulfate 325 milliGRAM(s) Oral <User Schedule>  heparin  Infusion.  Unit(s)/Hr (9 mL/Hr) IV Continuous <Continuous>  polyethylene glycol 3350 17 Gram(s) Oral two times a day  senna 2 Tablet(s) Oral at bedtime    MEDICATIONS  (PRN):  acetaminophen     Tablet .. 650 milliGRAM(s) Oral every 6 hours PRN Temp greater or equal to 38C (100.4F), Mild Pain (1 - 3)  heparin   Injectable 4000 Unit(s) IV Push every 6 hours PRN For aPTT less than 40  heparin   Injectable 2000 Unit(s) IV Push every 6 hours PRN For aPTT between 40 - 57      CAPILLARY BLOOD GLUCOSE        I&O's Summary      PHYSICAL EXAM:  Vital Signs Last 24 Hrs  T(C): 37 (23 Jan 2025 06:02), Max: 37.1 (22 Jan 2025 20:25)  T(F): 98.6 (23 Jan 2025 06:02), Max: 98.7 (22 Jan 2025 20:25)  HR: 86 (23 Jan 2025 06:02) (70 - 90)  BP: 130/95 (23 Jan 2025 06:02) (130/95 - 159/107)  BP(mean): --  RR: 18 (23 Jan 2025 06:02) (16 - 18)  SpO2: 99% (23 Jan 2025 06:02) (98% - 100%)    Parameters below as of 23 Jan 2025 06:02  Patient On (Oxygen Delivery Method): room air        GENERAL: NAD, lying in bed comfortably  HEAD: Atraumatic, normocephalic  EYES: EOMI, PERRLA, conjunctiva and sclera clear  ENT: Moist mucous membranes  NECK: Supple, no JVD  HEART: S1, S2, Regular rate and rhythm, no murmurs, rubs, or gallops  LUNGS: Unlabored respirations, clear to auscultation bilaterally, no crackles, wheezing, or rhonchi  ABDOMEN: Soft, nontender, nondistended, +BS  EXTREMITIES: 2+ peripheral pulses bilaterally. No clubbing, cyanosis, or edema  NERVOUS SYSTEM:  A&Ox3, no focal deficits   SKIN: No rashes or lesions    LABS:                        12.0   5.40  )-----------( 360      ( 22 Jan 2025 17:04 )             35.3     01-22    139  |  104  |  10  ----------------------------<  68[L]  3.4[L]   |  19[L]  |  0.36[L]    Ca    8.5      22 Jan 2025 17:04    TPro  7.9  /  Alb  4.2  /  TBili  0.2  /  DBili  x   /  AST  19  /  ALT  12  /  AlkPhos  58  01-22    PT/INR - ( 22 Jan 2025 17:04 )   PT: 11.0 sec;   INR: 0.92 ratio         PTT - ( 22 Jan 2025 17:04 )  PTT:29.4 sec      Urinalysis Basic - ( 22 Jan 2025 17:04 )    Color: x / Appearance: x / SG: x / pH: x  Gluc: 68 mg/dL / Ketone: x  / Bili: x / Urobili: x   Blood: x / Protein: x / Nitrite: x   Leuk Esterase: x / RBC: x / WBC x   Sq Epi: x / Non Sq Epi: x / Bacteria: x          RADIOLOGY & ADDITIONAL TESTS:  Results Reviewed:   Imaging Personally Reviewed:  Electrocardiogram Personally Reviewed:  Tele: ***Plan not finalized until attending attestation***    Buster Santizo MD (PGY-1)  Internal Medicine  Contact via [employer] TEAMS    ******************************************    PROGRESS NOTE:     Patient is a 44y old  Female who presents with a chief complaint of PE (23 Jan 2025 04:41)      INTERVAL EVENTS: No acute overnight events.     SUBJECTIVE: Patient seen and examined at bedside.   Has some pleuritic chest pain    MEDICATIONS  (STANDING):  ferrous    sulfate 325 milliGRAM(s) Oral <User Schedule>  heparin  Infusion.  Unit(s)/Hr (9 mL/Hr) IV Continuous <Continuous>  polyethylene glycol 3350 17 Gram(s) Oral two times a day  senna 2 Tablet(s) Oral at bedtime    MEDICATIONS  (PRN):  acetaminophen     Tablet .. 650 milliGRAM(s) Oral every 6 hours PRN Temp greater or equal to 38C (100.4F), Mild Pain (1 - 3)  heparin   Injectable 4000 Unit(s) IV Push every 6 hours PRN For aPTT less than 40  heparin   Injectable 2000 Unit(s) IV Push every 6 hours PRN For aPTT between 40 - 57      PHYSICAL EXAM:  Vital Signs Last 24 Hrs  T(C): 37 (23 Jan 2025 06:02), Max: 37.1 (22 Jan 2025 20:25)  T(F): 98.6 (23 Jan 2025 06:02), Max: 98.7 (22 Jan 2025 20:25)  HR: 86 (23 Jan 2025 06:02) (70 - 90)  BP: 130/95 (23 Jan 2025 06:02) (130/95 - 159/107)  BP(mean): --  RR: 18 (23 Jan 2025 06:02) (16 - 18)  SpO2: 99% (23 Jan 2025 06:02) (98% - 100%)    Parameters below as of 23 Jan 2025 06:02  Patient On (Oxygen Delivery Method): room air        GENERAL: NAD, lying in bed comfortably  HEAD: Atraumatic, normocephalic  EYES: EOMI, PERRLA, conjunctiva and sclera clear  ENT: Moist mucous membranes  NECK: Supple, no JVD  HEART: S1, S2, Regular rate and rhythm, no murmurs, rubs, or gallops  LUNGS: Unlabored respirations, clear to auscultation bilaterally, no crackles, wheezing, or rhonchi  ABDOMEN: Soft, nontender, nondistended, +BS  EXTREMITIES: 2+ peripheral pulses bilaterally. No clubbing, cyanosis, or edema  NERVOUS SYSTEM:  A&Ox3, no focal deficits   SKIN: +R foot lesion. +Ischial ulcer      LABS:                        12.0   5.40  )-----------( 360      ( 22 Jan 2025 17:04 )             35.3     01-22    139  |  104  |  10  ----------------------------<  68[L]  3.4[L]   |  19[L]  |  0.36[L]    Ca    8.5      22 Jan 2025 17:04    TPro  7.9  /  Alb  4.2  /  TBili  0.2  /  DBili  x   /  AST  19  /  ALT  12  /  AlkPhos  58  01-22    PT/INR - ( 22 Jan 2025 17:04 )   PT: 11.0 sec;   INR: 0.92 ratio         PTT - ( 22 Jan 2025 17:04 )  PTT:29.4 sec      Urinalysis Basic - ( 22 Jan 2025 17:04 )    Color: x / Appearance: x / SG: x / pH: x  Gluc: 68 mg/dL / Ketone: x  / Bili: x / Urobili: x   Blood: x / Protein: x / Nitrite: x   Leuk Esterase: x / RBC: x / WBC x   Sq Epi: x / Non Sq Epi: x / Bacteria: x          RADIOLOGY & ADDITIONAL TESTS:  < from: US Duplex Venous Lower Ext Complete, Bilateral (01.23.25 @ 09:49) >  COMPARISON: Right lower extremity Doppler 5/11/2024    TECHNIQUE: Duplex sonography of the BILATERAL LOWER extremity veins with   color and spectral Doppler, with and without compression.    FINDINGS:    RIGHT:  Normal compressibility of the RIGHT common femoral, femoral and popliteal   veins.  Doppler examination shows normal spontaneous and phasic flow.  No RIGHT calf vein thrombosis is detected.    LEFT:  Normal compressibility of the LEFT common femoral, femoral and popliteal   veins.  Doppler examination shows normal spontaneous and phasicflow.  No LEFT calf vein thrombosis is detected.    IMPRESSION:  No evidence of deep venous thrombosis in either lower extremity.    < end of copied text >

## 2025-01-23 NOTE — PROGRESS NOTE ADULT - ASSESSMENT
44F MHx spina bifida, multiple bilateral foot surgeries, and  shunt (managed [facility] neurosurgery), uterine fibroids presenting with R foot wound, found to have  R foot and L ischial abscess, s/p R foot sx with podiatry 5/20. 44F PMHx spina bifida, multiple bilateral foot surgeries, and  shunt (managed [facility] neurosurgery), uterine fibroids presenting with pleuritic chest pain and found to have segmental pulmonary embolism. Admitted to medicine for further management and monitoring. Detailed plan as below:

## 2025-01-23 NOTE — H&P ADULT - ASSESSMENT
44F PMHx spina bifida, multiple bilateral foot surgeries, and  shunt (managed [facility] neurosurgery), uterine fibroids presenting with pleuritic chest pain and found to have segmental pulmonary embolism. Admitted to medicine for further management and monitoring. Detailed plan as below:

## 2025-01-23 NOTE — H&P ADULT - PROBLEM SELECTOR PLAN 4
VTE PPx: Heparin gtt  Nutrition: Regular Diet  Fluids: None  Electrolytes: Maintain K>4, Mag >2, Phos > 3  Access: PIV  Dispo: pending clinical improvement

## 2025-01-23 NOTE — PROGRESS NOTE ADULT - NUTRITIONAL ASSESSMENT
This patient has been assessed with a concern for Malnutrition and has been determined to have a diagnosis/diagnoses of Underweight (BMI < 19).    This patient is being managed with:   Diet Regular-  Lactose Restricted (Milk Sugar Intoler.)  Entered: Jun 2 2024  6:12PM

## 2025-01-23 NOTE — PROGRESS NOTE ADULT - PROBLEM SELECTOR PLAN 2
Patient with Proteus on UCx from 5/11, s/p vancomycin (5/11) and zosyn (5/11 - 5/21). Patient with new lower abdominal and lower back pain on 6/2.  - UCx (6/2) growing Pseudomonas  - F/u sensitivities  - S/p CTX (6/3-6/4), switch to Zosyn (6/4 - ) for pseudomonas coverage  - C/w miralax, senna, dulcolax suppository - Open foot wound on R foot.  - S/p I&D in May 2024  - Pt has not been following-up with surgeon on wound care  - Xray of foot ordered for possible OM  - ESR and CRP ordered in the morning

## 2025-01-23 NOTE — ED ADULT NURSE REASSESSMENT NOTE - NS ED NURSE REASSESS COMMENT FT1
patient laying in semi fowlers position on the stretcher. patient alert and oriented times four. patient denies shortness of breath, chest pain, nausea, vomiting, chill, fever. Patient normal sinus on the monitor. Respirations equal and adequate. Patients IV patent, no signs of infiltration. Safety measures in place, call bell within reach. Patient stable upon assessment.   second IV placed 20 gauge right arm, weight in chart.

## 2025-01-23 NOTE — H&P ADULT - HISTORY OF PRESENT ILLNESS
44F PMHx spina bifida, multiple bilateral foot surgeries, and  shunt (managed [facility] neurosurgery), uterine fibroids presenting with pleuritic chest pain. It is associated with shortness of breath. Denies prolonged travel. No OCP use. Endorses to not ambulating much due to right foot surgeries. Denies diaphoresis, n/v/d, numbness, tingling, weakness, syncope, fever or chills. No personal or family history of AI disorders.

## 2025-01-24 ENCOUNTER — RESULT REVIEW (OUTPATIENT)
Age: 45
End: 2025-01-24

## 2025-01-24 LAB
APTT BLD: 80.5 SEC — HIGH (ref 24.5–35.6)
APTT BLD: 85.5 SEC — HIGH (ref 24.5–35.6)
BASOPHILS # BLD AUTO: 0.03 K/UL — SIGNIFICANT CHANGE UP (ref 0–0.2)
BASOPHILS NFR BLD AUTO: 0.5 % — SIGNIFICANT CHANGE UP (ref 0–2)
CRP SERPL-MCNC: <3 MG/L — SIGNIFICANT CHANGE UP
EOSINOPHIL # BLD AUTO: 0.14 K/UL — SIGNIFICANT CHANGE UP (ref 0–0.5)
EOSINOPHIL NFR BLD AUTO: 2.1 % — SIGNIFICANT CHANGE UP (ref 0–6)
ERYTHROCYTE [SEDIMENTATION RATE] IN BLOOD: 22 MM/HR — SIGNIFICANT CHANGE UP (ref 4–25)
HCT VFR BLD CALC: 29.2 % — LOW (ref 34.5–45)
HCT VFR BLD CALC: 31.3 % — LOW (ref 34.5–45)
HGB BLD-MCNC: 10.5 G/DL — LOW (ref 11.5–15.5)
HGB BLD-MCNC: 9.9 G/DL — LOW (ref 11.5–15.5)
IANC: 3.79 K/UL — SIGNIFICANT CHANGE UP (ref 1.8–7.4)
IMM GRANULOCYTES NFR BLD AUTO: 0.2 % — SIGNIFICANT CHANGE UP (ref 0–0.9)
LYMPHOCYTES # BLD AUTO: 2.1 K/UL — SIGNIFICANT CHANGE UP (ref 1–3.3)
LYMPHOCYTES # BLD AUTO: 31.7 % — SIGNIFICANT CHANGE UP (ref 13–44)
MCHC RBC-ENTMCNC: 28.2 PG — SIGNIFICANT CHANGE UP (ref 27–34)
MCHC RBC-ENTMCNC: 28.2 PG — SIGNIFICANT CHANGE UP (ref 27–34)
MCHC RBC-ENTMCNC: 33.5 G/DL — SIGNIFICANT CHANGE UP (ref 32–36)
MCHC RBC-ENTMCNC: 33.9 G/DL — SIGNIFICANT CHANGE UP (ref 32–36)
MCV RBC AUTO: 83.2 FL — SIGNIFICANT CHANGE UP (ref 80–100)
MCV RBC AUTO: 84.1 FL — SIGNIFICANT CHANGE UP (ref 80–100)
MONOCYTES # BLD AUTO: 0.56 K/UL — SIGNIFICANT CHANGE UP (ref 0–0.9)
MONOCYTES NFR BLD AUTO: 8.4 % — SIGNIFICANT CHANGE UP (ref 2–14)
NEUTROPHILS # BLD AUTO: 3.79 K/UL — SIGNIFICANT CHANGE UP (ref 1.8–7.4)
NEUTROPHILS NFR BLD AUTO: 57.1 % — SIGNIFICANT CHANGE UP (ref 43–77)
NRBC # BLD AUTO: 0 K/UL — SIGNIFICANT CHANGE UP (ref 0–0)
NRBC # BLD AUTO: 0 K/UL — SIGNIFICANT CHANGE UP (ref 0–0)
NRBC # BLD: 0 /100 WBCS — SIGNIFICANT CHANGE UP (ref 0–0)
NRBC # BLD: 0 /100 WBCS — SIGNIFICANT CHANGE UP (ref 0–0)
NRBC # FLD: 0 K/UL — SIGNIFICANT CHANGE UP (ref 0–0)
NRBC # FLD: 0 K/UL — SIGNIFICANT CHANGE UP (ref 0–0)
NRBC BLD-RTO: 0 /100 WBCS — SIGNIFICANT CHANGE UP (ref 0–0)
NRBC BLD-RTO: 0 /100 WBCS — SIGNIFICANT CHANGE UP (ref 0–0)
PLATELET # BLD AUTO: 264 K/UL — SIGNIFICANT CHANGE UP (ref 150–400)
PLATELET # BLD AUTO: 300 K/UL — SIGNIFICANT CHANGE UP (ref 150–400)
RBC # BLD: 3.51 M/UL — LOW (ref 3.8–5.2)
RBC # BLD: 3.72 M/UL — LOW (ref 3.8–5.2)
RBC # FLD: 15.5 % — HIGH (ref 10.3–14.5)
RBC # FLD: 15.8 % — HIGH (ref 10.3–14.5)
WBC # BLD: 6.25 K/UL — SIGNIFICANT CHANGE UP (ref 3.8–10.5)
WBC # BLD: 6.63 K/UL — SIGNIFICANT CHANGE UP (ref 3.8–10.5)
WBC # FLD AUTO: 6.25 K/UL — SIGNIFICANT CHANGE UP (ref 3.8–10.5)
WBC # FLD AUTO: 6.63 K/UL — SIGNIFICANT CHANGE UP (ref 3.8–10.5)

## 2025-01-24 PROCEDURE — 99232 SBSQ HOSP IP/OBS MODERATE 35: CPT | Mod: GC

## 2025-01-24 PROCEDURE — 73630 X-RAY EXAM OF FOOT: CPT | Mod: 26,RT

## 2025-01-24 PROCEDURE — 93306 TTE W/DOPPLER COMPLETE: CPT | Mod: 26

## 2025-01-24 PROCEDURE — 99223 1ST HOSP IP/OBS HIGH 75: CPT

## 2025-01-24 RX ORDER — APIXABAN 5 MG/1
1 TABLET, FILM COATED ORAL
Qty: 74 | Refills: 0
Start: 2025-01-24 | End: 2025-02-22

## 2025-01-24 RX ORDER — ONDANSETRON 4 MG/1
4 TABLET, ORALLY DISINTEGRATING ORAL ONCE
Refills: 0 | Status: COMPLETED | OUTPATIENT
Start: 2025-01-24 | End: 2025-01-24

## 2025-01-24 RX ORDER — MAGNESIUM, ALUMINUM HYDROXIDE 200-225/5
30 SUSPENSION, ORAL (FINAL DOSE FORM) ORAL ONCE
Refills: 0 | Status: COMPLETED | OUTPATIENT
Start: 2025-01-24 | End: 2025-01-24

## 2025-01-24 RX ORDER — POTASSIUM CHLORIDE 750 MG/1
40 TABLET, EXTENDED RELEASE ORAL ONCE
Refills: 0 | Status: COMPLETED | OUTPATIENT
Start: 2025-01-24 | End: 2025-01-24

## 2025-01-24 RX ORDER — APIXABAN 5 MG/1
0 TABLET, FILM COATED ORAL
Qty: 74 | Refills: 0
Start: 2025-01-24

## 2025-01-24 RX ORDER — APIXABAN 5 MG/1
1 TABLET, FILM COATED ORAL
Qty: 60 | Refills: 0
Start: 2025-01-24 | End: 2025-02-22

## 2025-01-24 RX ADMIN — Medication 900 UNIT(S)/HR: at 00:47

## 2025-01-24 RX ADMIN — Medication 900 UNIT(S)/HR: at 20:25

## 2025-01-24 RX ADMIN — Medication 2 TABLET(S): at 21:33

## 2025-01-24 RX ADMIN — POLYETHYLENE GLYCOL 3350 17 GRAM(S): 17 POWDER, FOR SOLUTION ORAL at 17:57

## 2025-01-24 RX ADMIN — Medication 30 MILLILITER(S): at 16:18

## 2025-01-24 RX ADMIN — ACETAMINOPHEN 650 MILLIGRAM(S): 160 SUSPENSION ORAL at 17:20

## 2025-01-24 RX ADMIN — Medication 900 UNIT(S)/HR: at 08:43

## 2025-01-24 RX ADMIN — ACETAMINOPHEN, DIPHENHYDRAMINE HCL, PHENYLEPHRINE HCL 3 MILLIGRAM(S): 325; 25; 5 TABLET ORAL at 21:33

## 2025-01-24 RX ADMIN — ACETAMINOPHEN 650 MILLIGRAM(S): 160 SUSPENSION ORAL at 16:20

## 2025-01-24 RX ADMIN — ONDANSETRON 4 MILLIGRAM(S): 4 TABLET, ORALLY DISINTEGRATING ORAL at 14:04

## 2025-01-24 RX ADMIN — Medication 900 UNIT(S)/HR: at 06:34

## 2025-01-24 RX ADMIN — Medication 325 MILLIGRAM(S): at 12:00

## 2025-01-24 RX ADMIN — POLYETHYLENE GLYCOL 3350 17 GRAM(S): 17 POWDER, FOR SOLUTION ORAL at 06:13

## 2025-01-24 RX ADMIN — POTASSIUM CHLORIDE 40 MILLIEQUIVALENT(S): 750 TABLET, EXTENDED RELEASE ORAL at 16:18

## 2025-01-24 NOTE — DIETITIAN INITIAL EVALUATION ADULT - OTHER INFO
Per chart, patient is a 44y Female with PMH spina bifida, fibroids who presented to [facility] with chest pain.    Patient is currently ordered for a PO diet. Patient reports a good appetite and PO intake at meals during course of admission. Documented on RN flowsheet as consuming % of meals. Patient denies any chewing or swallowing difficulty with current diet order. No report of GI distress (nausea, vomiting, diarrhea, constipation). Last BM 1/17/25 per RN flowsheet documentation. Noted to be on a bowel regimen. Noted supplementation with ferrous sulfate per review of medication list.     Patient reports she has history of iron deficiency anemia and craves eating ice chips as a result. Writer attempted to provide nutrition education for iron deficiency anemia, including a review of iron-rich foods to focus on. However, patient appeared uninterested and kept interrupting writer mid-sentence in order to repeatedly talk about her dinner last night. Further education may be provided upon follow-up as per protocol.

## 2025-01-24 NOTE — CONSULT NOTE ADULT - ASSESSMENT
44F presents for right foot wound and cellultis  -Pt was seen and evaluated  -Afebrile, WBC 6.63  -Right foot dorsomedial wound to subQ with surrounding hyperkeratotic tissue, no erythema, no drainage, no malodor, no fluctuance, no acute signs of infection. left foot no open wounds, no acute signs of infection.  -Right foot xray: no OM, no gas  -No culture obtained 2/2 no acute signs of infection  -ordered santyl  -Wound care orders placed  -Pt is stable for discharge from podiatry standpoint  -Wound care and follow-up information placed in discharge provider note  -Discussed with attending

## 2025-01-24 NOTE — PHYSICAL THERAPY INITIAL EVALUATION ADULT - ADDITIONAL COMMENTS
Pt. lives in a house with family. Pt. owns a rollator and wheelchair. Pt. reports having braces for bilateral LE however has not worn them.    Pt. was left in bed post PT Evaluation, no apparent distress, all lines intact, SpO2 100%, HR 86 bpm, call bell within reach, +bed alarm. Regina MEADOWS made aware of pt. status and participation in PT.

## 2025-01-24 NOTE — DISCHARGE NOTE PROVIDER - NSDCFUADDINST_GEN_ALL_CORE_FT
Wound recommendations: Clean wound and periwound skin with NS. Pat dry. Apply Liquid barrier film to periwound skin. Lightly pack wound depth and undermining with Aquacel hydrofiber ribbon, make sure to leave 2" out at end visible to ensure full removal of packing with subsequent dressing changes. Cover with silicone foam with border. Change daily or prn if soiled.

## 2025-01-24 NOTE — PROGRESS NOTE ADULT - SUBJECTIVE AND OBJECTIVE BOX
***Plan not finalized until attending attestation***    Buster Santizo MD (PGY-1)  Internal Medicine  Contact via [employer] TEAMS    ******************************************    PROGRESS NOTE:     Patient is a 44y old  Female who presents with a chief complaint of PE (23 Jan 2025 07:24)      INTERVAL EVENTS: No acute overnight events.     SUBJECTIVE: Patient seen and examined at bedside. This morning, the patient is comfortable and doing well. No acute complaints.    MEDICATIONS  (STANDING):  ferrous    sulfate 325 milliGRAM(s) Oral <User Schedule>  heparin  Infusion.  Unit(s)/Hr (9 mL/Hr) IV Continuous <Continuous>  melatonin 3 milliGRAM(s) Oral at bedtime  polyethylene glycol 3350 17 Gram(s) Oral two times a day  senna 2 Tablet(s) Oral at bedtime    MEDICATIONS  (PRN):  acetaminophen     Tablet .. 650 milliGRAM(s) Oral every 6 hours PRN Temp greater or equal to 38C (100.4F), Mild Pain (1 - 3)  heparin   Injectable 4000 Unit(s) IV Push every 6 hours PRN For aPTT less than 40  heparin   Injectable 2000 Unit(s) IV Push every 6 hours PRN For aPTT between 40 - 57      CAPILLARY BLOOD GLUCOSE        I&O's Summary      PHYSICAL EXAM:  Vital Signs Last 24 Hrs  T(C): 37.2 (24 Jan 2025 06:10), Max: 37.3 (23 Jan 2025 14:26)  T(F): 98.9 (24 Jan 2025 06:10), Max: 99.2 (23 Jan 2025 14:26)  HR: 69 (24 Jan 2025 06:10) (69 - 85)  BP: 118/77 (24 Jan 2025 06:10) (101/65 - 129/91)  BP(mean): --  RR: 18 (24 Jan 2025 06:10) (18 - 18)  SpO2: 100% (24 Jan 2025 06:10) (100% - 100%)    Parameters below as of 24 Jan 2025 06:10  Patient On (Oxygen Delivery Method): room air        GENERAL: NAD, lying in bed comfortably  HEAD: Atraumatic, normocephalic  EYES: EOMI, PERRLA, conjunctiva and sclera clear  ENT: Moist mucous membranes  NECK: Supple, no JVD  HEART: S1, S2, Regular rate and rhythm, no murmurs, rubs, or gallops  LUNGS: Unlabored respirations, clear to auscultation bilaterally, no crackles, wheezing, or rhonchi  ABDOMEN: Soft, nontender, nondistended, +BS  EXTREMITIES: 2+ peripheral pulses bilaterally. No clubbing, cyanosis, or edema  NERVOUS SYSTEM:  A&Ox3, no focal deficits   SKIN: No rashes or lesions    LABS:                        10.5   6.63  )-----------( 264      ( 24 Jan 2025 05:32 )             31.3     01-22    139  |  104  |  10  ----------------------------<  68[L]  3.4[L]   |  19[L]  |  0.36[L]    Ca    8.5      22 Jan 2025 17:04    TPro  7.9  /  Alb  4.2  /  TBili  0.2  /  DBili  x   /  AST  19  /  ALT  12  /  AlkPhos  58  01-22    PT/INR - ( 22 Jan 2025 17:04 )   PT: 11.0 sec;   INR: 0.92 ratio         PTT - ( 24 Jan 2025 05:32 )  PTT:80.5 sec      Urinalysis Basic - ( 22 Jan 2025 17:04 )    Color: x / Appearance: x / SG: x / pH: x  Gluc: 68 mg/dL / Ketone: x  / Bili: x / Urobili: x   Blood: x / Protein: x / Nitrite: x   Leuk Esterase: x / RBC: x / WBC x   Sq Epi: x / Non Sq Epi: x / Bacteria: x          RADIOLOGY & ADDITIONAL TESTS:  Results Reviewed:   Imaging Personally Reviewed:  Electrocardiogram Personally Reviewed:  Tele: ***Plan not finalized until attending attestation***    Buster Santizo MD (PGY-1)  Internal Medicine  Contact via [employer] TEAMS    ******************************************    PROGRESS NOTE:     Patient is a 44y old  Female who presents with a chief complaint of PE (23 Jan 2025 07:24)    INTERVAL EVENTS: No acute overnight events.     SUBJECTIVE: Patient seen and examined at bedside.   Multiple complaints of abd/chest burning sensation, has not had BM    MEDICATIONS  (STANDING):  ferrous    sulfate 325 milliGRAM(s) Oral <User Schedule>  heparin  Infusion.  Unit(s)/Hr (9 mL/Hr) IV Continuous <Continuous>  melatonin 3 milliGRAM(s) Oral at bedtime  polyethylene glycol 3350 17 Gram(s) Oral two times a day  senna 2 Tablet(s) Oral at bedtime    MEDICATIONS  (PRN):  acetaminophen     Tablet .. 650 milliGRAM(s) Oral every 6 hours PRN Temp greater or equal to 38C (100.4F), Mild Pain (1 - 3)  heparin   Injectable 4000 Unit(s) IV Push every 6 hours PRN For aPTT less than 40  heparin   Injectable 2000 Unit(s) IV Push every 6 hours PRN For aPTT between 40 - 57      PHYSICAL EXAM:  Vital Signs Last 24 Hrs  T(C): 37.2 (24 Jan 2025 06:10), Max: 37.3 (23 Jan 2025 14:26)  T(F): 98.9 (24 Jan 2025 06:10), Max: 99.2 (23 Jan 2025 14:26)  HR: 69 (24 Jan 2025 06:10) (69 - 85)  BP: 118/77 (24 Jan 2025 06:10) (101/65 - 129/91)  BP(mean): --  RR: 18 (24 Jan 2025 06:10) (18 - 18)  SpO2: 100% (24 Jan 2025 06:10) (100% - 100%)    Parameters below as of 24 Jan 2025 06:10  Patient On (Oxygen Delivery Method): room air        GENERAL: NAD  HEAD: Atraumatic, normocephalic  EYES: EOMI, PERRLA, conjunctiva and sclera clear  ENT: Moist mucous membranes  NECK: Supple, no JVD  HEART: S1, S2, Regular rate and rhythm, no murmurs, rubs, or gallops  LUNGS: Unlabored respirations, clear to auscultation bilaterally, no crackles, wheezing, or rhonchi  ABDOMEN: Soft, nontender, nondistended, +BS  EXTREMITIES: 2+ peripheral pulses bilaterally. No clubbing, cyanosis, or edema  NERVOUS SYSTEM:  A&Ox3, no focal deficits   SKIN: +R foot lesion. +Ischial ulcer      LABS:                        10.5   6.63  )-----------( 264      ( 24 Jan 2025 05:32 )             31.3     01-22    139  |  104  |  10  ----------------------------<  68[L]  3.4[L]   |  19[L]  |  0.36[L]    Ca    8.5      22 Jan 2025 17:04    TPro  7.9  /  Alb  4.2  /  TBili  0.2  /  DBili  x   /  AST  19  /  ALT  12  /  AlkPhos  58  01-22    PT/INR - ( 22 Jan 2025 17:04 )   PT: 11.0 sec;   INR: 0.92 ratio         PTT - ( 24 Jan 2025 05:32 )  PTT:80.5 sec      Urinalysis Basic - ( 22 Jan 2025 17:04 )    Color: x / Appearance: x / SG: x / pH: x  Gluc: 68 mg/dL / Ketone: x  / Bili: x / Urobili: x   Blood: x / Protein: x / Nitrite: x   Leuk Esterase: x / RBC: x / WBC x   Sq Epi: x / Non Sq Epi: x / Bacteria: x          RADIOLOGY & ADDITIONAL TESTS:  < from: Xray Foot AP + Lateral + Oblique, Right (01.24.25 @ 13:20) >  INTERPRETATION:  CLINICAL INDICATION: Foot wound. Pain.    TECHNIQUE: 3 views right foot.    COMPARISON: Foot x-rays 8/17/2024.      FINDINGS/  IMPRESSION:  There is an skin wound with soft tissue swelling adjacent to the skin   wound along the medial midfoot. No tracking soft tissue gas identified.   No cortical erosion or destruction is seen. No periosteal reaction.   Similar findings of fusion across the hindfoot/midfoot. There is no acute   fracture. No dislocation. No advanced productive changes about the joints.    --- End of Report ---    < end of copied text >   ***Plan not finalized until attending attestation***    Buster Santizo MD (PGY-1)  Internal Medicine  Contact via [employer] TEAMS    ******************************************    PROGRESS NOTE:     Patient is a 44y old  Female who presents with a chief complaint of PE (23 Jan 2025 07:24)    INTERVAL EVENTS: No acute overnight events.     SUBJECTIVE: Patient seen and examined at bedside.   Multiple complaints of abd/chest burning sensation, has not had BM    MEDICATIONS  (STANDING):  ferrous    sulfate 325 milliGRAM(s) Oral <User Schedule>  heparin  Infusion.  Unit(s)/Hr (9 mL/Hr) IV Continuous <Continuous>  melatonin 3 milliGRAM(s) Oral at bedtime  polyethylene glycol 3350 17 Gram(s) Oral two times a day  senna 2 Tablet(s) Oral at bedtime    MEDICATIONS  (PRN):  acetaminophen     Tablet .. 650 milliGRAM(s) Oral every 6 hours PRN Temp greater or equal to 38C (100.4F), Mild Pain (1 - 3)  heparin   Injectable 4000 Unit(s) IV Push every 6 hours PRN For aPTT less than 40  heparin   Injectable 2000 Unit(s) IV Push every 6 hours PRN For aPTT between 40 - 57      PHYSICAL EXAM:  Vital Signs Last 24 Hrs  T(C): 37.2 (24 Jan 2025 06:10), Max: 37.3 (23 Jan 2025 14:26)  T(F): 98.9 (24 Jan 2025 06:10), Max: 99.2 (23 Jan 2025 14:26)  HR: 69 (24 Jan 2025 06:10) (69 - 85)  BP: 118/77 (24 Jan 2025 06:10) (101/65 - 129/91)  BP(mean): --  RR: 18 (24 Jan 2025 06:10) (18 - 18)  SpO2: 100% (24 Jan 2025 06:10) (100% - 100%)    Parameters below as of 24 Jan 2025 06:10  Patient On (Oxygen Delivery Method): room air        GENERAL: NAD  HEAD: Atraumatic, normocephalic  EYES: EOMI, PERRLA, conjunctiva and sclera clear  ENT: Moist mucous membranes  NECK: Supple, no JVD  HEART: S1, S2, Regular rate and rhythm, no murmurs, rubs, or gallops  LUNGS: Unlabored respirations, clear to auscultation bilaterally, no crackles, wheezing, or rhonchi  ABDOMEN: Soft, nontender, nondistended, +BS  EXTREMITIES: 2+ peripheral pulses bilaterally. No clubbing, cyanosis, or edema  NERVOUS SYSTEM:  A&Ox3, no focal deficits   SKIN: +R foot lesion. +Ischial ulcer      LABS:                        10.5   6.63  )-----------( 264      ( 24 Jan 2025 05:32 )             31.3     01-22    139  |  104  |  10  ----------------------------<  68[L]  3.4[L]   |  19[L]  |  0.36[L]    Ca    8.5      22 Jan 2025 17:04    TPro  7.9  /  Alb  4.2  /  TBili  0.2  /  DBili  x   /  AST  19  /  ALT  12  /  AlkPhos  58  01-22    PT/INR - ( 22 Jan 2025 17:04 )   PT: 11.0 sec;   INR: 0.92 ratio         PTT - ( 24 Jan 2025 05:32 )  PTT:80.5 sec      Urinalysis Basic - ( 22 Jan 2025 17:04 )    Color: x / Appearance: x / SG: x / pH: x  Gluc: 68 mg/dL / Ketone: x  / Bili: x / Urobili: x   Blood: x / Protein: x / Nitrite: x   Leuk Esterase: x / RBC: x / WBC x   Sq Epi: x / Non Sq Epi: x / Bacteria: x          RADIOLOGY & ADDITIONAL TESTS:  < from: Xray Foot AP + Lateral + Oblique, Right (01.24.25 @ 13:20) >  INTERPRETATION:  CLINICAL INDICATION: Foot wound. Pain.    TECHNIQUE: 3 views right foot.    COMPARISON: Foot x-rays 8/17/2024.      FINDINGS/  IMPRESSION:  There is an skin wound with soft tissue swelling adjacent to the skin   wound along the medial midfoot. No tracking soft tissue gas identified.   No cortical erosion or destruction is seen. No periosteal reaction.   Similar findings of fusion across the hindfoot/midfoot. There is no acute   fracture. No dislocation. No advanced productive changes about the joints.    --- End of Report ---    < end of copied text >    < from: TTE W or WO Ultrasound Enhancing Agent (01.24.25 @ 09:02) >  CONCLUSIONS:      1. Left ventricular cavity is normal in size. Left ventricular wall thickness is normal. Left ventricular systolic function is normal with an ejection fraction of 67 % by Cabral's method of disks. There are no regional wall motion abnormalities seen.   2. Normal left ventricular diastolic function, with normal left ventricular filling pressure.   3. Normal right ventricular cavity size, with normal wall thickness, and normal right ventricular systolic function. Tricuspid annular plane systolic excursion (TAPSE) is 2.1 cm (normal >=1.7 cm).   4. Normal left and right atrial size.   5. No significant valvular disease.   6. No pericardial effusion seen.    < end of copied text >

## 2025-01-24 NOTE — DISCHARGE NOTE PROVIDER - NSFOLLOWUPCLINICS_GEN_ALL_ED_FT
[facility]  Podiatry  [address] [place] [address] - [address] [place] [address]  Phone: [phone]  Fax:   Established Patient  Follow Up Time: 2 weeks    [facility]  Wound Care  [address] [place] [id number]  Phone: [phone]  Fax: [phone]  Follow Up Time: 2 weeks

## 2025-01-24 NOTE — PROGRESS NOTE ADULT - PROBLEM SELECTOR PLAN 2
- Open foot wound on R foot.  - S/p I&D in May 2024  - Pt has not been following-up with surgeon on wound care  - Xray of foot ordered for possible OM  - ESR and CRP ordered in the morning - Open foot wound on R foot.  - S/p I&D in May 2024  - Pt has not been following-up with surgeon on wound care  - Xray of foot ordered for possible OM  - ESR and CRP wnl - Open foot wound on R foot.  - S/p I&D in May 2024  - Pt has not been following-up with surgeon on wound care  - Xray negative for signs of tracts or OM  - ESR and CRP wnl  - Wound care consulted

## 2025-01-24 NOTE — DISCHARGE NOTE PROVIDER - NSDCMRMEDTOKEN_GEN_ALL_CORE_FT
Eliquis Starter Pack for Treatment of DVT and PE 5 mg oral tablet: 1 tab(s) orally 2 times a day 2 tabs twice daily for 7 days then 1 tab once daily  ferrous sulfate 325 mg (65 mg elemental iron) oral tablet: 1 tab(s) orally once a day   Eliquis Starter Pack for Treatment of DVT and PE 5 mg oral tablet: 1 tab(s) orally 2 times a day 2 tabs twice daily for 7 days then 1 tab once daily  Eliquis Starter Pack for Treatment of DVT and PE 5 mg oral tablet: 1 tab(s) orally 2 times a day 2 tabs twice daily for 7 days then 1 tab once daily  ferrous sulfate 325 mg (65 mg elemental iron) oral tablet: 1 tab(s) orally once a day   Eliquis Starter Pack for Treatment of DVT and PE 5 mg oral tablet: 1 tab(s) orally 2 times a day 2 tabs twice daily for 7 days then 1 tab once daily  Eliquis Starter Pack for Treatment of DVT and PE 5 mg oral tablet: 1 tab(s) orally 2 times a day 2 tabs twice daily for 7 days then 1 tab once daily  ferrous sulfate 325 mg (65 mg elemental iron) oral tablet: 1 tab(s) orally once a day  Santyl 250 units/g topical ointment: Apply topically to affected area once a day Please apply to right foot wound daily and dress with 4x4 gauze   Eliquis Starter Pack for Treatment of DVT and PE 5 mg oral tablet: 1 tab(s) orally 2 times a day 2 tabs twice daily for 7 days then 1 tab once daily  Eliquis Starter Pack for Treatment of DVT and PE 5 mg oral tablet: 1 tab(s) orally 2 times a day 2 tabs twice daily for 7 days then 1 tab once daily  ferrous sulfate 325 mg (65 mg elemental iron) oral tablet: 1 tab(s) orally once a day  Santyl 250 units/g topical ointment: Apply topically to affected area once a day Wound 2cm x 2cm size. Please apply to right foot wound daily and dress with 4x4 gauze   Eliquis 5 mg oral tablet: 2 tab(s) orally 2 times a day Please take 10 mg BID for 5 days till 2/1 then take 5mg BID after  ferrous sulfate 325 mg (65 mg elemental iron) oral tablet: 1 tab(s) orally once a day  Outpatient Physical Therapy: Outpatient physical therapy three times a week  Physical Therapy: Please provider Physical Therapy 3 times a week for 6 weeks   Eliquis 5 mg oral tablet: 2 tab(s) orally 2 times a day Please take 10 mg BID for 5 days till 2/1 then take 5mg BID after  ferrous sulfate 325 mg (65 mg elemental iron) oral tablet: 1 tab(s) orally once a day  gabapentin 100 mg oral capsule: 1 cap(s) orally 3 times a day  Outpatient Physical Therapy: Outpatient physical therapy three times a week  Physical Therapy: Please provider Physical Therapy 3 times a week for 6 weeks   Eliquis 5 mg oral tablet: 2 tab(s) orally 2 times a day Please take 10 mg BID for 3 and a half days till 2/1 then take 5mg twice a day after  ferrous sulfate 325 mg (65 mg elemental iron) oral tablet: 1 tab(s) orally once a day  gabapentin 100 mg oral capsule: 1 cap(s) orally 3 times a day  Outpatient Physical Therapy: Outpatient physical therapy three times a week  Physical Therapy: Please provider Physical Therapy 3 times a week for 6 weeks   Eliquis 5 mg oral tablet: 2 tab(s) orally 2 times a day Take 10mg two times a day(2 pills 2 times a day) till 2/1/25 then take 5mg(one pill 2 times a day) after.  ferrous sulfate 325 mg (65 mg elemental iron) oral tablet: 1 tab(s) orally once a day  gabapentin 100 mg oral capsule: 1 cap(s) orally 3 times a day  Outpatient Physical Therapy: Outpatient physical therapy three times a week  Physical Therapy: Please provider Physical Therapy 3 times a week for 6 weeks   Eliquis 5 mg oral tablet: 2 tab(s) orally 2 times a day Take 10mg two times a day(2 pills 2 times a day) till 2/1/25 then take 5mg(one pill 2 times a day) after.  ferrous sulfate 325 mg (65 mg elemental iron) oral tablet: 1 tab(s) orally once a day  gabapentin 100 mg oral capsule: 1 cap(s) orally 3 times a day  pantoprazole 40 mg oral delayed release tablet: 1 tab(s) orally once a day

## 2025-01-24 NOTE — DISCHARGE NOTE PROVIDER - NSDCFUADDAPPT_GEN_ALL_CORE_FT
Podiatry Discharge Instructions:  Follow up: Please follow up with [facility] within 1 week of discharge from the hospital, please call [phone] for appointment and discuss that you recently were seen in the hospital.  Wound Care: Please apply santyl, 4x4 gauze and yvonne daily to right foot wound.  Weight bearing: Please weight bear as tolerated in a surgical shoe on right foot.  Antibiotics: Please continue as instructed. APPTS ARE READY TO BE MADE: [X] YES    Best Family or Patient Contact (if needed):    Additional Information about above appointments (if needed):    1: PCP  2: Podiatry  3:     Other comments or requests:   Podiatry Discharge Instructions:  Follow up: Please follow up with [facility] within 1 week of discharge from the hospital, please call [phone] for appointment and discuss that you recently were seen in the hospital.  Wound Care: Please apply santyl, 4x4 gauze and yvonne daily to right foot wound.  Weight bearing: Please weight bear as tolerated in a surgical shoe on right foot.  Antibiotics: Please continue as instructed. APPTS ARE READY TO BE MADE: [X] YES    Best Family or Patient Contact (if needed):    Additional Information about above appointments (if needed):    1: PCP  2: Podiatry  3: Wound Care     Other comments or requests:   Podiatry Discharge Instructions:  Follow up: Please follow up with [facility] within 1 week of discharge from the hospital, please call [phone] for appointment and discuss that you recently were seen in the hospital.  Wound Care: Please apply santyl, 4x4 gauze and yvonne daily to right foot wound.  Weight bearing: Please weight bear as tolerated in a surgical shoe on right foot.  Antibiotics: Please continue as instructed. APPTS ARE READY TO BE MADE: [X] YES    Best Family or Patient Contact (if needed):    Additional Information about above appointments (if needed):    1: PCP  2: Podiatry  3: Wound Care     Other comments or requests:   Podiatry Discharge Instructions:  Follow up: Please follow up with [facility] within 1 week of discharge from the hospital, please call [phone] for appointment and discuss that you recently were seen in the hospital.  Wound Care: Please apply santyl, 4x4 gauze and yvonne daily to right foot wound.  Weight bearing: Please weight bear as tolerated in a surgical shoe on right foot.  Antibiotics: Please continue as instructed.    Patient advised they did not want to proceed with scheduling appointments with the providers on their referrals. They will coordinate care on their own.

## 2025-01-24 NOTE — CONSULT NOTE ADULT - ASSESSMENT
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 please reconsult earlier if needed     Upon discharge f/u as outpatient at [facility] [address] [place], [place] [phone]  D/w team     Thank you for this consult  JOSEPH Perez, DAISY (pager #[phone] or available in MS teams)    If after 4PM or before 7:30AM on Mon-Friday or weekend/holiday please contact general surgery for urgent matters.   Team A- [id number]   Team B- [id number]  For non-urgent matters e-mail [email or web address]    I spent 75  minutes face to face with this patient of which more than 50% of the time was spent counseling & coordinating care of this pt     Assessment/Plan: 44F PMHx spina bifida, multiple bilateral foot surgeries, and  shunt (managed [facility] neurosurgery), uterine fibroids presenting with pleuritic chest pain and found to have segmental pulmonary embolism. Admitted to medicine for further management and monitoring.    Wound Consult requested to assist w/ management of right dorsal foot non healing wound and left ischium chronic Stage 4 pressure injury. Patient known to WO last seen 6/5/24. Patient endorses she receives VNS 3x a week. Per patient she believes her wound is getting better. Previously trial with negative therapy dressing to expedite wound healing however treatement was interrupted several times by patient despite education    Left ischium deep chronic Stage 4 pressure injury   -No major changes since last seen, wound measurements larger but that included area of re-epithelization   -1/16/24, CT of the ABD and pelvis: Left ischial decubitus ulcer with subjacent soft tissue that may represent scarring or phlegmon. No drainable fluid collection or evidence of adjacent osteomyelitis. Remainder of findings as per primary team   -No s/s of acute infection   -Macerated white granular tissue vs. scar tissue  and re-epithelization from 2-3 o'clock red viable muscle vs. soft red granulation tissue   -Minimal undermining from 11-12 o'clock extends 1.2cm   -Delayed wound healing likely secondary to friction/shearing forces, muscle wasting, mostly bedbound state, increase moisture, diaper use, and decreased sensation. Patient AOX3, and requesting to keep diaper in place for dignity purposes. Per records patient was educated regarding risks of diaper usage.   -Topical recommendations: Clean wound and periwound skin with NS. Pat dry. Apply Liquid barrier film to periwound skin. Lightly pack wound depth and undermining with Aquacel hydrofiber ribbon, make sure to leave 2" out at end visible to ensure full removal of packing with subsequent dressing changes. Cover with silicone foam with border. Change daily or prn if soiled.   -follow up closely at wound care center outpatient.   -Continue to offload pressure; low airloss support surface, t&P per protocol with use of fluidized positioning devices. Continue to educate patient and benefits of single breathable incontinence pads and risks of diaper usage. If patient continues to prefer diaper usage, please follow up with frequent rounding for perineal care.  -Risks for moisture associated dermatitis in sacrum/bilateral buttocks/perineum: Gently clean with skin cleanser. Pat dry. Apply a thin layer of Willian barrier moisture cream, apply twice a day or prn if soiled.   -Appreciate nutrition recommendations for optimization.  -Upon discharge should qualify for group 2 air mattress (patient reports currently has a hospital bed and a mattress that she does not like as she reports it feels umcomfortable) Roho seat cushion to continue to offload. Patient should limit seating time to less than 2 consecutive hours. Case management/social work to please follow up.  -Resume VNS services for wound care once discharge to home     Right dorsal foot non healing chronic wound   -US venous duplex negative for DVT   -5/13/24, normal ADAM/PVR study   -Hx of right foot mass s/p mass removal by podiatry on 5/20/24   -Appreciate podiatry consult and recs    Additional Recs   Continue turning and positioning w/ offloading assistive devices as per protocol  Continue w/ Pericare as per protocol  Waffle Cushion to chair if oob to chair  Continue w/ low air loss fluidized bed surface     Care as per medicine, will follow w/ you periodically during hospital stay. Please reconsult earlier if needed     Upon discharge f/u as outpatient at [facility] [address] [place], [place] [phone]  D/w team MD Santizo     Thank you for this consult  KATHY Perez-BC, CWMARCELL (pager #[phone] or available in MS teams)    If after 4PM or before 7:30AM on Mon-Friday or weekend/holiday please contact general surgery for urgent matters.   Team A- [id number]   Team B- [id number]  For non-urgent matters e-mail [email or web address]    I spent 75  minutes face to face with this patient of which more than 50% of the time was spent counseling & coordinating care of this pt

## 2025-01-24 NOTE — PROGRESS NOTE ADULT - ASSESSMENT
44F PMHx spina bifida, multiple bilateral foot surgeries, and  shunt (managed [facility] neurosurgery), uterine fibroids presenting with pleuritic chest pain and found to have segmental pulmonary embolism. Admitted to medicine for further management and monitoring. Detailed plan as below:   44F PMHx spina bifida, multiple bilateral foot surgeries, and  shunt (managed [facility] neurosurgery), uterine fibroids presenting with pleuritic chest pain and found to have segmental pulmonary embolism. Admitted to medicine for further management and monitoring.

## 2025-01-24 NOTE — DISCHARGE NOTE PROVIDER - NSDCCPCAREPLAN_GEN_ALL_CORE_FT
PRINCIPAL DISCHARGE DIAGNOSIS  Diagnosis: Pulmonary embolism  Assessment and Plan of Treatment: You were diagnosed with a blood clot in your lungs called a pulmonary embolism. This was most likely caused by immobility secondary to your foot wound and surgeries. You were given blood thinning medication called heparin through an IV in the hospital. You were prescribed blood thinning medication as an oral medication. Please TAKE ELIQUIS 2 PILLS TWICE A DAY FOR 7 DAYS AND THEN 1 PILL ONCE A DAY AFTERWARDS      SECONDARY DISCHARGE DIAGNOSES  Diagnosis: Wound of right foot  Assessment and Plan of Treatment: You have a right foot wound that was from an incision and drainage surgery in May 2024. Podiatrists evaluated your foot and recommended topical ointments and follow-up in their clinic after discharge. Please follow with the podiatrists to get your foot wound taken care of.     PRINCIPAL DISCHARGE DIAGNOSIS  Diagnosis: Pulmonary embolism  Assessment and Plan of Treatment: You were diagnosed with a blood clot in your lungs called a pulmonary embolism. This was most likely caused by immobility secondary to your foot wound and surgeries. You were given blood thinning medication called heparin through an IV in the hospital. You were prescribed blood thinning medication as an oral medication. Please TAKE ELIQUIS 2 PILLS TWICE A DAY FOR 7 DAYS AND THEN 1 PILL ONCE A DAY AFTERWARDS      SECONDARY DISCHARGE DIAGNOSES  Diagnosis: Wound of right foot  Assessment and Plan of Treatment: You have a right foot wound that was from an incision and drainage surgery in May 2024. Podiatrists evaluated your foot and recommended topical ointments and follow-up in their clinic after discharge. Please follow with the podiatrists to get your foot wound taken care of.  Wound Care: Please apply santyl, 4x4 gauze and yvonne daily to right foot wound.     PRINCIPAL DISCHARGE DIAGNOSIS  Diagnosis: Pulmonary embolism  Assessment and Plan of Treatment: You were diagnosed with a blood clot in your lungs called a pulmonary embolism. This was most likely caused by immobility secondary to your foot wound and surgeries. You were given blood thinning medication called heparin through an IV in the hospital. You were prescribed blood thinning medication as an oral medication. There was also a concern for vaginal bleeding and blood in the urine, we monitored your blood counts and vitals and were not concerned as these values were stable. Please take 10mg 2x day for 5 days and then 5mg two times a day.      SECONDARY DISCHARGE DIAGNOSES  Diagnosis: Wound of right foot  Assessment and Plan of Treatment: You have a right foot wound that was from an incision and drainage surgery in May 2024. Podiatrists evaluated your foot and recommended topical ointments and follow-up in their clinic after discharge. Please follow with the podiatrists to get your foot wound taken care of.  Wound Care: Please apply santyl, 4x4 gauze and yvonne daily to right foot wound.    Diagnosis: Pressure sore of ischial area  Assessment and Plan of Treatment: You were found to have a wound of your ischium. You were seen by the wound care doctors. They gave recommendations for wound managment that will be listed in this discharge summary. Please follow up with the wound care clinic outside the hospital. If you develop fevers, chills, pus, or generally feel unwell please seek urgent medical care.     PRINCIPAL DISCHARGE DIAGNOSIS  Diagnosis: Pulmonary embolism  Assessment and Plan of Treatment: You were diagnosed with a blood clot in your lungs called a pulmonary embolism. This was most likely caused by immobility secondary to your foot wound and surgeries. You were given blood thinning medication called heparin through an IV in the hospital. You were prescribed blood thinning medication as an oral medication. There was also a concern for vaginal bleeding and blood in the urine, we monitored your blood counts and vitals and were not concerned as these values were stable. PLEASE TAKE 10 MG TWICE A DAY ON NIGHT OF 1/29 AND THEN TAKE 10MG TWICE A DAY UNTIL 2/1, THEN TAKE 5MG TWICE A DAY THEREAFTER      SECONDARY DISCHARGE DIAGNOSES  Diagnosis: Wound of right foot  Assessment and Plan of Treatment: You have a right foot wound that was from an incision and drainage surgery in May 2024. Podiatrists evaluated your foot and recommended topical ointments and follow-up in their clinic after discharge. Please follow with the podiatrists to get your foot wound taken care of.  Wound Care: Please apply santyl, 4x4 gauze and yvonne daily to right foot wound.    Diagnosis: Pressure sore of ischial area  Assessment and Plan of Treatment: You were found to have a wound of your ischium. You were seen by the wound care doctors. They gave recommendations for wound managment that will be listed in this discharge summary. Please follow up with the wound care clinic outside the hospital. If you develop fevers, chills, pus, or generally feel unwell please seek urgent medical care.     PRINCIPAL DISCHARGE DIAGNOSIS  Diagnosis: Pulmonary embolism  Assessment and Plan of Treatment: You were diagnosed with a blood clot in your lungs called a pulmonary embolism. This was most likely caused by immobility secondary to your foot wound and surgeries. You were given blood thinning medication called heparin through an IV in the hospital. You were prescribed blood thinning medication as an oral medication. There was also a concern for vaginal bleeding and blood in the urine, we monitored your blood counts and vitals and were not concerned as these values were stable. PLEASE TAKE  10MG TWICE A DAY UNTIL 2/1, THEN TAKE 5MG TWICE A DAY THEREAFTER      SECONDARY DISCHARGE DIAGNOSES  Diagnosis: Wound of right foot  Assessment and Plan of Treatment: You have a right foot wound that was from an incision and drainage surgery in May 2024. Podiatrists evaluated your foot and recommended topical ointments and follow-up in their clinic after discharge. Please follow with the podiatrists to get your foot wound taken care of.  Wound Care: Please apply santyl, 4x4 gauze and yvonne daily to right foot wound.    Diagnosis: Pressure sore of ischial area  Assessment and Plan of Treatment: You were found to have a wound of your ischium. You were seen by the wound care doctors. They gave recommendations for wound managment that will be listed in this discharge summary. Please follow up with the wound care clinic outside the hospital. If you develop fevers, chills, pus, or generally feel unwell please seek urgent medical care.

## 2025-01-24 NOTE — DIETITIAN INITIAL EVALUATION ADULT - PERTINENT LABORATORY DATA
01-22    139  |  104  |  10  ----------------------------<  68[L]  3.4[L]   |  19[L]  |  0.36[L]    Ca    8.5      22 Jan 2025 17:04    TPro  7.9  /  Alb  4.2  /  TBili  0.2  /  DBili  x   /  AST  19  /  ALT  12  /  AlkPhos  58  01-22

## 2025-01-24 NOTE — PROGRESS NOTE ADULT - PROBLEM SELECTOR PLAN 1
-CT Angio Chest PE: Right lower lobe segmental pulmonary embolism.  -PESI: Class I, Very Low Risk: 0-1.6% 30-day mortality in this group  -DVT Duplex b/l LE: ordered  -Troponin T: <6  -Pro-BNP: ordered  -PT/INR 11/0.92  -Hypercoagulable panel given unprovoked PE  -Age-appropriate cancer screening outpatient   -Heparin ggt. Can transition to DOAC after treatment considerations  -Monitor for sings or symptoms of bleeding. -CT Angio Chest PE: Right lower lobe segmental pulmonary embolism.  -PESI: Class I, Very Low Risk: 0-1.6% 30-day mortality in this group  -DVT Duplex b/l LE: ordered  -Troponin T: <6  -Pro-BNP: ordered  -PT/INR 11/0.92  -Age-appropriate cancer screening outpatient   -Heparin ggt. Can transition to DOAC   -Monitor for sings or symptoms of bleeding. -CT Angio Chest PE: Right lower lobe segmental pulmonary embolism.  -PESI: Class I, Very Low Risk: 0-1.6% 30-day mortality in this group  -DVT Duplex b/l LE: ordered  -Troponin T: <6  -PT/INR 11/0.92  -Age-appropriate cancer screening outpatient   -Heparin ggt. Can transition to DOAC on DC. Eliquis sent to pharmacy.  -Monitor for sings or symptoms of bleeding. -CT Angio Chest PE: Right lower lobe segmental pulmonary embolism.  -PESI: Class I, Very Low Risk: 0-1.6% 30-day mortality in this group  -DVT Duplex b/l LE: ordered  -Troponin T: <6  -PT/INR 11/0.92  - EF 67% on Echo, no wall motion abnormality or right heart strain  -Age-appropriate cancer screening outpatient   -Heparin ggt. Can transition to DOAC on DC. Eliquis sent to pharmacy.  -Monitor for sings or symptoms of bleeding.

## 2025-01-24 NOTE — PROGRESS NOTE ADULT - PROBLEM SELECTOR PLAN 4
VTE PPx: Heparin gtt  Nutrition: Regular Diet  Fluids: None  Electrolytes: Maintain K>4, Mag >2, Phos > 3  Access: PIV  Dispo: pending clinical improvement.

## 2025-01-24 NOTE — DISCHARGE NOTE PROVIDER - NSDCCPTREATMENT_GEN_ALL_CORE_FT
PRINCIPAL PROCEDURE  Procedure: CT angiogram chest w contrast  Findings and Treatment:   PROCEDURE:  CT Angiography of the Chest.  Sagittal and coronalreformats were performed as well as 3D (MIP)   reconstructions.  FINDINGS:  LUNGS AND LARGE AIRWAYS: Patent central airways. Clear lungs.  PLEURA: No pleural effusion.  VESSELS: A filling defect in a right lower lobe segmental pulmonary   artery branch extending into a subsegmental branch is seen, compatible   pulmonary embolism ([id number]). Previously questioned filling defect in a   left lower lobe segmental pulmonary artery branch is not appreciated on   this exam.  HEART: Heart size is normal. No pericardial effusion.  MEDIASTINUM AND SIERRA: No lymphadenopathy.  CHEST WALL AND LOWER NECK: Partially visualized  shunt catheter courses   along the anterior chest and turns into the left upper quadrant.  VISUALIZED UPPER ABDOMEN: Within normal limits.  BONES: Degenerative changes.  IMPRESSION:  Right lower lobe segmental pulmonary embolism.        SECONDARY PROCEDURE  Procedure: Duplex scan of both lower extremities  Findings and Treatment: INTERPRETATION:  CLINICAL INFORMATION: Pulmonary embolism. Assess for DVT.  COMPARISON: Right lower extremity Doppler 5/11/2024  TECHNIQUE: Duplex sonography of the BILATERAL LOWER extremity veins with   color and spectral Doppler, with and without compression.  FINDINGS:  RIGHT:  Normal compressibility of the RIGHT common femoral, femoral and popliteal   veins.  Doppler examination shows normal spontaneous and phasic flow.  No RIGHT calf vein thrombosis is detected.  LEFT:  Normal compressibility of the LEFT common femoral, femoral and popliteal   veins.  Doppler examination shows normal spontaneous and phasicflow.  No LEFT calf vein thrombosis is detected.  IMPRESSION:  No evidence of deep venous thrombosis in either lower extremity.      Procedure: Xray of right foot, three weight-bearing views  Findings and Treatment: INTERPRETATION:  CLINICAL INDICATION: Foot wound. Pain.  TECHNIQUE: 3 views right foot.  COMPARISON: Foot x-rays 8/17/2024.  FINDINGS/  IMPRESSION:  There is an skin wound with soft tissue swelling adjacent to the skin   wound along the medial midfoot. No tracking soft tissue gas identified.   No cortical erosion or destruction is seen. No periosteal reaction.   Similar findings of fusion across the hindfoot/midfoot. There is no acute   fracture. No dislocation. No advanced productive changes about the joints.       PRINCIPAL PROCEDURE  Procedure: CT angiogram chest w contrast  Findings and Treatment:   PROCEDURE:  CT Angiography of the Chest.  Sagittal and coronalreformats were performed as well as 3D (MIP)   reconstructions.  FINDINGS:  LUNGS AND LARGE AIRWAYS: Patent central airways. Clear lungs.  PLEURA: No pleural effusion.  VESSELS: A filling defect in a right lower lobe segmental pulmonary   artery branch extending into a subsegmental branch is seen, compatible   pulmonary embolism ([id number]). Previously questioned filling defect in a   left lower lobe segmental pulmonary artery branch is not appreciated on   this exam.  HEART: Heart size is normal. No pericardial effusion.  MEDIASTINUM AND SIERRA: No lymphadenopathy.  CHEST WALL AND LOWER NECK: Partially visualized  shunt catheter courses   along the anterior chest and turns into the left upper quadrant.  VISUALIZED UPPER ABDOMEN: Within normal limits.  BONES: Degenerative changes.  IMPRESSION:  Right lower lobe segmental pulmonary embolism.        SECONDARY PROCEDURE  Procedure: Duplex scan of both lower extremities  Findings and Treatment: INTERPRETATION:  CLINICAL INFORMATION: Pulmonary embolism. Assess for DVT.  COMPARISON: Right lower extremity Doppler 5/11/2024  TECHNIQUE: Duplex sonography of the BILATERAL LOWER extremity veins with   color and spectral Doppler, with and without compression.  FINDINGS:  RIGHT:  Normal compressibility of the RIGHT common femoral, femoral and popliteal   veins.  Doppler examination shows normal spontaneous and phasic flow.  No RIGHT calf vein thrombosis is detected.  LEFT:  Normal compressibility of the LEFT common femoral, femoral and popliteal   veins.  Doppler examination shows normal spontaneous and phasicflow.  No LEFT calf vein thrombosis is detected.  IMPRESSION:  No evidence of deep venous thrombosis in either lower extremity.      Procedure: Xray of right foot, three weight-bearing views  Findings and Treatment: INTERPRETATION:  CLINICAL INDICATION: Foot wound. Pain.  TECHNIQUE: 3 views right foot.  COMPARISON: Foot x-rays 8/17/2024.  FINDINGS/  IMPRESSION:  There is an skin wound with soft tissue swelling adjacent to the skin   wound along the medial midfoot. No tracking soft tissue gas identified.   No cortical erosion or destruction is seen. No periosteal reaction.   Similar findings of fusion across the hindfoot/midfoot. There is no acute   fracture. No dislocation. No advanced productive changes about the joints.      Procedure: Abdominal x-ray, AP  Findings and Treatment: EXAM: Portable abdomen  FINDINGS:  There is a nonobstructive gas pattern.  Drains overlie the abdomen.  Spina bifida involves the sacrum.  No free intraperitoneal air.  Moderate stool burden.  COMPARISON: No prior exams available.

## 2025-01-24 NOTE — CONSULT NOTE ADULT - SUBJECTIVE AND OBJECTIVE BOX
Wound SURGERY CONSULT NOTE    HPI:44F PMHx spina bifida, multiple bilateral foot surgeries, and  shunt (managed [facility] neurosurgery), uterine fibroids presenting with pleuritic chest pain. It is associated with shortness of breath. Denies prolonged travel. No OCP use. Endorses to not ambulating much due to right foot surgeries. Denies diaphoresis, n/v/d, numbness, tingling, weakness, syncope, fever or chills. No personal or family history of AI disorders.  (23 Jan 2025 04:41)      Wound consult requested to assist w/ management of         Current Diet: Diet, Regular (01-23-25 @ 04:56)      PAST MEDICAL & SURGICAL HISTORY:  Spina bifida      Fibroids      Wound of right foot      Wound of left foot      S/P  shunt      S/P foot surgery, left      S/P foot surgery, right      REVIEW OF SYSTEMS: General/sKNI/ MSK: see HPI  All other systems negative    MEDICATIONS  (STANDING):  aluminum hydroxide/magnesium hydroxide/simethicone Suspension 30 milliLiter(s) Oral once  ferrous    sulfate 325 milliGRAM(s) Oral <User Schedule>  heparin  Infusion.  Unit(s)/Hr (9 mL/Hr) IV Continuous <Continuous>  melatonin 3 milliGRAM(s) Oral at bedtime  polyethylene glycol 3350 17 Gram(s) Oral two times a day  potassium chloride    Tablet ER 40 milliEquivalent(s) Oral once  senna 2 Tablet(s) Oral at bedtime    MEDICATIONS  (PRN):  acetaminophen     Tablet .. 650 milliGRAM(s) Oral every 6 hours PRN Temp greater or equal to 38C (100.4F), Mild Pain (1 - 3)  heparin   Injectable 4000 Unit(s) IV Push every 6 hours PRN For aPTT less than 40  heparin   Injectable 2000 Unit(s) IV Push every 6 hours PRN For aPTT between 40 - 57      Allergies    latex (Hives; Rash)  Zyrtec (Rash)  shellfish (Unknown)    Intolerances    lactose (Unknown)      SOCIAL HISTORY:  Lives at home,     FAMILY HISTORY:      PHYSICAL EXAM:  Vital Signs Last 24 Hrs  T(C): 36.9 (24 Jan 2025 13:50), Max: 37.2 (24 Jan 2025 06:10)  T(F): 98.4 (24 Jan 2025 13:50), Max: 98.9 (24 Jan 2025 06:10)  HR: 86 (24 Jan 2025 13:50) (69 - 86)  BP: 146/98 (24 Jan 2025 13:50) (101/65 - 146/98)  BP(mean): --  RR: 18 (24 Jan 2025 13:50) (18 - 18)  SpO2: 100% (24 Jan 2025 13:50) (100% - 100%)    Parameters below as of 24 Jan 2025 13:50  Patient On (Oxygen Delivery Method): room air        Constitutional: NAD / gaurded but stable,  A&Ox3/ Alert/ Confused  cachectic/ MO/ Obese/ frail  WD/ WN/ WG/ Disheveled  (+) low airloss support surface, (+) fluidized postioining devices, (+) complete cair boots     HEENT:  NC/AT, PERRL, EOMI, mucosa moist, throat clear, trachea midline, neck supple    Cardiovascular: RRR   Respiratory: CTA    Gastrointestinal soft NT/ND (+)BS  (+)PEG (+)ostomy    Neurology  weakened strength & sensation grossly intact/ paraesthesia  nonverbal, no follow commands/ paraplegic    Musculoskeletal:  limited/ FROM, no deformities/ contractures    Vascular: BLE equally warm/ cool,  no cyanosis, clubbing, edema  >LE //BLE edema equal  DP/PT pulses palpable  no overt  ischemia noted  hemosiderin staining    Skin:  moist w/ good turgor  frail,  ecchymosis w/o hematoma  serosanguinous drainage  No odor, erythema, increased warmth, tenderness, induration, fluctuance  full note to follow     LABS/ CULTURES/ RADIOLOGY:                        10.5   6.63  )-----------( 264      ( 24 Jan 2025 05:32 )             31.3       139  |  104  |  10  ----------------------------<  68      [01-22-25 @ 17:04]  3.4   |  19  |  0.36        Ca     8.5     [01-22-25 @ 17:04]    TPro  7.9  /  Alb  4.2  /  TBili  0.2  /  DBili  x   /  AST  19  /  ALT  12  /  AlkPhos  58  [01-22-25 @ 17:04]    PT/INR: PT 11.0 , INR 0.92       [01-22-25 @ 17:04]  PTT: 80.5       [01-24-25 @ 05:32]      ACC: [id number] EXAM:  US DPLX LWR EXT VEINS COMPL BI   ORDERED BY: AMELIA CORMIER     PROCEDURE DATE:  01/23/2025          INTERPRETATION:  CLINICAL INFORMATION: Pulmonary embolism. Assess for DVT.    COMPARISON: Right lower extremity Doppler 5/11/2024    TECHNIQUE: Duplex sonography of the BILATERAL LOWER extremity veins with   color and spectral Doppler, with and without compression.    FINDINGS:    RIGHT:  Normal compressibility of the RIGHT common femoral, femoral and popliteal   veins.  Doppler examination shows normal spontaneous and phasic flow.  No RIGHT calf vein thrombosis is detected.    LEFT:  Normal compressibility of the LEFT common femoral, femoral and popliteal   veins.  Doppler examination shows normal spontaneous and phasicflow.  No LEFT calf vein thrombosis is detected.    IMPRESSION:  No evidence of deep venous thrombosis in either lower extremity.    --- End of Report ---          CYNDI SOUTH MD; Resident Radiologist  This document has been electronically signed.  SAL RHOADES MD; Attending Radiologist  This document has been electronically signed. Jan 23 2025 10:10AM          ACC: [id number] EXAM:  CT ABDOMEN AND PELVIS IC   ORDERED BY: ERIC ZAVALA     PROCEDURE DATE:  01/16/2025          INTERPRETATION:  CLINICAL INFORMATION: Right lower quadrant abdominal pain    COMPARISON: CT abdomen pelvis 5/15/2024    CONTRAST/COMPLICATIONS:  IV Contrast: Omnipaque 350  70 cc administered   30 cc discarded  Oral Contrast: NONE  .    PROCEDURE:  CT of the Abdomen and Pelvis was performed.  Sagittal and coronal reformats were performed.    FINDINGS:  LOWER CHEST: Within normal limits.    LIVER: Stable cysts and additional subcentimeter hypodense foci that are   too small to characterize..  BILE DUCTS: Normal caliber.  GALLBLADDER: Within normal limits.  SPLEEN: Within normal limits.  PANCREAS: Within normal limits.  ADRENALS:Within normal limits.  KIDNEYS/URETERS: Within normal limits.    BLADDER: Within normal limits.  REPRODUCTIVE ORGANS: Enlarged fibroid uterus extending above the level of   the umbilicus, measuring 17.8 cm in craniocaudad dimension.    BOWEL: No bowel obstruction. Appendix is normal. Large stool burden.  PERITONEUM/RETROPERITONEUM:  shunt catheter terminates in the right   lower quadrant.  VESSELS: Within normal limits.  LYMPH NODES: No lymphadenopathy.  ABDOMINAL WALL:  shunt catheter in theanterior abdominal wall and   trace peritoneal cavity in the midline.. Decubitus ulcer overlying the   left ischium with subjacent soft tissue. No drainable fluid collection.  BONES: Degenerative changes.    IMPRESSION:  Large fecal load.    Left ischial decubitus ulcer with subjacent soft tissue that may   represent scarring or phlegmon. No drainable fluid collection or evidence   of adjacent osteomyelitis.        --- End of Report ---        BILATERAL ANKLE-BRACHIAL INDEX, SEGMENTAL LIMB PRESSURES, AND PULSE VOLUME                                  RECORDING REPORT       Name:        JANETH REILLY Study Date:       5/13/2024  YOB: 1980     MRN:              [id number]  Patient Age: 44 years      Accession Number: [id number]  Gender:      F             Admission ID:     [id number]  Referring Physician:    Emilie Zarate MD  Interpreting Physician: Grant Spence MD, PhD  Primary Sonographer:    Yoni Farrell RVT       --------------------------------------------------------------------------------  Procedure:        ADAM study with segmental pressures and PVR.  CPT:              PHYSIOL EXT LOW 3+ LEV KEE - 99388.m;PHYSL EXT LOW 1 and 2 LEV                    KEE - 35162.m  Admission Status: Inpatient  Indication(s):    Claudication - I73.9  Study Quality:    The study is technically good.       --------------------------------------------------------------------------------  CONCLUSIONS:     Normal ADAM/PVR study.      --------------------------------------------------------------------------------  MEASUREMENTS:    +---------+------------------+----+------------+--------+  |Right ADAM|Rt Pressure (mmHg)|ADAM |PVR Waveform|Comment |  +---------+------------------+----+------------+--------+  |Brachial |119               |    |            |        |  +---------+------------------+----+------------+--------+  |Thigh    |149               |1.25|Normal      |        |  +---------+------------------+----+------------+--------+  |Calf     |148               |1.24|Normal      |        |  +---------+------------------+----+------------+--------+  |Ankle    |141               |1.18|Normal      |        |  +---------+------------------+----+------------+--------+  |PTA      |136               |1.14|            |        |  +---------+------------------+----+------------+--------+  |DPA      |141               |1.18|            |        |  +---------+------------------+----+------------+--------+    +--------+------------------+----+------------+-------+  |Left ADAM|Lt Pressure (mmHg)|ADAM |PVR Waveform|Comment|  +--------+------------------+----+------------+-------+  |Brachial|119               |    |            |       |  +--------+------------------+----+------------+-------+  |Thigh   |148               |1.24|Normal      |       |  +--------+------------------+----+------------+-------+  |Calf    |145               |1.22|Normal      |       |  +--------+------------------+----+------------+-------+  |Ankle   |145             |1.22|Normal      |       |  +--------+------------------+----+------------+-------+  |PTA     |145               |1.22|            |       |  +--------+------------------+----+------------+-------+  |DPA     |143               |1.20|        |       |  +--------+------------------+----+------------+-------+    +---------+------------------+----+------------+-------+  |Right TBI|Rt Pressure (mmHg)|TBI |PPG Waveform|Comment|  +---------+------------------+----+------------+-------+  |Great Toe|138               |1.16|Normal      |       |  +---------+------------------+----+------------+-------+    +---------+------------------+----+------------+-------+  |Left TBI |Lt Pressure (mmHg)|TBI |PPG Waveform|Comment|  +---------+------------------+----+------------+-------+  |Great Toe|145               |1.22|Normal      |       |  +---------+------------------+----+------------+-------+    +-------+-----------+-----------+  |ADAM/TBI|Today's ADAM|Today's TBI|  +-------+-----------+-----------+  |Right  |1.18       |1.16       |  +-------+-----------+-----------+  |Left   |1.22       |1.22       |  +-------+-----------+-----------+       --------------------------------------------------------------------------------  FINDINGS:     Right Lower Extremity Arteries:  The right ADAM is normal (1.18). The right TBI is normal (1.16), with a toe pressure of 138 mmHg.    Left Lower Extremity Arteries:  The left ADAM is normal (1.22). The left TBI is normal (1.22), with a toe pressureof 145 mmHg.    Bilateral Lower Extremity Arteries:  Normal ADAM PVR study.         Electronically signed on 5/13/2024 at 6:53:39 PM by Grant Spence MD, PhD, FACC, VI           *** Final ***       Wound SURGERY CONSULT NOTE    HPI:44F PMHx spina bifida, multiple bilateral foot surgeries, and  shunt (managed [facility] neurosurgery), uterine fibroids presenting with pleuritic chest pain. It is associated with shortness of breath. Denies prolonged travel. No OCP use. Endorses to not ambulating much due to right foot surgeries. Denies diaphoresis, n/v/d, numbness, tingling, weakness, syncope, fever or chills. No personal or family history of AI disorders.  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 Patient endorse she has a hospital bed at home and "special mattress  but is not like the one I have on now" further reports her mattress at home is " uncomfortable" reports she is wheelchairboud, and has a shower chair at home. Patient endorses some difficulty walking due to non healing right foot wound. Reports she uses Darco shoe as recommended. Patient endorses currently she is experiencing coldness and ABD pain due to having her menstrual period.  Also endorse usually good appetite but lately she doesn't feel too well. Patient endorses she receives VNS 3 x a week, reports both her right foot and left ischium are dressed with Aquacel packing. Patient endorses currently she feels constipated, endorses she gets constipated when she is ill. Patient endorses she follows up at wound care center at a facility in [facility]" endorses she was seen last in 10/23.     Current Diet: Diet, Regular (01-23-25 @ 04:56)      PAST MEDICAL & SURGICAL HISTORY:  Spina bifida      Fibroids      Wound of right foot      Wound of left foot      S/P  shunt      S/P foot surgery, left      S/P foot surgery, right      REVIEW OF SYSTEMS: General/skin/ MSK: see HPI  All other systems negative    MEDICATIONS  (STANDING):  aluminum hydroxide/magnesium hydroxide/simethicone Suspension 30 milliLiter(s) Oral once  ferrous    sulfate 325 milliGRAM(s) Oral <User Schedule>  heparin  Infusion.  Unit(s)/Hr (9 mL/Hr) IV Continuous <Continuous>  melatonin 3 milliGRAM(s) Oral at bedtime  polyethylene glycol 3350 17 Gram(s) Oral two times a day  potassium chloride    Tablet ER 40 milliEquivalent(s) Oral once  senna 2 Tablet(s) Oral at bedtime    MEDICATIONS  (PRN):  acetaminophen     Tablet .. 650 milliGRAM(s) Oral every 6 hours PRN Temp greater or equal to 38C (100.4F), Mild Pain (1 - 3)  heparin   Injectable 4000 Unit(s) IV Push every 6 hours PRN For aPTT less than 40  heparin   Injectable 2000 Unit(s) IV Push every 6 hours PRN For aPTT between 40 - 57      Allergies    latex (Hives; Rash)  Zyrtec (Rash)  shellfish (Unknown)    Intolerances    lactose (Unknown)      SOCIAL HISTORY: Lives at home, wheelchairbound, lives with son at home. Has VNS 3x a week     FAMILY HISTORY:    PHYSICAL EXAM:  Vital Signs Last 24 Hrs  T(C): 36.9 (24 Jan 2025 13:50), Max: 37.2 (24 Jan 2025 06:10)  T(F): 98.4 (24 Jan 2025 13:50), Max: 98.9 (24 Jan 2025 06:10)  HR: 86 (24 Jan 2025 13:50) (69 - 86)  BP: 146/98 (24 Jan 2025 13:50) (101/65 - 146/98)  BP(mean): --  RR: 18 (24 Jan 2025 13:50) (18 - 18)  SpO2: 100% (24 Jan 2025 13:50) (100% - 100%)    Parameters below as of 24 Jan 2025 13:50  Patient On (Oxygen Delivery Method): room air    Weight (kg): 46.8 (23 Jan 2025 03:00)  BMI (kg/m2): 20.8 (23 Jan 2025 06:02)    Physical Exam   Constitutional: NAD, A&Ox3. Thin, (+) protruding bony prominences to sacrum and bilateral trochanters/bilateral ischium. Patient receiving Heparin drip   (+) low airloss support surface, (+) fluidized positioning devices, heels elevated with pillows. Independently mobile in bed.  HEENT: NC/AT, non icteric, mucosa moist  Cardiovascular: Rate regular   Respiratory: nonlabored, room air, equal chest expansion   Gastrointestinal: soft NT/ND   : functionally incontinent, wearing diaper.  Musculoskeletal: full aROM to bilateral UE, limited aROM to lower extremities. Mild foot drop bilaterally. Right dorsal foot bone deformity?   Vascular: Cool to touch to mid foot to toes, no edema, no cyanosis, no overt signs of ischemia, (+1) DP pulses   Skin:  moist w/ good turgor  Lumbar spine, right ankle, Left achilles left dorsal foot, bilateral distal shins, medial malleolus, surgical scars, well healed.  Right dorsal foot small wound over area of bone deformity?   -0.9ptl3efu8.5cm   -small serous drainage, no odor   -Tissue type 85% white/agranular tissue? and 15% pink moist granulation tissue at borders.   -NO dead space, no purulence   -No bone visible   -Periwound skin with hypopigmentation   Left ischium Stage 4 pressure injury,   - 4.1gvq9hsu9fv (prev 1.8cmx1.5cmx2.5cm), measurements include area of re-epithelization and white macerated scar tissue?   -Undermining from 6-11 o'clock extending 2cm  -mixed tissue type re-epithelization, pink/white agranular macerated soft tissue/scar tissue? and approx 20% visible red pink moist granulation soft granulation tissue vs. viable muscle   -small serosanguineous drainage, no purulent drainage, no odor.  -(+) epibole circumferentially  -Undermining from 11-12 o'clock extends 1.2cm, no purulence   **Aquacel ribbon and silicone foam with border applied.   Bilateral buttocks/inner buttocks areas of hypopigmentation, no open ulcerations.  Psych: calm and cooperative.       LABS/ CULTURES/ RADIOLOGY:                        10.5   6.63  )-----------( 264      ( 24 Jan 2025 05:32 )             31.3       139  |  104  |  10  ----------------------------<  68      [01-22-25 @ 17:04]  3.4   |  19  |  0.36        Ca     8.5     [01-22-25 @ 17:04]    TPro  7.9  /  Alb  4.2  /  TBili  0.2  /  DBili  x   /  AST  19  /  ALT  12  /  AlkPhos  58  [01-22-25 @ 17:04]    PT/INR: PT 11.0 , INR 0.92       [01-22-25 @ 17:04]  PTT: 80.5       [01-24-25 @ 05:32]      ACC: [id number] EXAM:  US DPLX LWR EXT VEINS COMPL BI   ORDERED BY: AMELIA CORMIER     PROCEDURE DATE:  01/23/2025          INTERPRETATION:  CLINICAL INFORMATION: Pulmonary embolism. Assess for DVT.    COMPARISON: Right lower extremity Doppler 5/11/2024    TECHNIQUE: Duplex sonography of the BILATERAL LOWER extremity veins with   color and spectral Doppler, with and without compression.    FINDINGS:    RIGHT:  Normal compressibility of the RIGHT common femoral, femoral and popliteal   veins.  Doppler examination shows normal spontaneous and phasic flow.  No RIGHT calf vein thrombosis is detected.    LEFT:  Normal compressibility of the LEFT common femoral, femoral and popliteal   veins.  Doppler examination shows normal spontaneous and phasicflow.  No LEFT calf vein thrombosis is detected.    IMPRESSION:  No evidence of deep venous thrombosis in either lower extremity.    --- End of Report ---          CYNDI SOUTH MD; Resident Radiologist  This document has been electronically signed.  SAL RHOADES MD; Attending Radiologist  This document has been electronically signed. Jan 23 2025 10:10AM          ACC: [id number] EXAM:  CT ABDOMEN AND PELVIS IC   ORDERED BY: ERIC ZAVALA     PROCEDURE DATE:  01/16/2025          INTERPRETATION:  CLINICAL INFORMATION: Right lower quadrant abdominal pain    COMPARISON: CT abdomen pelvis 5/15/2024    CONTRAST/COMPLICATIONS:  IV Contrast: Omnipaque 350  70 cc administered   30 cc discarded  Oral Contrast: NONE  .    PROCEDURE:  CT of the Abdomen and Pelvis was performed.  Sagittal and coronal reformats were performed.    FINDINGS:  LOWER CHEST: Within normal limits.    LIVER: Stable cysts and additional subcentimeter hypodense foci that are   too small to characterize..  BILE DUCTS: Normal caliber.  GALLBLADDER: Within normal limits.  SPLEEN: Within normal limits.  PANCREAS: Within normal limits.  ADRENALS:Within normal limits.  KIDNEYS/URETERS: Within normal limits.    BLADDER: Within normal limits.  REPRODUCTIVE ORGANS: Enlarged fibroid uterus extending above the level of   the umbilicus, measuring 17.8 cm in craniocaudad dimension.    BOWEL: No bowel obstruction. Appendix is normal. Large stool burden.  PERITONEUM/RETROPERITONEUM:  shunt catheter terminates in the right   lower quadrant.  VESSELS: Within normal limits.  LYMPH NODES: No lymphadenopathy.  ABDOMINAL WALL:  shunt catheter in the anterior abdominal wall and   trace peritoneal cavity in the midline.. Decubitus ulcer overlying the   left ischium with subjacent soft tissue. No drainable fluid collection.  BONES: Degenerative changes.    IMPRESSION:  Large fecal load.    Left ischial decubitus ulcer with subjacent soft tissue that may   represent scarring or phlegmon. No drainable fluid collection or evidence   of adjacent osteomyelitis.        --- End of Report ---        BILATERAL ANKLE-BRACHIAL INDEX, SEGMENTAL LIMB PRESSURES, AND PULSE VOLUME                                  RECORDING REPORT       Name:        JANETH REILLY Study Date:       5/13/2024  YOB: 1980     MRN:              [id number]  Patient Age: 44 years      Accession Number: [id number]  Gender:      F             Admission ID:     [id number]  Referring Physician:    Emilie Zarate MD  Interpreting Physician: Grant Spence MD, PhD  Primary Sonographer:    Yoni Farrell RVT       --------------------------------------------------------------------------------  Procedure:        ADAM study with segmental pressures and PVR.  CPT:              PHYSIOL EXT LOW 3+ LEV KEE - 48781.m;PHYSL EXT LOW 1 and 2 LEV                    KEE - 42899.m  Admission Status: Inpatient  Indication(s):    Claudication - I73.9  Study Quality:    The study is technically good.       --------------------------------------------------------------------------------  CONCLUSIONS:     Normal ADAM/PVR study.      --------------------------------------------------------------------------------  MEASUREMENTS:    +---------+------------------+----+------------+--------+  |Right ADAM|Rt Pressure (mmHg)|ADAM |PVR Waveform|Comment |  +---------+------------------+----+------------+--------+  |Brachial |119               |    |            |        |  +---------+------------------+----+------------+--------+  |Thigh    |149               |1.25|Normal      |        |  +---------+------------------+----+------------+--------+  |Calf     |148               |1.24|Normal      |        |  +---------+------------------+----+------------+--------+  |Ankle    |141               |1.18|Normal      |        |  +---------+------------------+----+------------+--------+  |PTA      |136               |1.14|            |        |  +---------+------------------+----+------------+--------+  |DPA      |141               |1.18|            |        |  +---------+------------------+----+------------+--------+    +--------+------------------+----+------------+-------+  |Left ADAM|Lt Pressure (mmHg)|ADAM |PVR Waveform|Comment|  +--------+------------------+----+------------+-------+  |Brachial|119               |    |            |       |  +--------+------------------+----+------------+-------+  |Thigh   |148               |1.24|Normal      |       |  +--------+------------------+----+------------+-------+  |Calf    |145               |1.22|Normal      |       |  +--------+------------------+----+------------+-------+  |Ankle   |145             |1.22|Normal      |       |  +--------+------------------+----+------------+-------+  |PTA     |145               |1.22|            |       |  +--------+------------------+----+------------+-------+  |DPA     |143               |1.20|        |       |  +--------+------------------+----+------------+-------+    +---------+------------------+----+------------+-------+  |Right TBI|Rt Pressure (mmHg)|TBI |PPG Waveform|Comment|  +---------+------------------+----+------------+-------+  |Great Toe|138               |1.16|Normal      |       |  +---------+------------------+----+------------+-------+    +---------+------------------+----+------------+-------+  |Left TBI |Lt Pressure (mmHg)|TBI |PPG Waveform|Comment|  +---------+------------------+----+------------+-------+  |Great Toe|145               |1.22|Normal      |       |  +---------+------------------+----+------------+-------+    +-------+-----------+-----------+  |ADAM/TBI|Today's ADAM|Today's TBI|  +-------+-----------+-----------+  |Right  |1.18       |1.16       |  +-------+-----------+-----------+  |Left   |1.22       |1.22       |  +-------+-----------+-----------+       --------------------------------------------------------------------------------  FINDINGS:     Right Lower Extremity Arteries:  The right ADAM is normal (1.18). The right TBI is normal (1.16), with a toe pressure of 138 mmHg.    Left Lower Extremity Arteries:  The left ADAM is normal (1.22). The left TBI is normal (1.22), with a toe pressureof 145 mmHg.    Bilateral Lower Extremity Arteries:  Normal ADAM PVR study.         Electronically signed on 5/13/2024 at 6:53:39 PM by Grant Spence MD, PhD, FACC, RPVI           *** Final ***

## 2025-01-24 NOTE — DIETITIAN INITIAL EVALUATION ADULT - ORAL INTAKE PTA/DIET HISTORY
Patient reports food allergy to shellfish and lactose intolerance. Nutrition supplementation at home includes Ensure and iron. When asked about her appetite, stated "it was fine." Patient a bit scattered in elaborating history. Initially stated  Patient reports food allergy to shellfish and lactose intolerance. Nutrition supplementation at home includes Ensure and iron. When asked about her appetite, stated "it was fine." Patient a bit scattered in elaborating history. Initially stated her appetite was adequate prior to admission, then later said it decreased three days after Thanksgiving 2024? Attempts to clarify further were unsuccessful.

## 2025-01-24 NOTE — DISCHARGE NOTE PROVIDER - CARE PROVIDER_API CALL
Maddy Mckeon  Internal Medicine  [address] [place] [address]  Phone: [phone]  Fax: [phone]  Established Patient  Follow Up Time: 2 weeks

## 2025-01-24 NOTE — CONSULT NOTE ADULT - SUBJECTIVE AND OBJECTIVE BOX
Patient is a 44y old  Female who presents with a chief complaint of PE (24 Jan 2025 15:42)      HPI:  44F PMHx spina bifida, multiple bilateral foot surgeries, and  shunt (managed [facility] neurosurgery), uterine fibroids presenting with pleuritic chest pain. It is associated with shortness of breath. Denies prolonged travel. No OCP use. Endorses to not ambulating much due to right foot surgeries. Denies diaphoresis, n/v/d, numbness, tingling, weakness, syncope, fever or chills. No personal or family history of AI disorders.  (23 Jan 2025 04:41)      PAST MEDICAL & SURGICAL HISTORY:  Spina bifida      Fibroids      Wound of right foot      Wound of left foot      S/P  shunt      S/P foot surgery, left      S/P foot surgery, right          MEDICATIONS  (STANDING):  aluminum hydroxide/magnesium hydroxide/simethicone Suspension 30 milliLiter(s) Oral once  ferrous    sulfate 325 milliGRAM(s) Oral <User Schedule>  heparin  Infusion.  Unit(s)/Hr (9 mL/Hr) IV Continuous <Continuous>  melatonin 3 milliGRAM(s) Oral at bedtime  polyethylene glycol 3350 17 Gram(s) Oral two times a day  potassium chloride    Tablet ER 40 milliEquivalent(s) Oral once  senna 2 Tablet(s) Oral at bedtime    MEDICATIONS  (PRN):  acetaminophen     Tablet .. 650 milliGRAM(s) Oral every 6 hours PRN Temp greater or equal to 38C (100.4F), Mild Pain (1 - 3)  heparin   Injectable 4000 Unit(s) IV Push every 6 hours PRN For aPTT less than 40  heparin   Injectable 2000 Unit(s) IV Push every 6 hours PRN For aPTT between 40 - 57      Allergies    latex (Hives; Rash)  Zyrtec (Rash)  shellfish (Unknown)    Intolerances    lactose (Unknown)      VITALS:    Vital Signs Last 24 Hrs  T(C): 36.9 (24 Jan 2025 13:50), Max: 37.2 (24 Jan 2025 06:10)  T(F): 98.4 (24 Jan 2025 13:50), Max: 98.9 (24 Jan 2025 06:10)  HR: 86 (24 Jan 2025 13:50) (69 - 86)  BP: 146/98 (24 Jan 2025 13:50) (101/65 - 146/98)  BP(mean): --  RR: 18 (24 Jan 2025 13:50) (18 - 18)  SpO2: 100% (24 Jan 2025 13:50) (100% - 100%)    Parameters below as of 24 Jan 2025 13:50  Patient On (Oxygen Delivery Method): room air        LABS:                          10.5   6.63  )-----------( 264      ( 24 Jan 2025 05:32 )             31.3       01-22    139  |  104  |  10  ----------------------------<  68[L]  3.4[L]   |  19[L]  |  0.36[L]    Ca    8.5      22 Jan 2025 17:04    TPro  7.9  /  Alb  4.2  /  TBili  0.2  /  DBili  x   /  AST  19  /  ALT  12  /  AlkPhos  58  01-22      CAPILLARY BLOOD GLUCOSE          PT/INR - ( 22 Jan 2025 17:04 )   PT: 11.0 sec;   INR: 0.92 ratio         PTT - ( 24 Jan 2025 05:32 )  PTT:80.5 sec    LOWER EXTREMITY PHYSICAL EXAM:    Vascular: DP/PT 1/4, B/L, CFT < 3 seconds B/L, Temperature gradient  warm to warm, B/L.   Neuro: Epicritic sensation diminished to the level of digits, B/L.  Musculoskeletal/Ortho: dropfoot, b/l  Skin: Right foot dorsomedial wound to subQ, circumferential erythema, midfoot non-pitting edema, no drainage, no malodor, no fluctuance, lefft foot no wounds    RADIOLOGY & ADDITIONAL STUDIES:    < from: Xray Foot AP + Lateral + Oblique, Right (01.24.25 @ 13:20) >    ACC: [id number] EXAM:  XR FOOT COMP MIN 3 VIEWS RT   ORDERED BY: MAICO COLE     PROCEDURE DATE:  01/24/2025          INTERPRETATION:  CLINICAL INDICATION: Foot wound. Pain.    TECHNIQUE: 3 views right foot.    COMPARISON: Foot x-rays 8/17/2024.      FINDINGS/  IMPRESSION:  There is an skin wound with soft tissue swelling adjacent to the skin   wound along the medial midfoot. No tracking soft tissue gas identified.   No cortical erosion or destruction is seen. No periosteal reaction.   Similar findings of fusion across the hindfoot/midfoot. There is no acute   fracture. No dislocation. No advanced productive changes about the joints.    --- End of Report ---            TA HARRIS MD; Attending Radiologist  This document has been electronically signed. Jan 24 2025  2:49PM    < end of copied text >

## 2025-01-24 NOTE — DIETITIAN INITIAL EVALUATION ADULT - ADD RECOMMEND
7
1. Monitor weights, labs, BM's, skin integrity, PO intake   2. Please monitor % PO intake on flowsheets   3. Honor food preferences as able within therapeutic diet order

## 2025-01-24 NOTE — DISCHARGE NOTE PROVIDER - HOSPITAL COURSE
Hospital Course:  44F PMHx spina bifida, multiple bilateral foot surgeries, and  shunt (managed [facility] neurosurgery), uterine fibroids presenting with pleuritic chest pain. It is associated with shortness of breath. Denies prolonged travel. No OCP use. Endorses to not ambulating much due to right foot surgeries. In the ED, vital signs were stable and elevated D-dimer, other than this no lab abnormalities present. CT angio showed right lower lobe segmental pulmonary embolism. PESI score class I, very low risk. PE most likely provoked given sedentary 2/2 multiple foot surgeries. Heparin drip was started. Physical exam notable for R unhealed foot wound from an I&D in May of 2024. Xray and wound consult was ordered to assess the wound for OA. ESR and CRP negative. Pt prescribed eliquis for outpt AC.    Important Medication Changes and Reason:  START Eliquis 5mg take 2 pills twice daily for 7 days and then one pill daily thereafter    Active or Pending Issues Requiring Follow-up:  - F/u with PCP regarding pulmonary embolism and DOAC course  - F/u with wound care for R foot wound    Advanced Directives:   [X] Full code  [ ] DNR  [ ] Hospice    Discharge Diagnoses:  Pulmonary embolism  Right foot wound         Hospital Course:  44F PMHx spina bifida, multiple bilateral foot surgeries, and  shunt (managed [facility] neurosurgery), uterine fibroids presenting with pleuritic chest pain. It is associated with shortness of breath. Denies prolonged travel. No OCP use. Endorses to not ambulating much due to right foot surgeries. In the ED, vital signs were stable and elevated D-dimer, other than this no lab abnormalities present. CT angio showed right lower lobe segmental pulmonary embolism. PESI score class I, very low risk. PE most likely provoked given sedentary 2/2 multiple foot surgeries. Heparin drip was started. Physical exam notable for R unhealed foot wound from an I&D in May of 2024. Xray and wound consult was ordered to assess the wound for OA. ESR and CRP negative. No signs of OM on xray. Wound care evaluated and recommended topic ointments and podiatry consult. Podiatry recommended topical ointments and outpt follow-up, no surgical intervention. Pt prescribed eliquis for outpt AC.    Important Medication Changes and Reason:  START Eliquis 5mg take 2 pills twice daily for 7 days and then one pill daily thereafter    Active or Pending Issues Requiring Follow-up:  - F/u with PCP regarding pulmonary embolism and DOAC course  - F/u with podiatry for R foot wound    Advanced Directives:   [X] Full code  [ ] DNR  [ ] Hospice    Discharge Diagnoses:  Pulmonary embolism  Right foot wound         Hospital Course:  44F PMHx spina bifida, multiple bilateral foot surgeries, and  shunt (managed [facility] neurosurgery), uterine fibroids presenting with pleuritic chest pain. It is associated with shortness of breath. Denies prolonged travel. No OCP use. Endorses to not ambulating much due to right foot surgeries. In the ED, vital signs were stable and elevated D-dimer, other than this no lab abnormalities present. CT angio showed right lower lobe segmental pulmonary embolism. PESI score class I, very low risk. PE most likely provoked given sedentary 2/2 multiple foot surgeries. Heparin drip was started. Pt then switched to Eliquis, concern for vaginal bleed and hematuria but VS and blood counts remained stable. Pt evaluated by podiatry for R foot wound, XR R foot not notable. Pt seen by podiatry and recommended for outpt follow up. Pt found to have ischial wound, pt seen by wound care. Pt to follow with wound care outside the hospital.     Important Medication Changes and Reason:  Eliquis 10mg BID for 5 days and then Eliquis 5 BID     Active or Pending Issues Requiring Follow-up:  - F/u with PCP regarding pulmonary embolism and DOAC course  - F/u with podiatry for R foot wound  - F/U with wound care for ischial wound     Advanced Directives:   [X] Full code  [ ] DNR  [ ] Hospice    Discharge Diagnoses:  Pulmonary embolism  Right foot wound         Hospital Course:  44F PMHx spina bifida, multiple bilateral foot surgeries, and  shunt (managed [facility] neurosurgery), uterine fibroids presenting with pleuritic chest pain. It is associated with shortness of breath. Denies prolonged travel. No OCP use. Endorses to not ambulating much due to right foot surgeries. In the ED, vital signs were stable and elevated D-dimer, other than this no lab abnormalities present. CT angio showed right lower lobe segmental pulmonary embolism. PESI score class I, very low risk. PE most likely provoked given sedentary 2/2 multiple foot surgeries. Heparin drip was started. Pt then switched to Eliquis, concern for vaginal bleed and hematuria but VS and blood counts remained stable. Pt evaluated by podiatry for R foot wound, XR R foot not notable. Pt seen by podiatry and recommended for outpt follow up. Pt found to have ischial wound, pt seen by wound care. Pt to follow with wound care outside the hospital.     Pt is medically optimized for D/C home on 1/28/25.    Important Medication Changes and Reason:  Eliquis 10mg BID for 5 days and then Eliquis 5 BID     Active or Pending Issues Requiring Follow-up:  - F/u with PCP regarding pulmonary embolism and DOAC course  - F/u with podiatry for R foot wound  - F/U with wound care for ischial wound     Advanced Directives:   [X] Full code  [ ] DNR  [ ] Hospice    Discharge Diagnoses:  Pulmonary embolism  Right foot wound         Hospital Course:  44F PMHx spina bifida, multiple bilateral foot surgeries, and  shunt (managed [facility] neurosurgery), uterine fibroids presenting with pleuritic chest pain. It is associated with shortness of breath. Denies prolonged travel. No OCP use. Endorses to not ambulating much due to right foot surgeries. In the ED, vital signs were stable and elevated D-dimer, other than this no lab abnormalities present. CT angio showed right lower lobe segmental pulmonary embolism. PESI score class I, very low risk. PE most likely provoked given sedentary 2/2 multiple foot surgeries. Heparin drip was started. Pt then switched to Eliquis, concern for vaginal bleed and hematuria but VS and blood counts remained stable. Pt evaluated by podiatry for R foot wound, XR R foot not notable. Pt seen by podiatry and recommended for outpt follow up. Pt found to have ischial wound, pt seen by wound care. Pt to follow with wound care outside the hospital.     Pt is medically optimized for D/C home on 1/28/25.    Important Medication Changes and Reason:  Eliquis 10mg BID for 3 1/2 days and then Eliquis 5 BID     Active or Pending Issues Requiring Follow-up:  - F/u with PCP regarding pulmonary embolism and DOAC course  - F/u with podiatry for R foot wound  - F/U with wound care for ischial wound     Advanced Directives:   [X] Full code  [ ] DNR  [ ] Hospice    Discharge Diagnoses:  Pulmonary embolism  Right foot wound         Hospital Course:  44F PMHx spina bifida, multiple bilateral foot surgeries, and  shunt (managed [facility] neurosurgery), uterine fibroids presenting with pleuritic chest pain. It is associated with shortness of breath. Denies prolonged travel. No OCP use. Endorses to not ambulating much due to right foot surgeries. In the ED, vital signs were stable and elevated D-dimer, other than this no lab abnormalities present. CT angio showed right lower lobe segmental pulmonary embolism. PESI score class I, very low risk. PE most likely provoked given sedentary 2/2 multiple foot surgeries. Heparin drip was started. Pt then switched to Eliquis, concern for vaginal bleed and hematuria but VS and blood counts remained stable. Pt evaluated by podiatry for R foot wound, XR R foot not notable. Pt seen by podiatry and recommended for outpt follow up. Pt found to have ischial wound, pt seen by wound care. Pt to follow with wound care outside the hospital.     Pt is medically optimized for D/C home on 1/29/25.    Important Medication Changes and Reason:  Eliquis 10mg BID for 3 1/2 days and then Eliquis 5 BID     Active or Pending Issues Requiring Follow-up:  - F/u with PCP regarding pulmonary embolism and DOAC course  - F/u with podiatry for R foot wound  - F/U with wound care for ischial wound     Advanced Directives:   [X] Full code  [ ] DNR  [ ] Hospice    Discharge Diagnoses:  Pulmonary embolism  Right foot wound         Hospital Course:  44F PMHx spina bifida, multiple bilateral foot surgeries, and  shunt (managed [facility] neurosurgery), uterine fibroids presenting with pleuritic chest pain. It is associated with shortness of breath. Denies prolonged travel. No OCP use. Endorses to not ambulating much due to right foot surgeries. In the ED, vital signs were stable and elevated D-dimer, other than this no lab abnormalities present. CT angio showed right lower lobe segmental pulmonary embolism. PESI score class I, very low risk. PE most likely provoked given sedentary 2/2 multiple foot surgeries. Heparin drip was started. Pt then switched to Eliquis, concern for vaginal bleed and hematuria but VS and blood counts remained stable. Pt evaluated by podiatry for R foot wound, XR R foot not notable. Pt seen by podiatry and recommended for outpt follow up. Pt found to have ischial wound, pt seen by wound care. Pt to follow with wound care outside the hospital.     Pt is medically optimized for D/C home on 1/29/25.    Important Medication Changes and Reason:  Eliquis 10mg BID till 2/1/25 and then Eliquis 5 BID   Neurontin 100 TID     Active or Pending Issues Requiring Follow-up:  - F/u with PCP regarding pulmonary embolism and DOAC course  - F/u with podiatry for R foot wound  - F/U with wound care for ischial wound     Advanced Directives:   [X] Full code  [ ] DNR  [ ] Hospice    Discharge Diagnoses:  Pulmonary embolism  Right foot wound         Hospital Course:  44F PMHx spina bifida, multiple bilateral foot surgeries, and  shunt (managed [facility] neurosurgery), uterine fibroids presenting with pleuritic chest pain. It is associated with shortness of breath. Denies prolonged travel. No OCP use. Endorses to not ambulating much due to right foot surgeries. In the ED, vital signs were stable and elevated D-dimer, other than this no lab abnormalities present. CT angio showed right lower lobe segmental pulmonary embolism. PESI score class I, very low risk. PE most likely provoked given sedentary 2/2 multiple foot surgeries. Heparin drip was started. Pt then switched to Eliquis, concern for vaginal bleed and hematuria but VS and blood counts remained stable. Pt evaluated by podiatry for R foot wound, XR R foot not notable. Pt seen by podiatry and recommended for outpt follow up. Pt found to have ischial wound, pt seen by wound care. Pt to follow with wound care outside the hospital.     Pt is medically optimized for D/C home on 1/29/25.    Important Medication Changes and Reason:  Eliquis 10mg BID till 2/1/25 and then Eliquis 5 BID   Neurontin 100 TID     Active or Pending Issues Requiring Follow-up:  - F/u with PCP regarding pulmonary embolism and DOAC course  - F/u with podiatry for R foot wound  - F/U with wound care for ischial wound     Advanced Directives:   [X] Full code  [ ] DNR  [ ] Hospice    Discharge Diagnoses:  Pulmonary embolism  Right foot wound

## 2025-01-24 NOTE — DIETITIAN INITIAL EVALUATION ADULT - PERTINENT MEDS FT
MEDICATIONS  (STANDING):  ferrous    sulfate 325 milliGRAM(s) Oral <User Schedule>  heparin  Infusion.  Unit(s)/Hr (9 mL/Hr) IV Continuous <Continuous>  melatonin 3 milliGRAM(s) Oral at bedtime  polyethylene glycol 3350 17 Gram(s) Oral two times a day  senna 2 Tablet(s) Oral at bedtime    MEDICATIONS  (PRN):  acetaminophen     Tablet .. 650 milliGRAM(s) Oral every 6 hours PRN Temp greater or equal to 38C (100.4F), Mild Pain (1 - 3)  heparin   Injectable 4000 Unit(s) IV Push every 6 hours PRN For aPTT less than 40  heparin   Injectable 2000 Unit(s) IV Push every 6 hours PRN For aPTT between 40 - 57

## 2025-01-24 NOTE — DIETITIAN INITIAL EVALUATION ADULT - OTHER CALCULATIONS
IBW: 98 lb +/- 10%, %%  Per Kalpana AUGUSTINE, noted the following weight history: 46.8kg (1/22), 44.2kg (5/29)  Objective weight measurements suggest 2.6kg (6%) weight gain x8 months period of time (not significant)

## 2025-01-24 NOTE — PHYSICAL THERAPY INITIAL EVALUATION ADULT - PERTINENT HX OF CURRENT PROBLEM, REHAB EVAL
Per documentation, pt. presented with pleuritic chest pain and found to have pulmonary embolism. Pt. on Heparin gtt. Pt. with wound of right foot s/p I&D in May 2024. Xray negative for OM. No acute fracture, no dislocation.

## 2025-01-24 NOTE — PROGRESS NOTE ADULT - PROBLEM SELECTOR PLAN 3
-Hx of uterine fibroids w/ heavy periods  -c/w home iron supplementation every other day for better absorption   -f/u w/ OBGYN outpatient.

## 2025-01-24 NOTE — PROGRESS NOTE ADULT - ATTENDING COMMENTS
44 y.o. F w/ a hx of spina bifida, multiple bilateral foot surgeries,  shunt, uterine fibroids hospitalized for segmental PE's.     Patient reports some chest pain. Has had vaginal spotting.     # Segmental PE: Wily and pro-BNP not elevated. F/u TTE. Suspect provoked iso immobility. Will need to follow up with PCP for age appropriate cancer screening. Cont Heparin drip with plans to transition to DOAC pending coverage.   # Foot wound: Tan colored drainage noted on dressing. Check foot Xray. Wound care c/s   # Ischial wound: No s/s of infection noted. Wound care c/s.

## 2025-01-25 ENCOUNTER — TRANSCRIPTION ENCOUNTER (OUTPATIENT)
Age: 45
End: 2025-01-25

## 2025-01-25 LAB
ANION GAP SERPL CALC-SCNC: 13 MMOL/L — SIGNIFICANT CHANGE UP (ref 7–14)
APTT BLD: 61.4 SEC — HIGH (ref 24.5–35.6)
BUN SERPL-MCNC: 8 MG/DL — SIGNIFICANT CHANGE UP (ref 7–23)
CALCIUM SERPL-MCNC: 9.1 MG/DL — SIGNIFICANT CHANGE UP (ref 8.4–10.5)
CHLORIDE SERPL-SCNC: 105 MMOL/L — SIGNIFICANT CHANGE UP (ref 98–107)
CO2 SERPL-SCNC: 21 MMOL/L — LOW (ref 22–31)
CREAT SERPL-MCNC: 0.42 MG/DL — LOW (ref 0.5–1.3)
EGFR: 122 ML/MIN/1.73M2 — SIGNIFICANT CHANGE UP
GLUCOSE SERPL-MCNC: 77 MG/DL — SIGNIFICANT CHANGE UP (ref 70–99)
HCT VFR BLD CALC: 34.8 % — SIGNIFICANT CHANGE UP (ref 34.5–45)
HGB BLD-MCNC: 11.5 G/DL — SIGNIFICANT CHANGE UP (ref 11.5–15.5)
MAGNESIUM SERPL-MCNC: 2.2 MG/DL — SIGNIFICANT CHANGE UP (ref 1.6–2.6)
MCHC RBC-ENTMCNC: 28 PG — SIGNIFICANT CHANGE UP (ref 27–34)
MCHC RBC-ENTMCNC: 33 G/DL — SIGNIFICANT CHANGE UP (ref 32–36)
MCV RBC AUTO: 84.7 FL — SIGNIFICANT CHANGE UP (ref 80–100)
NRBC # BLD AUTO: 0 K/UL — SIGNIFICANT CHANGE UP (ref 0–0)
NRBC # BLD: 0 /100 WBCS — SIGNIFICANT CHANGE UP (ref 0–0)
NRBC # FLD: 0 K/UL — SIGNIFICANT CHANGE UP (ref 0–0)
NRBC BLD-RTO: 0 /100 WBCS — SIGNIFICANT CHANGE UP (ref 0–0)
PHOSPHATE SERPL-MCNC: 3.5 MG/DL — SIGNIFICANT CHANGE UP (ref 2.5–4.5)
PLATELET # BLD AUTO: 317 K/UL — SIGNIFICANT CHANGE UP (ref 150–400)
POTASSIUM SERPL-MCNC: 4 MMOL/L — SIGNIFICANT CHANGE UP (ref 3.5–5.3)
POTASSIUM SERPL-SCNC: 4 MMOL/L — SIGNIFICANT CHANGE UP (ref 3.5–5.3)
RBC # BLD: 4.11 M/UL — SIGNIFICANT CHANGE UP (ref 3.8–5.2)
RBC # FLD: 15.9 % — HIGH (ref 10.3–14.5)
SODIUM SERPL-SCNC: 139 MMOL/L — SIGNIFICANT CHANGE UP (ref 135–145)
WBC # BLD: 6.55 K/UL — SIGNIFICANT CHANGE UP (ref 3.8–10.5)
WBC # FLD AUTO: 6.55 K/UL — SIGNIFICANT CHANGE UP (ref 3.8–10.5)

## 2025-01-25 PROCEDURE — 99233 SBSQ HOSP IP/OBS HIGH 50: CPT

## 2025-01-25 PROCEDURE — 74018 RADEX ABDOMEN 1 VIEW: CPT | Mod: 26

## 2025-01-25 RX ORDER — APIXABAN 5 MG/1
1 TABLET, FILM COATED ORAL
Qty: 74 | Refills: 0
Start: 2025-01-25 | End: 2025-02-23

## 2025-01-25 RX ORDER — LACTULOSE 10 G/15 ML
10 SOLUTION, ORAL ORAL
Refills: 0 | Status: DISCONTINUED | OUTPATIENT
Start: 2025-01-25 | End: 2025-01-25

## 2025-01-25 RX ORDER — BISACODYL 5 MG
5 TABLET, DELAYED RELEASE (ENTERIC COATED) ORAL AT BEDTIME
Refills: 0 | Status: DISCONTINUED | OUTPATIENT
Start: 2025-01-25 | End: 2025-01-25

## 2025-01-25 RX ORDER — BISACODYL 5 MG
10 TABLET, DELAYED RELEASE (ENTERIC COATED) ORAL ONCE
Refills: 0 | Status: COMPLETED | OUTPATIENT
Start: 2025-01-25 | End: 2025-01-25

## 2025-01-25 RX ADMIN — ACETAMINOPHEN 650 MILLIGRAM(S): 160 SUSPENSION ORAL at 21:36

## 2025-01-25 RX ADMIN — Medication 900 UNIT(S)/HR: at 09:48

## 2025-01-25 RX ADMIN — Medication 10 MILLIGRAM(S): at 06:25

## 2025-01-25 RX ADMIN — ACETAMINOPHEN 650 MILLIGRAM(S): 160 SUSPENSION ORAL at 04:45

## 2025-01-25 RX ADMIN — POLYETHYLENE GLYCOL 3350 17 GRAM(S): 17 POWDER, FOR SOLUTION ORAL at 05:11

## 2025-01-25 RX ADMIN — Medication 900 UNIT(S)/HR: at 20:30

## 2025-01-25 RX ADMIN — ACETAMINOPHEN, DIPHENHYDRAMINE HCL, PHENYLEPHRINE HCL 3 MILLIGRAM(S): 325; 25; 5 TABLET ORAL at 22:32

## 2025-01-25 RX ADMIN — ACETAMINOPHEN 650 MILLIGRAM(S): 160 SUSPENSION ORAL at 20:36

## 2025-01-25 RX ADMIN — ACETAMINOPHEN 650 MILLIGRAM(S): 160 SUSPENSION ORAL at 03:49

## 2025-01-25 RX ADMIN — Medication 1 APPLICATION(S): at 11:37

## 2025-01-25 RX ADMIN — Medication 900 UNIT(S)/HR: at 08:34

## 2025-01-25 NOTE — DISCHARGE NOTE NURSING/CASE MANAGEMENT/SOCIAL WORK - NSSCNAMETXT_GEN_ALL_CORE
[facility]. Nurse to visit on the day following discharge. Other appropriate services to be arranged thereafter.   Please contact the home care agency at the above phone number if you have not heard from them by approximately 12 noon on the day after your hospital discharge.  [facility] nurse will call prior to the home visit.

## 2025-01-25 NOTE — PROGRESS NOTE ADULT - PROBLEM SELECTOR PLAN 1
-CT Angio Chest PE: Right lower lobe segmental pulmonary embolism.  -PESI: Class I, Very Low Risk: 0-1.6% 30-day mortality in this group  -DVT Duplex b/l LE: ordered  -Troponin T: <6  -PT/INR 11/0.92  - EF 67% on Echo, no wall motion abnormality or right heart strain  -Age-appropriate cancer screening outpatient   -Heparin ggt. Can transition to DOAC on DC. Eliquis sent to pharmacy.  -Monitor for sings or symptoms of bleeding.

## 2025-01-25 NOTE — PROGRESS NOTE ADULT - PROBLEM SELECTOR PLAN 2
- Open foot wound on R foot.  - S/p I&D in May 2024  - Pt has not been following-up with surgeon on wound care  - Xray negative for signs of tracts or OM  - ESR and CRP wnl  - Wound care consulted - Open foot wound on R foot.  - S/p I&D in May 2024  - Pt has not been following-up with surgeon on wound care  - Xray negative for signs of tracts or OM  - ESR and CRP wnl  - Wound care consulted -> recommended pod consult -> recommended topical wound care and outpt podiatry f/u.  - Wound care home care face-to-face in [place] sum - Open foot wound on R foot.  - S/p I&D in May 2024  - Pt has not been following-up with surgeon on wound care  - Xray negative for signs of tracts or OM  - ESR and CRP wnl  - Wound care consulted -> recommended pod consult -> recommended topical wound care and outpt podiatry f/u.  - Wound care home care face-to-face in [place] sum (collagenase cream needs prior authorization, unable to complete today as Pt has medicaid and closed on weekends)

## 2025-01-25 NOTE — DISCHARGE NOTE NURSING/CASE MANAGEMENT/SOCIAL WORK - FINANCIAL ASSISTANCE
[facility] provides services at a reduced cost to those who are determined to be eligible through [facility]’s financial assistance program. Information regarding [facility]’s financial assistance program can be found by going to [email or web address] or by calling [phone].

## 2025-01-25 NOTE — DISCHARGE NOTE NURSING/CASE MANAGEMENT/SOCIAL WORK - NSDCFUADDAPPT_GEN_ALL_CORE_FT
APPTS ARE READY TO BE MADE: [X] YES    Best Family or Patient Contact (if needed):    Additional Information about above appointments (if needed):    1: PCP  2: Podiatry  3:     Other comments or requests:   Podiatry Discharge Instructions:  Follow up: Please follow up with [facility] within 1 week of discharge from the hospital, please call [phone] for appointment and discuss that you recently were seen in the hospital.  Wound Care: Please apply santyl, 4x4 gauze and yvonne daily to right foot wound.  Weight bearing: Please weight bear as tolerated in a surgical shoe on right foot.  Antibiotics: Please continue as instructed.

## 2025-01-25 NOTE — PROGRESS NOTE ADULT - ATTENDING COMMENTS
44 y.o. F w/ a hx of spina bifida, multiple bilateral foot surgeries,  shunt, uterine fibroids hospitalized for segmental PE's.     This morning pt reports bleeding w/ urination. ?hematuria. Unclear if pt currently menstruating. Tells different things to diff providers.     # Segmental PE: Wily and pro-BNP not elevated. F/u TTE. Suspect provoked iso immobility. Will need to follow up with PCP for age appropriate cancer screening. Cont Heparin drip given Hgb stable  #?Hematuria. C/w AC at this time given stable hgb. Check UA  #Constipation: Reported constipation, placed on bowel regimen. Abd xray ordered.   # Foot wound: Tan colored drainage noted on dressing. Check foot Xray. Wound care c/s   # Ischial wound: No s/s of infection noted. Wound care c/s.

## 2025-01-25 NOTE — PROGRESS NOTE ADULT - ASSESSMENT
44F PMHx spina bifida, multiple bilateral foot surgeries, and  shunt (managed [facility] neurosurgery), uterine fibroids presenting with pleuritic chest pain and found to have segmental pulmonary embolism. Admitted to medicine for further management and monitoring. 44F PMHx spina bifida, multiple bilateral foot surgeries, and  shunt (managed [facility] neurosurgery), uterine fibroids presenting with pleuritic chest pain and found to have segmental pulmonary embolism. S/p heparin drip, prescribed Eliquis oral as outpatient. Cleared for DC today.

## 2025-01-25 NOTE — PROGRESS NOTE ADULT - SUBJECTIVE AND OBJECTIVE BOX
***Plan not finalized until attending attestation***    Buster Santizo MD (PGY-1)  Internal Medicine  Contact via [employer] TEAMS    ******************************************    PROGRESS NOTE:     Patient is a 44y old  Female who presents with a chief complaint of PE (24 Jan 2025 16:14)      INTERVAL EVENTS: No acute overnight events.     SUBJECTIVE: Patient seen and examined at bedside. This morning, the patient is comfortable and doing well. No acute complaints.    MEDICATIONS  (STANDING):  collagenase Ointment 1 Application(s) Topical daily  ferrous    sulfate 325 milliGRAM(s) Oral <User Schedule>  heparin  Infusion.  Unit(s)/Hr (9 mL/Hr) IV Continuous <Continuous>  melatonin 3 milliGRAM(s) Oral at bedtime  polyethylene glycol 3350 17 Gram(s) Oral two times a day  senna 2 Tablet(s) Oral at bedtime    MEDICATIONS  (PRN):  acetaminophen     Tablet .. 650 milliGRAM(s) Oral every 6 hours PRN Temp greater or equal to 38C (100.4F), Mild Pain (1 - 3)  heparin   Injectable 4000 Unit(s) IV Push every 6 hours PRN For aPTT less than 40  heparin   Injectable 2000 Unit(s) IV Push every 6 hours PRN For aPTT between 40 - 57      CAPILLARY BLOOD GLUCOSE        I&O's Summary      PHYSICAL EXAM:  Vital Signs Last 24 Hrs  T(C): 36.8 (25 Jan 2025 05:40), Max: 36.9 (24 Jan 2025 13:50)  T(F): 98.2 (25 Jan 2025 05:40), Max: 98.4 (24 Jan 2025 13:50)  HR: 69 (25 Jan 2025 05:40) (69 - 86)  BP: 130/96 (25 Jan 2025 05:40) (130/96 - 146/98)  BP(mean): --  RR: 18 (25 Jan 2025 05:40) (17 - 18)  SpO2: 100% (25 Jan 2025 05:40) (100% - 100%)    Parameters below as of 25 Jan 2025 05:40  Patient On (Oxygen Delivery Method): room air        GENERAL: NAD, lying in bed comfortably  HEAD: Atraumatic, normocephalic  EYES: EOMI, PERRLA, conjunctiva and sclera clear  ENT: Moist mucous membranes  NECK: Supple, no JVD  HEART: S1, S2, Regular rate and rhythm, no murmurs, rubs, or gallops  LUNGS: Unlabored respirations, clear to auscultation bilaterally, no crackles, wheezing, or rhonchi  ABDOMEN: Soft, nontender, nondistended, +BS  EXTREMITIES: 2+ peripheral pulses bilaterally. No clubbing, cyanosis, or edema  NERVOUS SYSTEM:  A&Ox3, no focal deficits   SKIN: No rashes or lesions    LABS:                        10.5   6.63  )-----------( 264      ( 24 Jan 2025 05:32 )             31.3           PTT - ( 24 Jan 2025 05:32 )  PTT:80.5 sec            RADIOLOGY & ADDITIONAL TESTS:  Results Reviewed:   Imaging Personally Reviewed:  Electrocardiogram Personally Reviewed:  Tele: ***Plan not finalized until attending attestation***    Buster Santizo MD (PGY-1)  Internal Medicine  Contact via [employer] TEAMS    ******************************************    PROGRESS NOTE:     Patient is a 44y old  Female who presents with a chief complaint of PE (24 Jan 2025 16:14)      INTERVAL EVENTS: No acute overnight events.     SUBJECTIVE: Patient seen and examined at bedside. This morning, the patient is comfortable and doing well. No acute complaints.    MEDICATIONS  (STANDING):  collagenase Ointment 1 Application(s) Topical daily  ferrous    sulfate 325 milliGRAM(s) Oral <User Schedule>  heparin  Infusion.  Unit(s)/Hr (9 mL/Hr) IV Continuous <Continuous>  melatonin 3 milliGRAM(s) Oral at bedtime  polyethylene glycol 3350 17 Gram(s) Oral two times a day  senna 2 Tablet(s) Oral at bedtime    MEDICATIONS  (PRN):  acetaminophen     Tablet .. 650 milliGRAM(s) Oral every 6 hours PRN Temp greater or equal to 38C (100.4F), Mild Pain (1 - 3)  heparin   Injectable 4000 Unit(s) IV Push every 6 hours PRN For aPTT less than 40  heparin   Injectable 2000 Unit(s) IV Push every 6 hours PRN For aPTT between 40 - 57      CAPILLARY BLOOD GLUCOSE        I&O's Summary      PHYSICAL EXAM:  Vital Signs Last 24 Hrs  T(C): 36.8 (25 Jan 2025 05:40), Max: 36.9 (24 Jan 2025 13:50)  T(F): 98.2 (25 Jan 2025 05:40), Max: 98.4 (24 Jan 2025 13:50)  HR: 69 (25 Jan 2025 05:40) (69 - 86)  BP: 130/96 (25 Jan 2025 05:40) (130/96 - 146/98)  BP(mean): --  RR: 18 (25 Jan 2025 05:40) (17 - 18)  SpO2: 100% (25 Jan 2025 05:40) (100% - 100%)    Parameters below as of 25 Jan 2025 05:40  Patient On (Oxygen Delivery Method): room air        GENERAL: NAD, lying in bed comfortably  HEAD: Atraumatic, normocephalic  EYES: EOMI, PERRLA, conjunctiva and sclera clear  ENT: Moist mucous membranes  NECK: Supple, no JVD  HEART: S1, S2, Regular rate and rhythm, no murmurs, rubs, or gallops  LUNGS: Unlabored respirations, clear to auscultation bilaterally, no crackles, wheezing, or rhonchi  ABDOMEN: Soft, nontender, nondistended, +BS  EXTREMITIES: 2+ peripheral pulses bilaterally. No clubbing, cyanosis, or edema  NERVOUS SYSTEM:  A&Ox3, no focal deficits   SKIN: No rashes or lesions    LABS:                        10.5   6.63  )-----------( 264      ( 24 Jan 2025 05:32 )             31.3           PTT - ( 24 Jan 2025 05:32 )  PTT:80.5 sec

## 2025-01-26 LAB
ANION GAP SERPL CALC-SCNC: 12 MMOL/L — SIGNIFICANT CHANGE UP (ref 7–14)
APTT BLD: 56.4 SEC — HIGH (ref 24.5–35.6)
BUN SERPL-MCNC: 15 MG/DL — SIGNIFICANT CHANGE UP (ref 7–23)
CALCIUM SERPL-MCNC: 8.5 MG/DL — SIGNIFICANT CHANGE UP (ref 8.4–10.5)
CHLORIDE SERPL-SCNC: 105 MMOL/L — SIGNIFICANT CHANGE UP (ref 98–107)
CO2 SERPL-SCNC: 19 MMOL/L — LOW (ref 22–31)
CREAT SERPL-MCNC: 0.45 MG/DL — LOW (ref 0.5–1.3)
EGFR: 120 ML/MIN/1.73M2 — SIGNIFICANT CHANGE UP
GLUCOSE SERPL-MCNC: 91 MG/DL — SIGNIFICANT CHANGE UP (ref 70–99)
HCT VFR BLD CALC: 32.9 % — LOW (ref 34.5–45)
HGB BLD-MCNC: 10.8 G/DL — LOW (ref 11.5–15.5)
MAGNESIUM SERPL-MCNC: 2.2 MG/DL — SIGNIFICANT CHANGE UP (ref 1.6–2.6)
MCHC RBC-ENTMCNC: 27.8 PG — SIGNIFICANT CHANGE UP (ref 27–34)
MCHC RBC-ENTMCNC: 32.8 G/DL — SIGNIFICANT CHANGE UP (ref 32–36)
MCV RBC AUTO: 84.8 FL — SIGNIFICANT CHANGE UP (ref 80–100)
NRBC # BLD AUTO: 0 K/UL — SIGNIFICANT CHANGE UP (ref 0–0)
NRBC # BLD: 0 /100 WBCS — SIGNIFICANT CHANGE UP (ref 0–0)
NRBC # FLD: 0 K/UL — SIGNIFICANT CHANGE UP (ref 0–0)
NRBC BLD-RTO: 0 /100 WBCS — SIGNIFICANT CHANGE UP (ref 0–0)
PHOSPHATE SERPL-MCNC: 3.6 MG/DL — SIGNIFICANT CHANGE UP (ref 2.5–4.5)
PLATELET # BLD AUTO: 302 K/UL — SIGNIFICANT CHANGE UP (ref 150–400)
POTASSIUM SERPL-MCNC: 4 MMOL/L — SIGNIFICANT CHANGE UP (ref 3.5–5.3)
POTASSIUM SERPL-SCNC: 4 MMOL/L — SIGNIFICANT CHANGE UP (ref 3.5–5.3)
RBC # BLD: 3.88 M/UL — SIGNIFICANT CHANGE UP (ref 3.8–5.2)
RBC # FLD: 15.9 % — HIGH (ref 10.3–14.5)
SODIUM SERPL-SCNC: 136 MMOL/L — SIGNIFICANT CHANGE UP (ref 135–145)
WBC # BLD: 6.56 K/UL — SIGNIFICANT CHANGE UP (ref 3.8–10.5)
WBC # FLD AUTO: 6.56 K/UL — SIGNIFICANT CHANGE UP (ref 3.8–10.5)

## 2025-01-26 PROCEDURE — 99232 SBSQ HOSP IP/OBS MODERATE 35: CPT

## 2025-01-26 RX ORDER — APIXABAN 5 MG/1
10 TABLET, FILM COATED ORAL EVERY 12 HOURS
Refills: 0 | Status: DISCONTINUED | OUTPATIENT
Start: 2025-01-26 | End: 2025-01-29

## 2025-01-26 RX ADMIN — Medication 1 APPLICATION(S): at 22:34

## 2025-01-26 RX ADMIN — APIXABAN 10 MILLIGRAM(S): 5 TABLET, FILM COATED ORAL at 11:02

## 2025-01-26 RX ADMIN — ACETAMINOPHEN 650 MILLIGRAM(S): 160 SUSPENSION ORAL at 05:57

## 2025-01-26 RX ADMIN — ACETAMINOPHEN 650 MILLIGRAM(S): 160 SUSPENSION ORAL at 18:18

## 2025-01-26 RX ADMIN — ACETAMINOPHEN 650 MILLIGRAM(S): 160 SUSPENSION ORAL at 06:57

## 2025-01-26 RX ADMIN — Medication 900 UNIT(S)/HR: at 08:45

## 2025-01-26 RX ADMIN — ACETAMINOPHEN 650 MILLIGRAM(S): 160 SUSPENSION ORAL at 19:18

## 2025-01-26 RX ADMIN — APIXABAN 10 MILLIGRAM(S): 5 TABLET, FILM COATED ORAL at 18:18

## 2025-01-26 RX ADMIN — ACETAMINOPHEN, DIPHENHYDRAMINE HCL, PHENYLEPHRINE HCL 3 MILLIGRAM(S): 325; 25; 5 TABLET ORAL at 22:33

## 2025-01-26 NOTE — PROGRESS NOTE ADULT - PROBLEM SELECTOR PLAN 2
- Open foot wound on R foot, lost to following-up with surgeon on wound care after I&D in May 2024   - Xray negative for signs of tracts or OM  - ESR and CRP wnl    > Wound care consulted -> recommended pod consult -> recommended topical wound care and outpt podiatry f/u.

## 2025-01-26 NOTE — PROGRESS NOTE ADULT - SUBJECTIVE AND OBJECTIVE BOX
Medicine Progress Note  --------------------  Brandyn Jane M.D.  EM/IM PGY-2  Contact via TEAMS   --------------------      Patient: JANETH REILLY, MRN: [id number], : 1980  Admitted on 25 for Other pulmonary embolism without acute cor pulmonale      LANGUAGE - English     ------------------------------------------------------------------------------------------------------------  SUMMARY (from last progress note through 25 @ 08:03):   -  ------------------------------------------------------------------------------------------------------------  OVERNIGHT/INTERVAL EVENTS  No events overnight. Vitals remained stable on room air. Reviewed all scheduled medications.  In the last 24 hours, patient required the following PRNs: none     SUBJECTIVE  - Pt was seen and examined at bedside this am.       ROS negative unless noted above.  ------------------------------------------------------------------------------------------------------------  OBJECTIVE:  Physical Exam  Vital signs reviewed.  CONSTITUTIONAL: NAD   HEAD: Normocephalic; atraumatic  EYES: EOMI, PERRL, no conjunctival injection, no scleral icterus  MOUTH/THROAT:  MMM  NECK: Trachea midline, no JVD  CV: Normal S1, S2; no audible murmurs  RESP: normal work of breathing; CTAB   ABD: soft, non-distended; non-tender to palpation   : Deferred  MSK/EXT: deformed b/l foot, non draining dorsal R foot wound, no erythema or swelling   SKIN: No rashes on exposed skin surfaces  NEURO: Moves all extremities spontaneously with no focal deficits, speech is appropriate  PSYCH: poor insight, calm and interactive         Vital Signs Last 24 Hrs  T(F): 98.2, Max: 98.6 (25 @ 22:00)  HR: 70 (70 - 79)  BP: 135/90 (118/92 - 135/90)  RR: 17 (17 - 17)  SpO2: 100% (99% - 100%)        DAILY MEASUREMENTS:  I&O's Summary    Daily     Daily   Weight (kg): 46.8 (25 @ 03:00), 56.7 (01-15-25 @ 19:14)  Orthostatic VS        LABS:                        10.8   6.56  )-----------( 302      ( 2025 06:05 )             32.9     Hgb Trend: 10.8<--, 11.5<--, 10.5<--, 9.9<--, 12.0<--      139  |  105  |  8   ----------------------------<  77  4.0   |  21[L]  |  0.42[L]    Ca    9.1      2025 07:15  Phos  3.5       Mg     2.20           PTT - ( 2025 06:05 )  PTT:56.4 sec  CAPILLARY BLOOD GLUCOSE            Urinalysis Basic - ( 2025 07:15 )    Color: x / Appearance: x / SG: x / pH: x  Gluc: 77 mg/dL / Ketone: x  / Bili: x / Urobili: x   Blood: x / Protein: x / Nitrite: x   Leuk Esterase: x / RBC: x / WBC x   Sq Epi: x / Non Sq Epi: x / Bacteria: x              RADIOLOGY & ADDITIONAL TESTS:  Results Reviewed:     Imaging Reviewed:  Electrocardiogram Reviewed:      ------------------------------------------------------------------------------------------------------------  MEDICATIONS  (STANDING):  collagenase Ointment 1 Application(s) Topical daily  ferrous    sulfate 325 milliGRAM(s) Oral <User Schedule>  heparin  Infusion.  Unit(s)/Hr (9 mL/Hr) IV Continuous <Continuous>  melatonin 3 milliGRAM(s) Oral at bedtime    MEDICATIONS  (PRN):  acetaminophen     Tablet .. 650 milliGRAM(s) Oral every 6 hours PRN Temp greater or equal to 38C (100.4F), Mild Pain (1 - 3)  heparin   Injectable 4000 Unit(s) IV Push every 6 hours PRN For aPTT less than 40  heparin   Injectable 2000 Unit(s) IV Push every 6 hours PRN For aPTT between 40 - 57    ------------------------------------------------------------------------------------------------------------  COORDINATION OF CARE:  Care discussed with consultants/other providers and notes reviewed [Y]

## 2025-01-26 NOTE — PROGRESS NOTE ADULT - ATTENDING COMMENTS
44 y.o. F w/ a hx of spina bifida, multiple bilateral foot surgeries,  shunt, uterine fibroids hospitalized for segmental PE's.     This morning pt reports bleeding w/ urination. ?hematuria. Unclear if pt currently menstruating. Tells different things to diff providers.     # Segmental PE: Wily and pro-BNP not elevated.  Suspect provoked iso immobility. Will need to follow up with PCP for age appropriate cancer screening. Cont AC  #?Hematuria. C/w AC, transition to Eliquis given stable hgb. Monitor CBCs.   #Constipation: Reported constipation, placed on bowel regimen. Abd x-ray ordered. Will F/u, though now c/o frequent stools. Hold bowel regimen. Monitor stool count  # Foot wound: Tan colored drainage noted on dressing. Wound care c/s   # Ischial wound: No s/s of infection noted. Wound care c/s.

## 2025-01-26 NOTE — PROGRESS NOTE ADULT - PROBLEM SELECTOR PLAN 4
VTE PPx: Heparin gtt  Nutrition: Regular Diet  Fluids: None  Electrolytes: Maintain K>4, Mag >2, Phos > 3  Dispo: home

## 2025-01-26 NOTE — PROGRESS NOTE ADULT - PROBLEM SELECTOR PLAN 1
-CT Angio Chest PE: Right lower lobe segmental pulmonary embolism  -Troponin <6,   -EF 67% on Echo, no wall motion abnormality or right heart strain  -PESI: Class I, Very Low Risk: 0-1.6% 30-day mortality in this group  -DVT Duplex b/l LE: neg     > Age-appropriate cancer screening outpatient   > Heparin ggt. Can transition to DOAC on DC. Eliquis meds to bed delivered.   > Monitor for sings or symptoms of bleeding.

## 2025-01-26 NOTE — PROGRESS NOTE ADULT - ASSESSMENT
44F PMHx spina bifida, multiple bilateral foot surgeries, and  shunt (managed [facility] neurosurgery), uterine fibroids presenting with pleuritic chest pain and found to have low risk PE, likely provoked by immobility on hep gtt.

## 2025-01-27 DIAGNOSIS — M79.2 NEURALGIA AND NEURITIS, UNSPECIFIED: ICD-10-CM

## 2025-01-27 LAB
ANION GAP SERPL CALC-SCNC: 15 MMOL/L — HIGH (ref 7–14)
BLD GP AB SCN SERPL QL: NEGATIVE — SIGNIFICANT CHANGE UP
BUN SERPL-MCNC: 13 MG/DL — SIGNIFICANT CHANGE UP (ref 7–23)
CALCIUM SERPL-MCNC: 9.2 MG/DL — SIGNIFICANT CHANGE UP (ref 8.4–10.5)
CHLORIDE SERPL-SCNC: 102 MMOL/L — SIGNIFICANT CHANGE UP (ref 98–107)
CO2 SERPL-SCNC: 20 MMOL/L — LOW (ref 22–31)
CREAT SERPL-MCNC: 0.44 MG/DL — LOW (ref 0.5–1.3)
EGFR: 121 ML/MIN/1.73M2 — SIGNIFICANT CHANGE UP
GLUCOSE SERPL-MCNC: 83 MG/DL — SIGNIFICANT CHANGE UP (ref 70–99)
HCT VFR BLD CALC: 34.5 % — SIGNIFICANT CHANGE UP (ref 34.5–45)
HGB BLD-MCNC: 11.9 G/DL — SIGNIFICANT CHANGE UP (ref 11.5–15.5)
MAGNESIUM SERPL-MCNC: 2 MG/DL — SIGNIFICANT CHANGE UP (ref 1.6–2.6)
MCHC RBC-ENTMCNC: 28.5 PG — SIGNIFICANT CHANGE UP (ref 27–34)
MCHC RBC-ENTMCNC: 34.5 G/DL — SIGNIFICANT CHANGE UP (ref 32–36)
MCV RBC AUTO: 82.5 FL — SIGNIFICANT CHANGE UP (ref 80–100)
NRBC # BLD AUTO: 0 K/UL — SIGNIFICANT CHANGE UP (ref 0–0)
NRBC # BLD: 0 /100 WBCS — SIGNIFICANT CHANGE UP (ref 0–0)
NRBC # FLD: 0 K/UL — SIGNIFICANT CHANGE UP (ref 0–0)
NRBC BLD-RTO: 0 /100 WBCS — SIGNIFICANT CHANGE UP (ref 0–0)
PHOSPHATE SERPL-MCNC: 5.3 MG/DL — HIGH (ref 2.5–4.5)
PLATELET # BLD AUTO: 338 K/UL — SIGNIFICANT CHANGE UP (ref 150–400)
POTASSIUM SERPL-MCNC: 4.1 MMOL/L — SIGNIFICANT CHANGE UP (ref 3.5–5.3)
POTASSIUM SERPL-SCNC: 4.1 MMOL/L — SIGNIFICANT CHANGE UP (ref 3.5–5.3)
RBC # BLD: 4.18 M/UL — SIGNIFICANT CHANGE UP (ref 3.8–5.2)
RBC # FLD: 15.8 % — HIGH (ref 10.3–14.5)
RH IG SCN BLD-IMP: POSITIVE — SIGNIFICANT CHANGE UP
SODIUM SERPL-SCNC: 137 MMOL/L — SIGNIFICANT CHANGE UP (ref 135–145)
WBC # BLD: 3.67 K/UL — LOW (ref 3.8–10.5)
WBC # FLD AUTO: 3.67 K/UL — LOW (ref 3.8–10.5)

## 2025-01-27 PROCEDURE — 99239 HOSP IP/OBS DSCHRG MGMT >30: CPT

## 2025-01-27 RX ORDER — APIXABAN 5 MG/1
2 TABLET, FILM COATED ORAL
Qty: 120 | Refills: 0
Start: 2025-01-27 | End: 2025-02-25

## 2025-01-27 RX ORDER — GABAPENTIN 800 MG/1
100 TABLET ORAL THREE TIMES A DAY
Refills: 0 | Status: DISCONTINUED | OUTPATIENT
Start: 2025-01-27 | End: 2025-01-29

## 2025-01-27 RX ORDER — LIDOCAINE HYDROCHLORIDE 30 MG/G
1 CREAM TOPICAL ONCE
Refills: 0 | Status: COMPLETED | OUTPATIENT
Start: 2025-01-27 | End: 2025-01-27

## 2025-01-27 RX ORDER — BISACODYL 5 MG
10 TABLET, DELAYED RELEASE (ENTERIC COATED) ORAL ONCE
Refills: 0 | Status: COMPLETED | OUTPATIENT
Start: 2025-01-27 | End: 2025-01-27

## 2025-01-27 RX ADMIN — Medication 10 MILLIGRAM(S): at 17:18

## 2025-01-27 RX ADMIN — ACETAMINOPHEN 650 MILLIGRAM(S): 160 SUSPENSION ORAL at 22:44

## 2025-01-27 RX ADMIN — ACETAMINOPHEN, DIPHENHYDRAMINE HCL, PHENYLEPHRINE HCL 3 MILLIGRAM(S): 325; 25; 5 TABLET ORAL at 21:44

## 2025-01-27 RX ADMIN — ACETAMINOPHEN 650 MILLIGRAM(S): 160 SUSPENSION ORAL at 06:15

## 2025-01-27 RX ADMIN — LIDOCAINE HYDROCHLORIDE 1 PATCH: 30 CREAM TOPICAL at 23:00

## 2025-01-27 RX ADMIN — LIDOCAINE HYDROCHLORIDE 1 PATCH: 30 CREAM TOPICAL at 18:59

## 2025-01-27 RX ADMIN — ACETAMINOPHEN 650 MILLIGRAM(S): 160 SUSPENSION ORAL at 07:15

## 2025-01-27 RX ADMIN — Medication 325 MILLIGRAM(S): at 11:41

## 2025-01-27 RX ADMIN — APIXABAN 10 MILLIGRAM(S): 5 TABLET, FILM COATED ORAL at 05:34

## 2025-01-27 RX ADMIN — GABAPENTIN 100 MILLIGRAM(S): 800 TABLET ORAL at 21:44

## 2025-01-27 RX ADMIN — APIXABAN 10 MILLIGRAM(S): 5 TABLET, FILM COATED ORAL at 17:16

## 2025-01-27 RX ADMIN — LIDOCAINE HYDROCHLORIDE 1 PATCH: 30 CREAM TOPICAL at 11:40

## 2025-01-27 RX ADMIN — ACETAMINOPHEN 650 MILLIGRAM(S): 160 SUSPENSION ORAL at 21:44

## 2025-01-27 RX ADMIN — Medication 1 APPLICATION(S): at 11:42

## 2025-01-27 NOTE — PROGRESS NOTE ADULT - PROBLEM SELECTOR PLAN 2
- Open foot wound on R foot, lost to following-up with surgeon on wound care after I&D in May 2024   - Xray negative for signs of tracts or OM  - ESR and CRP wnl    > Wound care consulted -> recommended pod consult -> recommended topical wound care and outpt podiatry f/u. - Pt complaining of neuropathic pain that comes and goes  - Starting gabapentin 100mg TID

## 2025-01-27 NOTE — PROGRESS NOTE ADULT - PROBLEM SELECTOR PLAN 3
-Hx of uterine fibroids w/ heavy periods  -c/w home iron supplementation every other day for better absorption   -f/u w/ OBGYN outpatient. - Open foot wound on R foot, lost to following-up with surgeon on wound care after I&D in May 2024   - Xray negative for signs of tracts or OM  - ESR and CRP wnl    > Wound care consulted -> recommended pod consult -> recommended topical wound care and outpt podiatry f/u.

## 2025-01-27 NOTE — PROGRESS NOTE ADULT - PROBLEM SELECTOR PLAN 4
VTE PPx: Heparin gtt  Nutrition: Regular Diet  Fluids: None  Electrolytes: Maintain K>4, Mag >2, Phos > 3  Dispo: home -Hx of uterine fibroids w/ heavy periods  -c/w home iron supplementation every other day for better absorption   -f/u w/ OBGYN outpatient.

## 2025-01-27 NOTE — PROGRESS NOTE ADULT - SUBJECTIVE AND OBJECTIVE BOX
Patient is a 44y old  Female who presents with a chief complaint of PE (27 Jan 2025 07:37)       INTERVAL HPI/OVERNIGHT EVENTS:  Patient seen and evaluated at bedside.  Pt is resting comfortable in NAD. Denies N/V/F/C.  Pain rated at X/10    Allergies    latex (Hives; Rash)  Zyrtec (Rash)  shellfish (Unknown)    Intolerances    lactose (Unknown)      Vital Signs Last 24 Hrs  T(C): 37 (27 Jan 2025 14:55), Max: 37 (27 Jan 2025 14:55)  T(F): 98.6 (27 Jan 2025 14:55), Max: 98.6 (27 Jan 2025 14:55)  HR: 91 (27 Jan 2025 14:55) (73 - 91)  BP: 128/93 (27 Jan 2025 05:30) (128/93 - 134/95)  BP(mean): --  RR: 17 (27 Jan 2025 14:55) (17 - 17)  SpO2: 100% (27 Jan 2025 14:55) (100% - 100%)    Parameters below as of 27 Jan 2025 14:55  Patient On (Oxygen Delivery Method): room air        LABS:                        11.9   3.67  )-----------( 338      ( 27 Jan 2025 06:15 )             34.5     01-27    137  |  102  |  13  ----------------------------<  83  4.1   |  20[L]  |  0.44[L]    Ca    9.2      27 Jan 2025 06:15  Phos  5.3     01-27  Mg     2.00     01-27      PTT - ( 26 Jan 2025 06:05 )  PTT:56.4 sec  Urinalysis Basic - ( 27 Jan 2025 06:15 )    Color: x / Appearance: x / SG: x / pH: x  Gluc: 83 mg/dL / Ketone: x  / Bili: x / Urobili: x   Blood: x / Protein: x / Nitrite: x   Leuk Esterase: x / RBC: x / WBC x   Sq Epi: x / Non Sq Epi: x / Bacteria: x      CAPILLARY BLOOD GLUCOSE          Lower Extremity Physical Exam:  Vascular: DP/PT 1/4, B/L, CFT < 3 seconds B/L, Temperature gradient  warm to warm, B/L.   Neuro: Epicritic sensation diminished to the level of digits, B/L.  Musculoskeletal/Ortho: dropfoot, b/l  Skin: Right foot dorsomedial wound to subQ, circumferential erythema, midfoot non-pitting edema, no drainage, no malodor, no fluctuance, lefft foot no wounds, now healing with eschar intact  RADIOLOGY & ADDITIONAL TESTS:

## 2025-01-27 NOTE — PROGRESS NOTE ADULT - TIME BILLING
reviewing laboratory data, consultants' recommendations, documentation in [place], performing medically appropriate examinations/evaluations, discussion with patient/family/RN/ACP/Residents and interdisciplinary staff (such as , social workers, etc), counseling patient/family/care giver, ordering medically appropriate medication, tests, or procedures
reviewing laboratory data, consultants' recommendations, documentation in [place], performing medically appropriate examinations/evaluations, discussion with patient/family/RN/ACP/Residents and interdisciplinary staff (such as , social workers, etc), counseling patient/family/care giver, ordering medically appropriate medication, tests, or procedures
reviewing laboratory data, consultants' recommendations, documentation in [place], performing medically appropriate examinations/evaluations, discussion with patient/family/RN/ACP/Residents and interdisciplinary staff (such as , social workers, etc), counseling patient/family/care giver, ordering medically appropriate medication, tests, or procedures

## 2025-01-27 NOTE — PROGRESS NOTE ADULT - ASSESSMENT
44F presents for right foot wound , neuropathy  -Pt was seen and evaluated  -Right foot dorsomedial wound to subQ with surrounding hyperkeratotic tissue, no erythema, no drainage, no malodor, no fluctuance, no acute signs of infection. left foot no open wounds, no acute signs of infection. Today with eschar intact  -Right foot xray: no OM, no gas  -No culture obtained 2/2 no acute signs of infection  -Betadine with dry dressing  -Wound care orders placed  -Pt is stable for discharge from podiatry standpoint  -Wound care and follow-up information placed in discharge provider note

## 2025-01-27 NOTE — PROGRESS NOTE ADULT - SUBJECTIVE AND OBJECTIVE BOX
***Plan not finalized until attending attestation***    Buster Santizo MD (PGY-1)  Internal Medicine  Contact via [employer] TEAMS    ******************************************    PROGRESS NOTE:     Patient is a 44y old  Female who presents with a chief complaint of PE (26 Jan 2025 08:02)      INTERVAL EVENTS: No acute overnight events.     SUBJECTIVE: Patient seen and examined at bedside. This morning, the patient is comfortable and doing well. No acute complaints.    MEDICATIONS  (STANDING):  apixaban 10 milliGRAM(s) Oral every 12 hours  collagenase Ointment 1 Application(s) Topical daily  ferrous    sulfate 325 milliGRAM(s) Oral <User Schedule>  melatonin 3 milliGRAM(s) Oral at bedtime    MEDICATIONS  (PRN):  acetaminophen     Tablet .. 650 milliGRAM(s) Oral every 6 hours PRN Temp greater or equal to 38C (100.4F), Mild Pain (1 - 3)      CAPILLARY BLOOD GLUCOSE        I&O's Summary      PHYSICAL EXAM:  Vital Signs Last 24 Hrs  T(C): 36.7 (27 Jan 2025 05:30), Max: 36.9 (26 Jan 2025 21:37)  T(F): 98 (27 Jan 2025 05:30), Max: 98.5 (26 Jan 2025 21:37)  HR: 73 (27 Jan 2025 05:30) (67 - 87)  BP: 128/93 (27 Jan 2025 05:30) (124/91 - 134/95)  BP(mean): --  RR: 17 (27 Jan 2025 05:30) (17 - 17)  SpO2: 100% (27 Jan 2025 05:30) (99% - 100%)    Parameters below as of 27 Jan 2025 05:30  Patient On (Oxygen Delivery Method): room air        GENERAL: NAD, lying in bed comfortably  HEAD: Atraumatic, normocephalic  EYES: EOMI, PERRLA, conjunctiva and sclera clear  ENT: Moist mucous membranes  NECK: Supple, no JVD  HEART: S1, S2, Regular rate and rhythm, no murmurs, rubs, or gallops  LUNGS: Unlabored respirations, clear to auscultation bilaterally, no crackles, wheezing, or rhonchi  ABDOMEN: Soft, nontender, nondistended, +BS  EXTREMITIES: 2+ peripheral pulses bilaterally. No clubbing, cyanosis, or edema  NERVOUS SYSTEM:  A&Ox3, no focal deficits   SKIN: No rashes or lesions    LABS:                        11.9   3.67  )-----------( 338      ( 27 Jan 2025 06:15 )             34.5     01-26    136  |  105  |  15  ----------------------------<  91  4.0   |  19[L]  |  0.45[L]    Ca    8.5      26 Jan 2025 06:05  Phos  3.6     01-26  Mg     2.20     01-26      PTT - ( 26 Jan 2025 06:05 )  PTT:56.4 sec      Urinalysis Basic - ( 26 Jan 2025 06:05 )    Color: x / Appearance: x / SG: x / pH: x  Gluc: 91 mg/dL / Ketone: x  / Bili: x / Urobili: x   Blood: x / Protein: x / Nitrite: x   Leuk Esterase: x / RBC: x / WBC x   Sq Epi: x / Non Sq Epi: x / Bacteria: x          RADIOLOGY & ADDITIONAL TESTS:  Results Reviewed:   Imaging Personally Reviewed:  Electrocardiogram Personally Reviewed:  Tele: ***Plan not finalized until attending attestation***    Buster Santizo MD (PGY-1)  Internal Medicine  Contact via [employer] TEAMS    ******************************************    PROGRESS NOTE:     Patient is a 44y old  Female who presents with a chief complaint of PE (26 Jan 2025 08:02)    INTERVAL EVENTS: No acute overnight events.     SUBJECTIVE: Patient seen and examined at bedside.   Says she is still constipated and having new neuropathic pain down R arms and legs  Does not want to be discharged today    MEDICATIONS  (STANDING):  apixaban 10 milliGRAM(s) Oral every 12 hours  collagenase Ointment 1 Application(s) Topical daily  ferrous    sulfate 325 milliGRAM(s) Oral <User Schedule>  melatonin 3 milliGRAM(s) Oral at bedtime    MEDICATIONS  (PRN):  acetaminophen     Tablet .. 650 milliGRAM(s) Oral every 6 hours PRN Temp greater or equal to 38C (100.4F), Mild Pain (1 - 3)      PHYSICAL EXAM:  Vital Signs Last 24 Hrs  T(C): 36.7 (27 Jan 2025 05:30), Max: 36.9 (26 Jan 2025 21:37)  T(F): 98 (27 Jan 2025 05:30), Max: 98.5 (26 Jan 2025 21:37)  HR: 73 (27 Jan 2025 05:30) (67 - 87)  BP: 128/93 (27 Jan 2025 05:30) (124/91 - 134/95)  BP(mean): --  RR: 17 (27 Jan 2025 05:30) (17 - 17)  SpO2: 100% (27 Jan 2025 05:30) (99% - 100%)    Parameters below as of 27 Jan 2025 05:30  Patient On (Oxygen Delivery Method): room air        GENERAL: NAD, lying in bed comfortably  HEAD: Atraumatic, normocephalic  EYES: EOMI, PERRLA, conjunctiva and sclera clear  ENT: Moist mucous membranes  NECK: Supple, no JVD  HEART: S1, S2, Regular rate and rhythm, no murmurs, rubs, or gallops  LUNGS: Unlabored respirations, clear to auscultation bilaterally, no crackles, wheezing, or rhonchi  ABDOMEN: Soft, nontender, nondistended, +BS  EXTREMITIES: 2+ peripheral pulses bilaterally. No clubbing, cyanosis, or edema  NERVOUS SYSTEM:  A&Ox3, no focal deficits   SKIN: +R foot wound, bandaged     LABS:                        11.9   3.67  )-----------( 338      ( 27 Jan 2025 06:15 )             34.5     01-26    136  |  105  |  15  ----------------------------<  91  4.0   |  19[L]  |  0.45[L]    Ca    8.5      26 Jan 2025 06:05  Phos  3.6     01-26  Mg     2.20     01-26      PTT - ( 26 Jan 2025 06:05 )  PTT:56.4 sec      Urinalysis Basic - ( 26 Jan 2025 06:05 )    Color: x / Appearance: x / SG: x / pH: x  Gluc: 91 mg/dL / Ketone: x  / Bili: x / Urobili: x   Blood: x / Protein: x / Nitrite: x   Leuk Esterase: x / RBC: x / WBC x   Sq Epi: x / Non Sq Epi: x / Bacteria: x          RADIOLOGY & ADDITIONAL TESTS:  Results Reviewed:   Imaging Personally Reviewed:  Electrocardiogram Personally Reviewed:  Tele:

## 2025-01-27 NOTE — PROGRESS NOTE ADULT - ATTENDING COMMENTS
44 y.o. F w/ a hx of spina bifida, multiple bilateral foot surgeries,  shunt, uterine fibroids hospitalized for segmental PE's.   pt mensurating, counts stable, otherwise no concern for bleed on AC     # Segmental PE: Wily and pro-BNP not elevated.  Suspect provoked iso immobility. Will need to follow up with PCP for age appropriate cancer screening. Cont AC  #Constipation: Reported constipation, placed on bowel regimen. Abd x-ray done, f.u read but reports having BM   # Foot wound: Tan colored drainage noted on dressing. Wound care and podiatry done, no abx     stable for dc w out-pt f.u

## 2025-01-27 NOTE — PROGRESS NOTE ADULT - PROBLEM SELECTOR PLAN 1
-CT Angio Chest PE: Right lower lobe segmental pulmonary embolism  -Troponin <6,   -EF 67% on Echo, no wall motion abnormality or right heart strain  -PESI: Class I, Very Low Risk: 0-1.6% 30-day mortality in this group  -DVT Duplex b/l LE: neg     > Age-appropriate cancer screening outpatient   > Heparin ggt. Can transition to DOAC on DC. Eliquis meds to bed delivered.   > Monitor for sings or symptoms of bleeding. - CT Angio Chest PE: Right lower lobe segmental pulmonary embolism  -Troponin <6,   -EF 67% on Echo, no wall motion abnormality or right heart strain  -PESI: Class I, Very Low Risk: 0-1.6% 30-day mortality in this group  -DVT Duplex b/l LE: neg     > Heparin ggt -> switched to apixaban 10mg BID for 5 more days  > Monitor for sings or symptoms of bleeding  > Age-appropriate cancer screening outpatient

## 2025-01-28 PROCEDURE — 99232 SBSQ HOSP IP/OBS MODERATE 35: CPT | Mod: GC

## 2025-01-28 RX ORDER — ANTISEPTIC SURGICAL SCRUB 0.04 MG/ML
1 SOLUTION TOPICAL DAILY
Refills: 0 | Status: DISCONTINUED | OUTPATIENT
Start: 2025-01-28 | End: 2025-01-29

## 2025-01-28 RX ADMIN — ACETAMINOPHEN 650 MILLIGRAM(S): 160 SUSPENSION ORAL at 22:55

## 2025-01-28 RX ADMIN — GABAPENTIN 100 MILLIGRAM(S): 800 TABLET ORAL at 13:04

## 2025-01-28 RX ADMIN — ANTISEPTIC SURGICAL SCRUB 1 APPLICATION(S): 0.04 SOLUTION TOPICAL at 12:11

## 2025-01-28 RX ADMIN — ACETAMINOPHEN, DIPHENHYDRAMINE HCL, PHENYLEPHRINE HCL 3 MILLIGRAM(S): 325; 25; 5 TABLET ORAL at 21:55

## 2025-01-28 RX ADMIN — ACETAMINOPHEN 650 MILLIGRAM(S): 160 SUSPENSION ORAL at 12:10

## 2025-01-28 RX ADMIN — APIXABAN 10 MILLIGRAM(S): 5 TABLET, FILM COATED ORAL at 05:28

## 2025-01-28 RX ADMIN — GABAPENTIN 100 MILLIGRAM(S): 800 TABLET ORAL at 21:55

## 2025-01-28 RX ADMIN — ACETAMINOPHEN 650 MILLIGRAM(S): 160 SUSPENSION ORAL at 13:10

## 2025-01-28 RX ADMIN — Medication 1 APPLICATION(S): at 12:11

## 2025-01-28 RX ADMIN — APIXABAN 10 MILLIGRAM(S): 5 TABLET, FILM COATED ORAL at 18:16

## 2025-01-28 RX ADMIN — ACETAMINOPHEN 650 MILLIGRAM(S): 160 SUSPENSION ORAL at 21:55

## 2025-01-28 RX ADMIN — GABAPENTIN 100 MILLIGRAM(S): 800 TABLET ORAL at 05:28

## 2025-01-28 NOTE — PROVIDER CONTACT NOTE (OTHER) - ACTION/TREATMENT ORDERED:
MD lin
MD in to evaluate patient.
As per MD, " thank you for letting me know, continue to monitor".
[id number] Comprehensive

## 2025-01-28 NOTE — PROGRESS NOTE ADULT - PROBLEM SELECTOR PLAN 1
- CT Angio Chest PE: Right lower lobe segmental pulmonary embolism  -Troponin <6,   -EF 67% on Echo, no wall motion abnormality or right heart strain  -PESI: Class I, Very Low Risk: 0-1.6% 30-day mortality in this group  -DVT Duplex b/l LE: neg     > Heparin ggt -> switched to apixaban 10mg BID for 5 more days  > Monitor for sings or symptoms of bleeding  > Age-appropriate cancer screening outpatient

## 2025-01-28 NOTE — PROGRESS NOTE ADULT - ATTENDING COMMENTS
refused dc yesterday d.t neuropathic pain - her complaints vary from provider to provider and are not consistent - she remains non-focal   she has ongoing neuropathic pain since childhood - explained to pt that no further inpt work up warranted and she should f.u out-pt  counts remain stable and she is elif AC    dc notice to be given today     remains stable for dc w out-pt PT per PT recs - no DME needs

## 2025-01-28 NOTE — PROGRESS NOTE ADULT - PROBLEM SELECTOR PLAN 2
- Pt complaining of neuropathic pain that comes and goes  - Starting gabapentin 100mg TID - Pt complaining of neuropathic pain that comes and goes. Chronic pain.  - Pt does not want to leave until this pain has resolved. Pt told risks of staying in hospital and that outpt follow-up is recommended. 24hr notice given.  - Starting gabapentin 100mg TID

## 2025-01-28 NOTE — PROGRESS NOTE ADULT - SUBJECTIVE AND OBJECTIVE BOX
***Plan not finalized until attending attestation***    Buster Santizo MD (PGY-1)  Internal Medicine  Contact via [employer] TEAMS    ******************************************    PROGRESS NOTE:     Patient is a 44y old  Female who presents with a chief complaint of PE (27 Jan 2025 20:22)      INTERVAL EVENTS: No acute overnight events.     SUBJECTIVE: Patient seen and examined at bedside. This morning, the patient is comfortable and doing well. No acute complaints.    MEDICATIONS  (STANDING):  apixaban 10 milliGRAM(s) Oral every 12 hours  chlorhexidine 2% Cloths 1 Application(s) Topical daily  collagenase Ointment 1 Application(s) Topical daily  ferrous    sulfate 325 milliGRAM(s) Oral <User Schedule>  gabapentin 100 milliGRAM(s) Oral three times a day  melatonin 3 milliGRAM(s) Oral at bedtime    MEDICATIONS  (PRN):  acetaminophen     Tablet .. 650 milliGRAM(s) Oral every 6 hours PRN Temp greater or equal to 38C (100.4F), Mild Pain (1 - 3)      CAPILLARY BLOOD GLUCOSE        I&O's Summary      PHYSICAL EXAM:  Vital Signs Last 24 Hrs  T(C): 36.9 (28 Jan 2025 05:13), Max: 37 (27 Jan 2025 14:55)  T(F): 98.4 (28 Jan 2025 05:13), Max: 98.6 (27 Jan 2025 14:55)  HR: 74 (28 Jan 2025 05:13) (74 - 91)  BP: 123/88 (28 Jan 2025 05:13) (123/88 - 142/106)  BP(mean): --  RR: 18 (28 Jan 2025 05:13) (17 - 18)  SpO2: 100% (28 Jan 2025 05:13) (99% - 100%)    Parameters below as of 28 Jan 2025 05:13  Patient On (Oxygen Delivery Method): room air        GENERAL: NAD, lying in bed comfortably  HEAD: Atraumatic, normocephalic  EYES: EOMI, PERRLA, conjunctiva and sclera clear  ENT: Moist mucous membranes  NECK: Supple, no JVD  HEART: S1, S2, Regular rate and rhythm, no murmurs, rubs, or gallops  LUNGS: Unlabored respirations, clear to auscultation bilaterally, no crackles, wheezing, or rhonchi  ABDOMEN: Soft, nontender, nondistended, +BS  EXTREMITIES: 2+ peripheral pulses bilaterally. No clubbing, cyanosis, or edema  NERVOUS SYSTEM:  A&Ox3, no focal deficits   SKIN: No rashes or lesions    LABS:                        11.9   3.67  )-----------( 338      ( 27 Jan 2025 06:15 )             34.5     01-27    137  |  102  |  13  ----------------------------<  83  4.1   |  20[L]  |  0.44[L]    Ca    9.2      27 Jan 2025 06:15  Phos  5.3     01-27  Mg     2.00     01-27            Urinalysis Basic - ( 27 Jan 2025 06:15 )    Color: x / Appearance: x / SG: x / pH: x  Gluc: 83 mg/dL / Ketone: x  / Bili: x / Urobili: x   Blood: x / Protein: x / Nitrite: x   Leuk Esterase: x / RBC: x / WBC x   Sq Epi: x / Non Sq Epi: x / Bacteria: x          RADIOLOGY & ADDITIONAL TESTS:  Results Reviewed:   Imaging Personally Reviewed:  Electrocardiogram Personally Reviewed:  Tele: ***Plan not finalized until attending attestation***    Buster Santizo MD (PGY-1)  Internal Medicine  Contact via [employer] TEAMS    ******************************************    PROGRESS NOTE:     Patient is a 44y old  Female who presents with a chief complaint of PE (27 Jan 2025 20:22)      INTERVAL EVENTS: No acute overnight events.     SUBJECTIVE: Patient seen and examined at bedside.   Has chronic pain that medications were started for inpt to address, but pt adamant about staying  Pt told that risks of getting sick in the hospital are high and chronic issues will be assessed outpt    MEDICATIONS  (STANDING):  apixaban 10 milliGRAM(s) Oral every 12 hours  chlorhexidine 2% Cloths 1 Application(s) Topical daily  collagenase Ointment 1 Application(s) Topical daily  ferrous    sulfate 325 milliGRAM(s) Oral <User Schedule>  gabapentin 100 milliGRAM(s) Oral three times a day  melatonin 3 milliGRAM(s) Oral at bedtime    MEDICATIONS  (PRN):  acetaminophen     Tablet .. 650 milliGRAM(s) Oral every 6 hours PRN Temp greater or equal to 38C (100.4F), Mild Pain (1 - 3)      PHYSICAL EXAM:  Vital Signs Last 24 Hrs  T(C): 36.9 (28 Jan 2025 05:13), Max: 37 (27 Jan 2025 14:55)  T(F): 98.4 (28 Jan 2025 05:13), Max: 98.6 (27 Jan 2025 14:55)  HR: 74 (28 Jan 2025 05:13) (74 - 91)  BP: 123/88 (28 Jan 2025 05:13) (123/88 - 142/106)  BP(mean): --  RR: 18 (28 Jan 2025 05:13) (17 - 18)  SpO2: 100% (28 Jan 2025 05:13) (99% - 100%)    Parameters below as of 28 Jan 2025 05:13  Patient On (Oxygen Delivery Method): room air        GENERAL: NAD  HEAD: Atraumatic, normocephalic  EYES: EOMI, PERRLA, conjunctiva and sclera clear  ENT: Moist mucous membranes  NECK: Supple, no JVD  HEART: S1, S2, Regular rate and rhythm, no murmurs, rubs, or gallops  LUNGS: Unlabored respirations, clear to auscultation bilaterally, no crackles, wheezing, or rhonchi  ABDOMEN: Soft, nontender, nondistended, +BS  EXTREMITIES: 2+ peripheral pulses bilaterally. No clubbing, cyanosis, or edema  NERVOUS SYSTEM:  A&Ox3, no focal deficits   SKIN: No rashes or lesions    LABS:                        11.9   3.67  )-----------( 338      ( 27 Jan 2025 06:15 )             34.5     01-27    137  |  102  |  13  ----------------------------<  83  4.1   |  20[L]  |  0.44[L]    Ca    9.2      27 Jan 2025 06:15  Phos  5.3     01-27  Mg     2.00     01-27            Urinalysis Basic - ( 27 Jan 2025 06:15 )    Color: x / Appearance: x / SG: x / pH: x  Gluc: 83 mg/dL / Ketone: x  / Bili: x / Urobili: x   Blood: x / Protein: x / Nitrite: x   Leuk Esterase: x / RBC: x / WBC x   Sq Epi: x / Non Sq Epi: x / Bacteria: x

## 2025-01-28 NOTE — CHART NOTE - NSCHARTNOTEFT_GEN_A_CORE
GEL OVERLAY TO PREVENT SKIN BREAKDOWN.      Dx spina bifida Q05.9, pulmonary embolism I26.99, pressure sore of ischial area L89.309, wound of right foot S91.301A    ALEENA: 99

## 2025-01-28 NOTE — PROGRESS NOTE ADULT - ASSESSMENT
44F PMHx spina bifida, multiple bilateral foot surgeries, and  shunt (managed [facility]), uterine fibroids presenting with pleuritic chest pain and found to have low risk PE, likely provoked by immobility on hep gtt. Now transitioned to DOAC. 44F PMHx spina bifida, multiple bilateral foot surgeries, and  shunt (managed [facility]), uterine fibroids presenting with pleuritic chest pain and found to have low risk PE, likely provoked by immobility on hep gtt. Now transitioned to DOAC. Has multiple chronic complaints that have addressed inpt, but should be outpt. Pt agitated by this fact and has been given 24hr notice.

## 2025-01-29 VITALS
OXYGEN SATURATION: 99 % | RESPIRATION RATE: 18 BRPM | HEART RATE: 96 BPM | TEMPERATURE: 98 F | SYSTOLIC BLOOD PRESSURE: 137 MMHG | DIASTOLIC BLOOD PRESSURE: 92 MMHG

## 2025-01-29 LAB
MRSA PCR RESULT.: SIGNIFICANT CHANGE UP
S AUREUS DNA NOSE QL NAA+PROBE: SIGNIFICANT CHANGE UP

## 2025-01-29 PROCEDURE — 99232 SBSQ HOSP IP/OBS MODERATE 35: CPT | Mod: GC

## 2025-01-29 RX ORDER — APIXABAN 5 MG/1
2 TABLET, FILM COATED ORAL
Qty: 120 | Refills: 0
Start: 2025-01-29 | End: 2025-02-27

## 2025-01-29 RX ORDER — GABAPENTIN 800 MG/1
1 TABLET ORAL
Qty: 30 | Refills: 3
Start: 2025-01-29

## 2025-01-29 RX ADMIN — APIXABAN 10 MILLIGRAM(S): 5 TABLET, FILM COATED ORAL at 05:26

## 2025-01-29 RX ADMIN — ANTISEPTIC SURGICAL SCRUB 1 APPLICATION(S): 0.04 SOLUTION TOPICAL at 11:07

## 2025-01-29 RX ADMIN — Medication 325 MILLIGRAM(S): at 09:17

## 2025-01-29 RX ADMIN — GABAPENTIN 100 MILLIGRAM(S): 800 TABLET ORAL at 05:27

## 2025-01-29 RX ADMIN — GABAPENTIN 100 MILLIGRAM(S): 800 TABLET ORAL at 13:01

## 2025-01-29 RX ADMIN — Medication 1 APPLICATION(S): at 11:09

## 2025-01-29 NOTE — PROGRESS NOTE ADULT - PROBLEM SELECTOR PLAN 2
- Pt complaining of neuropathic pain that comes and goes. Chronic pain.  - Pt does not want to leave until this pain has resolved. Pt told risks of staying in hospital and that outpt follow-up is recommended. 24hr notice given.  - Starting gabapentin 100mg TID

## 2025-01-29 NOTE — PROGRESS NOTE ADULT - ASSESSMENT
44F PMHx spina bifida, multiple bilateral foot surgeries, and  shunt (managed [facility]), uterine fibroids presenting with pleuritic chest pain and found to have low risk PE, likely provoked by immobility on hep gtt. Now transitioned to DOAC. Has multiple chronic complaints that have addressed inpt, but should be outpt. Pt agitated by this fact and has been given 24hr notice.

## 2025-01-29 NOTE — PROGRESS NOTE ADULT - SUBJECTIVE AND OBJECTIVE BOX
Patient is a 44y old  Female who presents with a chief complaint of PE (29 Jan 2025 07:39)       INTERVAL HPI/OVERNIGHT EVENTS:  Patient seen and evaluated at bedside.  Pt is resting comfortable in NAD. Denies N/V/F/C.  Pain rated at X/10    Allergies    latex (Hives; Rash)  Zyrtec (Rash)  shellfish (Unknown)    Intolerances    lactose (Unknown)      Vital Signs Last 24 Hrs  T(C): 36.8 (29 Jan 2025 06:07), Max: 37.2 (28 Jan 2025 14:39)  T(F): 98.2 (29 Jan 2025 06:07), Max: 98.9 (28 Jan 2025 14:39)  HR: 76 (29 Jan 2025 06:07) (76 - 84)  BP: 107/84 (29 Jan 2025 06:07) (107/84 - 153/103)  BP(mean): --  RR: 18 (29 Jan 2025 06:07) (18 - 18)  SpO2: 100% (29 Jan 2025 06:07) (100% - 100%)    Parameters below as of 29 Jan 2025 06:07  Patient On (Oxygen Delivery Method): room air        LABS:              CAPILLARY BLOOD GLUCOSE          Lower Extremity Physical Exam:  Vascular: DP/PT 1/4, B/L, CFT < 3 seconds B/L, Temperature gradient  warm to warm, B/L.   Neuro: Epicritic sensation diminished to the level of digits, B/L.  Musculoskeletal/Ortho: dropfoot, b/l  Skin: Right foot dorsomedial wound to subQ, circumferential erythema, midfoot non-pitting edema, no drainage, no malodor, no fluctuance, lefft foot no wounds, now healing with eschar intact    RADIOLOGY & ADDITIONAL TESTS:

## 2025-01-29 NOTE — PROGRESS NOTE ADULT - PROVIDER SPECIALTY LIST ADULT
Podiatry
Internal Medicine
Podiatry
Internal Medicine

## 2025-01-29 NOTE — PROGRESS NOTE ADULT - SUBJECTIVE AND OBJECTIVE BOX
***Plan not finalized until attending attestation***    Buster Santizo MD (PGY-1)  Internal Medicine  Contact via [employer] TEAMS    ******************************************    PROGRESS NOTE:     Patient is a 44y old  Female who presents with a chief complaint of PE (28 Jan 2025 08:06)      INTERVAL EVENTS: No acute overnight events.     SUBJECTIVE: Patient seen and examined at bedside. This morning, the patient is comfortable and doing well. No acute complaints.    MEDICATIONS  (STANDING):  apixaban 10 milliGRAM(s) Oral every 12 hours  chlorhexidine 2% Cloths 1 Application(s) Topical daily  collagenase Ointment 1 Application(s) Topical daily  ferrous    sulfate 325 milliGRAM(s) Oral <User Schedule>  gabapentin 100 milliGRAM(s) Oral three times a day  melatonin 3 milliGRAM(s) Oral at bedtime    MEDICATIONS  (PRN):  acetaminophen     Tablet .. 650 milliGRAM(s) Oral every 6 hours PRN Temp greater or equal to 38C (100.4F), Mild Pain (1 - 3)      CAPILLARY BLOOD GLUCOSE        I&O's Summary      PHYSICAL EXAM:  Vital Signs Last 24 Hrs  T(C): 36.8 (29 Jan 2025 06:07), Max: 37.2 (28 Jan 2025 14:39)  T(F): 98.2 (29 Jan 2025 06:07), Max: 98.9 (28 Jan 2025 14:39)  HR: 76 (29 Jan 2025 06:07) (76 - 84)  BP: 107/84 (29 Jan 2025 06:07) (107/84 - 153/103)  BP(mean): --  RR: 18 (29 Jan 2025 06:07) (18 - 18)  SpO2: 100% (29 Jan 2025 06:07) (100% - 100%)    Parameters below as of 29 Jan 2025 06:07  Patient On (Oxygen Delivery Method): room air        GENERAL: NAD, lying in bed comfortably  HEAD: Atraumatic, normocephalic  EYES: EOMI, PERRLA, conjunctiva and sclera clear  ENT: Moist mucous membranes  NECK: Supple, no JVD  HEART: S1, S2, Regular rate and rhythm, no murmurs, rubs, or gallops  LUNGS: Unlabored respirations, clear to auscultation bilaterally, no crackles, wheezing, or rhonchi  ABDOMEN: Soft, nontender, nondistended, +BS  EXTREMITIES: 2+ peripheral pulses bilaterally. No clubbing, cyanosis, or edema  NERVOUS SYSTEM:  A&Ox3, no focal deficits   SKIN: No rashes or lesions    LABS:                      RADIOLOGY & ADDITIONAL TESTS:  Results Reviewed:   Imaging Personally Reviewed:  Electrocardiogram Personally Reviewed:  Tele: ***Plan not finalized until attending attestation***    Buster Santizo MD (PGY-1)  Internal Medicine  Contact via [employer] TEAMS    ******************************************    PROGRESS NOTE:     Patient is a 44y old  Female who presents with a chief complaint of PE (28 Jan 2025 08:06)      INTERVAL EVENTS: No acute overnight events.     SUBJECTIVE: Patient seen and examined at bedside.   Concerns about chronic neuropathy and pain for years. Discussed requiring further outpt management    MEDICATIONS  (STANDING):  apixaban 10 milliGRAM(s) Oral every 12 hours  chlorhexidine 2% Cloths 1 Application(s) Topical daily  collagenase Ointment 1 Application(s) Topical daily  ferrous    sulfate 325 milliGRAM(s) Oral <User Schedule>  gabapentin 100 milliGRAM(s) Oral three times a day  melatonin 3 milliGRAM(s) Oral at bedtime    MEDICATIONS  (PRN):  acetaminophen     Tablet .. 650 milliGRAM(s) Oral every 6 hours PRN Temp greater or equal to 38C (100.4F), Mild Pain (1 - 3)      CAPILLARY BLOOD GLUCOSE        I&O's Summary      PHYSICAL EXAM:  Vital Signs Last 24 Hrs  T(C): 36.8 (29 Jan 2025 06:07), Max: 37.2 (28 Jan 2025 14:39)  T(F): 98.2 (29 Jan 2025 06:07), Max: 98.9 (28 Jan 2025 14:39)  HR: 76 (29 Jan 2025 06:07) (76 - 84)  BP: 107/84 (29 Jan 2025 06:07) (107/84 - 153/103)  BP(mean): --  RR: 18 (29 Jan 2025 06:07) (18 - 18)  SpO2: 100% (29 Jan 2025 06:07) (100% - 100%)    Parameters below as of 29 Jan 2025 06:07  Patient On (Oxygen Delivery Method): room air          GENERAL: NAD  HEAD: Atraumatic, normocephalic  EYES: EOMI, PERRLA, conjunctiva and sclera clear  ENT: Moist mucous membranes  NECK: Supple, no JVD  HEART: S1, S2, Regular rate and rhythm, no murmurs, rubs, or gallops  LUNGS: Unlabored respirations, clear to auscultation bilaterally, no crackles, wheezing, or rhonchi  ABDOMEN: Soft, nontender, nondistended, +BS  EXTREMITIES: 2+ peripheral pulses bilaterally. No clubbing, cyanosis, or edema  NERVOUS SYSTEM:  A&Ox3, no focal deficits   SKIN: No rashes or lesions

## 2025-01-29 NOTE — PROGRESS NOTE ADULT - PROBLEM SELECTOR PROBLEM 5
Constipation
Need for prophylactic measure

## 2025-01-29 NOTE — PROGRESS NOTE ADULT - PROBLEM SELECTOR PLAN 5
VTE PPx: Apixaban 10mg  Nutrition: Regular Diet  Fluids: None  Electrolytes: Maintain K>4, Mag >2, Phos > 3  Dispo: home
VTE PPx: Apixaban 10mg  Nutrition: Regular Diet  Fluids: None  Electrolytes: Maintain K>4, Mag >2, Phos > 3  Dispo: home
Patient with diarrhea earlier in hospital course, now states she has not had a BM for 3 days. With lower quadrant abd pain.  - Has not used PRN opiates  - Miralax, senna, dulcolax suppository
VTE PPx: Apixaban 10mg  Nutrition: Regular Diet  Fluids: None  Electrolytes: Maintain K>4, Mag >2, Phos > 3  Dispo: home

## 2025-01-29 NOTE — PROGRESS NOTE ADULT - PROBLEM SELECTOR PLAN 1
- CT Angio Chest PE: Right lower lobe segmental pulmonary embolism  -Troponin <6,   -EF 67% on Echo, no wall motion abnormality or right heart strain  -PESI: Class I, Very Low Risk: 0-1.6% 30-day mortality in this group  -DVT Duplex b/l LE: neg     > Heparin ggt -> switched to apixaban 10mg BID for 5 more days  > Monitor for sings or symptoms of bleeding  > Age-appropriate cancer screening outpatient - CT Angio Chest PE: Right lower lobe segmental pulmonary embolism  -Troponin <6,   -EF 67% on Echo, no wall motion abnormality or right heart strain  -PESI: Class I, Very Low Risk: 0-1.6% 30-day mortality in this group  -DVT Duplex b/l LE: neg     > Heparin ggt -> switched to apixaban 10mg BID for 3 1/2 more days  > Monitor for sings or symptoms of bleeding  > Age-appropriate cancer screening outpatient

## 2025-02-01 ENCOUNTER — INPATIENT (INPATIENT)
Facility: HOSPITAL | Age: 45
LOS: 0 days | Discharge: HOME HEALTH | End: 2025-02-02
Attending: HOSPITALIST | Admitting: HOSPITALIST
Payer: MEDICAID

## 2025-02-01 VITALS
SYSTOLIC BLOOD PRESSURE: 157 MMHG | TEMPERATURE: 98 F | DIASTOLIC BLOOD PRESSURE: 99 MMHG | WEIGHT: 102.96 LBS | HEART RATE: 113 BPM | HEIGHT: 59 IN | RESPIRATION RATE: 24 BRPM | OXYGEN SATURATION: 100 %

## 2025-02-01 DIAGNOSIS — Z29.9 ENCOUNTER FOR PROPHYLACTIC MEASURES, UNSPECIFIED: ICD-10-CM

## 2025-02-01 DIAGNOSIS — M79.2 NEURALGIA AND NEURITIS, UNSPECIFIED: ICD-10-CM

## 2025-02-01 DIAGNOSIS — R19.7 DIARRHEA, UNSPECIFIED: ICD-10-CM

## 2025-02-01 DIAGNOSIS — L89.309 PRESSURE ULCER OF UNSPECIFIED BUTTOCK, UNSPECIFIED STAGE: ICD-10-CM

## 2025-02-01 DIAGNOSIS — Z98.890 OTHER SPECIFIED POSTPROCEDURAL STATES: Chronic | ICD-10-CM

## 2025-02-01 DIAGNOSIS — R07.9 CHEST PAIN, UNSPECIFIED: ICD-10-CM

## 2025-02-01 DIAGNOSIS — Z98.2 PRESENCE OF CEREBROSPINAL FLUID DRAINAGE DEVICE: Chronic | ICD-10-CM

## 2025-02-01 DIAGNOSIS — S91.301A UNSPECIFIED OPEN WOUND, RIGHT FOOT, INITIAL ENCOUNTER: ICD-10-CM

## 2025-02-01 DIAGNOSIS — R07.81 PLEURODYNIA: ICD-10-CM

## 2025-02-01 DIAGNOSIS — I26.99 OTHER PULMONARY EMBOLISM WITHOUT ACUTE COR PULMONALE: ICD-10-CM

## 2025-02-01 DIAGNOSIS — Z86.018 PERSONAL HISTORY OF OTHER BENIGN NEOPLASM: ICD-10-CM

## 2025-02-01 LAB
A1C WITH ESTIMATED AVERAGE GLUCOSE RESULT: 5.3 % — SIGNIFICANT CHANGE UP (ref 4–5.6)
ADD ON TEST-SPECIMEN IN LAB: SIGNIFICANT CHANGE UP
ALBUMIN SERPL ELPH-MCNC: 3.7 G/DL — SIGNIFICANT CHANGE UP (ref 3.3–5)
ALP SERPL-CCNC: 61 U/L — SIGNIFICANT CHANGE UP (ref 40–120)
ALT FLD-CCNC: 26 U/L — SIGNIFICANT CHANGE UP (ref 4–33)
ANION GAP SERPL CALC-SCNC: 12 MMOL/L — SIGNIFICANT CHANGE UP (ref 7–14)
ANION GAP SERPL CALC-SCNC: 12 MMOL/L — SIGNIFICANT CHANGE UP (ref 7–14)
AST SERPL-CCNC: 33 U/L — HIGH (ref 4–32)
BASOPHILS # BLD AUTO: 0.03 K/UL — SIGNIFICANT CHANGE UP (ref 0–0.2)
BASOPHILS NFR BLD AUTO: 0.5 % — SIGNIFICANT CHANGE UP (ref 0–2)
BILIRUB SERPL-MCNC: 0.3 MG/DL — SIGNIFICANT CHANGE UP (ref 0.2–1.2)
BLOOD GAS VENOUS COMPREHENSIVE RESULT: SIGNIFICANT CHANGE UP
BUN SERPL-MCNC: 11 MG/DL — SIGNIFICANT CHANGE UP (ref 7–23)
BUN SERPL-MCNC: 12 MG/DL — SIGNIFICANT CHANGE UP (ref 7–23)
CALCIUM SERPL-MCNC: 8.7 MG/DL — SIGNIFICANT CHANGE UP (ref 8.4–10.5)
CALCIUM SERPL-MCNC: 8.8 MG/DL — SIGNIFICANT CHANGE UP (ref 8.4–10.5)
CHLORIDE SERPL-SCNC: 107 MMOL/L — SIGNIFICANT CHANGE UP (ref 98–107)
CHLORIDE SERPL-SCNC: 108 MMOL/L — HIGH (ref 98–107)
CK MB BLD-MCNC: 2.1 % — SIGNIFICANT CHANGE UP (ref 0–2.5)
CK MB CFR SERPL CALC: 1.4 NG/ML — SIGNIFICANT CHANGE UP
CK SERPL-CCNC: 67 U/L — SIGNIFICANT CHANGE UP (ref 25–170)
CO2 SERPL-SCNC: 17 MMOL/L — LOW (ref 22–31)
CO2 SERPL-SCNC: 19 MMOL/L — LOW (ref 22–31)
CREAT SERPL-MCNC: 0.4 MG/DL — LOW (ref 0.5–1.3)
CREAT SERPL-MCNC: 0.49 MG/DL — LOW (ref 0.5–1.3)
EGFR: 118 ML/MIN/1.73M2 — SIGNIFICANT CHANGE UP
EGFR: 124 ML/MIN/1.73M2 — SIGNIFICANT CHANGE UP
EOSINOPHIL # BLD AUTO: 0.07 K/UL — SIGNIFICANT CHANGE UP (ref 0–0.5)
EOSINOPHIL NFR BLD AUTO: 1.2 % — SIGNIFICANT CHANGE UP (ref 0–6)
ESTIMATED AVERAGE GLUCOSE: 105 — SIGNIFICANT CHANGE UP
FLUAV AG NPH QL: SIGNIFICANT CHANGE UP
FLUBV AG NPH QL: SIGNIFICANT CHANGE UP
GLUCOSE SERPL-MCNC: 81 MG/DL — SIGNIFICANT CHANGE UP (ref 70–99)
GLUCOSE SERPL-MCNC: 91 MG/DL — SIGNIFICANT CHANGE UP (ref 70–99)
HCT VFR BLD CALC: 36.8 % — SIGNIFICANT CHANGE UP (ref 34.5–45)
HGB BLD-MCNC: 12.4 G/DL — SIGNIFICANT CHANGE UP (ref 11.5–15.5)
IANC: 2.88 K/UL — SIGNIFICANT CHANGE UP (ref 1.8–7.4)
IMM GRANULOCYTES NFR BLD AUTO: 0.3 % — SIGNIFICANT CHANGE UP (ref 0–0.9)
LYMPHOCYTES # BLD AUTO: 2.31 K/UL — SIGNIFICANT CHANGE UP (ref 1–3.3)
LYMPHOCYTES # BLD AUTO: 38.3 % — SIGNIFICANT CHANGE UP (ref 13–44)
MAGNESIUM SERPL-MCNC: 2.1 MG/DL — SIGNIFICANT CHANGE UP (ref 1.6–2.6)
MCHC RBC-ENTMCNC: 28.1 PG — SIGNIFICANT CHANGE UP (ref 27–34)
MCHC RBC-ENTMCNC: 33.7 G/DL — SIGNIFICANT CHANGE UP (ref 32–36)
MCV RBC AUTO: 83.3 FL — SIGNIFICANT CHANGE UP (ref 80–100)
MONOCYTES # BLD AUTO: 0.72 K/UL — SIGNIFICANT CHANGE UP (ref 0–0.9)
MONOCYTES NFR BLD AUTO: 11.9 % — SIGNIFICANT CHANGE UP (ref 2–14)
NEUTROPHILS # BLD AUTO: 2.88 K/UL — SIGNIFICANT CHANGE UP (ref 1.8–7.4)
NEUTROPHILS NFR BLD AUTO: 47.8 % — SIGNIFICANT CHANGE UP (ref 43–77)
NRBC # BLD AUTO: 0 K/UL — SIGNIFICANT CHANGE UP (ref 0–0)
NRBC # BLD: 0 /100 WBCS — SIGNIFICANT CHANGE UP (ref 0–0)
NRBC # FLD: 0 K/UL — SIGNIFICANT CHANGE UP (ref 0–0)
NRBC BLD-RTO: 0 /100 WBCS — SIGNIFICANT CHANGE UP (ref 0–0)
NT-PROBNP SERPL-SCNC: <36 PG/ML — SIGNIFICANT CHANGE UP
PHOSPHATE SERPL-MCNC: 2.4 MG/DL — LOW (ref 2.5–4.5)
PLATELET # BLD AUTO: 404 K/UL — HIGH (ref 150–400)
POTASSIUM SERPL-MCNC: 3.9 MMOL/L — SIGNIFICANT CHANGE UP (ref 3.5–5.3)
POTASSIUM SERPL-MCNC: 5.4 MMOL/L — HIGH (ref 3.5–5.3)
POTASSIUM SERPL-SCNC: 3.9 MMOL/L — SIGNIFICANT CHANGE UP (ref 3.5–5.3)
POTASSIUM SERPL-SCNC: 5.4 MMOL/L — HIGH (ref 3.5–5.3)
PROT SERPL-MCNC: 7.5 G/DL — SIGNIFICANT CHANGE UP (ref 6–8.3)
RBC # BLD: 4.42 M/UL — SIGNIFICANT CHANGE UP (ref 3.8–5.2)
RBC # FLD: 15.2 % — HIGH (ref 10.3–14.5)
RSV RNA NPH QL NAA+NON-PROBE: SIGNIFICANT CHANGE UP
SARS-COV-2 RNA SPEC QL NAA+PROBE: SIGNIFICANT CHANGE UP
SODIUM SERPL-SCNC: 137 MMOL/L — SIGNIFICANT CHANGE UP (ref 135–145)
SODIUM SERPL-SCNC: 138 MMOL/L — SIGNIFICANT CHANGE UP (ref 135–145)
TROPONIN T, HIGH SENSITIVITY RESULT: 10 NG/L — SIGNIFICANT CHANGE UP
TROPONIN T, HIGH SENSITIVITY RESULT: <6 NG/L — SIGNIFICANT CHANGE UP
WBC # BLD: 6.03 K/UL — SIGNIFICANT CHANGE UP (ref 3.8–10.5)
WBC # FLD AUTO: 6.03 K/UL — SIGNIFICANT CHANGE UP (ref 3.8–10.5)

## 2025-02-01 PROCEDURE — 99285 EMERGENCY DEPT VISIT HI MDM: CPT

## 2025-02-01 PROCEDURE — 71045 X-RAY EXAM CHEST 1 VIEW: CPT | Mod: 26

## 2025-02-01 PROCEDURE — 71275 CT ANGIOGRAPHY CHEST: CPT | Mod: 26

## 2025-02-01 PROCEDURE — 99223 1ST HOSP IP/OBS HIGH 75: CPT | Mod: GC

## 2025-02-01 RX ORDER — ACETAMINOPHEN 160 MG/5ML
650 SUSPENSION ORAL EVERY 6 HOURS
Refills: 0 | Status: DISCONTINUED | OUTPATIENT
Start: 2025-02-01 | End: 2025-02-01

## 2025-02-01 RX ORDER — SODIUM PHOSPHATE, DIBASIC, ANHYDROUS, POTASSIUM PHOSPHATE, MONOBASIC, AND SODIUM PHOSPHATE, MONOBASIC, MONOHYDRATE 852; 155; 130 MG/1; MG/1; MG/1
1 TABLET, COATED ORAL ONCE
Refills: 0 | Status: COMPLETED | OUTPATIENT
Start: 2025-02-01 | End: 2025-02-01

## 2025-02-01 RX ORDER — APIXABAN 5 MG/1
5 TABLET, FILM COATED ORAL EVERY 12 HOURS
Refills: 0 | Status: DISCONTINUED | OUTPATIENT
Start: 2025-02-02 | End: 2025-02-02

## 2025-02-01 RX ORDER — APIXABAN 5 MG/1
10 TABLET, FILM COATED ORAL ONCE
Refills: 0 | Status: COMPLETED | OUTPATIENT
Start: 2025-02-01 | End: 2025-02-01

## 2025-02-01 RX ORDER — GABAPENTIN 800 MG/1
100 TABLET ORAL EVERY 8 HOURS
Refills: 0 | Status: DISCONTINUED | OUTPATIENT
Start: 2025-02-01 | End: 2025-02-02

## 2025-02-01 RX ORDER — ACETAMINOPHEN 160 MG/5ML
700 SUSPENSION ORAL ONCE
Refills: 0 | Status: COMPLETED | OUTPATIENT
Start: 2025-02-01 | End: 2025-02-01

## 2025-02-01 RX ORDER — FERROUS SULFATE 325(65) MG
325 TABLET ORAL DAILY
Refills: 0 | Status: DISCONTINUED | OUTPATIENT
Start: 2025-02-01 | End: 2025-02-02

## 2025-02-01 RX ORDER — ONDANSETRON 4 MG/1
4 TABLET, ORALLY DISINTEGRATING ORAL ONCE
Refills: 0 | Status: COMPLETED | OUTPATIENT
Start: 2025-02-01 | End: 2025-02-01

## 2025-02-01 RX ORDER — FAMOTIDINE 10 MG/ML
20 INJECTION INTRAVENOUS ONCE
Refills: 0 | Status: COMPLETED | OUTPATIENT
Start: 2025-02-01 | End: 2025-02-01

## 2025-02-01 RX ORDER — TRAMADOL HYDROCHLORIDE 100 MG/1
25 TABLET, EXTENDED RELEASE ORAL EVERY 8 HOURS
Refills: 0 | Status: DISCONTINUED | OUTPATIENT
Start: 2025-02-01 | End: 2025-02-02

## 2025-02-01 RX ORDER — ACETAMINOPHEN 160 MG/5ML
650 SUSPENSION ORAL EVERY 6 HOURS
Refills: 0 | Status: DISCONTINUED | OUTPATIENT
Start: 2025-02-01 | End: 2025-02-02

## 2025-02-01 RX ORDER — MAGNESIUM, ALUMINUM HYDROXIDE 200-225/5
30 SUSPENSION, ORAL (FINAL DOSE FORM) ORAL EVERY 4 HOURS
Refills: 0 | Status: DISCONTINUED | OUTPATIENT
Start: 2025-02-01 | End: 2025-02-01

## 2025-02-01 RX ORDER — ONDANSETRON 4 MG/1
4 TABLET, ORALLY DISINTEGRATING ORAL EVERY 8 HOURS
Refills: 0 | Status: DISCONTINUED | OUTPATIENT
Start: 2025-02-01 | End: 2025-02-02

## 2025-02-01 RX ORDER — IPRATROPIUM BROMIDE AND ALBUTEROL SULFATE .5; 2.5 MG/3ML; MG/3ML
3 SOLUTION RESPIRATORY (INHALATION)
Refills: 0 | Status: COMPLETED | OUTPATIENT
Start: 2025-02-01 | End: 2025-02-01

## 2025-02-01 RX ORDER — ACETAMINOPHEN, DIPHENHYDRAMINE HCL, PHENYLEPHRINE HCL 325; 25; 5 MG/1; MG/1; MG/1
3 TABLET ORAL AT BEDTIME
Refills: 0 | Status: DISCONTINUED | OUTPATIENT
Start: 2025-02-01 | End: 2025-02-01

## 2025-02-01 RX ORDER — FAMOTIDINE 10 MG/ML
20 INJECTION INTRAVENOUS DAILY
Refills: 0 | Status: DISCONTINUED | OUTPATIENT
Start: 2025-02-01 | End: 2025-02-02

## 2025-02-01 RX ORDER — MAGNESIUM, ALUMINUM HYDROXIDE 200-225/5
30 SUSPENSION, ORAL (FINAL DOSE FORM) ORAL ONCE
Refills: 0 | Status: COMPLETED | OUTPATIENT
Start: 2025-02-01 | End: 2025-02-01

## 2025-02-01 RX ADMIN — SODIUM PHOSPHATE, DIBASIC, ANHYDROUS, POTASSIUM PHOSPHATE, MONOBASIC, AND SODIUM PHOSPHATE, MONOBASIC, MONOHYDRATE 1 PACKET(S): 852; 155; 130 TABLET, COATED ORAL at 22:33

## 2025-02-01 RX ADMIN — Medication 30 MILLILITER(S): at 13:13

## 2025-02-01 RX ADMIN — GABAPENTIN 100 MILLIGRAM(S): 800 TABLET ORAL at 21:37

## 2025-02-01 RX ADMIN — ONDANSETRON 4 MILLIGRAM(S): 4 TABLET, ORALLY DISINTEGRATING ORAL at 14:39

## 2025-02-01 RX ADMIN — ACETAMINOPHEN 280 MILLIGRAM(S): 160 SUSPENSION ORAL at 17:10

## 2025-02-01 RX ADMIN — TRAMADOL HYDROCHLORIDE 25 MILLIGRAM(S): 100 TABLET, EXTENDED RELEASE ORAL at 21:37

## 2025-02-01 RX ADMIN — APIXABAN 10 MILLIGRAM(S): 5 TABLET, FILM COATED ORAL at 21:39

## 2025-02-01 RX ADMIN — FAMOTIDINE 20 MILLIGRAM(S): 10 INJECTION INTRAVENOUS at 13:19

## 2025-02-01 NOTE — H&P ADULT - PROBLEM SELECTOR PLAN 4
w/ known hx of pressure sore of L ischium.    Plan:  - Wound care consulted: appreciate recs  - Wound care orders from recent admission re-ordered until new recs appreciated. w/ known hx of pressure sore of L ischium.    Plan:  - Wound care consulted: appreciate recs  > Previous wound care orders placed recommended on last admission

## 2025-02-01 NOTE — ED PROVIDER NOTE - PHYSICAL EXAMINATION
GENERAL: +uncomfortable appearing  HEAD: normocephalic, atraumatic  HEENT: normal conjunctiva, oral mucosa moist, no JVD  CARDIAC: +reproducible chest pain regular rate and rhythm, normal S1S2, no appreciable murmurs, 2+ pulses in UE/LE b/l  PULM: normal breath sounds, clear to ascultation bilaterally, no rales, rhonchi, wheezing  GI: abdomen nondistended, soft, nontender, no guarding, rebound tenderness  NEURO: no focal motor or sensory deficits, normal speech, normal gait, AAOx3  MSK: no peripheral edema, no calf tenderness b/l  SKIN: well-perfused, extremities warm, no visible rashes  PSYCH: appropriate mood and affect GENERAL: +uncomfortable appearing  HEAD: normocephalic, atraumatic  HEENT: normal conjunctiva, oral mucosa moist, no JVD  CARDIAC: +reproducible chest pain, regular rate and rhythm, normal S1S2, no appreciable murmurs, 2+ pulses in UE/LE b/l  PULM: normal breath sounds, clear to ascultation bilaterally, no rales, rhonchi, wheezing  GI: abdomen nondistended, soft, nontender, no guarding, rebound tenderness  NEURO: no focal motor or sensory deficits, normal speech, normal gait, AAOx3  MSK: +shortened lower limbs (baseline), no peripheral edema, no calf tenderness b/l  SKIN: well-perfused, extremities warm, no visible rashes  PSYCH: appropriate mood and affect

## 2025-02-01 NOTE — H&P ADULT - ATTENDING COMMENTS
I have personally seen, examined, and participated in the care of this patient.  I have reviewed all pertinent clinical information, including history, physical exam, plan, and the resident's note and agree except as noted.    45 y/o F w/ a PMH of spina bifida, multiple b/l foot surgeries,  shunt (followed by [facility]), and uterine fibroids presenting w/ a 12-hour history of recurrent pleuritic chest pain in setting of recently diagnosed PE    #PE  #CP  Pain associated with deep inspiration consistent with PE. Also associated with food however so possible GERD sx as well. No SOB at this time. No hypoxia. CTA No main or lobar pulmonary embolus. The segmental and subsegmental branches within the lower lungs are not evaluated secondary to motion.  -tylenol PRN, tramadol PRN  -c/w AC, last dose 10mg today and c/w 5mg BID  -repeat troponin. EKG nonischemic    #Neuropathic pain  Chronic  C/w gabapentin    #Foot wound  States unroofed wound since leaving hospital - no bleeding currently and no signs of infection  -podiatry c/s in AM  -wound care c/s    #Ischial pressure wound  Known and chronic  Wound care c/s    #Diarrhea  Hold Bowel regimen  GI PCR if continues

## 2025-02-01 NOTE — H&P ADULT - HISTORY OF PRESENT ILLNESS
Patient is a 43 y/o F w/ a PMH of spina bifida, multiple b/l foot surgeries,  shunt (followed by [facility]), and uterine fibroids presenting w/ a 12-hour history of recurrent pleuritic chest pain. Of note, patient discharged 2 days ago after initial 1/23/2025 presentation for pleuritic CP found to be 2.2 to R lower lobe segmental PE (deemed likely provoked 2.2 to immobility following multiple foot surgeries i/s/o hx of spina bifida). This morning, patient reports 10/10, "sharp", "burning" CP following breakfast. Patient reports associated SOB at that time and an inability to speak in full sentences. Denies radiation of the pain, "crushing" in nature, or GERD-like symptoms. Reports that the pain is worse with inspiration. Denies trying analgesics at home for relief. Family called EMS at that time, who brought her in. Now, pain improved though still "7/10". Now denies SOB and able to communicate without dyspnea. Patent reports she has been compliant with medications since recent discharge, including Eliquis.    Moreover, patient reports 2-3x episodes diarrhea and 1x episode of vomiting while in the ED. Reports no nausea, but endorses hx of chronic constipation. Last BM >1 week ago prior to these episodes of diarrhea. Otherwise, denies headache, LOC, dizziness, cough, rhinorrhea, fever, palpitations, abdominal pain, hematochezia, hematuria, or lower extremity pain.     Of further note, patient reports R dorsal foot wound that has since raised since recent discharge. Patient endorses that Podiatry saw her throughout recent admission, for which they recommended topical txs. Denies swelling, redness, warmth surrounding wound. Patient is a 45 y/o F w/ a PMH of spina bifida, multiple b/l foot surgeries,  shunt (followed by [facility]), and uterine fibroids presenting w/ a 12-hour history of recurrent pleuritic chest pain. Of note, patient discharged 2 days ago after initial 1/23/2025 presentation for pleuritic CP found to be 2.2 to R lower lobe segmental PE (deemed likely provoked 2.2 to immobility following multiple foot surgeries i/s/o hx of spina bifida). This morning, patient reports 10/10, "sharp", "burning" CP following breakfast. Patient reports associated SOB at that time and an inability to speak in full sentences. Denies radiation of the pain, "crushing" in nature, or GERD-like symptoms. Reports that the pain is worse with inspiration. Denies trying analgesics at home for relief. Family called EMS at that time, who brought her in. Now, pain improved though still "7/10". Now denies SOB and able to communicate without dyspnea. Patent reports she has been compliant with medications since recent discharge, including Eliquis.    Moreover, patient reports 2-3x episodes diarrhea and 1x episode of vomiting while in the ED. Reports no nausea, but endorses hx of chronic constipation. Last BM >1 week ago prior to these episodes of diarrhea. Otherwise, denies headache, LOC, dizziness, cough, rhinorrhea, fever, palpitations, abdominal pain, hematochezia, hematuria, or lower extremity pain.     Of further note, patient reports R dorsal foot wound that has since raised (prior, flat) since recent discharge. Patient endorses that Podiatry saw her throughout recent admission, for which they recommended topical txs. Denies swelling, redness, warmth surrounding wound.    In the ED, initially tachycardic, hypertensive, tachypneic -> now, VSS. CBC, CMP grossly unremarkable. VBG 7.52/25/30/20. RVP (-). CXR clear lungs. CTA Chest w/ no main or lobar PE (segmental and subsegmental branches of lower lungs not evaluated secondary to motion). In the ED, she received IV Tylenol, Zofran, Maalox, and Pepcid.

## 2025-02-01 NOTE — H&P ADULT - ASSESSMENT
Patient is a 43 y/o F w/ a PMH of spina bifida, multiple b/l foot surgeries,  shunt (followed by [facility]), and uterine fibroids presenting w/ a 12-hour history of recurrent pleuritic chest pain. Discharged 2x days prior w/ provoked R lower lobe segmental PE (2.2 immobility) on Eliquis; reports mediation compliance. Labs grossly unremarkable, CXR wnl, CTA w/ no PE (though limited 2.2 motion artifact). Patient admitted for pain control most likely 2.2 to existing PE being tx'd w/ full AC.

## 2025-02-01 NOTE — ED ADULT NURSE REASSESSMENT NOTE - NS ED NURSE REASSESS COMMENT FT1
Break RN: Pt alert and oriented x 4, laying in bed, NAD, respirations even and unlabored, VS in flow sheet. Pt c/o abdominal pain rated 7/10, pt medicated per MD orders. Report given to lisa Renae by Dahlia MEADOWS.

## 2025-02-01 NOTE — ED PROVIDER NOTE - ATTENDING CONTRIBUTION TO CARE
44F h/o spina bifida, recent dx PE p/w CP and SOB - pt on eliquis.  Was d/c a few days ago.  Burning CP worse after eating a/w SOB today.  Adherent to eliquis.  Pt reports vaginal bleeding, known fibroids, follows with OBGYN.  Tachycardic here not hypoxic; tachypneic.  Reproducible CP.  Dorsal R foot wound, known issue, follows with a surgeon.  CP sx sound like GERD.  Plan check labs, trops, pepcid / maalox.  After eval and initial tx pt still tachycardic and SOB at rest.  Recheck CTPA eval for therapeutic failure/ worsening of PE clot burden.  Likely admission.    VS:  unremarkable except tachycardia tachypnea, not hypoxic    GEN -mild distress SOB, normal mental status;   A+O x3  speaking in complete sentences.  Airway patent.    HEAD - NC/AT     ENT - PEERL, EOMI, mucous membranes dry , no discharge      NECK: Neck supple, non-tender without lymphadenopathy, no masses, no JVD  PULM - CTA b/l,  symmetric breath sounds  COR -  normal heart sounds    ABD - , ND, NT, soft,  BACK - no CVA tenderness, nontender spine     EXTREMS - no edema, no deformity, warm and well perfused.  Limbs muscular atrophy.  SKIN - no rash    or bruising      NEUROLOGIC - alert, face symmetric, speech fluent, sensation nl, motor generally weak but no focal deficit.

## 2025-02-01 NOTE — H&P ADULT - NSHPREVIEWOFSYSTEMS_GEN_ALL_CORE
General: Denies dizziness, fatigue  Eyes: Denies blurry vision  ENMT: Denies rhinorrhea  Respiratory: Denies cough, +SOB  Cardiovascular: Denies palpitations, +CP  Gastrointestinal: Denies abd pain, nausea, hematochezia, melena  : Denies dysuria, increased freq  Musculoskeletal: Denies edema, joint pain  Neuro: Denies weakness, numbness  Psych: Denies anxiety, depression  All ROS negative unless indicated above

## 2025-02-01 NOTE — H&P ADULT - PROBLEM SELECTOR PLAN 3
Open foot wound on R foot, lost to following-up with surgeon on wound care after I&D in May 2024   - Xray negative on previous admission (1/23/2025) for signs of tracts or OM  - ESR and CRP wnl on previous admission (1/23/2025)  - On exam, less c/f infection. And, afebrile w/ labs grossly wnl    Plan:  - Podiatry consult in AM  > Orders placed for topical txs previously recommended on last admission  - Possible small subdermal hematoma under wound while on AC. Consider repeat imaging. Open foot wound on R foot, lost to following-up with surgeon on wound care after I&D in May 2024   - Xray negative on previous admission (1/23/2025) for signs of tracts or OM  - ESR and CRP wnl on previous admission (1/23/2025)  - On exam, less c/f infection. And, afebrile w/ labs grossly wnl    Plan:  - Podiatry consult in AM for, according to patient, newly raised wound (flat on recent d/c)  > Orders placed for topical txs previously recommended on last admission

## 2025-02-01 NOTE — ED ADULT NURSE REASSESSMENT NOTE - NS ED NURSE REASSESS COMMENT FT1
Pt awake and alert, A&OX4, resp even and unlabored. Pt in no acute distress at this time. IV patent. Denies CP, SOB, HA, dizziness, palpitations, blurry vision. Resting comfortably. Safety being maintained.

## 2025-02-01 NOTE — ED ADULT NURSE REASSESSMENT NOTE - NS ED NURSE REASSESS COMMENT FT1
Pt awake and alert, A&OX4, resp even and unlabored. Chest rise equal b/l. Pt in no acute distress at this time. Denies CP, SOB, HA, dizziness, palpitations, blurry vision. Resting comfortably. Safety being maintained.

## 2025-02-01 NOTE — ED PROVIDER NOTE - CLINICAL SUMMARY MEDICAL DECISION MAKING FREE TEXT BOX
This is a 44yoF PMHx spina bifida, multiple bilateral foot surgeries, and  shunt (managed [facility] neurosurgery), uterine fibroids presenting with chest pain. Patient recently discharged with chest pain and was found to have a right lower lobe segmental PE.  PE likely provoked in the setting of multiple foot surgeries.  Patient has been on Eliquis since.  Patient coming in with described as burning chest pain shortly after eating.  Associated shortness of breath.  Patient denies fever, cough, abdominal pain, nausea, vomiting, rash, lower extremity swelling.  Patient has been compliant on her Eliquis.      Upon presentation /99, heart rate 113, RR 24, saturating well on room air.  On exam patient appears uncomfortable, but speaking full sentences, clear lungs, normal heart sounds, reproducible chest pain, abdominal soft and nontender, no lower extremity swelling or tenderness, no rash.  Presentation may be due to GERD versus reflux.  However also considering treatment failure PE although patient not hypoxic she is tachycardic.  She has been compliant on Eliquis.  Will obtain cardiac labs, chest x-ray.   consider repeat CTA.  Trial Pepcid and Maalox.

## 2025-02-01 NOTE — H&P ADULT - PROBLEM SELECTOR PLAN 6
Hx of uterine fibroids w/ heavy periods, worsened since initiation of AC.   - H/H wnl on admission labs    Plan:  - c/w home iron supplementation   - f/u w/ OBGYN outpatient.

## 2025-02-01 NOTE — ED PROVIDER NOTE - OBJECTIVE STATEMENT
see MDM see MDM    DD ED ATTF h/o spina bifida, recent dx PE p/w CP and SOB - pt on eliquis.  Was d/c a few days ago.  Burning CP worse after eating a/w SOB today.  Adherent to eliquis.  Pt reports vaginal bleeding, known fibroids, follows with OBGYN.  Tachycardic here not hypoxic; tachypneic.  Reproducible CP.  Dorsal R foot wound, known issue, follows with a surgeon.  CP sx sound like GERD.  Plan check labs, trops, pepcid / maalox.  After eval and initial tx pt still tachycardic and SOB at rest.  Recheck CTPA eval for therapeutic failure/ worsening of PE clot burden.  Likely admission.

## 2025-02-01 NOTE — ED ADULT NURSE NOTE - OBJECTIVE STATEMENT
Received pt in bed A and Ox3 in NAD resting comfortably, c/o midsternal burning sensation since breakfast this morning, pt placed on monitor with NSR noted, IV initialed 20 G left AC labs drawn and sent. pt with right  foot wound, anterior aspect, abscess raises, red in color, non tender, no exudate. assisted pt to the bathroom, pt reports had an episode of vaginal spotting on urination, abd soft non distended non tender, MD at bedside for vaginal exam.

## 2025-02-01 NOTE — ED ADULT TRIAGE NOTE - CHIEF COMPLAINT QUOTE
c/o SOB starting X 1 hour prior to arrival. endorsing chest burning. reports diagnosed with a blood clot in the lung 1/22/25. on Eliquis. appears tachypneic on arrival. O2 >95% room air. Phx spina bifida (wheelchair bound).

## 2025-02-01 NOTE — H&P ADULT - NSHPLABSRESULTS_GEN_ALL_CORE
12.4   6.03  )-----------( 404      ( 01 Feb 2025 13:10 )             36.8     02-01    138  |  107  |  11  ----------------------------<  91  3.9   |  19[L]  |  0.49[L]    Ca    8.7      01 Feb 2025 18:50  Phos  2.4     02-01  Mg     2.10     02-01    TPro  7.5  /  Alb  3.7  /  TBili  0.3  /  DBili  x   /  AST  33[H]  /  ALT  26  /  AlkPhos  61  02-01        Urinalysis Basic - ( 01 Feb 2025 18:50 )  Color: x / Appearance: x / SG: x / pH: x  Gluc: 91 mg/dL / Ketone: x  / Bili: x / Urobili: x   Blood: x / Protein: x / Nitrite: x   Leuk Esterase: x / RBC: x / WBC x   Sq Epi: x / Non Sq Epi: x / Bacteria: x    Lactate Trend    CAPILLARY BLOOD GLUCOSE Labs reviewed by me    12.4   6.03  )-----------( 404      ( 01 Feb 2025 13:10 )             36.8     02-01    138  |  107  |  11  ----------------------------<  91  3.9   |  19[L]  |  0.49[L]    Ca    8.7      01 Feb 2025 18:50  Phos  2.4     02-01  Mg     2.10     02-01    TPro  7.5  /  Alb  3.7  /  TBili  0.3  /  DBili  x   /  AST  33[H]  /  ALT  26  /  AlkPhos  61  02-01        Urinalysis Basic - ( 01 Feb 2025 18:50 )  Color: x / Appearance: x / SG: x / pH: x  Gluc: 91 mg/dL / Ketone: x  / Bili: x / Urobili: x   Blood: x / Protein: x / Nitrite: x   Leuk Esterase: x / RBC: x / WBC x   Sq Epi: x / Non Sq Epi: x / Bacteria: x    Lactate Trend    CAPILLARY BLOOD GLUCOSE

## 2025-02-01 NOTE — ED PROVIDER NOTE - NS ED ROS FT
General: denies fever, chills  HENT: denies nasal congestion, rhinorrhea  Eyes: denies visual changes, blurred vision  CV: +CP  Resp: +SOB, denies cough  Abdominal: denies nausea, vomiting, diarrhea, abdominal pain  : denies urinary pain or discharge  MSK: denies muscle aches, leg swelling  Neuro: denies headaches, numbness, tingling  Skin: denies rashes, bruises

## 2025-02-01 NOTE — ED PROVIDER NOTE - PROGRESS NOTE DETAILS
Jimi Florian MD  CTA no main/lobar PE but unable to assess segmental/subseg. Patient's pain not improved, requesting admission for pain control as she does not feel safe going home and will come back due to pain.

## 2025-02-01 NOTE — H&P ADULT - PROBLEM SELECTOR PLAN 5
Patient w/ 2-3x episodes of diarrhea and 1x episode of vomiting in ED. Prior, last BM >1 week ago.   - Likely chronic constipation relief w/ home stool softeners    Plan:  - CTM   - Zofran PRN for nausea.  - Hold off on bowel regimen at this time; consider standing/home regimen for chronic constipation if diarrhea resolves.

## 2025-02-01 NOTE — H&P ADULT - NSHPPHYSICALEXAM_GEN_ALL_CORE
Vital Signs Last 24 Hrs  T(C): 37.2 (01 Feb 2025 16:50), Max: 37.2 (01 Feb 2025 16:50)  T(F): 99 (01 Feb 2025 16:50), Max: 99 (01 Feb 2025 16:50)  HR: 88 (01 Feb 2025 16:50) (88 - 113)  BP: 127/81 (01 Feb 2025 16:50) (127/81 - 157/99)  RR: 17 (01 Feb 2025 16:50) (17 - 24)  SpO2: 100% (01 Feb 2025 16:50) (100% - 100%)    Parameters below as of 01 Feb 2025 16:50  Patient On (Oxygen Delivery Method): room air    CONSTITUTIONAL: NAD; well-developed;   HEENT: PERRL, clear conjunctiva  RESPIRATORY: Normal respiratory effort; lungs are clear to auscultation bilaterally; No Crackles/Rhonchi/Wheezing  CARDIOVASCULAR: Regular rate and rhythm, normal S1 and S2, no murmur/rub/gallop; No lower extremity edema; Peripheral pulses are 2+ bilaterally  ABDOMEN: Nontender to Palpation; normoactive bowel sounds, no rebound/guarding; No hepatosplenomegaly  MUSCULOSKELETAL: no clubbing or cyanosis of digits; no joint swelling or tenderness to palpation  EXTREMITY: Lower extremities Non-tender to palpation; non-erythematous B/L. + R dorsal foot wound. Raised, no erythema, warmth, pus, discharge.   NEURO: A&Ox3; no focal deficits   PSYCH: normal mood; Affect appropriate Vital Signs Last 24 Hrs  T(C): 37.2 (01 Feb 2025 16:50), Max: 37.2 (01 Feb 2025 16:50)  T(F): 99 (01 Feb 2025 16:50), Max: 99 (01 Feb 2025 16:50)  HR: 88 (01 Feb 2025 16:50) (88 - 113)  BP: 127/81 (01 Feb 2025 16:50) (127/81 - 157/99)  RR: 17 (01 Feb 2025 16:50) (17 - 24)  SpO2: 100% (01 Feb 2025 16:50) (100% - 100%)    Parameters below as of 01 Feb 2025 16:50  Patient On (Oxygen Delivery Method): room air    CONSTITUTIONAL: NAD; well-developed;   HEENT: PERRL, clear conjunctiva  RESPIRATORY: Normal respiratory effort; lungs are clear to auscultation bilaterally; No Crackles/Rhonchi/Wheezing  CARDIOVASCULAR: Regular rate and rhythm, normal S1 and S2, no murmur/rub/gallop; No lower extremity edema; Peripheral pulses are 2+ bilaterally  ABDOMEN: Nontender to Palpation; normoactive bowel sounds, no rebound/guarding; No hepatosplenomegaly  MUSCULOSKELETAL: no clubbing or cyanosis of digits; no joint swelling or tenderness to palpation  EXTREMITY: Lower extremities Non-tender to palpation; non-erythematous B/L. + R dorsal foot wound. Raised, no erythema, warmth, pus, discharge.   NEURO: A&Ox3; no focal deficits   PSYCH: normal mood; Affect appropriate  Skin: Ischial grade 1-2 wound - no signs of infection

## 2025-02-01 NOTE — H&P ADULT - PROBLEM SELECTOR PLAN 2
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 Chronic in nature i/s/o Spina Bifida.    Plan:   - c/w home Gabapentin 100 TID

## 2025-02-01 NOTE — ED PROVIDER NOTE - RECEIVING PHYSICIAN
[FreeTextEntry1] : 1) under sterile conditions, removed suture \par - well healed \par - applied bandage \par 
Ingrid @ 430pm

## 2025-02-01 NOTE — H&P ADULT - PROBLEM SELECTOR PLAN 7
VTE PPx: Apixaban 10mg  Nutrition: Regular Diet  Fluids: None  Electrolytes: Maintain K>4, Mag >2, Phos > 3  Dispo: home. VTE PPx: Apixaban 10mg one dose today to complete loading dose x7 days and c/w 5mg BID  Nutrition: Regular Diet  Fluids: None  Electrolytes: Maintain K>4, Mag >2, Phos > 3  Dispo: home. VTE PPx: Apixaban 10mg one dose today to complete loading dose x7 days and c/w 5mg BID after  Nutrition: Regular Diet  Fluids: None  Electrolytes: Maintain K>4, Mag >2, Phos > 3  Dispo: home. VTE PPx: Apixaban 10mg one dose today to complete loading dose x7 days and c/w 5mg BID   Nutrition: Regular Diet  Fluids: None  Electrolytes: Maintain K>4, Mag >2, Phos > 3  Dispo: home.

## 2025-02-01 NOTE — ED ADULT NURSE NOTE - NSFALLHARMRISKINTERV_ED_ALL_ED
Assistance OOB with selected safe patient handling equipment if applicable/Assistance with ambulation/Communicate risk of Fall with Harm to all staff, patient, and family/Monitor gait and stability/Provide patient with walking aids/Provide visual cue: red socks, yellow wristband, yellow gown, etc/Reinforce activity limits and safety measures with patient and family/Toileting schedule using arm’s reach rule for commode and bathroom/Use of alarms - bed, stretcher, chair and/or video monitoring/Bed in lowest position, wheels locked, appropriate side rails in place/Call bell, personal items and telephone in reach/Instruct patient to call for assistance before getting out of bed/chair/stretcher/Non-slip footwear applied when patient is off stretcher/[place] to call system/Physically safe environment - no spills, clutter or unnecessary equipment/Purposeful Proactive Rounding/Room/bathroom lighting operational, light cord in reach

## 2025-02-01 NOTE — H&P ADULT - PROBLEM SELECTOR PLAN 1
Patient w/ pleuritic CP, worsened w/ deep inspiration. Most likely i/s/o known PE dx'd on recent admission (d/c'd 2x days prior). On full AC, reports full medication compliance.  - CXR (-), CTA w/ no new clot or extension of existing PE.  Ddx includes: 2.2 to existing PE, GERD    Plan:  - c/w home Eliquis 10 mg BID (through 02/01) -> 5 mg BID  - PO Tylenol, Tramadol PRN for mild and moderate/severe pain  - IV Tylenol as needed  - DVT Duplex b/l LE (1/23/2025): (-)  - TTE (01/24/2025): EF 67%, otherwise wnl Patient w/ pleuritic CP, worsened w/ deep inspiration. Most likely i/s/o known PE dx'd on recent admission (d/c'd 2x days prior). On full AC, reports full medication compliance.  - CXR (-), CTA w/ no new clot or extension of existing PE.  Ddx includes: 2.2 to existing PE, GERD    Plan:  - c/w home Eliquis 10 mg BID (through 02/01) -> 5 mg BID  - PO Tylenol, Tramadol PRN for mild and moderate/severe pain  - IV Tylenol as needed  - Pepcid 20 mg qD for possible GERD-like symptoms  - DVT Duplex b/l LE (1/23/2025): (-)  - TTE (01/24/2025): EF 67%, otherwise wnl Patient w/ pleuritic CP, worsened w/ deep inspiration. Most likely i/s/o known PE dx'd on recent admission (d/c'd 2x days prior). On full AC, reports full medication compliance.  - CXR (-), CTA w/ no new clot or extension of existing PE.  Ddx includes: 2.2 to existing PE, GERD    Plan:  - c/w home Eliquis 10 mg BID (through 02/01) -> 5 mg BID  - PO Tylenol, Tramadol PRN for mild and moderate/severe pain  - IV Tylenol as needed  - Pepcid 20 mg qD for possible GERD-like symptoms  - DVT Duplex b/l LE (1/23/2025): (-)  - TTE (01/24/2025): EF 67%, otherwise wnl  - age appropriate cancer screening with PMD Patient w/ pleuritic CP, worsened w/ deep inspiration. Most likely i/s/o known PE dx'd on recent admission (d/c'd 2x days prior). On full AC, reports full medication compliance.  - CXR (-), CTA w/ no new clot or extension of existing PE.  Ddx includes: 2.2 to existing PE, GERD    Plan:  - c/w home Eliquis 10 mg BID (through 02/01) -> 5 mg BID  - PO Tylenol, Tramadol PRN for mild and moderate/severe pain  - IV Tylenol as needed  - Pepcid 20 mg qD for possible GERD-like symptoms  - f/u trop, CK, CKMB (EKG nonischemic)  - DVT Duplex b/l LE (1/23/2025): (-)  - TTE (01/24/2025): EF 67%, otherwise wnl  - age appropriate cancer screening with PMD

## 2025-02-02 VITALS
TEMPERATURE: 98 F | DIASTOLIC BLOOD PRESSURE: 82 MMHG | HEART RATE: 80 BPM | SYSTOLIC BLOOD PRESSURE: 116 MMHG | OXYGEN SATURATION: 98 % | RESPIRATION RATE: 18 BRPM

## 2025-02-02 PROCEDURE — 99232 SBSQ HOSP IP/OBS MODERATE 35: CPT

## 2025-02-02 RX ORDER — ACETAMINOPHEN 160 MG/5ML
700 SUSPENSION ORAL ONCE
Refills: 0 | Status: COMPLETED | OUTPATIENT
Start: 2025-02-02 | End: 2025-02-02

## 2025-02-02 RX ORDER — MAGNESIUM, ALUMINUM HYDROXIDE 200-225/5
30 SUSPENSION, ORAL (FINAL DOSE FORM) ORAL EVERY 6 HOURS
Refills: 0 | Status: DISCONTINUED | OUTPATIENT
Start: 2025-02-02 | End: 2025-02-02

## 2025-02-02 RX ORDER — PANTOPRAZOLE 20 MG/1
1 TABLET, DELAYED RELEASE ORAL
Qty: 30 | Refills: 0
Start: 2025-02-02 | End: 2025-03-03

## 2025-02-02 RX ORDER — DOCUSATE SODIUM 100 MG
1 CAPSULE ORAL
Refills: 0 | DISCHARGE

## 2025-02-02 RX ADMIN — ACETAMINOPHEN 280 MILLIGRAM(S): 160 SUSPENSION ORAL at 08:59

## 2025-02-02 RX ADMIN — TRAMADOL HYDROCHLORIDE 25 MILLIGRAM(S): 100 TABLET, EXTENDED RELEASE ORAL at 12:28

## 2025-02-02 RX ADMIN — FAMOTIDINE 20 MILLIGRAM(S): 10 INJECTION INTRAVENOUS at 08:58

## 2025-02-02 RX ADMIN — GABAPENTIN 100 MILLIGRAM(S): 800 TABLET ORAL at 04:41

## 2025-02-02 RX ADMIN — GABAPENTIN 100 MILLIGRAM(S): 800 TABLET ORAL at 13:33

## 2025-02-02 RX ADMIN — Medication 30 MILLILITER(S): at 04:41

## 2025-02-02 RX ADMIN — APIXABAN 5 MILLIGRAM(S): 5 TABLET, FILM COATED ORAL at 08:58

## 2025-02-02 RX ADMIN — TRAMADOL HYDROCHLORIDE 25 MILLIGRAM(S): 100 TABLET, EXTENDED RELEASE ORAL at 13:28

## 2025-02-02 RX ADMIN — Medication 325 MILLIGRAM(S): at 08:58

## 2025-02-02 RX ADMIN — ACETAMINOPHEN 700 MILLIGRAM(S): 160 SUSPENSION ORAL at 09:59

## 2025-02-02 NOTE — PATIENT PROFILE ADULT - FALL HARM RISK - HARM RISK INTERVENTIONS
Assistance with ambulation/Assistance OOB with selected safe patient handling equipment/Communicate Risk of Fall with Harm to all staff/Discuss with provider need for PT consult/Monitor gait and stability/Provide patient with walking aids - walker, cane, crutches/Reinforce activity limits and safety measures with patient and family/Review medications for side effects contributing to fall risk/Sit up slowly, dangle for a short time, stand at bedside before walking/Tailored Fall Risk Interventions/Toileting schedule using arm’s reach rule for commode and bathroom/Visual Cue: Yellow wristband and red socks/Bed in lowest position, wheels locked, appropriate side rails in place/Call bell, personal items and telephone in reach/Instruct patient to call for assistance before getting out of bed or chair/Non-slip footwear when patient is out of bed/[place] to call system/Physically safe environment - no spills, clutter or unnecessary equipment/Purposeful Proactive Rounding/Room/bathroom lighting operational, light cord in reach

## 2025-02-02 NOTE — DISCHARGE NOTE PROVIDER - NSDCCPCAREPLAN_GEN_ALL_CORE_FT
PRINCIPAL DISCHARGE DIAGNOSIS  Diagnosis: Chest pain  Assessment and Plan of Treatment: You were diagnosed with a blood clot in your lungs called a pulmonary embolism and returned to the hospital due to persistent pain. This was most likely caused by immobility secondary to your foot wound and surgeries. You were prescribed blood thinning medication as an oral medication. Please continue taking this medication. You also had some symptoms of acid reflux so you were prescribed pantoprazole 40 mg daily. Please start taking this medication for your symptoms and follow up with your PCP.      SECONDARY DISCHARGE DIAGNOSES  Diagnosis: Wound of right foot  Assessment and Plan of Treatment: You have a right foot wound that was from an incision and drainage surgery in May 2024. Please follow with the podiatrists to get your foot wound taken care of.  Wound Care: Please apply santyl, 4x4 gauze and yvonne daily to right foot wound.    Diagnosis: Fibroid  Assessment and Plan of Treatment: You have a history of fibroids and you mentioned some increased heavy bleeding with your periods. Please follow up with an Ob gyn. Please return to the ED for any concerns.

## 2025-02-02 NOTE — DISCHARGE NOTE PROVIDER - NSDCCPTREATMENT_GEN_ALL_CORE_FT
PRINCIPAL PROCEDURE  Procedure: CTA chest w/w/o contrast  Findings and Treatment:   < end of copied text >  FINDINGS:  PULMONARY VESSELS: No main or lobar pulmonary embolus. The segmental and   subsegmental branches within the lower lungs are not evaluated secondary   to motion.  HEART AND VASCULATURE: Cardiomegaly. No pericardial effusion. No aortic   aneurysm or dissection.  LUNGS, AIRWAYS, PLEURA: Patent central airways. Clear lungs. No pleural   effusions or pneumothorax.  MEDIASTINUM: No mass or lymphadenopathy.  UPPER ABDOMEN: Within normal limits.  BONES AND SOFT TISSUES: 2 subcutaneous catheters within the left anterior   chest wall.  LOWER NECK: Within normal limits.  IMPRESSION:  No main or lobar pulmonary embolus. The segmental and subsegmental   branches within the lower lungs are not evaluated secondary to motion.  --- End of Report ---< from: CT Angio Chest PE Protocol w/ IV Cont (02.01.25 @ 17:38) >

## 2025-02-02 NOTE — DISCHARGE NOTE PROVIDER - NSDCFUADDAPPT_GEN_ALL_CORE_FT
APPTS ARE READY TO BE MADE: [x] YES    Additional Information about above appointments (if needed):    1: PCP  2: Wound Care  3: Podiatry   4: Ob/Gyn     Other comments or requests:   If you need a new PCP, Please make an appointment with General Internal Medicine, [address] [place] [address], [address], [place] [address]. Please call [phone] to make an appointment  APPTS ARE READY TO BE MADE: [x] YES    Additional Information about above appointments (if needed):    1: PCP  2: Wound Care  3: Podiatry   4: Ob/Gyn     Other comments or requests:   If you need a new PCP, Please make an appointment with General Internal Medicine, [address] [place] [address], [address], [place] [address]. Please call [phone] to make an appointment     pcp-Patient advises they do not want our assistance and prefer to coordinate the non - [facility] appointments on their own. No information was provided to the patient.    obgyn-Patient informed us they already have secured a follow up appointment which is not visible on [facility] on 2/25 with her OBGYN    podiatry-Patient informed us they already have secured a follow up appointment which is not visible on [facility] on 2/12 with her pdiatrist     wound care-Patient advises they do not want our assistance and prefer to coordinate the non - [facility] appointments on their own. No information was provided to the patient.

## 2025-02-02 NOTE — DISCHARGE NOTE NURSING/CASE MANAGEMENT/SOCIAL WORK - FINANCIAL ASSISTANCE
[facility] provides services at a reduced cost to those who are determined to be eligible through [facility]’s financial assistance program. Information regarding [facility]’s financial assistance program can be found by going to [email or web address] or by calling [phone].

## 2025-02-02 NOTE — DISCHARGE NOTE NURSING/CASE MANAGEMENT/SOCIAL WORK - PATIENT PORTAL LINK FT
You can access the FollowMyHealth Patient Portal offered by [facility] by registering at the following website: [email or web address]. By joining [employer]’s FollowMyHealth portal, you will also be able to view your health information using other applications (apps) compatible with our system.

## 2025-02-02 NOTE — PROGRESS NOTE ADULT - ATTENDING COMMENTS
I have personally seen, examined, and participated in the care of this patient.  I have reviewed all pertinent clinical information, including history, physical exam, plan, and the resident's note and agree except as noted.    43 y/o F w/ a PMH of spina bifida, multiple b/l foot surgeries,  shunt (followed by [facility]), and uterine fibroids presenting w/ a 12-hour history of recurrent pleuritic chest pain in setting of recently diagnosed PE, now improved with Tylenol and Maalox. Pt remained HD stable overnight and is ready for discharge with trial of PPIs. Pt has a PCP appointment tomorrow.     #PE  #CP  Pain associated with deep inspiration consistent with PE. Also associated with food however so possible GERD sx as well. No SOB at this time. No hypoxia. CTA No main or lobar pulmonary embolus. The segmental and subsegmental branches within the lower lungs are not evaluated secondary to motion.  -tylenol PRN, tramadol PRN  -c/w AC, last dose 10mg today and c/w 5mg BID  -repeat troponin. EKG nonischemic    #Neuropathic pain  Chronic  C/w gabapentin    #Foot wound  States unroofed wound since leaving hospital - no bleeding currently and no signs of infection  -podiatry c/s in AM  -wound care c/s    #Ischial pressure wound  Known and chronic  Wound care c/s    #Diarrhea  Hold Bowel regimen  GI PCR if continues

## 2025-02-02 NOTE — PROGRESS NOTE ADULT - SUBJECTIVE AND OBJECTIVE BOX
JANETH REILLY  44y  Female    Complaints:  Subjective:     No acute o/n events. Patient this morning is resting comfortably. Reports pain has improved     FAMILY HISTORY:    44yVital Signs Last 24 Hrs  T(C): 36.7 (02 Feb 2025 04:40), Max: 37.2 (01 Feb 2025 16:50)  T(F): 98 (02 Feb 2025 04:40), Max: 99 (01 Feb 2025 16:50)  HR: 82 (02 Feb 2025 04:40) (77 - 113)  BP: 101/66 (02 Feb 2025 04:40) (101/66 - 157/99)  BP(mean): 96 (02 Feb 2025 04:40) (96 - 96)  RR: 13 (02 Feb 2025 04:40) (13 - 24)  SpO2: 100% (02 Feb 2025 04:40) (100% - 100%)    Parameters below as of 02 Feb 2025 04:40  Patient On (Oxygen Delivery Method): room air    PHYSICAL EXAM:  GENERAL: NAD  HEAD:  Atraumatic  EYES: EOMI, conjunctiva and sclera clear  ENMT: Moist mucous membranes, Good dentition, No lesions  NECK: Supple, No JVD  NERVOUS SYSTEM:  Alert & Oriented X3, Good concentration  CHEST/LUNG: Clear to percussion bilaterally; No rales, rhonchi, wheezing, or rubs  HEART: Regular rate and rhythm; No murmurs, rubs, or gallops  ABDOMEN: Soft, Nontender, Nondistended  EXTREMITY: Lower extremities Non-tender to palpation; non-erythematous B/L. + R dorsal foot wound. Raised, no erythema, warmth, pus, discharge.       Consultant(s) Notes Reviewed:  [x ] YES  [ ] NO  Care Discussed with Consultants/Other Providers [ x] YES  [ ] NO    LAB:                         12.4   6.03  )-----------( 404      ( 01 Feb 2025 13:10 )             36.8       02-01    138  |  107  |  11  ----------------------------<  91  3.9   |  19[L]  |  0.49[L]    Ca    8.7      01 Feb 2025 18:50  Phos  2.4     02-01  Mg     2.10     02-01    TPro  7.5  /  Alb  3.7  /  TBili  0.3  /  DBili  x   /  AST  33[H]  /  ALT  26  /  AlkPhos  61  02-01              Urinalysis Basic - ( 01 Feb 2025 18:50 )    Color: x / Appearance: x / SG: x / pH: x  Gluc: 91 mg/dL / Ketone: x  / Bili: x / Urobili: x   Blood: x / Protein: x / Nitrite: x   Leuk Esterase: x / RBC: x / WBC x   Sq Epi: x / Non Sq Epi: x / Bacteria: x            CARDIAC MARKERS ( 01 Feb 2025 18:50 )  x     / x     / x     / x     / 1.4 ng/mL        CAPILLARY BLOOD GLUCOSE            Xray Chest 1 View AP/PA:   ACC: [id number] EXAM:  XR CHEST AP OR PA 1V   ORDERED BY: CYNDI JOSÉ     PROCEDURE DATE:  02/01/2025          INTERPRETATION:  EXAMINATION: XR CHEST    CLINICAL INDICATION: Chest Pain    TECHNIQUE: Single frontal, portable view of the chest was obtained.    COMPARISON: Chest x-ray 1/22/2025.    FINDINGS:  Left-sided  shunt catheter is partially in visualized.  The heart size is normal.  The lungs are clear.  There is no pneumothorax or pleural effusion.  There are no acute osseous abnormalities.    IMPRESSION:  Clear lungs.    --- End of Report ---    JESICA PACHECO MD; Resident Radiologist  This document has been electronically signed.  CHRISTOPHER GLASGOW MD; Attending Radiologist  This document has been electronically signed. Feb 1 2025  3:41PM (02-01-25 @ 13:07)    Female  RADIOLOGY & ADDITIONAL TESTS:  < from: CT Angio Chest PE Protocol w/ IV Cont (02.01.25 @ 17:38) >  IMPRESSION:    No main or lobar pulmonary embolus. The segmental and subsegmental   branches within the lower lungs are not evaluated secondary to motion.    --- End of Report ---    < end of copied text >      MedsMEDICATIONS  (STANDING):  acetaminophen   IVPB .. 700 milliGRAM(s) IV Intermittent once  apixaban 5 milliGRAM(s) Oral every 12 hours  famotidine    Tablet 20 milliGRAM(s) Oral daily  ferrous    sulfate 325 milliGRAM(s) Oral daily  gabapentin 100 milliGRAM(s) Oral every 8 hours    MEDICATIONS  (PRN):  acetaminophen     Tablet .. 650 milliGRAM(s) Oral every 6 hours PRN Mild Pain (1 - 3), Moderate Pain (4 - 6)  aluminum hydroxide/magnesium hydroxide/simethicone Suspension 30 milliLiter(s) Oral every 6 hours PRN Dyspepsia  ondansetron Injectable 4 milliGRAM(s) IV Push every 8 hours PRN Nausea and/or Vomiting  traMADol 25 milliGRAM(s) Oral every 8 hours PRN Severe Pain (7 - 10)      HEALTH ISSUES - PROBLEM Dx:  Pleuritic chest pain    Neuropathic pain    Wound of right foot    History of uterine fibroid    Need for prophylactic measure    Diarrhea    Pressure sore of ischial area           JANETH REILLY  44y  Female    Complaints:  Subjective:     No acute o/n events. Patient this morning is resting comfortably. Reports pain has improved     44yVital Signs Last 24 Hrs  T(C): 36.7 (02 Feb 2025 04:40), Max: 37.2 (01 Feb 2025 16:50)  T(F): 98 (02 Feb 2025 04:40), Max: 99 (01 Feb 2025 16:50)  HR: 82 (02 Feb 2025 04:40) (77 - 113)  BP: 101/66 (02 Feb 2025 04:40) (101/66 - 157/99)  BP(mean): 96 (02 Feb 2025 04:40) (96 - 96)  RR: 13 (02 Feb 2025 04:40) (13 - 24)  SpO2: 100% (02 Feb 2025 04:40) (100% - 100%)    Parameters below as of 02 Feb 2025 04:40  Patient On (Oxygen Delivery Method): room air    PHYSICAL EXAM:  GENERAL: NAD  HEAD:  Atraumatic  EYES: EOMI, conjunctiva and sclera clear  ENMT: Moist mucous membranes, Good dentition, No lesions  NECK: Supple, No JVD  NERVOUS SYSTEM:  Alert & Oriented X3, Good concentration  CHEST/LUNG: Clear to percussion bilaterally; No rales, rhonchi, wheezing, or rubs  HEART: Regular rate and rhythm; No murmurs, rubs, or gallops  ABDOMEN: Soft, Nontender, Nondistended  EXTREMITY: Lower extremities Non-tender to palpation; non-erythematous B/L. + R dorsal foot wound. Raised, no erythema, warmth, pus, discharge.       Consultant(s) Notes Reviewed:  [x ] YES  [ ] NO  Care Discussed with Consultants/Other Providers [ x] YES  [ ] NO    LAB:                         12.4   6.03  )-----------( 404      ( 01 Feb 2025 13:10 )             36.8       02-01    138  |  107  |  11  ----------------------------<  91  3.9   |  19[L]  |  0.49[L]    Ca    8.7      01 Feb 2025 18:50  Phos  2.4     02-01  Mg     2.10     02-01    TPro  7.5  /  Alb  3.7  /  TBili  0.3  /  DBili  x   /  AST  33[H]  /  ALT  26  /  AlkPhos  61  02-01              Urinalysis Basic - ( 01 Feb 2025 18:50 )    Color: x / Appearance: x / SG: x / pH: x  Gluc: 91 mg/dL / Ketone: x  / Bili: x / Urobili: x   Blood: x / Protein: x / Nitrite: x   Leuk Esterase: x / RBC: x / WBC x   Sq Epi: x / Non Sq Epi: x / Bacteria: x            CARDIAC MARKERS ( 01 Feb 2025 18:50 )  x     / x     / x     / x     / 1.4 ng/mL        CAPILLARY BLOOD GLUCOSE            Xray Chest 1 View AP/PA:   ACC: [id number] EXAM:  XR CHEST AP OR PA 1V   ORDERED BY: CYNDI JOSÉ     PROCEDURE DATE:  02/01/2025          INTERPRETATION:  EXAMINATION: XR CHEST    CLINICAL INDICATION: Chest Pain    TECHNIQUE: Single frontal, portable view of the chest was obtained.    COMPARISON: Chest x-ray 1/22/2025.    FINDINGS:  Left-sided  shunt catheter is partially in visualized.  The heart size is normal.  The lungs are clear.  There is no pneumothorax or pleural effusion.  There are no acute osseous abnormalities.    IMPRESSION:  Clear lungs.    --- End of Report ---    JESICA PACHECO MD; Resident Radiologist  This document has been electronically signed.  CHRISTOPHER GLASGOW MD; Attending Radiologist  This document has been electronically signed. Feb 1 2025  3:41PM (02-01-25 @ 13:07)    Female  RADIOLOGY & ADDITIONAL TESTS:  < from: CT Angio Chest PE Protocol w/ IV Cont (02.01.25 @ 17:38) >  IMPRESSION:    No main or lobar pulmonary embolus. The segmental and subsegmental   branches within the lower lungs are not evaluated secondary to motion.    --- End of Report ---    < end of copied text >      MedsMEDICATIONS  (STANDING):  acetaminophen   IVPB .. 700 milliGRAM(s) IV Intermittent once  apixaban 5 milliGRAM(s) Oral every 12 hours  famotidine    Tablet 20 milliGRAM(s) Oral daily  ferrous    sulfate 325 milliGRAM(s) Oral daily  gabapentin 100 milliGRAM(s) Oral every 8 hours    MEDICATIONS  (PRN):  acetaminophen     Tablet .. 650 milliGRAM(s) Oral every 6 hours PRN Mild Pain (1 - 3), Moderate Pain (4 - 6)  aluminum hydroxide/magnesium hydroxide/simethicone Suspension 30 milliLiter(s) Oral every 6 hours PRN Dyspepsia  ondansetron Injectable 4 milliGRAM(s) IV Push every 8 hours PRN Nausea and/or Vomiting  traMADol 25 milliGRAM(s) Oral every 8 hours PRN Severe Pain (7 - 10)      HEALTH ISSUES - PROBLEM Dx:  Pleuritic chest pain    Neuropathic pain    Wound of right foot    History of uterine fibroid    Need for prophylactic measure    Diarrhea    Pressure sore of ischial area           JANETH REILLY  44y  Female    Complaints:  Subjective:     No acute o/n events. Patient this morning is resting comfortably. Reports pain has improved     44yVital Signs Last 24 Hrs  T(C): 36.7 (02 Feb 2025 04:40), Max: 37.2 (01 Feb 2025 16:50)  T(F): 98 (02 Feb 2025 04:40), Max: 99 (01 Feb 2025 16:50)  HR: 82 (02 Feb 2025 04:40) (77 - 113)  BP: 101/66 (02 Feb 2025 04:40) (101/66 - 157/99)  BP(mean): 96 (02 Feb 2025 04:40) (96 - 96)  RR: 13 (02 Feb 2025 04:40) (13 - 24)  SpO2: 100% (02 Feb 2025 04:40) (100% - 100%)    Parameters below as of 02 Feb 2025 04:40  Patient On (Oxygen Delivery Method): room air    PHYSICAL EXAM:  GENERAL: NAD  HEAD:  Atraumatic  EYES: EOMI, conjunctiva and sclera clear  ENMT: Moist mucous membranes, Good dentition, No lesions  NECK: Supple, No JVD  NERVOUS SYSTEM:  Alert & Oriented X3, Good concentration  CHEST/LUNG: Clear to percussion bilaterally; No rales, rhonchi, wheezing, or rubs  HEART: Regular rate and rhythm; No murmurs, rubs, or gallops  ABDOMEN: Soft, Nontender, Nondistended  EXTREMITY: Lower extremities Non-tender to palpation; non-erythematous B/L. + R dorsal foot wound. Raised, no erythema, warmth, pus, discharge.       Consultant(s) Notes Reviewed:  [x ] YES  [ ] NO  Care Discussed with Consultants/Other Providers [ x] YES  [ ] NO    LAB:                         12.4   6.03  )-----------( 404      ( 01 Feb 2025 13:10 )             36.8       02-01    138  |  107  |  11  ----------------------------<  91  3.9   |  19[L]  |  0.49[L]    Ca    8.7      01 Feb 2025 18:50  Phos  2.4     02-01  Mg     2.10     02-01    TPro  7.5  /  Alb  3.7  /  TBili  0.3  /  DBili  x   /  AST  33[H]  /  ALT  26  /  AlkPhos  61  02-01              Urinalysis Basic - ( 01 Feb 2025 18:50 )    Color: x / Appearance: x / SG: x / pH: x  Gluc: 91 mg/dL / Ketone: x  / Bili: x / Urobili: x   Blood: x / Protein: x / Nitrite: x   Leuk Esterase: x / RBC: x / WBC x   Sq Epi: x / Non Sq Epi: x / Bacteria: x            CARDIAC MARKERS ( 01 Feb 2025 18:50 )  x     / x     / x     / x     / 1.4 ng/mL        CAPILLARY BLOOD GLUCOSE            Xray Chest 1 View AP/PA:   ACC: [id number] EXAM:  XR CHEST AP OR PA 1V   ORDERED BY: CYNDI JOSÉ     PROCEDURE DATE:  02/01/2025          INTERPRETATION:  EXAMINATION: XR CHEST    CLINICAL INDICATION: Chest Pain    TECHNIQUE: Single frontal, portable view of the chest was obtained.    COMPARISON: Chest x-ray 1/22/2025.    FINDINGS:  Left-sided  shunt catheter is partially in visualized.  The heart size is normal.  The lungs are clear.  There is no pneumothorax or pleural effusion.  There are no acute osseous abnormalities.    IMPRESSION:  Clear lungs.    --- End of Report ---    JESICA PACHECO MD; Resident Radiologist  This document has been electronically signed.  CHRISTOPHER GLASGOW MD; Attending Radiologist  This document has been electronically signed. Feb 1 2025  3:41PM (02-01-25 @ 13:07)    Female  RADIOLOGY & ADDITIONAL TESTS:  < from: CT Angio Chest PE Protocol w/ IV Cont (02.01.25 @ 17:38) >  IMPRESSION:    No main or lobar pulmonary embolus. The segmental and subsegmental   branches within the lower lungs are not evaluated secondary to motion.    --- End of Report ---    < end of copied text >      MedsMEDICATIONS  (STANDING):  acetaminophen   IVPB .. 700 milliGRAM(s) IV Intermittent once  apixaban 5 milliGRAM(s) Oral every 12 hours  famotidine    Tablet 20 milliGRAM(s) Oral daily  ferrous    sulfate 325 milliGRAM(s) Oral daily  gabapentin 100 milliGRAM(s) Oral every 8 hours    MEDICATIONS  (PRN):  acetaminophen     Tablet .. 650 milliGRAM(s) Oral every 6 hours PRN Mild Pain (1 - 3), Moderate Pain (4 - 6)  aluminum hydroxide/magnesium hydroxide/simethicone Suspension 30 milliLiter(s) Oral every 6 hours PRN Dyspepsia  ondansetron Injectable 4 milliGRAM(s) IV Push every 8 hours PRN Nausea and/or Vomiting  traMADol 25 milliGRAM(s) Oral every 8 hours PRN Severe Pain (7 - 10)      HEALTH ISSUES - PROBLEM Dx:  Pleuritic chest pain    Neuropathic pain    Wound of right foot    History of uterine fibroid    Need for prophylactic measure    Diarrhea    Pressure sore of ischial area

## 2025-02-02 NOTE — DISCHARGE NOTE PROVIDER - CARE PROVIDER_API CALL
Maddy Mckeon  Internal Medicine  [address] [place] [address]  Phone: [phone]  Fax: [phone]  Follow Up Time:

## 2025-02-02 NOTE — DISCHARGE NOTE NURSING/CASE MANAGEMENT/SOCIAL WORK - NSDCFUADDAPPT_GEN_ALL_CORE_FT
APPTS ARE READY TO BE MADE: [x] YES    Additional Information about above appointments (if needed):    1: PCP  2: Wound Care  3: Podiatry   4: Ob/Gyn     Other comments or requests:   If you need a new PCP, Please make an appointment with General Internal Medicine, [address] [place] [address], [address], [place] [address]. Please call [phone] to make an appointment

## 2025-02-02 NOTE — PROGRESS NOTE ADULT - PROBLEM SELECTOR PLAN 3
Open foot wound on R foot, lost to following-up with surgeon on wound care after I&D in May 2024   - Xray negative on previous admission (1/23/2025) for signs of tracts or OM  - ESR and CRP wnl on previous admission (1/23/2025)  - On exam, less c/f infection. And, afebrile w/ labs grossly wnl    Plan:  - Podiatry consult in AM for, according to patient, newly raised wound (flat on recent d/c)  > Orders placed for topical txs previously recommended on last admission Open foot wound on R foot, lost to following-up with surgeon on wound care after I&D in May 2024   - Xray negative on previous admission (1/23/2025) for signs of tracts or OM  - ESR and CRP wnl on previous admission (1/23/2025)  - On exam, less c/f infection. And, afebrile w/ labs grossly wnl    Plan:  > Orders placed for topical txs previously recommended on last admission  > Outpatient follow up

## 2025-02-02 NOTE — DISCHARGE NOTE PROVIDER - NSFOLLOWUPCLINICSTOKEN_GEN_ALL_ED_FT
[id number]: || ||00\01||False;[id number]: || ||00\01||False;[id number]: || ||00\01||False;[id number]: || ||00\01||False;

## 2025-02-02 NOTE — DISCHARGE NOTE PROVIDER - NSDCMRMEDTOKEN_GEN_ALL_CORE_FT
docusate sodium 100 mg oral capsule: 1 cap(s) orally 3 times a day as needed for  constipation  Eliquis 5 mg oral tablet: 2 tab(s) orally 2 times a day Take 10mg two times a day(2 pills 2 times a day) till 2/1/25 then take 5mg(one pill 2 times a day) after.  ferrous sulfate 325 mg (65 mg elemental iron) oral tablet: 1 tab(s) orally once a day  gabapentin 100 mg oral capsule: 1 cap(s) orally 3 times a day   docusate sodium 100 mg oral capsule: 1 cap(s) orally 3 times a day as needed for  constipation  Eliquis 5 mg oral tablet: 2 tab(s) orally 2 times a day Take 10mg two times a day(2 pills 2 times a day) till 2/1/25 then take 5mg(one pill 2 times a day) after.  ferrous sulfate 325 mg (65 mg elemental iron) oral tablet: 1 tab(s) orally once a day  gabapentin 100 mg oral capsule: 1 cap(s) orally 3 times a day  pantoprazole 40 mg oral delayed release tablet: 1 tab(s) orally once a day   Eliquis 5 mg oral tablet: 2 tab(s) orally 2 times a day Take 10mg two times a day(2 pills 2 times a day) till 2/1/25 then take 5mg(one pill 2 times a day) after.  ferrous sulfate 325 mg (65 mg elemental iron) oral tablet: 1 tab(s) orally once a day  gabapentin 100 mg oral capsule: 1 cap(s) orally 3 times a day  pantoprazole 40 mg oral delayed release tablet: 1 tab(s) orally once a day

## 2025-02-02 NOTE — PROGRESS NOTE ADULT - PROBLEM SELECTOR PLAN 7
VTE PPx: Apixaban 10mg one dose today to complete loading dose x7 days and c/w 5mg BID   Nutrition: Regular Diet  Dispo: home. VTE PPx: Apixaban 10mg one dose today to complete loading dose x7 days and c/w 5mg BID   Nutrition: Regular Diet  Dispo: home today. Patient is requesting discharge and states she will follow up outpatient

## 2025-02-02 NOTE — DISCHARGE NOTE PROVIDER - NSFOLLOWUPCLINICS_GEN_ALL_ED_FT
- Primary Care  Primary Care  [address] [facility] [place] [address]  Phone: [phone]  Fax:     [facility]  Podiatry  [address] [place] [address] [address] [place] [address]  Phone: [phone]  Fax:     [facility]  Wound Care  [address] [place] [address]  Phone: [phone]  Fax: [phone]    [facility]  Gynceology/OB  [address] [place] [address]  Phone: [phone]  Fax:

## 2025-02-02 NOTE — DISCHARGE NOTE PROVIDER - HOSPITAL COURSE
HPI:  Patient is a 43 y/o F w/ a PMH of spina bifida, multiple b/l foot surgeries,  shunt (followed by [facility]), and uterine fibroids presenting w/ a 12-hour history of recurrent pleuritic chest pain. Of note, patient discharged 2 days ago after initial 1/23/2025 presentation for pleuritic CP found to be 2.2 to R lower lobe segmental PE (deemed likely provoked 2.2 to immobility following multiple foot surgeries i/s/o hx of spina bifida). This morning, patient reports 10/10, "sharp", "burning" CP following breakfast. Patient reports associated SOB at that time and an inability to speak in full sentences. Denies radiation of the pain, "crushing" in nature, or GERD-like symptoms. Reports that the pain is worse with inspiration. Denies trying analgesics at home for relief. Family called EMS at that time, who brought her in. Now, pain improved though still "7/10". Now denies SOB and able to communicate without dyspnea. Patent reports she has been compliant with medications since recent discharge, including Eliquis.    Moreover, patient reports 2-3x episodes diarrhea and 1x episode of vomiting while in the ED. Reports no nausea, but endorses hx of chronic constipation. Last BM >1 week ago prior to these episodes of diarrhea. Otherwise, denies headache, LOC, dizziness, cough, rhinorrhea, fever, palpitations, abdominal pain, hematochezia, hematuria, or lower extremity pain.     Of further note, patient reports R dorsal foot wound that has since raised (prior, flat) since recent discharge. Patient endorses that Podiatry saw her throughout recent admission, for which they recommended topical txs. Denies swelling, redness, warmth surrounding wound.    In the ED, initially tachycardic, hypertensive, tachypneic -> now, VSS. CBC, CMP grossly unremarkable. VBG 7.52/25/30/20. RVP (-). CXR clear lungs. CTA Chest w/ no main or lobar PE (segmental and subsegmental branches of lower lungs not evaluated secondary to motion). In the ED, she received IV Tylenol, Zofran, Maalox, and Pepcid. (01 Feb 2025 20:45)    Hospital Course: Patient got some pain medication in the ED and stated her symptoms improved, patient was requesting discharge home. Patient states symptoms could also be due to burning sensation she gets after eating so she was sent home with pantoprazole 40 mg daily.     The patient is afebrile, hemodynamically stable and medically optimized for discharge to home with follow up with PCP. On day of discharge, patient is clinically stable with no new exam findings or acute symptoms compared to prior. The patient was seen by the attending physician on the date of discharge and deemed stable and acceptable for discharge. The patient's chronic medical conditions were treated accordingly per the patient's home medication regimen. The patient's medication reconciliation (with changes made to chronic medications), follow up appointments, discharge orders, instructions, and significant lab and diagnostic studies are as noted.    Important Medication Changes and Reason:  Pantoprazole 40 mg daily     Active or Pending Issues Requiring Follow-up:  Pulmonary Embolism   Acid Reflux     Advanced Directives:   [x] Full code  [ ] DNR  [ ] Hospice    Discharge Diagnoses:  Pulmonary Embolism   Acid Reflux

## 2025-02-02 NOTE — PATIENT PROFILE ADULT - MEDICATIONS/VISITS
You received IV Tylenol for pain management at 12:15pm. Please DO NOT take any Tylenol (Acetaminophen) containing products, such as Vicodin, Percocet, Excedrin, and cold medications for the next 6 hours (until 6:15PM). DO NOT TAKE MORE THAN 3000 MG OF TYLENOL in a 24 hour period. You received IV Toradol for pain management at 12:15pm. Please DO NOT take any motrin (ibuprofen) containing products such as advil, motrin, or aleve for the next 6 hours (Until 6:15pm).
no

## 2025-02-02 NOTE — PROGRESS NOTE ADULT - PROBLEM SELECTOR PLAN 4
w/ known hx of pressure sore of L ischium.    Plan:  - Wound care consulted: appreciate recs  > Previous wound care orders placed recommended on last admission w/ known hx of pressure sore of L ischium.    Plan:  > Previous wound care orders placed recommended on last admission

## 2025-02-02 NOTE — PROGRESS NOTE ADULT - ASSESSMENT
Patient is a 45 y/o F w/ a PMH of spina bifida, multiple b/l foot surgeries,  shunt (followed by [facility]), and uterine fibroids presenting w/ a 12-hour history of recurrent pleuritic chest pain. Discharged 2x days prior w/ provoked R lower lobe segmental PE (2.2 immobility) on Eliquis; reports mediation compliance. Labs grossly unremarkable, CXR wnl, CTA w/ no PE (though limited 2.2 motion artifact). Patient admitted for pain control most likely 2.2 to existing PE being tx'd w/ full AC.

## 2025-02-02 NOTE — PROGRESS NOTE ADULT - PROBLEM SELECTOR PLAN 1
Patient w/ pleuritic CP, worsened w/ deep inspiration. Most likely i/s/o known PE dx'd on recent admission (d/c'd 2x days prior). On full AC, reports full medication compliance.  - CXR (-), CTA w/ no new clot or extension of existing PE.  Ddx includes: 2.2 to existing PE, GERD    Plan:  - c/w home Eliquis 10 mg BID (through 02/01) -> 5 mg BID  - PO Tylenol, Tramadol PRN for mild and moderate/severe pain  - IV Tylenol as needed  - Pepcid 20 mg qD for possible GERD-like symptoms  - DVT Duplex b/l LE (1/23/2025): (-)  - TTE (01/24/2025): EF 67%, otherwise wnl

## 2025-02-27 ENCOUNTER — INPATIENT (INPATIENT)
Facility: HOSPITAL | Age: 45
LOS: 10 days | Discharge: HOME HEALTH | End: 2025-03-10
Attending: INTERNAL MEDICINE | Admitting: INTERNAL MEDICINE
Payer: MEDICAID

## 2025-02-27 VITALS
WEIGHT: 130.07 LBS | RESPIRATION RATE: 16 BRPM | OXYGEN SATURATION: 98 % | DIASTOLIC BLOOD PRESSURE: 95 MMHG | SYSTOLIC BLOOD PRESSURE: 142 MMHG | TEMPERATURE: 98 F | HEART RATE: 88 BPM | HEIGHT: 59 IN

## 2025-02-27 DIAGNOSIS — Z86.2 PERSONAL HISTORY OF DISEASES OF THE BLOOD AND BLOOD-FORMING ORGANS AND CERTAIN DISORDERS INVOLVING THE IMMUNE MECHANISM: ICD-10-CM

## 2025-02-27 DIAGNOSIS — R07.9 CHEST PAIN, UNSPECIFIED: ICD-10-CM

## 2025-02-27 DIAGNOSIS — M79.2 NEURALGIA AND NEURITIS, UNSPECIFIED: ICD-10-CM

## 2025-02-27 DIAGNOSIS — Z98.2 PRESENCE OF CEREBROSPINAL FLUID DRAINAGE DEVICE: Chronic | ICD-10-CM

## 2025-02-27 DIAGNOSIS — Z98.890 OTHER SPECIFIED POSTPROCEDURAL STATES: Chronic | ICD-10-CM

## 2025-02-27 DIAGNOSIS — K21.9 GASTRO-ESOPHAGEAL REFLUX DISEASE WITHOUT ESOPHAGITIS: ICD-10-CM

## 2025-02-27 DIAGNOSIS — I26.99 OTHER PULMONARY EMBOLISM WITHOUT ACUTE COR PULMONALE: ICD-10-CM

## 2025-02-27 DIAGNOSIS — Z29.9 ENCOUNTER FOR PROPHYLACTIC MEASURES, UNSPECIFIED: ICD-10-CM

## 2025-02-27 LAB
ALBUMIN SERPL ELPH-MCNC: 3.6 G/DL — SIGNIFICANT CHANGE UP (ref 3.3–5)
ALBUMIN SERPL ELPH-MCNC: 3.7 G/DL — SIGNIFICANT CHANGE UP (ref 3.3–5)
ALP SERPL-CCNC: 62 U/L — SIGNIFICANT CHANGE UP (ref 40–120)
ALP SERPL-CCNC: 62 U/L — SIGNIFICANT CHANGE UP (ref 40–120)
ALT FLD-CCNC: 9 U/L — SIGNIFICANT CHANGE UP (ref 4–33)
ALT FLD-CCNC: SIGNIFICANT CHANGE UP U/L (ref 4–33)
ANION GAP SERPL CALC-SCNC: 14 MMOL/L — SIGNIFICANT CHANGE UP (ref 7–14)
ANION GAP SERPL CALC-SCNC: 18 MMOL/L — HIGH (ref 7–14)
AST SERPL-CCNC: 21 U/L — SIGNIFICANT CHANGE UP (ref 4–32)
AST SERPL-CCNC: SIGNIFICANT CHANGE UP U/L (ref 4–32)
BASOPHILS # BLD AUTO: 0.03 K/UL — SIGNIFICANT CHANGE UP (ref 0–0.2)
BASOPHILS NFR BLD AUTO: 0.7 % — SIGNIFICANT CHANGE UP (ref 0–2)
BILIRUB SERPL-MCNC: 0.3 MG/DL — SIGNIFICANT CHANGE UP (ref 0.2–1.2)
BILIRUB SERPL-MCNC: 0.3 MG/DL — SIGNIFICANT CHANGE UP (ref 0.2–1.2)
BUN SERPL-MCNC: 6 MG/DL — LOW (ref 7–23)
BUN SERPL-MCNC: 6 MG/DL — LOW (ref 7–23)
CALCIUM SERPL-MCNC: 8.8 MG/DL — SIGNIFICANT CHANGE UP (ref 8.4–10.5)
CALCIUM SERPL-MCNC: 8.9 MG/DL — SIGNIFICANT CHANGE UP (ref 8.4–10.5)
CHLORIDE SERPL-SCNC: 100 MMOL/L — SIGNIFICANT CHANGE UP (ref 98–107)
CHLORIDE SERPL-SCNC: 103 MMOL/L — SIGNIFICANT CHANGE UP (ref 98–107)
CO2 SERPL-SCNC: 14 MMOL/L — LOW (ref 22–31)
CO2 SERPL-SCNC: 18 MMOL/L — LOW (ref 22–31)
CREAT SERPL-MCNC: 0.37 MG/DL — LOW (ref 0.5–1.3)
CREAT SERPL-MCNC: 0.43 MG/DL — LOW (ref 0.5–1.3)
EGFR: 121 ML/MIN/1.73M2 — SIGNIFICANT CHANGE UP
EGFR: 121 ML/MIN/1.73M2 — SIGNIFICANT CHANGE UP
EGFR: 126 ML/MIN/1.73M2 — SIGNIFICANT CHANGE UP
EGFR: 126 ML/MIN/1.73M2 — SIGNIFICANT CHANGE UP
EOSINOPHIL # BLD AUTO: 0.01 K/UL — SIGNIFICANT CHANGE UP (ref 0–0.5)
EOSINOPHIL NFR BLD AUTO: 0.2 % — SIGNIFICANT CHANGE UP (ref 0–6)
GLUCOSE SERPL-MCNC: 79 MG/DL — SIGNIFICANT CHANGE UP (ref 70–99)
GLUCOSE SERPL-MCNC: 88 MG/DL — SIGNIFICANT CHANGE UP (ref 70–99)
HCG SERPL-ACNC: <1 MIU/ML — SIGNIFICANT CHANGE UP
HCT VFR BLD CALC: 36 % — SIGNIFICANT CHANGE UP (ref 34.5–45)
HGB BLD-MCNC: 12.2 G/DL — SIGNIFICANT CHANGE UP (ref 11.5–15.5)
IANC: 2.11 K/UL — SIGNIFICANT CHANGE UP (ref 1.8–7.4)
IMM GRANULOCYTES NFR BLD AUTO: 0.2 % — SIGNIFICANT CHANGE UP (ref 0–0.9)
LIDOCAIN IGE QN: 29 U/L — SIGNIFICANT CHANGE UP (ref 7–60)
LYMPHOCYTES # BLD AUTO: 2.04 K/UL — SIGNIFICANT CHANGE UP (ref 1–3.3)
LYMPHOCYTES # BLD AUTO: 44.3 % — HIGH (ref 13–44)
MCHC RBC-ENTMCNC: 28.4 PG — SIGNIFICANT CHANGE UP (ref 27–34)
MCHC RBC-ENTMCNC: 33.9 G/DL — SIGNIFICANT CHANGE UP (ref 32–36)
MCV RBC AUTO: 83.9 FL — SIGNIFICANT CHANGE UP (ref 80–100)
MONOCYTES # BLD AUTO: 0.41 K/UL — SIGNIFICANT CHANGE UP (ref 0–0.9)
MONOCYTES NFR BLD AUTO: 8.9 % — SIGNIFICANT CHANGE UP (ref 2–14)
NEUTROPHILS # BLD AUTO: 2.11 K/UL — SIGNIFICANT CHANGE UP (ref 1.8–7.4)
NEUTROPHILS NFR BLD AUTO: 45.7 % — SIGNIFICANT CHANGE UP (ref 43–77)
NRBC # BLD AUTO: 0 K/UL — SIGNIFICANT CHANGE UP (ref 0–0)
NRBC # FLD: 0 K/UL — SIGNIFICANT CHANGE UP (ref 0–0)
NRBC BLD AUTO-RTO: 0 /100 WBCS — SIGNIFICANT CHANGE UP (ref 0–0)
NT-PROBNP SERPL-SCNC: 73 PG/ML — SIGNIFICANT CHANGE UP
PLATELET # BLD AUTO: 317 K/UL — SIGNIFICANT CHANGE UP (ref 150–400)
POTASSIUM SERPL-MCNC: 4.4 MMOL/L — SIGNIFICANT CHANGE UP (ref 3.5–5.3)
POTASSIUM SERPL-MCNC: 6.3 MMOL/L — CRITICAL HIGH (ref 3.5–5.3)
POTASSIUM SERPL-SCNC: 4.4 MMOL/L — SIGNIFICANT CHANGE UP (ref 3.5–5.3)
POTASSIUM SERPL-SCNC: 6.3 MMOL/L — CRITICAL HIGH (ref 3.5–5.3)
PROT SERPL-MCNC: 7.1 G/DL — SIGNIFICANT CHANGE UP (ref 6–8.3)
PROT SERPL-MCNC: 7.2 G/DL — SIGNIFICANT CHANGE UP (ref 6–8.3)
RBC # BLD: 4.29 M/UL — SIGNIFICANT CHANGE UP (ref 3.8–5.2)
RBC # FLD: 15.4 % — HIGH (ref 10.3–14.5)
SODIUM SERPL-SCNC: 132 MMOL/L — LOW (ref 135–145)
SODIUM SERPL-SCNC: 135 MMOL/L — SIGNIFICANT CHANGE UP (ref 135–145)
TROPONIN T, HIGH SENSITIVITY RESULT: <6 NG/L — SIGNIFICANT CHANGE UP
WBC # BLD: 4.61 K/UL — SIGNIFICANT CHANGE UP (ref 3.8–10.5)
WBC # FLD AUTO: 4.61 K/UL — SIGNIFICANT CHANGE UP (ref 3.8–10.5)

## 2025-02-27 PROCEDURE — 99497 ADVNCD CARE PLAN 30 MIN: CPT | Mod: 25

## 2025-02-27 PROCEDURE — 99285 EMERGENCY DEPT VISIT HI MDM: CPT

## 2025-02-27 PROCEDURE — 99223 1ST HOSP IP/OBS HIGH 75: CPT

## 2025-02-27 PROCEDURE — 71045 X-RAY EXAM CHEST 1 VIEW: CPT | Mod: 26

## 2025-02-27 RX ORDER — GABAPENTIN 400 MG/1
100 CAPSULE ORAL THREE TIMES A DAY
Refills: 0 | Status: DISCONTINUED | OUTPATIENT
Start: 2025-02-27 | End: 2025-03-10

## 2025-02-27 RX ORDER — APIXABAN 2.5 MG/1
5 TABLET, FILM COATED ORAL EVERY 12 HOURS
Refills: 0 | Status: DISCONTINUED | OUTPATIENT
Start: 2025-02-28 | End: 2025-03-04

## 2025-02-27 RX ORDER — ACETAMINOPHEN 500 MG/5ML
650 LIQUID (ML) ORAL EVERY 6 HOURS
Refills: 0 | Status: DISCONTINUED | OUTPATIENT
Start: 2025-02-27 | End: 2025-03-07

## 2025-02-27 RX ORDER — APIXABAN 2.5 MG/1
TABLET, FILM COATED ORAL
Refills: 0 | Status: DISCONTINUED | OUTPATIENT
Start: 2025-02-27 | End: 2025-03-04

## 2025-02-27 RX ORDER — SUCRALFATE 1 G
1 TABLET ORAL ONCE
Refills: 0 | Status: COMPLETED | OUTPATIENT
Start: 2025-02-27 | End: 2025-02-27

## 2025-02-27 RX ORDER — KETOROLAC TROMETHAMINE 30 MG/ML
30 INJECTION, SOLUTION INTRAMUSCULAR; INTRAVENOUS ONCE
Refills: 0 | Status: DISCONTINUED | OUTPATIENT
Start: 2025-02-27 | End: 2025-02-27

## 2025-02-27 RX ORDER — FERROUS SULFATE 137(45) MG
325 TABLET, EXTENDED RELEASE ORAL
Refills: 0 | Status: DISCONTINUED | OUTPATIENT
Start: 2025-02-27 | End: 2025-03-10

## 2025-02-27 RX ORDER — MAGNESIUM, ALUMINUM HYDROXIDE 200-200 MG
30 TABLET,CHEWABLE ORAL EVERY 6 HOURS
Refills: 0 | Status: DISCONTINUED | OUTPATIENT
Start: 2025-02-27 | End: 2025-03-10

## 2025-02-27 RX ORDER — APIXABAN 2.5 MG/1
5 TABLET, FILM COATED ORAL ONCE
Refills: 0 | Status: COMPLETED | OUTPATIENT
Start: 2025-02-27 | End: 2025-02-27

## 2025-02-27 RX ORDER — ACETAMINOPHEN 500 MG/5ML
1000 LIQUID (ML) ORAL ONCE
Refills: 0 | Status: COMPLETED | OUTPATIENT
Start: 2025-02-27 | End: 2025-02-27

## 2025-02-27 RX ADMIN — KETOROLAC TROMETHAMINE 30 MILLIGRAM(S): 30 INJECTION, SOLUTION INTRAMUSCULAR; INTRAVENOUS at 17:54

## 2025-02-27 RX ADMIN — APIXABAN 5 MILLIGRAM(S): 2.5 TABLET, FILM COATED ORAL at 22:17

## 2025-02-27 RX ADMIN — Medication 400 MILLIGRAM(S): at 14:33

## 2025-02-27 RX ADMIN — Medication 30 MILLILITER(S): at 14:33

## 2025-02-27 RX ADMIN — GABAPENTIN 100 MILLIGRAM(S): 400 CAPSULE ORAL at 22:17

## 2025-02-27 RX ADMIN — Medication 20 MILLIGRAM(S): at 14:33

## 2025-02-27 RX ADMIN — Medication 1 GRAM(S): at 17:17

## 2025-02-27 NOTE — H&P ADULT - PROBLEM SELECTOR PLAN 2
-Continue with home Eliquis 5 mg BID  -CTA chest ordered in ED to evaluate for clot burden as above (Unlikely to )

## 2025-02-27 NOTE — H&P ADULT - ASSESSMENT
44F PMHx spina bifida, multiple bilateral foot surgeries,  shunt (managed [facility] neurosurgery), GERD, uterine fibroids, and recent PE on Eliquis presenting with pleuritic substernal and right sided chest pain associated with SOB. Admitted to medicine for further management and monitoring. Detailed plan as below:

## 2025-02-27 NOTE — H&P ADULT - HISTORY OF PRESENT ILLNESS
44F PMHx spina bifida, multiple bilateral foot surgeries,  shunt (managed [facility] neurosurgery), GERD, uterine fibroids, and recent PE on Eliquis presenting with substernal and right sided chest pain. Patient was diagnosed with PE  44F PMHx spina bifida, multiple bilateral foot surgeries,  shunt (managed [facility] neurosurgery), GERD, uterine fibroids, and recent PE on Eliquis presenting with substernal and right sided chest pain. Patient was diagnosed with PE on 1/23/25 and has been on Eliquis. Patient states that her CP started after consumption of chili pepper yesterday. It improved last night was reoccurred today. It is rate 10/10, characterized as squeezing, pleuritic and radiating to the left upper extremity. Reports mild SOB. No exertional activity. Denies any associated symptoms fatigue, distress, diaphoresis, nausea, vomiting, abdominal pain, or leg swelling. Patient denies immobility, long plane/car rides, malignancy, or history of clotting disorder. No family history of CVD before the age of 65. No smoking history. No hx of hypothyroidism or HIV infection or Hepatitis C or autoimmune disorders. No psychosocial factors including stress/anxiety/depression.

## 2025-02-27 NOTE — H&P ADULT - PROBLEM SELECTOR PLAN 6
VTE PPx: Eliquis  GI PPx: Pantoprazole  Nutrition: Regular Diet  Fluids: PRN  Electrolytes: Maintain K>4, Mag >2, Phos > 3  Access: PIV  Code Status: FULL  Dispo: pending clinical improvement

## 2025-02-27 NOTE — H&P ADULT - NSHPPHYSICALEXAM_GEN_ALL_CORE
- GENERAL: Alert and oriented x 3. No acute distress  - EYES: EOMI. Anicteric.  - HENT: Moist mucous membranes. No scleral icterus. No cervical lymphadenopathy.  - LUNGS: Clear to auscultation bilaterally. No accessory muscle use.  - CARDIOVASCULAR: Regular rate and rhythm. No murmur. No JVD.  - ABDOMEN: Soft, non-tender and non-distended. No palpable masses.  - EXTREMITIES: No edema. Non-tender.  - NEUROLOGIC: No focal neurological deficits. CN II-XII grossly intact, but not individually tested.  - PSYCHIATRIC: Cooperative. Appropriate mood and affect.

## 2025-02-27 NOTE — H&P ADULT - NSHPREVIEWOFSYSTEMS_GEN_ALL_CORE
- CONSTITUTIONAL: Denies weight loss, fever and chills.  - HEENT: Denies changes in vision and hearing.  - RESPIRATORY: +SOB. Denies cough.  - CV: Denies palpitations. +CP.  - GI: Denies abdominal pain, nausea, vomiting and diarrhea.  - : Denies dysuria and urinary frequency.  - SKIN: Denies rash and pruritus.  - NEUROLOGICAL: Denies headache and syncope.  - PSYCHIATRIC: Denies recent changes in mood. Denies anxiety and depression.    A 12-point review of systems was completed and is otherwise negative except as noted in the HPI.

## 2025-02-27 NOTE — ED ADULT NURSE REASSESSMENT NOTE - NS ED NURSE REASSESS COMMENT FT1
Report received from ABDIEL Parker. Patient is alert and in no signs of acute distress. NSR on CM. Respirations even and unlabored, chest rise symmetrical b/l. Patient admitted to [facility] pending transport to [facility]. Comfort measures maintained. Bed in lowest position. Safety maintained.

## 2025-02-27 NOTE — ED PROVIDER NOTE - PROGRESS NOTE DETAILS
Alberto Kim DO (PGY2)  Patient presenting with persistent right-sided chest pain despite extensive pain medication negative workup.  Will admit patient to telemetry doc for further evaluation and cardiac workup. Alberto Kim DO (PGY2)  Patient presenting with persistent right-sided chest pain despite extensive pain medication and negative workup.  Will admit patient to telemetry doc for further evaluation and cardiac workup. Alberto Kim DO (PGY2)  Discussed with telemetry doctor and advised repeating CTA chest to investigate increased clot.  Telemetry doctor Noted okay to admit patient under his service but ensure patient receives scan prior to transfer out of ED.  Discussed with nursing staff and will ensure CT scan is obtained.  Will admit patient

## 2025-02-27 NOTE — ED ADULT TRIAGE NOTE - CHIEF COMPLAINT QUOTE
Pt coming from home for intermittent chest pain x 2 day. Pt recently admitted for PE on eliquis. history of spina bifida, GERD.

## 2025-02-27 NOTE — ED ADULT NURSE NOTE - OBJECTIVE STATEMENT
Pt received to 23A. pt is AxO 3 and ambulatory. pt Pt received to 23A. pt is AxO 3 and ambulatory. pt c/o epigastric pain radiating to the chest. EKG obtained. pt respiration even and unlabored. pt states pain x 1 day after eating "soup with pepper". pt respirations even and unlabored. pt denies headaches, dizziness, diarrhea, SOB, or urinary symptoms, fever like symptoms. Pt abdomen is soft, nontender,  nondistended. pt 20g to the left hand. pt labs obtained and sent tot  lab. medicated as prescribed. plan of care ongoing. Pt received to 23A. pt is AxO 3 and ambulatory. pt c/o epigastric pain radiating to the chest. EKG obtained. pt respiration even and unlabored. pt states pain x 1 day after eating "soup with pepper". Hx of Fibroids. pt endorses currently being on her menstrual cycle.  pt respirations even and unlabored. pt denies headaches, dizziness, diarrhea, SOB, or urinary symptoms, fever like symptoms. Pt abdomen is soft, nontender,  nondistended. pt 20g to the left hand. pt labs obtained and sent to the lab. medicated as prescribed. plan of care ongoing.

## 2025-02-27 NOTE — ED ADULT NURSE NOTE - NSFALLRISKINTERV_ED_ALL_ED

## 2025-02-27 NOTE — CONSULT NOTE ADULT - SUBJECTIVE AND OBJECTIVE BOX
[facility] Attending Consultation Note     Patient seen and evaluated at bedside    Chief Complaint: cp    HPI:  44F PMHx spina bifida, multiple bilateral foot surgeries,  shunt (managed [facility] neurosurgery), GERD, uterine fibroids, and recent PE on Eliquis presenting with substernal and right sided chest pain. Patient was diagnosed with PE on 1/23/25 and has been on Eliquis. Patient states that her CP started after consumption of chili pepper yesterday. It improved last night was reoccurred today. It is rate 10/10, characterized as squeezing, pleuritic and radiating to the left upper extremity. Reports mild SOB. No exertional activity. Denies any associated symptoms fatigue, distress, diaphoresis, nausea, vomiting, abdominal pain, or leg swelling. Patient denies immobility, long plane/car rides, malignancy, or history of clotting disorder. No family history of CVD before the age of 65. No smoking history. No hx of hypothyroidism or HIV infection or Hepatitis C or autoimmune disorders. No psychosocial factors including stress/anxiety/depression.   (27 Feb 2025 21:50)      PMHx:   Spina bifida    Fibroids    Wound of right foot    Wound of left foot        PSHx:   No significant past surgical history    S/P  shunt    S/P foot surgery, left    S/P foot surgery, right        Allergies:  lactose (Unknown)  shellfish (Unknown)  latex (Hives; Rash)  Zyrtec (Rash)      Home Meds:    Current Medications:   acetaminophen     Tablet .. 650 milliGRAM(s) Oral every 6 hours PRN  aluminum hydroxide/magnesium hydroxide/simethicone Suspension 30 milliLiter(s) Oral every 6 hours PRN  apixaban      ferrous    sulfate 325 milliGRAM(s) Oral <User Schedule>  gabapentin 100 milliGRAM(s) Oral three times a day  pantoprazole    Tablet 40 milliGRAM(s) Oral before breakfast      FAMILY HISTORY:      Social History: Personally reviewed   No tobacco, EtOH or IVDU     REVIEW OF SYSTEMS:  Constitutional:     [x ] negative [ ] fevers [ ] chills [ ] weight loss [ ] weight gain  HEENT:                  [x ] negative [ ] dry eyes [ ] eye irritation [ ] postnasal drip [ ] nasal congestion  CV:                         [ x] negative  [ ] chest pain [ ] orthopnea [ ] palpitations [ ] murmur  Resp:                     [x ] negative [ ] cough [ ] shortness of breath [ ] dyspnea [ ] wheezing [ ] sputum [ ]hemoptysis  GI:                          [ x] negative [ ] nausea [ ] vomiting [ ] diarrhea [ ] constipation [ ] abd pain [ ] dysphagia   :                        [ x] negative [ ] dysuria [ ] nocturia [ ] hematuria [ ] increased urinary frequency  Musculoskeletal: [x ] negative [ ] back pain [ ] myalgias [ ] arthralgias [ ] fracture  Skin:                       [ x] negative [ ] rash [ ] itch  Neurological:        [ x] negative [ ] headache [ ] dizziness [ ] syncope [ ] weakness [ ] numbness  Psychiatric:           [ x] negative [ ] anxiety [ ] depression  Endocrine:            [ x] negative [ ] diabetes [ ] thyroid problem  Heme/Lymph:      [ x] negative [ ] anemia [ ] bleeding problem  Allergic/Immune: [ x] negative [ ] itchy eyes [ ] nasal discharge [ ] hives [ ] angioedema    [ x] All other systems negative  [ ] Unable to assess ROS due to      Physical Exam:  T(F): 98.4 (02-27), Max: 99.5 (02-27)  HR: 78 (02-27) (78 - 88)  BP: 153/97 (02-27) (142/95 - 153/97)  RR: 18 (02-27)  SpO2: 100% (02-27)    Gen: Well appearing   HENNT: No JVP   CV: regular rate, regular rhtyhm, no murmur   Pulm: clear to auscultation bilaterally   Abdomen: Non-tender, non-distended   Ext: no edema b/l   Neuro: grossly non-focal     Cardiovascular Diagnostic Testing:      Labs: Personally reviewed                        12.2   4.61  )-----------( 317      ( 27 Feb 2025 14:32 )             36.0     02-27    135  |  103  |  6[L]  ----------------------------<  88  4.4   |  18[L]  |  0.43[L]    Ca    8.8      27 Feb 2025 15:36    TPro  7.2  /  Alb  3.7  /  TBili  0.3  /  DBili  x   /  AST  21  /  ALT  9   /  AlkPhos  62  02-27

## 2025-02-27 NOTE — ED PROVIDER NOTE - OBJECTIVE STATEMENT
45 y/o F w/ a PMH of spina bifida, multiple b/l foot surgeries,  shunt (followed by [facility] Neurosx), GERD, uterine fibroids, and recently diagnosed with PE on Eliquis presenting w/ right-sided burning chest pain that is nonexertional nonpleuritic x 1 day.  States that symptoms occurred after eating a spicy pepper and sleep last night.  Takes pantoprazole for GERD but ran out 2 days ago.  No symptoms are exacerbated with laying down.  Denies fever, chills, nausea, vomiting, SOB, abdominal pain, change in bowel, urinary symptoms. 43 y/o F w/ a PMH of spina bifida, multiple b/l foot surgeries,  shunt (followed by [facility] Neurosx), GERD, uterine fibroids, and recently diagnosed with PE on Eliquis presenting w/ right-sided burning chest pain that is nonexertional nonpleuritic x 1 day.  States that symptoms occurred after eating a spicy pepper and sleep last night.  Takes pantoprazole for GERD but ran out 2 days ago.  No symptoms are exacerbated with laying down.  Denies fever, chills, nausea, vomiting, SOB, abdominal pain, change in bowel, urinary symptoms.    Attendinyo female with recent diagnosis of PE presents with chest pain and shortness of breath.  not pleuritic.  feels burning.  started after eating spicy pepper in soup.

## 2025-02-27 NOTE — ED PROVIDER NOTE - CLINICAL SUMMARY MEDICAL DECISION MAKING FREE TEXT BOX
Afebrile hemodynamically stable female presents to ED for nonexertional nonpleuritic right-sided chest pain that is exacerbated with laying down.  Physical exam notable for soft nondistended nontender abdomen.  Negative CVA.  Clear breath sounds bilaterally satting well on RA.  No lower extremity or calf tenderness.  EKG unchanged from prior with no ischemic changes. Ordered basic labs, troponin, proBNP, CXR to rule out ACS versus electrolyte abnormality versus infectious etiology.  Given pain meds, Maalox/Pepcid, reassess.

## 2025-02-27 NOTE — CONSULT NOTE ADULT - ASSESSMENT
44F w/ PMH of spina bifida,  shunt and recent segmental PE on Eliquis here for new onset R sided cp. No positional or pleuritic component. EKG with NSR no sig STT changes. Trop and BNP wnl.     1. Cp   -rec repeat CTPA to assess clot burden and AC failure   -cont AC with eliquis   -check ECHO and ST to r/o CAD   -possible GI component, on PPI     2. HTN   -monitor BP  -consider adding norvasc 5 mg qd if needed     admit to tele     Nohemy Ramirez MD FAC  Attending Interventional Cardiologist, [facility]/JULIEN.   Avaliable on [employer] Team

## 2025-02-27 NOTE — H&P ADULT - CONVERSATION DETAILS
Goals of care discussion with the patient (face to face)  Asked about the patient's values, hopes, goals, and priorities  Patient would like to be FULL CODE for this admission   Patient would be okay want CPR or mechanical ventilation if needed for lifesaving measures

## 2025-02-28 LAB
ANION GAP SERPL CALC-SCNC: 13 MMOL/L — SIGNIFICANT CHANGE UP (ref 7–14)
BUN SERPL-MCNC: 11 MG/DL — SIGNIFICANT CHANGE UP (ref 7–23)
CALCIUM SERPL-MCNC: 8.9 MG/DL — SIGNIFICANT CHANGE UP (ref 8.4–10.5)
CHLORIDE SERPL-SCNC: 105 MMOL/L — SIGNIFICANT CHANGE UP (ref 98–107)
CK MB BLD-MCNC: <1.7 % — SIGNIFICANT CHANGE UP (ref 0–2.5)
CK MB CFR SERPL CALC: <1 NG/ML — SIGNIFICANT CHANGE UP
CK SERPL-CCNC: 59 U/L — SIGNIFICANT CHANGE UP (ref 25–170)
CO2 SERPL-SCNC: 20 MMOL/L — LOW (ref 22–31)
CREAT SERPL-MCNC: 0.48 MG/DL — LOW (ref 0.5–1.3)
EGFR: 118 ML/MIN/1.73M2 — SIGNIFICANT CHANGE UP
EGFR: 118 ML/MIN/1.73M2 — SIGNIFICANT CHANGE UP
GLUCOSE SERPL-MCNC: 106 MG/DL — HIGH (ref 70–99)
HCT VFR BLD CALC: 34.2 % — LOW (ref 34.5–45)
HGB BLD-MCNC: 11.4 G/DL — LOW (ref 11.5–15.5)
MAGNESIUM SERPL-MCNC: 2.3 MG/DL — SIGNIFICANT CHANGE UP (ref 1.6–2.6)
MCHC RBC-ENTMCNC: 28.2 PG — SIGNIFICANT CHANGE UP (ref 27–34)
MCHC RBC-ENTMCNC: 33.3 G/DL — SIGNIFICANT CHANGE UP (ref 32–36)
MCV RBC AUTO: 84.7 FL — SIGNIFICANT CHANGE UP (ref 80–100)
NRBC # BLD AUTO: 0 K/UL — SIGNIFICANT CHANGE UP (ref 0–0)
NRBC # FLD: 0 K/UL — SIGNIFICANT CHANGE UP (ref 0–0)
NRBC BLD AUTO-RTO: 0 /100 WBCS — SIGNIFICANT CHANGE UP (ref 0–0)
PHOSPHATE SERPL-MCNC: 2.6 MG/DL — SIGNIFICANT CHANGE UP (ref 2.5–4.5)
PLATELET # BLD AUTO: 317 K/UL — SIGNIFICANT CHANGE UP (ref 150–400)
POTASSIUM SERPL-MCNC: 3.7 MMOL/L — SIGNIFICANT CHANGE UP (ref 3.5–5.3)
POTASSIUM SERPL-SCNC: 3.7 MMOL/L — SIGNIFICANT CHANGE UP (ref 3.5–5.3)
RBC # BLD: 4.04 M/UL — SIGNIFICANT CHANGE UP (ref 3.8–5.2)
RBC # FLD: 15.5 % — HIGH (ref 10.3–14.5)
SODIUM SERPL-SCNC: 138 MMOL/L — SIGNIFICANT CHANGE UP (ref 135–145)
TROPONIN T, HIGH SENSITIVITY RESULT: <6 NG/L — SIGNIFICANT CHANGE UP
WBC # BLD: 3.41 K/UL — LOW (ref 3.8–10.5)
WBC # FLD AUTO: 3.41 K/UL — LOW (ref 3.8–10.5)

## 2025-02-28 PROCEDURE — 99233 SBSQ HOSP IP/OBS HIGH 50: CPT

## 2025-02-28 PROCEDURE — 70450 CT HEAD/BRAIN W/O DYE: CPT | Mod: 26

## 2025-02-28 PROCEDURE — 71275 CT ANGIOGRAPHY CHEST: CPT | Mod: 26

## 2025-02-28 RX ORDER — OXYCODONE HYDROCHLORIDE 30 MG/1
5 TABLET ORAL EVERY 6 HOURS
Refills: 0 | Status: DISCONTINUED | OUTPATIENT
Start: 2025-02-28 | End: 2025-03-06

## 2025-02-28 RX ORDER — ACETAMINOPHEN 500 MG/5ML
1000 LIQUID (ML) ORAL ONCE
Refills: 0 | Status: DISCONTINUED | OUTPATIENT
Start: 2025-02-28 | End: 2025-03-01

## 2025-02-28 RX ADMIN — OXYCODONE HYDROCHLORIDE 5 MILLIGRAM(S): 30 TABLET ORAL at 11:41

## 2025-02-28 RX ADMIN — GABAPENTIN 100 MILLIGRAM(S): 400 CAPSULE ORAL at 06:28

## 2025-02-28 RX ADMIN — GABAPENTIN 100 MILLIGRAM(S): 400 CAPSULE ORAL at 21:12

## 2025-02-28 RX ADMIN — Medication 325 MILLIGRAM(S): at 10:21

## 2025-02-28 RX ADMIN — Medication 650 MILLIGRAM(S): at 06:30

## 2025-02-28 RX ADMIN — APIXABAN 5 MILLIGRAM(S): 2.5 TABLET, FILM COATED ORAL at 17:11

## 2025-02-28 RX ADMIN — Medication 30 MILLILITER(S): at 15:15

## 2025-02-28 RX ADMIN — GABAPENTIN 100 MILLIGRAM(S): 400 CAPSULE ORAL at 13:23

## 2025-02-28 RX ADMIN — APIXABAN 5 MILLIGRAM(S): 2.5 TABLET, FILM COATED ORAL at 10:21

## 2025-02-28 RX ADMIN — Medication 40 MILLIGRAM(S): at 06:28

## 2025-02-28 RX ADMIN — Medication 30 MILLILITER(S): at 07:37

## 2025-02-28 RX ADMIN — Medication 30 MILLILITER(S): at 22:15

## 2025-02-28 NOTE — PATIENT PROFILE ADULT - FALL HARM RISK - HARM RISK INTERVENTIONS
Assistance with ambulation/Assistance OOB with selected safe patient handling equipment/Communicate Risk of Fall with Harm to all staff/Discuss with provider need for PT consult/Monitor gait and stability/Provide patient with walking aids - walker, cane, crutches/Reinforce activity limits and safety measures with patient and family/Review medications for side effects contributing to fall risk/Sit up slowly, dangle for a short time, stand at bedside before walking/Tailored Fall Risk Interventions/Toileting schedule using arm’s reach rule for commode and bathroom/Visual Cue: Yellow wristband and red socks/Bed in lowest position, wheels locked, appropriate side rails in place/Call bell, personal items and telephone in reach/Instruct patient to call for assistance before getting out of bed or chair/Non-slip footwear when patient is out of bed/[place] to call system/Physically safe environment - no spills, clutter or unnecessary equipment/Purposeful Proactive Rounding/Room/bathroom lighting operational, light cord in reach

## 2025-02-28 NOTE — PROGRESS NOTE ADULT - ASSESSMENT
44F w/ PMH of spina bifida,  shunt and recent segmental PE on Eliquis here for new onset R sided cp. No positional or pleuritic component. EKG with NSR no sig STT changes. Trop and BNP wnl.     1. Cp   -repeat CTPA no PE   -cont AC with eliquis   -check ECHO and ST to r/o CAD   -possible GI component, on PPI     2. HTN   -monitor BP  -consider adding norvasc 5 mg qd if needed     admit to tele     Nohemy Ramirez MD [facility]  Attending Interventional Cardiologist, [place]/JULI[facility].   [employer]valiable on [employer] Team

## 2025-02-28 NOTE — CHART NOTE - NSCHARTNOTEFT_GEN_A_CORE
Patient complains of headache, neuro exam completed by provider which is benign.  Will order CTH & shunt series to evaluate for any underlying etiology of headache.    Discussed above w/Dr. Henry

## 2025-02-28 NOTE — CONSULT NOTE ADULT - ASSESSMENT
44F PMHx spina bifida, multiple bilateral foot surgeries,  shunt (managed [facility] neurosurgery), GERD, uterine fibroids, and recent PE on Eliquis presenting with substernal and right sided chest pain. Patient was diagnosed with PE on 1/23/25 and has been on Eliquis. Patient states that her CP started after consumption of chili pepper yesterday. It improved last night was reoccurred today. It is rate 10/10, characterized as squeezing, pleuritic and radiating to the left upper extremity. Reports mild SOB. No exertional activity. Denies any associated symptoms fatigue, distress, diaphoresis, nausea, vomiting, abdominal pain, or leg swelling. Patient denies immobility, long plane/car rides, malignancy, or history of clotting disorder. No family history of CVD before the age of 65. No smoking history. No hx of hypothyroidism or HIV infection or Hepatitis C or autoimmune disorders. No psychosocial factors including stress/anxiety/depression.   (27 Feb 2025 21:50)  she says she has sob too but most of the issues started after eating  chilly peppers; ; currently sheis doing  ok ; does not looks to in any resp distress       Recent PE  Spina bifida/ recent foot surgery   s/p  shunt  GERD      Recent Pe  she has been complaint with her Eliquis  she is on 2 L of oxygen at this time:   her symptoms more likely due to GERD symptoms  cont PPI:   new cta is pending      Spina bifida/ recent foot surgery   per primary team     s/p  shunt  supportive care     GERD  cont PPI     dw acp

## 2025-02-28 NOTE — CONSULT NOTE ADULT - SUBJECTIVE AND OBJECTIVE BOX
02-28-25 @ 11:10    Patient is a 44y old  Female who presents with a chief complaint of Chest pain (27 Feb 2025 21:50)      HPI:  44F PMHx spina bifida, multiple bilateral foot surgeries,  shunt (managed [facility] neurosurgery), GERD, uterine fibroids, and recent PE on Eliquis presenting with substernal and right sided chest pain. Patient was diagnosed with PE on 1/23/25 and has been on Eliquis. Patient states that her CP started after consumption of chili pepper yesterday. It improved last night was reoccurred today. It is rate 10/10, characterized as squeezing, pleuritic and radiating to the left upper extremity. Reports mild SOB. No exertional activity. Denies any associated symptoms fatigue, distress, diaphoresis, nausea, vomiting, abdominal pain, or leg swelling. Patient denies immobility, long plane/car rides, malignancy, or history of clotting disorder. No family history of CVD before the age of 65. No smoking history. No hx of hypothyroidism or HIV infection or Hepatitis C or autoimmune disorders. No psychosocial factors including stress/anxiety/depression.   (27 Feb 2025 21:50)    she says she has sob too but most of the issues started after eating  chilly peppers; ; currently sheis doing  ok ; does not looks to in any resp distress       ?FOLLOWING PRESENT  [ yHx of PE/DVT, [x ] Hx COPD, [ ]x Hx of Asthma, [y ] Hx of Hospitalization, [x ]  Hx of BiPAP/CPAP use, [ x] Hx of GUSTAVO    Allergies    shellfish (Unknown)  latex (Hives; Rash)  Zyrtec (Rash)    Intolerances    lactose (Unknown)      PAST MEDICAL & SURGICAL HISTORY:  Spina bifida      Fibroids      Wound of right foot      Wound of left foot      S/P  shunt      S/P foot surgery, left      S/P foot surgery, right          FAMILY HISTORY:      Social History: [ x ] TOBACCO                  [  x] ETOH                                 x[  ] IVDA/DRUGS    REVIEW OF SYSTEMS      General:	x    Skin/Breast:x  	  Ophthalmologic:x  	  ENMT:	x    Respiratory and Thorax:  sob,  castillo   	  Cardiovascular:	x    Gastrointestinal:	chest pain     Genitourinary:	x    Musculoskeletal:	x    Neurological:	x    Psychiatric:	x    Hematology/Lymphatics:	x    Endocrine:	x    Allergic/Immunologic:	x    MEDICATIONS  (STANDING):  apixaban 5 milliGRAM(s) Oral every 12 hours  apixaban      ferrous    sulfate 325 milliGRAM(s) Oral <User Schedule>  gabapentin 100 milliGRAM(s) Oral three times a day  pantoprazole    Tablet 40 milliGRAM(s) Oral before breakfast    MEDICATIONS  (PRN):  acetaminophen     Tablet .. 650 milliGRAM(s) Oral every 6 hours PRN Temp greater or equal to 38C (100.4F), Mild Pain (1 - 3)  aluminum hydroxide/magnesium hydroxide/simethicone Suspension 30 milliLiter(s) Oral every 6 hours PRN Dyspepsia  oxyCODONE    IR 5 milliGRAM(s) Oral every 6 hours PRN Severe Pain (7 - 10)       Vital Signs Last 24 Hrs  T(C): 36.8 (28 Feb 2025 06:25), Max: 37.5 (27 Feb 2025 16:04)  T(F): 98.2 (28 Feb 2025 06:25), Max: 99.5 (27 Feb 2025 16:04)  HR: 83 (28 Feb 2025 06:25) (78 - 88)  BP: 120/90 (28 Feb 2025 06:25) (120/90 - 153/97)  BP(mean): 120 (27 Feb 2025 16:04) (120 - 120)  RR: 18 (28 Feb 2025 06:25) (14 - 18)  SpO2: 99% (28 Feb 2025 06:25) (98% - 100%)    Parameters below as of 28 Feb 2025 06:25  Patient On (Oxygen Delivery Method): room air    Orthostatic VS          I&O's Summary      Physical Exam:   GENERAL: NAD, well-groomed, well-developed  HEENT: NETTIE/   Atraumatic, Normocephalic  ENMT: No tonsillar erythema, exudates, or enlargement; Moist mucous membranes, Good dentition, No lesions  NECK: Supple, No JVD, Normal thyroid  CHEST/LUNG: Clear to auscultation bilaterally  CVS: Regular rate and rhythm; No murmurs, rubs, or gallops  GI: : Soft, Nontender, Nondistended; Bowel sounds present  NERVOUS SYSTEM:  Alert & Oriented X3  EXTREMITIES:  - edema  LYMPH: No lymphadenopathy noted  SKIN: No rashes or lesions  ENDOCRINOLOGY: No Thyromegaly  PSYCH: Appropriate    Labs:    CARDIAC MARKERS ( 28 Feb 2025 09:50 )  x     / x     / x     / x     / <1.0 ng/mL                            11.4   3.41  )-----------( 317      ( 28 Feb 2025 09:50 )             34.2                         12.2   4.61  )-----------( 317      ( 27 Feb 2025 14:32 )             36.0     02-28    138  |  105  |  11  ----------------------------<  106[H]  3.7   |  20[L]  |  0.48[L]  02-27    135  |  103  |  6[L]  ----------------------------<  88  4.4   |  18[L]  |  0.43[L]  02-27    132[L]  |  100  |  6[L]  ----------------------------<  79  6.3[HH]   |  14[L]  |  0.37[L]    Ca    8.9      28 Feb 2025 09:50  Ca    8.8      27 Feb 2025 15:36  Ca    8.9      27 Feb 2025 14:32  Phos  2.6     02-28  Mg     2.30     02-28    TPro  7.2  /  Alb  3.7  /  TBili  0.3  /  DBili  x   /  AST  21  /  ALT  9   /  AlkPhos  62  02-27  TPro  7.1  /  Alb  3.6  /  TBili  0.3  /  DBili  x   /  AST  TNP  /  ALT  TNP  /  AlkPhos  62  02-27    CAPILLARY BLOOD GLUCOSE        LIVER FUNCTIONS - ( 27 Feb 2025 15:36 )  Alb: 3.7 g/dL / Pro: 7.2 g/dL / ALK PHOS: 62 U/L / ALT: 9 U/L / AST: 21 U/L / GGT: x             Urinalysis Basic - ( 28 Feb 2025 09:50 )    Color: x / Appearance: x / SG: x / pH: x  Gluc: 106 mg/dL / Ketone: x  / Bili: x / Urobili: x   Blood: x / Protein: x / Nitrite: x   Leuk Esterase: x / RBC: x / WBC x   Sq Epi: x / Non Sq Epi: x / Bacteria: x      D DImer      Studies  Chest X-RAY  CT SCAN Chest   CT Abdomen  Venous Dopplers: LE:   Others        < from: Xray Chest 1 View- PORTABLE-Urgent (02.27.25 @ 15:05) >  INTERPRETATION:  EXAMINATION: XR CHEST URGENT    CLINICAL INDICATION: Chest Pain    COMPARISON: Chest radiograph 2/1/2025    TECHNIQUE: Frontal view of the chest.    FINDINGS:  Left-sided thoracic catheters.  The lungs are clear. No pleural effusion. No pneumothorax.  The heart size is normal.  No acute osseous abnormalities.    IMPRESSION:  Clear lungs.    --- End of Report ---          LUAN PENNINGTON MD; Resident Radiologist  This document has been electronically signed.  LIANNA BROWN MD; Attending Radiologist  This document has been electronically signed. Feb 27 2025  3:39PM    < end of copied text >  < from: CT Angio Chest PE Protocol w/ IV Cont (02.01.25 @ 17:38) >    FINDINGS:    PULMONARY VESSELS: No main or lobar pulmonary embolus. The segmental and   subsegmental branches within the lower lungs are not evaluated secondary   to motion.    HEART AND VASCULATURE: Cardiomegaly. No pericardial effusion. No aortic   aneurysm or dissection.    LUNGS, AIRWAYS, PLEURA: Patent central airways. Clear lungs. No pleural   effusions or pneumothorax.    MEDIASTINUM: No mass or lymphadenopathy.    UPPER ABDOMEN: Within normal limits.    BONES AND SOFT TISSUES: 2 subcutaneous catheters within the left anterior   chest wall.    LOWER NECK: Within normal limits.    IMPRESSION:    No main or lobar pulmonary embolus. The segmental and subsegmental   branches within the lower lungs are not evaluated secondary to motion.    --- End of Report ---            YENY SALAZAR MD; Attending Radiologist  This document has been electronically signed. Feb 1 2025  5:51PM    < end of copied text >  < from: CT Angio Chest PE Protocol w/ IV Cont (01.23.25 @ 00:40) >  IMPRESSION:  Right lower lobe segmental pulmonary embolism.    Findings were discussed with Dr. Don 1/23/2025 12:57 AM by Dr. Downs with read back confirmation.    --- End of Report ---          NARDA DOWNS MD; Resident Radiologist  This document has been electronically signed.  ROGERS SHOEMAKER MD; Attending Radiologist  This document has been electronically signed. Jan 23 2025  1:08AM    < end of copied text >      rad< from: Xray Chest 1 View- PORTABLE-Urgent (02.27.25 @ 15:05) >  FINDINGS:  Left-sided thoracic catheters.  The lungs are clear. No pleural effusion. No pneumothorax.  The heart size is normal.  No acute osseous abnormalities.    IMPRESSION:  Clear lungs.    --- End of Report ---          LUAN PENNINGTON MD; Resident Radiologist  This document has been electronically signed.  LIANNA BROWN MD; Attending Radiologist  This document has been electronically signed. Feb 27 2025  3:39PM    < end of copied text >

## 2025-02-28 NOTE — CHART NOTE - NSCHARTNOTEFT_GEN_A_CORE
PDI	First Name	Last Name	Birth Date	Gender	Street Address	[facility]	Zip Code  TIMOTHY Balderas	1980	Female	BE	[facility]	[address]    Prescription Information      PDI Filter:    PDI	Current Rx	Drug Type	Rx Written	Rx Dispensed	Drug	Quantity	Days Supply  A	N	O	06/06/2024	06/06/2024	oxycodone hcl (ir) 5 mg tablet	30	10  Prescriber Name More Vuong  Prescriber JAMIE # [id number]  Payment Method Other  Dispenser [employer] #[id number]

## 2025-03-01 LAB
ANION GAP SERPL CALC-SCNC: 10 MMOL/L — SIGNIFICANT CHANGE UP (ref 7–14)
BUN SERPL-MCNC: 16 MG/DL — SIGNIFICANT CHANGE UP (ref 7–23)
CALCIUM SERPL-MCNC: 8.3 MG/DL — LOW (ref 8.4–10.5)
CHLORIDE SERPL-SCNC: 105 MMOL/L — SIGNIFICANT CHANGE UP (ref 98–107)
CK MB BLD-MCNC: <2 % — SIGNIFICANT CHANGE UP (ref 0–2.5)
CK MB CFR SERPL CALC: <1 NG/ML — SIGNIFICANT CHANGE UP
CK SERPL-CCNC: 51 U/L — SIGNIFICANT CHANGE UP (ref 25–170)
CO2 SERPL-SCNC: 22 MMOL/L — SIGNIFICANT CHANGE UP (ref 22–31)
CREAT SERPL-MCNC: 0.61 MG/DL — SIGNIFICANT CHANGE UP (ref 0.5–1.3)
EGFR: 112 ML/MIN/1.73M2 — SIGNIFICANT CHANGE UP
EGFR: 112 ML/MIN/1.73M2 — SIGNIFICANT CHANGE UP
GLUCOSE SERPL-MCNC: 82 MG/DL — SIGNIFICANT CHANGE UP (ref 70–99)
HCT VFR BLD CALC: 30.1 % — LOW (ref 34.5–45)
HGB BLD-MCNC: 10 G/DL — LOW (ref 11.5–15.5)
MAGNESIUM SERPL-MCNC: 2.4 MG/DL — SIGNIFICANT CHANGE UP (ref 1.6–2.6)
MCHC RBC-ENTMCNC: 28.1 PG — SIGNIFICANT CHANGE UP (ref 27–34)
MCHC RBC-ENTMCNC: 33.2 G/DL — SIGNIFICANT CHANGE UP (ref 32–36)
MCV RBC AUTO: 84.6 FL — SIGNIFICANT CHANGE UP (ref 80–100)
NRBC # BLD AUTO: 0 K/UL — SIGNIFICANT CHANGE UP (ref 0–0)
NRBC # FLD: 0 K/UL — SIGNIFICANT CHANGE UP (ref 0–0)
NRBC BLD AUTO-RTO: 0 /100 WBCS — SIGNIFICANT CHANGE UP (ref 0–0)
PHOSPHATE SERPL-MCNC: 3.7 MG/DL — SIGNIFICANT CHANGE UP (ref 2.5–4.5)
PLATELET # BLD AUTO: 289 K/UL — SIGNIFICANT CHANGE UP (ref 150–400)
POTASSIUM SERPL-MCNC: 3.9 MMOL/L — SIGNIFICANT CHANGE UP (ref 3.5–5.3)
POTASSIUM SERPL-SCNC: 3.9 MMOL/L — SIGNIFICANT CHANGE UP (ref 3.5–5.3)
RBC # BLD: 3.56 M/UL — LOW (ref 3.8–5.2)
RBC # FLD: 15.5 % — HIGH (ref 10.3–14.5)
SODIUM SERPL-SCNC: 137 MMOL/L — SIGNIFICANT CHANGE UP (ref 135–145)
TROPONIN T, HIGH SENSITIVITY RESULT: <6 NG/L — SIGNIFICANT CHANGE UP
WBC # BLD: 3.85 K/UL — SIGNIFICANT CHANGE UP (ref 3.8–10.5)
WBC # FLD AUTO: 3.85 K/UL — SIGNIFICANT CHANGE UP (ref 3.8–10.5)

## 2025-03-01 PROCEDURE — 71045 X-RAY EXAM CHEST 1 VIEW: CPT | Mod: 26

## 2025-03-01 PROCEDURE — 76856 US EXAM PELVIC COMPLETE: CPT | Mod: 26

## 2025-03-01 PROCEDURE — 93010 ELECTROCARDIOGRAM REPORT: CPT

## 2025-03-01 PROCEDURE — 99232 SBSQ HOSP IP/OBS MODERATE 35: CPT

## 2025-03-01 PROCEDURE — 99222 1ST HOSP IP/OBS MODERATE 55: CPT | Mod: GC

## 2025-03-01 RX ORDER — ACETAMINOPHEN 500 MG/5ML
1000 LIQUID (ML) ORAL ONCE
Refills: 0 | Status: COMPLETED | OUTPATIENT
Start: 2025-03-01 | End: 2025-03-01

## 2025-03-01 RX ORDER — IPRATROPIUM BROMIDE AND ALBUTEROL SULFATE .5; 2.5 MG/3ML; MG/3ML
3 SOLUTION RESPIRATORY (INHALATION) EVERY 6 HOURS
Refills: 0 | Status: DISCONTINUED | OUTPATIENT
Start: 2025-03-01 | End: 2025-03-10

## 2025-03-01 RX ADMIN — GABAPENTIN 100 MILLIGRAM(S): 400 CAPSULE ORAL at 05:46

## 2025-03-01 RX ADMIN — Medication 20 MILLIGRAM(S): at 01:17

## 2025-03-01 RX ADMIN — Medication 1 DOSE(S): at 21:31

## 2025-03-01 RX ADMIN — Medication 40 MILLIGRAM(S): at 05:56

## 2025-03-01 RX ADMIN — Medication 400 MILLIGRAM(S): at 14:20

## 2025-03-01 RX ADMIN — APIXABAN 5 MILLIGRAM(S): 2.5 TABLET, FILM COATED ORAL at 05:46

## 2025-03-01 RX ADMIN — GABAPENTIN 100 MILLIGRAM(S): 400 CAPSULE ORAL at 21:31

## 2025-03-01 RX ADMIN — Medication 1000 MILLIGRAM(S): at 14:55

## 2025-03-01 RX ADMIN — GABAPENTIN 100 MILLIGRAM(S): 400 CAPSULE ORAL at 13:01

## 2025-03-01 RX ADMIN — APIXABAN 5 MILLIGRAM(S): 2.5 TABLET, FILM COATED ORAL at 18:14

## 2025-03-01 NOTE — PROGRESS NOTE ADULT - ASSESSMENT
44F PMHx spina bifida, multiple bilateral foot surgeries,  shunt (managed [facility] neurosurgery), GERD, uterine fibroids, and recent PE on Eliquis presenting with pleuritic substernal and right sided chest pain associated with SOB. Admitted to medicine for further management and monitoring. Detailed plan as below:    Bleeding PV:    TV Sono  Gyn eval

## 2025-03-01 NOTE — PROGRESS NOTE ADULT - ASSESSMENT
44F PMHx spina bifida, multiple bilateral foot surgeries,  shunt (managed [facility] neurosurgery), GERD, uterine fibroids, and recent PE on Eliquis presenting with substernal and right sided chest pain. Patient was diagnosed with PE on 1/23/25 and has been on Eliquis. Patient states that her CP started after consumption of chili pepper yesterday. It improved last night was reoccurred today. It is rate 10/10, characterized as squeezing, pleuritic and radiating to the left upper extremity. Reports mild SOB. No exertional activity. Denies any associated symptoms fatigue, distress, diaphoresis, nausea, vomiting, abdominal pain, or leg swelling. Patient denies immobility, long plane/car rides, malignancy, or history of clotting disorder. No family history of CVD before the age of 65. No smoking history. No hx of hypothyroidism or HIV infection or Hepatitis C or autoimmune disorders. No psychosocial factors including stress/anxiety/depression.   (27 Feb 2025 21:50)  she says she has sob too but most of the issues started after eating  chilly peppers; ; currently sheis doing  ok ; does not looks to in any resp distress       Recent PE  Spina bifida/ recent foot surgery   s/p  shunt  GERD      Recent Pe  she has been complaint with her Eliquis  she is on 2 L of oxygen at this time:   her symptoms more likely due to GERD symptoms  cont PPI:   new cta is pending      3/1:  new cta showed: No pulmonary embolism. No acute pulmonary process.  she seems to have MSK pain too as pressing on her sternum and ribs do hurt  :   she has increased bleeding during urination:  ? gyn consult:   her HB has dropped from 12 to 10    may have dahlia too : cont PPI     Spina bifida/ recent foot surgery   per primary team   23/1: has lesion on rigth foot:  : defer to [facility] team      s/p  shunt  supportive care   3/1: ct head is normal  No CT evidence of acute intracranial pathology. The ventricular system appears slitlike and decompressed, unchanged from     GERD  cont PPI     dw acp  44F PMHx spina bifida, multiple bilateral foot surgeries,  shunt (managed [facility] neurosurgery), GERD, uterine fibroids, and recent PE on Eliquis presenting with substernal and right sided chest pain. Patient was diagnosed with PE on 1/23/25 and has been on Eliquis. Patient states that her CP started after consumption of chili pepper yesterday. It improved last night was reoccurred today. It is rate 10/10, characterized as squeezing, pleuritic and radiating to the left upper extremity. Reports mild SOB. No exertional activity. Denies any associated symptoms fatigue, distress, diaphoresis, nausea, vomiting, abdominal pain, or leg swelling. Patient denies immobility, long plane/car rides, malignancy, or history of clotting disorder. No family history of CVD before the age of 65. No smoking history. No hx of hypothyroidism or HIV infection or Hepatitis C or autoimmune disorders. No psychosocial factors including stress/anxiety/depression.   (27 Feb 2025 21:50)  she says she has sob too but most of the issues started after eating  chilly peppers; ; currently sheis doing  ok ; does not looks to in any resp distress       Recent PE  Spina bifida/ recent foot surgery   s/p  shunt  GERD      Recent Pe  she has been complaint with her Eliquis  she is on 2 L of oxygen at this time:   her symptoms more likely due to GERD symptoms  cont PPI:   new cta is pending      3/1:  new cta showed: No pulmonary embolism. No acute pulmonary process.  she seems to have MSK pain too as pressing on her sternum and ribs do hurt  :   she has increased bleeding during urination:  ? gyn consult:   her HB has dropped from 12 to 10    may have dahlia too : cont PPI     she also says her son has asthma and she could have asthma too and at times she found her wheezing:   trial of BD to see if that helps her in tightness in chest     Spina bifida/ recent foot surgery   per primary team   23/1: has lesion on rigth foot:  : defer to [facility] team      s/p  shunt  supportive care   3/1: ct head is normal  No CT evidence of acute intracranial pathology. The ventricular system appears slitlike and decompressed, unchanged from     GERD  cont PPI     dw acp

## 2025-03-01 NOTE — CHART NOTE - NSCHARTNOTEFT_GEN_A_CORE
Late Entry:     Notified by RN patient with chest pain. Patient assessed at bedside states chest pain began while eating breakfast. Similar to pain that brought her in this admission. Patient states last night had chest pain that improved with maalox. Did endorse pain down the left arm which has no improved.  Upon ROS patient also endorses heavy vaginal bleeding (hx of uterine fibroids).   last menses began January 15th and was started on eliquis in February. Since starting eliquis she has experienced frequent vaginal bleeding.  has gone through 5 diapers in past 24 hours.  Denies lightheadedness, dizziness, vomiting, numbness or tingling.     AM LABS:                       10.0   3.85  )-----------( 289      ( 01 Mar 2025 04:21 )             30.1     03-01    137  |  105  |  16  ----------------------------<  82  3.9   |  22  |  0.61    Ca    8.3[L]      01 Mar 2025 04:21  Phos  3.7     03-01  Mg     2.40     03-01    CARDIAC MARKERS ( 01 Mar 2025 10:00 )  <6 ng/L / x     / x     / x     / x     / <1.0 ng/mL  CARDIAC MARKERS ( 28 Feb 2025 09:50 )  <6 ng/L / x     / x     / x     / x     / <1.0 ng/mL  CARDIAC MARKERS ( 27 Feb 2025 14:32 )  <6 ng/L / x     / x     / x     / x     / x    CHEST XRAY 3/1/25 12:03:   FINDINGS:  Partially imaged left-sided  shunts.  The heart size is normal.  The lungs are clear.  There is no pneumothorax or pleural effusion.    IMPRESSION:  Clear lungs.     EKG w/o ST depressions or elevations, unchanged from prior 2/28/25.    VITALS:   T(C): 36.9 (03-01-25 @ 13:00), Max: 37.2 (03-01-25 @ 04:17)  HR: 73 (03-01-25 @ 13:00) (63 - 76)  BP: 128/78 (03-01-25 @ 13:00) (111/70 - 128/78)  RR: 18 (03-01-25 @ 13:00) (17 - 18)  SpO2: 100% (03-01-25 @ 13:00) (100% - 100%)    EXAM:   CONSTITUTIONAL: Well groomed, no apparent distress  EYES: PERRLA and symmetric, EOMI    RESP: No respiratory distress, no use of accessory muscles; CTA b/l, no WRR. +Point tenderness to chest wall   CV: RRR, +S1S2, no MRG   GI: Soft, NT, ND, no rebound, no guarding +BS x4   PSYCH: Appropriate insight/judgment; A+O x 3, mood and affect appropriate, recent/remote memory intact     IV Ofirmev 1g x1 ordered for pain. Discussed with pulm- started on inhalers for possible underlying asthma.  Will continue medications for GERD.  Patient is awaiting NST pharmacologic. GYN consulted and recommends TVUS at this time.  Case discussed with Dr. Henry. Late Entry:     Notified by RN patient with chest pain. Patient assessed at bedside states chest pain began while eating breakfast. Similar to pain that brought her in this admission. Patient states last night had chest pain that improved with maalox. Did endorse pain down the left arm which has now improved.  Upon ROS patient also endorses heavy vaginal bleeding (hx of uterine fibroids).   last menses began January 15th and was started on eliquis in February. Since starting eliquis she has experienced frequent vaginal bleeding.  has gone through 5 diapers in past 24 hours.  Denies lightheadedness, dizziness, vomiting, numbness or tingling.     AM LABS:                       10.0   3.85  )-----------( 289      ( 01 Mar 2025 04:21 )             30.1     03-01    137  |  105  |  16  ----------------------------<  82  3.9   |  22  |  0.61    Ca    8.3[L]      01 Mar 2025 04:21  Phos  3.7     03-01  Mg     2.40     03-01    CARDIAC MARKERS ( 01 Mar 2025 10:00 )  <6 ng/L / x     / x     / x     / x     / <1.0 ng/mL  CARDIAC MARKERS ( 28 Feb 2025 09:50 )  <6 ng/L / x     / x     / x     / x     / <1.0 ng/mL  CARDIAC MARKERS ( 27 Feb 2025 14:32 )  <6 ng/L / x     / x     / x     / x     / x    CHEST XRAY 3/1/25 12:03:   FINDINGS:  Partially imaged left-sided  shunts.  The heart size is normal.  The lungs are clear.  There is no pneumothorax or pleural effusion.    IMPRESSION:  Clear lungs.     EKG w/o ST depressions or elevations, unchanged from prior 2/28/25.    VITALS:   T(C): 36.9 (03-01-25 @ 13:00), Max: 37.2 (03-01-25 @ 04:17)  HR: 73 (03-01-25 @ 13:00) (63 - 76)  BP: 128/78 (03-01-25 @ 13:00) (111/70 - 128/78)  RR: 18 (03-01-25 @ 13:00) (17 - 18)  SpO2: 100% (03-01-25 @ 13:00) (100% - 100%)    EXAM:   CONSTITUTIONAL: Well groomed, no apparent distress  EYES: PERRLA and symmetric, EOMI    RESP: No respiratory distress, no use of accessory muscles; CTA b/l, no WRR. +Point tenderness to chest wall   CV: RRR, +S1S2, no MRG   GI: Soft, NT, ND, no rebound, no guarding +BS x4   PSYCH: Appropriate insight/judgment; A+O x 3, mood and affect appropriate, recent/remote memory intact     IV Ofirmev 1g x1 ordered for pain. Discussed with pulm- started on inhalers for possible underlying asthma.  Will continue medications for GERD.  Patient is awaiting NST pharmacologic. GYN consulted and recommends TVUS at this time.  Case discussed with Dr. Henry.

## 2025-03-01 NOTE — CONSULT NOTE ADULT - ATTENDING COMMENTS
Pt seen and discussed w/ resident. Agree with findings and plan as documented with changes made as necessary  43yo P1 w/ hx recent PE on Eliquis admitted for chest pain and SOB. GYN consulted for vaginal bleeding. Suspect vaginal bleeding from fibroids and/or recent start of Elliquis, however will need to rule out endometrial hyperplasia or other etiologies.   At bedside she reports large fibroid on right abdomen, abd exam with palpable mass to umbilicus  She reported last void did not have blood for the first time.     Need physical exam when pt amenable  Pt amenable to TAUS - recommend ordering as patient refusing TVUS   Pt with downtrending hgb from 11.4 > 10, possibly from bleeding +/- hemodilution, continue to trend  Recommend EMB, which can be down when bleeding stabilizes or in outpatient setting    Discussed management of AUB likely from fibroids and Eliquis however need to rule out other etiologies with EMB, which she may be able to get outpatient with her GYN. Discussed that patient not a good candidate for hormonal methods or TXA given her recent PE.     If hgb continues to significantly downtrend, pt has symptomatic anemia, heavy bleeding >=1 pad in 1-2 hours consistently, then discussed potential need for Heme consult to assess option for holding or decreasing Elliquis dose, or potentially surgical/procedural intervention (embolization, etc.). She reports that surgery has been discussed with her in the past but she is nervous given she has had a hx of multiple surgeries.     Of note, the patient does not want male providers for any exams.     GYN will follow peripherally.    BRITTNEY Tamez MD

## 2025-03-01 NOTE — CONSULT NOTE ADULT - SUBJECTIVE AND OBJECTIVE BOX
JANETH REILLY  44y  Female [id number]    HPI:  44F  LMP 2/15 PMHx spina bifida, multiple bilateral foot surgeries,  shunt (managed [facility] neurosurgery), GERD, fibroids, and recent PE on Eliquis presented to ED with substernal and right sided chest pain. Pt admitted for management and workup of chest pain. While admitted, pt with vaginal bleeding for which GYN was consulted.    Pt states that she usually has regular, monthly periods that last 5-7d and require 5 pads per day. Since starting Eliquis on 2/15, she states that she has been bleeding daily, and she has required 10 pads per day. She followed up with her private OBGYN who stated that the heavy bleeding was likely due to her Hx fibroids and beginning Eliquis. They discussed treatment options for the fibroid but did not come to a decision. Pt states that she has been passing clots as well as having heavy vaginal bleeding while admitted. She denies abdominal pain, lightheadedness, and dizziness.      Name of GYN Physician: Dr. Yamila Mckeon  OBHx:  x1  GynHx: Denies cysts, endometriosis, STI's, Abnormal pap smears. +Fibroids  PMH: PE, Spina bifida, GERD,  shunt, Anemia  PSH:  Shunt, b/l foot surgery  Meds: Eliquis, Gabapentin, Pantoprazole  Allx: Claritin (anaphylaxis, hives)    Vital Signs Last 24 Hrs  T(C): 36.9 (01 Mar 2025 17:00), Max: 37.2 (01 Mar 2025 04:17)  T(F): 98.4 (01 Mar 2025 17:00), Max: 98.9 (01 Mar 2025 04:17)  HR: 77 (01 Mar 2025 17:00) (63 - 77)  BP: 147/82 (01 Mar 2025 17:00) (111/70 - 147/82)  BP(mean): --  RR: 19 (01 Mar 2025 17:00) (17 - 19)  SpO2: 100% (01 Mar 2025 17:00) (100% - 100%)    Parameters below as of 01 Mar 2025 17:00  Patient On (Oxygen Delivery Method): room air        Physical Exam:   General: sitting comfortably in bed, NAD   HEENT: neck supple, full ROM  CV: Well perfused on exam  Lungs: Saturating well on RA  Abd: Soft, non-tender, non-distended.  Bowel sounds present.    : Refused exam  Ext: non-tender b/l, no edema     LABS:                              10.0   3.85  )-----------( 289      ( 01 Mar 2025 04:21 )             30.1     -    137  |  105  |  16  ----------------------------<  82  3.9   |  22  |  0.61    Ca    8.3[L]      01 Mar 2025 04:21  Phos  3.7       Mg     2.40           I&O's Detail    2025 07:  -  01 Mar 2025 07:00  --------------------------------------------------------  IN:    Oral Fluid: 240 mL  Total IN: 240 mL    OUT:    Voided (mL): 200 mL  Total OUT: 200 mL    Total NET: 40 mL      01 Mar 2025 07:01  -  01 Mar 2025 18:40  --------------------------------------------------------  IN:    Oral Fluid: 480 mL  Total IN: 480 mL    OUT:    Voided (mL): 0 mL  Total OUT: 0 mL    Total NET: 480 mL          Urinalysis Basic - ( 01 Mar 2025 04:21 )    Color: x / Appearance: x / SG: x / pH: x  Gluc: 82 mg/dL / Ketone: x  / Bili: x / Urobili: x   Blood: x / Protein: x / Nitrite: x   Leuk Esterase: x / RBC: x / WBC x   Sq Epi: x / Non Sq Epi: x / Bacteria: x        RADIOLOGY & ADDITIONAL STUDIES:

## 2025-03-01 NOTE — PROGRESS NOTE ADULT - ASSESSMENT
44F w/ PMH of spina bifida,  shunt and recent segmental PE on Eliquis here for new onset R sided cp. No positional or pleuritic component. EKG with NSR no sig STT changes. Trop and BNP wnl.     1. Cp   -repeat CTPA no PE   -cont AC with eliquis   -check ECHO and ST to r/o CAD   -possible GI component, on PPI     2. HTN   -monitor BP  -consider adding norvasc 5 mg qd if needed     Nohemy Ramirez MD [facility]  Attending Interventional Cardiologist, [facility]/JULIEN.   Avaliable on [employer] Team

## 2025-03-01 NOTE — CONSULT NOTE ADULT - ASSESSMENT
44y P1 with Hx PE on Eliquis admitted for chest pain and shortness of breath workup and management. GYN consulted for vaginal bleeding. VSS. Pt with drop in H/H. Pt refusing GYN examination and TVUS at this time.    Recommendations:  - Pad counts  - TAUS if pt refusing TVUS  - Outpatient EMBx  - Monitor CBC, if H/H continue to downtrend consider Heme consult  - No further GYN intervention at this time  - Please reconsult if pt amenable to GYN examination    D/w Dr. Dashawn Chávez PGY2 44y P1 with Hx PE on Eliquis admitted for chest pain and shortness of breath workup and management. GYN consulted for vaginal bleeding. VSS. Pt with drop in H/H. Pt refusing GYN examination and TVUS at this time.    Recommendations:  - Pad counts  - TAUS if pt refusing TVUS  - Outpatient EMBx  - Monitor CBC, if H/H continue to downtrend consider Heme consult  - No further GYN intervention at this time  - Please let us know if pt amenable to GYN examination, if bleeding >= 1 pad in 1-2 hours consistently, or if hgb continuing to downtrend     D/w Dr. Dashawn Chávez PGY2    Attending attestation:  Pt discussed w/ resident. Agree with findings and plan as documented.   45yo P1 w/ hx recent PE on Eliquis admitted for chest pain and SOB. GYN consulted for vaginal bleeding. Suspect vaginal bleeding from fibroids and/or recent start of Elliquis, however will need to rule out endometrial hyperplasia or other etiologies.     Need physical exam when pt amenable  Pt refusing TVUS, will assess if patient amenable to TAUS  Pt with downtrending hgb from 11.4 > 10, possibly from bleeding +/- hemodilution, continue to trend  Recommend EMB, which can be down when bleeding stabilizes or in outpatient setting    Patient not a good candidate for hormonal methods or TXA given her recent PE. If hgb continues to significantly downtrend, pt has symptomatic anemia, heavy bleeding >=1 pad in 1-2 hours consistently, then consider Heme consult to assess option for holding or decreasing Elliquis dose, or surgical intervention (embolization, etc.).     BRITTNEY Tamez MD        44y P1 with Hx PE on Eliquis admitted for chest pain and shortness of breath workup and management. GYN consulted for vaginal bleeding. VSS. Pt with drop in H/H. Pt refusing GYN examination and TVUS at this time.    Recommendations:  - Pad counts  - TAUS if pt refusing TVUS  - Outpatient EMBx  - Monitor CBC, if H/H continue to downtrend consider Heme consult  - No further GYN intervention at this time  - Please let us know if pt amenable to GYN examination, if bleeding >= 1 pad in 1-2 hours consistently, or if hgb continuing to downtrend     D/w Dr. Dashawn Chávez PGY2    Attending attestation:  Pt discussed w/ resident. Agree with findings and plan as documented.   45yo P1 w/ hx recent PE on Eliquis admitted for chest pain and SOB. GYN consulted for vaginal bleeding. Suspect vaginal bleeding from fibroids and/or recent start of Elliquis, however will need to rule out endometrial hyperplasia or other etiologies.     Need physical exam when pt amenable  Pt refusing TVUS, will assess if patient amenable to TAUS  Pt with downtrending hgb from 11.4 > 10, possibly from bleeding +/- hemodilution, continue to trend  Recommend EMB, which can be down when bleeding stabilizes or in outpatient setting    Patient not a good candidate for hormonal methods or TXA given her recent PE. If hgb continues to significantly downtrend, pt has symptomatic anemia, heavy bleeding >=1 pad in 1-2 hours consistently, then consider Heme consult to assess option for holding or decreasing Elliquis dose, or potentially surgical/procedural intervention (embolization, etc.).     GYN will follow peripherally     BRITTNEY Tamez MD        44y P1 with Hx PE on Eliquis admitted for chest pain and shortness of breath workup and management. GYN consulted for vaginal bleeding. VSS. Pt with drop in H/H. Pt refusing GYN examination and TVUS at this time.    Recommendations:  - Pad counts  - recommend TAUS as pt refusing TVUS  - Outpatient EMBx  - Monitor CBC, if H/H continue to downtrend consider Heme consult  - No further GYN intervention at this time  - Please let us know if pt amenable to GYN examination, if bleeding >= 1 pad in 1-2 hours consistently, or if hgb continuing to downtrend     D/w Dr. Dashawn Chávez PGY2

## 2025-03-02 LAB
ANION GAP SERPL CALC-SCNC: 11 MMOL/L — SIGNIFICANT CHANGE UP (ref 7–14)
BUN SERPL-MCNC: 14 MG/DL — SIGNIFICANT CHANGE UP (ref 7–23)
CALCIUM SERPL-MCNC: 8.8 MG/DL — SIGNIFICANT CHANGE UP (ref 8.4–10.5)
CHLORIDE SERPL-SCNC: 104 MMOL/L — SIGNIFICANT CHANGE UP (ref 98–107)
CO2 SERPL-SCNC: 22 MMOL/L — SIGNIFICANT CHANGE UP (ref 22–31)
CREAT SERPL-MCNC: 0.59 MG/DL — SIGNIFICANT CHANGE UP (ref 0.5–1.3)
EGFR: 112 ML/MIN/1.73M2 — SIGNIFICANT CHANGE UP
EGFR: 112 ML/MIN/1.73M2 — SIGNIFICANT CHANGE UP
GLUCOSE SERPL-MCNC: 88 MG/DL — SIGNIFICANT CHANGE UP (ref 70–99)
HCT VFR BLD CALC: 32.3 % — LOW (ref 34.5–45)
HGB BLD-MCNC: 10.7 G/DL — LOW (ref 11.5–15.5)
MAGNESIUM SERPL-MCNC: 2.2 MG/DL — SIGNIFICANT CHANGE UP (ref 1.6–2.6)
MCHC RBC-ENTMCNC: 28.4 PG — SIGNIFICANT CHANGE UP (ref 27–34)
MCHC RBC-ENTMCNC: 33.1 G/DL — SIGNIFICANT CHANGE UP (ref 32–36)
MCV RBC AUTO: 85.7 FL — SIGNIFICANT CHANGE UP (ref 80–100)
NRBC # BLD AUTO: 0 K/UL — SIGNIFICANT CHANGE UP (ref 0–0)
NRBC # FLD: 0 K/UL — SIGNIFICANT CHANGE UP (ref 0–0)
NRBC BLD AUTO-RTO: 0 /100 WBCS — SIGNIFICANT CHANGE UP (ref 0–0)
PHOSPHATE SERPL-MCNC: 4.6 MG/DL — HIGH (ref 2.5–4.5)
PLATELET # BLD AUTO: 297 K/UL — SIGNIFICANT CHANGE UP (ref 150–400)
POTASSIUM SERPL-MCNC: 4.2 MMOL/L — SIGNIFICANT CHANGE UP (ref 3.5–5.3)
POTASSIUM SERPL-SCNC: 4.2 MMOL/L — SIGNIFICANT CHANGE UP (ref 3.5–5.3)
RBC # BLD: 3.77 M/UL — LOW (ref 3.8–5.2)
RBC # FLD: 15.7 % — HIGH (ref 10.3–14.5)
SODIUM SERPL-SCNC: 137 MMOL/L — SIGNIFICANT CHANGE UP (ref 135–145)
WBC # BLD: 3.67 K/UL — LOW (ref 3.8–10.5)
WBC # FLD AUTO: 3.67 K/UL — LOW (ref 3.8–10.5)

## 2025-03-02 PROCEDURE — 99232 SBSQ HOSP IP/OBS MODERATE 35: CPT

## 2025-03-02 RX ADMIN — APIXABAN 5 MILLIGRAM(S): 2.5 TABLET, FILM COATED ORAL at 05:05

## 2025-03-02 RX ADMIN — IPRATROPIUM BROMIDE AND ALBUTEROL SULFATE 3 MILLILITER(S): .5; 2.5 SOLUTION RESPIRATORY (INHALATION) at 20:55

## 2025-03-02 RX ADMIN — GABAPENTIN 100 MILLIGRAM(S): 400 CAPSULE ORAL at 14:42

## 2025-03-02 RX ADMIN — IPRATROPIUM BROMIDE AND ALBUTEROL SULFATE 3 MILLILITER(S): .5; 2.5 SOLUTION RESPIRATORY (INHALATION) at 11:42

## 2025-03-02 RX ADMIN — GABAPENTIN 100 MILLIGRAM(S): 400 CAPSULE ORAL at 21:38

## 2025-03-02 RX ADMIN — APIXABAN 5 MILLIGRAM(S): 2.5 TABLET, FILM COATED ORAL at 18:04

## 2025-03-02 RX ADMIN — GABAPENTIN 100 MILLIGRAM(S): 400 CAPSULE ORAL at 05:05

## 2025-03-02 RX ADMIN — Medication 1 DOSE(S): at 20:09

## 2025-03-02 RX ADMIN — Medication 40 MILLIGRAM(S): at 06:14

## 2025-03-02 RX ADMIN — IPRATROPIUM BROMIDE AND ALBUTEROL SULFATE 3 MILLILITER(S): .5; 2.5 SOLUTION RESPIRATORY (INHALATION) at 16:23

## 2025-03-02 RX ADMIN — Medication 1 DOSE(S): at 10:04

## 2025-03-02 RX ADMIN — Medication 30 MILLILITER(S): at 19:30

## 2025-03-02 NOTE — PROVIDER CONTACT NOTE (FALL NOTIFICATION) - ASSESSMENT
pt. in no acute distress, awake, alert and oriented x4, denies pain or any discomfort, no injury observed,

## 2025-03-02 NOTE — PROGRESS NOTE ADULT - ASSESSMENT
44F PMHx spina bifida, multiple bilateral foot surgeries,  shunt (managed [facility] neurosurgery), GERD, uterine fibroids, and recent PE on Eliquis presenting with substernal and right sided chest pain. Patient was diagnosed with PE on 1/23/25 and has been on Eliquis. Patient states that her CP started after consumption of chili pepper yesterday. It improved last night was reoccurred today. It is rate 10/10, characterized as squeezing, pleuritic and radiating to the left upper extremity. Reports mild SOB. No exertional activity. Denies any associated symptoms fatigue, distress, diaphoresis, nausea, vomiting, abdominal pain, or leg swelling. Patient denies immobility, long plane/car rides, malignancy, or history of clotting disorder. No family history of CVD before the age of 65. No smoking history. No hx of hypothyroidism or HIV infection or Hepatitis C or autoimmune disorders. No psychosocial factors including stress/anxiety/depression.   (27 Feb 2025 21:50)  she says she has sob too but most of the issues started after eating  chilly peppers; ; currently sheis doing  ok ; does not looks to in any resp distress       Recent PE  Spina bifida/ recent foot surgery   s/p  shunt  GERD      Recent Pe  she has been complaint with her Eliquis  she is on 2 L of oxygen at this time:   her symptoms more likely due to GERD symptoms  cont PPI:   new cta is pending      3/1:  new cta showed: No pulmonary embolism. No acute pulmonary process.  she seems to have MSK pain too as pressing on her sternum and ribs do hurt  :   she has increased bleeding during urination:  ? gyn consult:   her HB has dropped from 12 to 10    may have dahlia too : cont PPI     she also says her son has asthma and she could have asthma too and at times she found her wheezing:   trial of BD to see if that helps her in tightness in chest     3/2:  cont BD:  cont advair:    cont eliquis;  US pelvis showed: Limited transabdominal exam. The patient declined the transvaginal portion. Enlarged multi fibroid uterus largely obscuring visualization of the endometrium and right ovary.  monitor or bleeding;     Spina bifida/ recent foot surgery   per primary team   3/1: has lesion on rigth foot:  : defer to primary team    3/2: per primary team     s/p  shunt  supportive care   3/1: ct head is normal  No CT evidence of acute intracranial pathology. The ventricular system appears slitlike and decompressed, unchanged from     GERD  cont PPI     dw pt

## 2025-03-02 NOTE — CHART NOTE - NSCHARTNOTEFT_GEN_A_CORE
Code fall called as Pt found on the floor bedside her bed. Pt reports that she had to use the restroom, but was unable to wait any longer for RN assistance so attempted to go to the bathroom on her own, but was unable to stand and fell right at the side of her bed onto the floor. Pt reports mild intermittent dizziness. Denies any head injury, HA, change in vision, numbness/tingling, paresthesias. Denies any bodily pain/injury. Pt reports that with her hx of spina bifida, she uses a motorized wheelchair at home and does not ambulate independently. /83mmhg, HR 81, RR 17, SpO2 100% on RA. On exam, Pt is A+O x 3, no apparent distress or discomfort noted. Head NC/AT, PERRLA, neck ROM intact without discomfort, b/l arm/shoulder ROM intact without discomfort, no vertebral or paravertebral tenderness noted, LE sensation and ROM intact. No ecchymosis or injury noted. Case discussed with Dr. Henry who was at bedside shortly after, recommend to check orthostatic vitals from lying to sitting (ordered), recent CTH without acute pathology, no evidence of injury or neurologic deficits on exam, will continue to monitor closely, encouraged Pt to use her call bell and to not attempt to get out of bed without assistance, maintain fall precautions. Code fall called as Pt found on the floor bedside her bed. Pt reports that she had to use the restroom, but was unable to wait any longer for RN assistance so attempted to go to the bathroom on her own, but was unable to stand and fell right at the side of her bed onto the floor. Pt reports mild intermittent dizziness. Denies any head injury, HA, change in vision, numbness/tingling, paresthesias. Denies any bodily pain/injury. Pt reports that with her hx of spina bifida, she uses a motorized wheelchair at home and does not ambulate independently. /83mmhg, HR 81, RR 17, SpO2 100% on RA. On exam, Pt is A+O x 3, no apparent distress or discomfort noted. Head NC/AT, PERRLA, neck ROM intact without discomfort, b/l arm/shoulder ROM intact without discomfort, no vertebral or paravertebral tenderness noted, LE sensation and ROM intact. No ecchymosis or injury noted. Case discussed with Dr. Henry who was at bedside shortly after, recommend to check orthostatic vitals from lying to sitting (ordered), recent CTH without acute pathology, no evidence of injury or new neurologic deficits on exam, will continue to monitor closely, encouraged Pt to use her call bell and to not attempt to get out of bed without assistance, maintain fall precautions.

## 2025-03-02 NOTE — PROGRESS NOTE ADULT - ASSESSMENT
44F PMHx spina bifida, multiple bilateral foot surgeries,  shunt (managed [facility] neurosurgery), GERD, uterine fibroids, and recent PE on Eliquis presenting with pleuritic substernal and right sided chest pain associated with SOB. Admitted to medicine for further management and monitoring. Detailed plan as below:    Bleeding PV:    Pelvic sono: Fibroid  Gyn eval appreciated

## 2025-03-02 NOTE — PROGRESS NOTE ADULT - ASSESSMENT
44F w/ PMH of spina bifida,  shunt and recent segmental PE on Eliquis here for new onset R sided cp. No positional or pleuritic component. EKG with NSR no sig STT changes. Trop and BNP wnl.     1. Cp   -repeat CTPA no PE   -cont AC with eliquis   -check ECHO and ST to r/o CAD   -possible GI component, on PPI     2. HTN   -monitor BP  -consider adding norvasc 5 mg qd if needed     Nohemy Ramirez MD [facility]  Attending Interventional Cardiologist, [facility]-NS/JULIEN.   Avaliable on [employer] Team

## 2025-03-03 ENCOUNTER — RESULT REVIEW (OUTPATIENT)
Age: 45
End: 2025-03-03

## 2025-03-03 LAB
ANION GAP SERPL CALC-SCNC: 12 MMOL/L — SIGNIFICANT CHANGE UP (ref 7–14)
BUN SERPL-MCNC: 16 MG/DL — SIGNIFICANT CHANGE UP (ref 7–23)
CALCIUM SERPL-MCNC: 8.9 MG/DL — SIGNIFICANT CHANGE UP (ref 8.4–10.5)
CHLORIDE SERPL-SCNC: 104 MMOL/L — SIGNIFICANT CHANGE UP (ref 98–107)
CO2 SERPL-SCNC: 21 MMOL/L — LOW (ref 22–31)
CREAT SERPL-MCNC: 0.58 MG/DL — SIGNIFICANT CHANGE UP (ref 0.5–1.3)
EGFR: 113 ML/MIN/1.73M2 — SIGNIFICANT CHANGE UP
EGFR: 113 ML/MIN/1.73M2 — SIGNIFICANT CHANGE UP
GLUCOSE SERPL-MCNC: 91 MG/DL — SIGNIFICANT CHANGE UP (ref 70–99)
HCT VFR BLD CALC: 31.9 % — LOW (ref 34.5–45)
HGB BLD-MCNC: 10.4 G/DL — LOW (ref 11.5–15.5)
MAGNESIUM SERPL-MCNC: 2.1 MG/DL — SIGNIFICANT CHANGE UP (ref 1.6–2.6)
MCHC RBC-ENTMCNC: 28.2 PG — SIGNIFICANT CHANGE UP (ref 27–34)
MCHC RBC-ENTMCNC: 32.6 G/DL — SIGNIFICANT CHANGE UP (ref 32–36)
MCV RBC AUTO: 86.4 FL — SIGNIFICANT CHANGE UP (ref 80–100)
NRBC # BLD AUTO: 0 K/UL — SIGNIFICANT CHANGE UP (ref 0–0)
NRBC # FLD: 0 K/UL — SIGNIFICANT CHANGE UP (ref 0–0)
NRBC BLD AUTO-RTO: 0 /100 WBCS — SIGNIFICANT CHANGE UP (ref 0–0)
PHOSPHATE SERPL-MCNC: 4.4 MG/DL — SIGNIFICANT CHANGE UP (ref 2.5–4.5)
PLATELET # BLD AUTO: 280 K/UL — SIGNIFICANT CHANGE UP (ref 150–400)
POTASSIUM SERPL-MCNC: 4.2 MMOL/L — SIGNIFICANT CHANGE UP (ref 3.5–5.3)
POTASSIUM SERPL-SCNC: 4.2 MMOL/L — SIGNIFICANT CHANGE UP (ref 3.5–5.3)
RBC # BLD: 3.69 M/UL — LOW (ref 3.8–5.2)
RBC # FLD: 15.5 % — HIGH (ref 10.3–14.5)
SODIUM SERPL-SCNC: 137 MMOL/L — SIGNIFICANT CHANGE UP (ref 135–145)
WBC # BLD: 3.93 K/UL — SIGNIFICANT CHANGE UP (ref 3.8–10.5)
WBC # FLD AUTO: 3.93 K/UL — SIGNIFICANT CHANGE UP (ref 3.8–10.5)

## 2025-03-03 PROCEDURE — 93016 CV STRESS TEST SUPVJ ONLY: CPT | Mod: GC

## 2025-03-03 PROCEDURE — 78451 HT MUSCLE IMAGE SPECT SING: CPT | Mod: 26

## 2025-03-03 PROCEDURE — 99232 SBSQ HOSP IP/OBS MODERATE 35: CPT

## 2025-03-03 PROCEDURE — 71045 X-RAY EXAM CHEST 1 VIEW: CPT | Mod: 26

## 2025-03-03 PROCEDURE — 93018 CV STRESS TEST I&R ONLY: CPT | Mod: GC

## 2025-03-03 RX ADMIN — GABAPENTIN 100 MILLIGRAM(S): 400 CAPSULE ORAL at 21:52

## 2025-03-03 RX ADMIN — GABAPENTIN 100 MILLIGRAM(S): 400 CAPSULE ORAL at 16:51

## 2025-03-03 RX ADMIN — Medication 1 DOSE(S): at 10:27

## 2025-03-03 RX ADMIN — GABAPENTIN 100 MILLIGRAM(S): 400 CAPSULE ORAL at 05:25

## 2025-03-03 RX ADMIN — Medication 30 MILLILITER(S): at 20:15

## 2025-03-03 RX ADMIN — Medication 1 DOSE(S): at 21:53

## 2025-03-03 RX ADMIN — IPRATROPIUM BROMIDE AND ALBUTEROL SULFATE 3 MILLILITER(S): .5; 2.5 SOLUTION RESPIRATORY (INHALATION) at 20:28

## 2025-03-03 RX ADMIN — Medication 650 MILLIGRAM(S): at 20:58

## 2025-03-03 RX ADMIN — Medication 325 MILLIGRAM(S): at 10:25

## 2025-03-03 RX ADMIN — APIXABAN 5 MILLIGRAM(S): 2.5 TABLET, FILM COATED ORAL at 05:25

## 2025-03-03 RX ADMIN — Medication 650 MILLIGRAM(S): at 20:15

## 2025-03-03 RX ADMIN — APIXABAN 5 MILLIGRAM(S): 2.5 TABLET, FILM COATED ORAL at 17:17

## 2025-03-03 RX ADMIN — IPRATROPIUM BROMIDE AND ALBUTEROL SULFATE 3 MILLILITER(S): .5; 2.5 SOLUTION RESPIRATORY (INHALATION) at 14:55

## 2025-03-03 RX ADMIN — IPRATROPIUM BROMIDE AND ALBUTEROL SULFATE 3 MILLILITER(S): .5; 2.5 SOLUTION RESPIRATORY (INHALATION) at 04:56

## 2025-03-03 RX ADMIN — Medication 40 MILLIGRAM(S): at 05:25

## 2025-03-03 NOTE — PROGRESS NOTE ADULT - ASSESSMENT
44F PMHx spina bifida, multiple bilateral foot surgeries,  shunt (managed [facility] neurosurgery), GERD, uterine fibroids, and recent PE on Eliquis presenting with pleuritic substernal and right sided chest pain associated with SOB. Admitted to medicine for further management and monitoring. Detailed plan as below:    Bleeding PV:    Pelvic sono: Fibroid  Gyn eval appreciated    GERD   GI  CONSULT CALLED   ppi

## 2025-03-03 NOTE — PROGRESS NOTE ADULT - PROBLEM SELECTOR PROBLEM 5
H/O iron deficiency anemia

## 2025-03-03 NOTE — CHART NOTE - NSCHARTNOTEFT_GEN_A_CORE
TAUS reviewed demonstrating 12.6cm, enlarged multi-fibroid uterus up to 7.5cm on L and 6.5 on R. Endometrium is 6mm and largely obscured by fibroids. RO not seen, LO wnl. No free fluid. Pt declining pelvic exam. Given H/H is stable and patient without evidence of hemorrhage, we recommend outpatient f/u with GYN for an endometrial biopsy i/s/o abnormal uterine bleeding. Pt may establish GYN care with our clinic if she wishes (information provided below), or follow up with her own GYN.    [address] [facility] [id number]  [place] [address]  [phone]    D/w Dr. Julito Singleton, PGY-3

## 2025-03-03 NOTE — PROGRESS NOTE ADULT - PROBLEM SELECTOR PLAN 2
-Continue with home Eliquis 5 mg BID  -CTA chest: Prel no PE  Pul f/up appreciated  Duoneb
-Continue with home Eliquis 5 mg BID  -CTA chest: Prel no PE  Pul eval appreiated

## 2025-03-03 NOTE — CHART NOTE - NSCHARTNOTEFT_GEN_A_CORE
Patient complains of burning sensation after each meal with sensation of solid food getting stuck x 1 month .  Pain is occasional accompanied with vomiting.   endorses 20lb weight loss in 1 month .   Complains of dysphagia with liquids as well. Denies previous GI workup / EGD .    Stress today with no evidence of ischemia   - GI emailed for consult   - above discussed with Dr. Henry

## 2025-03-03 NOTE — PROGRESS NOTE ADULT - PROBLEM SELECTOR PLAN 5
-Continue with home Ferrous sulfate QOD

## 2025-03-03 NOTE — PROGRESS NOTE ADULT - PROBLEM SELECTOR PLAN 3
-Continue with home Pantoprazole 40 mg QD

## 2025-03-03 NOTE — PROGRESS NOTE ADULT - ASSESSMENT
44F PMHx spina bifida, multiple bilateral foot surgeries,  shunt (managed [facility] neurosurgery), GERD, uterine fibroids, and recent PE on Eliquis presenting with substernal and right sided chest pain. Patient was diagnosed with PE on 1/23/25 and has been on Eliquis. Patient states that her CP started after consumption of chili pepper yesterday. It improved last night was reoccurred today. It is rate 10/10, characterized as squeezing, pleuritic and radiating to the left upper extremity. Reports mild SOB. No exertional activity. Denies any associated symptoms fatigue, distress, diaphoresis, nausea, vomiting, abdominal pain, or leg swelling. Patient denies immobility, long plane/car rides, malignancy, or history of clotting disorder. No family history of CVD before the age of 65. No smoking history. No hx of hypothyroidism or HIV infection or Hepatitis C or autoimmune disorders. No psychosocial factors including stress/anxiety/depression.   (27 Feb 2025 21:50)  she says she has sob too but most of the issues started after eating  chilly peppers; ; currently sheis doing  ok ; does not looks to in any resp distress       Recent PE  Spina bifida/ recent foot surgery   s/p  shunt  GERD      Recent Pe  she has been complaint with her Eliquis  she is on 2 L of oxygen at this time:   her symptoms more likely due to GERD symptoms  cont PPI:   new cta is pending      3/1:  new cta showed: No pulmonary embolism. No acute pulmonary process.  she seems to have MSK pain too as pressing on her sternum and ribs do hurt  :   she has increased bleeding during urination:  ? gyn consult:   her HB has dropped from 12 to 10    may have dahlia too : cont PPI     she also says her son has asthma and she could have asthma too and at times she found her wheezing:   trial of BD to see if that helps her in tightness in chest     3/2:  cont BD:  cont advair:    cont eliquis;  US pelvis showed: Limited transabdominal exam. The patient declined the transvaginal portion. Enlarged multi fibroid uterus largely obscuring visualization of the endometrium and right ovary.  monitor or bleeding;     3/3: she seems OK;  still complains off feeling pain in the chest after she eats:  sherifis alerdsy on ppi:  has pe on eliquis:   ? go co nsult     Spina bifida/ recent foot surgery   per primary team   3/1: has lesion on rigth foot:  : defer to primary team    3/2: per primary team     s/p  shunt  supportive care   3/1: ct head is normal  No CT evidence of acute intracranial pathology. The ventricular system appears slitlike and decompressed, unchanged from   3/3: seems O K:     GERD  cont PPI     dw pt : paged acp

## 2025-03-03 NOTE — PROGRESS NOTE ADULT - PROBLEM SELECTOR PLAN 1
::Atypical chest pain without troponinemia; ECG with no ischemic changes; Hemodynamically stable. No evidence of volume overload or shock on exam. Low suspicion ACS given pleuritic, positional, and reproducible pain by palpation. Known PE on Eliquis. Low suspicion PTX, aortic dissection, PNA, cardiac effusion or tamponade. Symptoms more likely i/s/o GERD  -Troponin: <6  -CXR: No acute cardiopulmonary process  -pro-BNP 73  -Admit to Telemetry; cardiac monitor  -c/w Eliquis 5 mg BID  -Maalox PRN  -CTA chest: Prel No PE  Cardio eval appreciated
::Atypical chest pain without troponinemia; ECG with no ischemic changes; Hemodynamically stable. No evidence of volume overload or shock on exam. Low suspicion ACS given pleuritic, positional, and reproducible pain by palpation. Known PE on Eliquis. Low suspicion PTX, aortic dissection, PNA, cardiac effusion or tamponade. Symptoms more likely i/s/o GERD  -c/w Eliquis 5 mg BID  -Maalox PRN  -CTA chest: No PE  Cardio f/up appreciated  NST

## 2025-03-03 NOTE — PROGRESS NOTE ADULT - ASSESSMENT
44F w/ PMH of spina bifida,  shunt and recent segmental PE on Eliquis here for new onset R sided cp. No positional or pleuritic component. EKG with NSR no sig STT changes. Trop and BNP wnl.     1. Cp   -repeat CTPA no PE   -cont AC with eliquis   -TTE ECHO and NST unremarkable   -possible GI component, on PPI   -op f/u    2. HTN   -monitor BP  -consider adding norvasc 5 mg qd if needed     Nohemy Ramirez MD [facility]  Attending Interventional Cardiologist, [place]-NS/JULIEN.   Avaliable on [employer] Team

## 2025-03-03 NOTE — PROGRESS NOTE ADULT - PROBLEM SELECTOR PLAN 4
-Continue with home gabapentin 100 mg TID

## 2025-03-04 LAB
ANION GAP SERPL CALC-SCNC: 11 MMOL/L — SIGNIFICANT CHANGE UP (ref 7–14)
APTT BLD: 32.4 SEC — SIGNIFICANT CHANGE UP (ref 24.5–35.6)
BUN SERPL-MCNC: 12 MG/DL — SIGNIFICANT CHANGE UP (ref 7–23)
CALCIUM SERPL-MCNC: 8.9 MG/DL — SIGNIFICANT CHANGE UP (ref 8.4–10.5)
CHLORIDE SERPL-SCNC: 104 MMOL/L — SIGNIFICANT CHANGE UP (ref 98–107)
CO2 SERPL-SCNC: 21 MMOL/L — LOW (ref 22–31)
CREAT SERPL-MCNC: 0.43 MG/DL — LOW (ref 0.5–1.3)
EGFR: 121 ML/MIN/1.73M2 — SIGNIFICANT CHANGE UP
EGFR: 121 ML/MIN/1.73M2 — SIGNIFICANT CHANGE UP
GLUCOSE SERPL-MCNC: 93 MG/DL — SIGNIFICANT CHANGE UP (ref 70–99)
HCT VFR BLD CALC: 31.8 % — LOW (ref 34.5–45)
HGB BLD-MCNC: 10.7 G/DL — LOW (ref 11.5–15.5)
MAGNESIUM SERPL-MCNC: 2.1 MG/DL — SIGNIFICANT CHANGE UP (ref 1.6–2.6)
MCHC RBC-ENTMCNC: 28.7 PG — SIGNIFICANT CHANGE UP (ref 27–34)
MCHC RBC-ENTMCNC: 33.6 G/DL — SIGNIFICANT CHANGE UP (ref 32–36)
MCV RBC AUTO: 85.3 FL — SIGNIFICANT CHANGE UP (ref 80–100)
NRBC # BLD AUTO: 0 K/UL — SIGNIFICANT CHANGE UP (ref 0–0)
NRBC # FLD: 0 K/UL — SIGNIFICANT CHANGE UP (ref 0–0)
NRBC BLD AUTO-RTO: 0 /100 WBCS — SIGNIFICANT CHANGE UP (ref 0–0)
PHOSPHATE SERPL-MCNC: 3.6 MG/DL — SIGNIFICANT CHANGE UP (ref 2.5–4.5)
PLATELET # BLD AUTO: 322 K/UL — SIGNIFICANT CHANGE UP (ref 150–400)
POTASSIUM SERPL-MCNC: 3.7 MMOL/L — SIGNIFICANT CHANGE UP (ref 3.5–5.3)
POTASSIUM SERPL-SCNC: 3.7 MMOL/L — SIGNIFICANT CHANGE UP (ref 3.5–5.3)
RBC # BLD: 3.73 M/UL — LOW (ref 3.8–5.2)
RBC # FLD: 15.6 % — HIGH (ref 10.3–14.5)
SODIUM SERPL-SCNC: 136 MMOL/L — SIGNIFICANT CHANGE UP (ref 135–145)
WBC # BLD: 3.29 K/UL — LOW (ref 3.8–10.5)
WBC # FLD AUTO: 3.29 K/UL — LOW (ref 3.8–10.5)

## 2025-03-04 PROCEDURE — 74018 RADEX ABDOMEN 1 VIEW: CPT | Mod: 26

## 2025-03-04 PROCEDURE — 99232 SBSQ HOSP IP/OBS MODERATE 35: CPT

## 2025-03-04 PROCEDURE — 70250 X-RAY EXAM OF SKULL: CPT | Mod: 26

## 2025-03-04 PROCEDURE — 99223 1ST HOSP IP/OBS HIGH 75: CPT

## 2025-03-04 RX ORDER — HEPARIN SODIUM 1000 [USP'U]/ML
4500 INJECTION INTRAVENOUS; SUBCUTANEOUS EVERY 6 HOURS
Refills: 0 | Status: DISCONTINUED | OUTPATIENT
Start: 2025-03-04 | End: 2025-03-06

## 2025-03-04 RX ORDER — HEPARIN SODIUM 1000 [USP'U]/ML
2000 INJECTION INTRAVENOUS; SUBCUTANEOUS EVERY 6 HOURS
Refills: 0 | Status: DISCONTINUED | OUTPATIENT
Start: 2025-03-04 | End: 2025-03-06

## 2025-03-04 RX ORDER — HEPARIN SODIUM 1000 [USP'U]/ML
INJECTION INTRAVENOUS; SUBCUTANEOUS
Qty: 25000 | Refills: 0 | Status: DISCONTINUED | OUTPATIENT
Start: 2025-03-04 | End: 2025-03-06

## 2025-03-04 RX ORDER — POLYETHYLENE GLYCOL 3350 17 G/17G
17 POWDER, FOR SOLUTION ORAL DAILY
Refills: 0 | Status: DISCONTINUED | OUTPATIENT
Start: 2025-03-04 | End: 2025-03-06

## 2025-03-04 RX ADMIN — IPRATROPIUM BROMIDE AND ALBUTEROL SULFATE 3 MILLILITER(S): .5; 2.5 SOLUTION RESPIRATORY (INHALATION) at 16:12

## 2025-03-04 RX ADMIN — Medication 1 DOSE(S): at 21:07

## 2025-03-04 RX ADMIN — Medication 650 MILLIGRAM(S): at 07:52

## 2025-03-04 RX ADMIN — GABAPENTIN 100 MILLIGRAM(S): 400 CAPSULE ORAL at 06:52

## 2025-03-04 RX ADMIN — Medication 40 MILLIGRAM(S): at 06:53

## 2025-03-04 RX ADMIN — APIXABAN 5 MILLIGRAM(S): 2.5 TABLET, FILM COATED ORAL at 06:53

## 2025-03-04 RX ADMIN — Medication 650 MILLIGRAM(S): at 06:52

## 2025-03-04 RX ADMIN — Medication 650 MILLIGRAM(S): at 14:54

## 2025-03-04 RX ADMIN — IPRATROPIUM BROMIDE AND ALBUTEROL SULFATE 3 MILLILITER(S): .5; 2.5 SOLUTION RESPIRATORY (INHALATION) at 10:39

## 2025-03-04 RX ADMIN — IPRATROPIUM BROMIDE AND ALBUTEROL SULFATE 3 MILLILITER(S): .5; 2.5 SOLUTION RESPIRATORY (INHALATION) at 03:57

## 2025-03-04 RX ADMIN — Medication 40 MILLIGRAM(S): at 17:16

## 2025-03-04 RX ADMIN — HEPARIN SODIUM 1100 UNIT(S)/HR: 1000 INJECTION INTRAVENOUS; SUBCUTANEOUS at 23:57

## 2025-03-04 RX ADMIN — Medication 1 DOSE(S): at 11:29

## 2025-03-04 RX ADMIN — IPRATROPIUM BROMIDE AND ALBUTEROL SULFATE 3 MILLILITER(S): .5; 2.5 SOLUTION RESPIRATORY (INHALATION) at 21:20

## 2025-03-04 RX ADMIN — GABAPENTIN 100 MILLIGRAM(S): 400 CAPSULE ORAL at 21:07

## 2025-03-04 RX ADMIN — HEPARIN SODIUM 1100 UNIT(S)/HR: 1000 INJECTION INTRAVENOUS; SUBCUTANEOUS at 19:04

## 2025-03-04 RX ADMIN — Medication 650 MILLIGRAM(S): at 15:54

## 2025-03-04 RX ADMIN — GABAPENTIN 100 MILLIGRAM(S): 400 CAPSULE ORAL at 14:50

## 2025-03-04 RX ADMIN — POLYETHYLENE GLYCOL 3350 17 GRAM(S): 17 POWDER, FOR SOLUTION ORAL at 17:16

## 2025-03-04 NOTE — CONSULT NOTE ADULT - ASSESSMENT
44F PMHx spina bifida, multiple bilateral foot surgeries,  shunt (managed [facility] neurosurgery), GERD, uterine fibroids, and recent PE on Eliquis 1/2025 presenting with substernal and right sided chest pain. Admitted to medicine for further management and monitoring. Evaluated by cards, TTE ECHO and NST unremarkable. GI consulted for dysphagia/gerd.     HDS. Labs: normocytic anemia, hgb 10s, no abd imaging this admission, CTAP 1/2025 w/ large fecal load    # GERD  # dysphagia to solids  - endorses burning chest pain and dysphagia to solids with intermittent vomiting since this past January, when consumes solids or 'heavy foods' feels bolus get stuck in mid chest which is painful, either food will slowly to pass to stomach or will immediately regurgitate undigested food (occurred daily for a week this past January but since improved with no recent regurgitation or pp vomiting), no issue with liquids, currently tolerating reg diet, prior improvement with ppi at home but ran out of med, no hematemesis, no abd pain or blood in stool, no prior egd; given clinical hx suspect gerd, r/o component of esophageal dysphagia- ie. esophagitis, structural etiology, etc  # chronic constipation  - baseline 1bm/wk with pushing/straining, noted prior imaging with large fecal load, also on opioids, suspect iso limited movility/component of underlying dysmotility  # recent PE on eliquis  # uterine fibroids/vaginal bleeding    Recommendations-   44F PMHx spina bifida, multiple bilateral foot surgeries,  shunt (managed [facility] neurosurgery), GERD, uterine fibroids, and recent PE on Eliquis 1/2025 presenting with substernal and right sided chest pain. Admitted to medicine for further management and monitoring. Evaluated by cards, TTE ECHO and NST unremarkable. GI consulted for dysphagia/gerd.     HDS. Labs: normocytic anemia, hgb 10s, no abd imaging this admission, CTAP 1/2025 w/ large fecal load    # GERD  # dysphagia to solids  - endorses burning chest pain and dysphagia to solids with intermittent vomiting since this past January, when consumes solids or 'heavy foods' feels bolus get stuck in mid chest which is painful, either food will slowly to pass to stomach or will immediately regurgitate undigested food (occurred daily for a week this past January but since improved with no recent regurgitation or pp vomiting), no issue with liquids, currently tolerating reg diet, prior improvement with ppi at home but ran out of med, no hematemesis, no abd pain or blood in stool, no prior egd; given clinical hx suspect gerd, r/o component of esophageal dysphagia- ie. esophagitis, structural etiology, etc  # chronic constipation  - baseline 1bm/wk with pushing/straining, noted prior imaging with large fecal load, also on opioids, suspect iso limited mobility/component of underlying dysmotility  # recent PE on eliquis  # uterine fibroids/vaginal bleeding    Recommendations-  - obtain BE with timed barium tablet  - trial of PPI BID, maalox prn  - GERD precautions, avoidance of acid reflux-inducing foods (excessive caffeine, chocolate, alcohol, peppermint, fatty foods, excessive spices such as garlic, tomato sauce, onions, peppers), avoidance of late meals (eating at least 3+ hours prior to bedtime), head of bed elevation if excessive night-time symptoms   - start miralax daily (uptitrate as needed for 1 soft bm/day)  - avoid opioids as able, oobat  - rest of care as per primary team    *all recommendations are tentative until note is attested by attending*    CASSI Osorio PA-C, RD, CDN  available on TEAMS M/T/TH/F from 7am - 4pm    after hours/weekends: non urgent issues --> email [email or web address], urgent issues --> contact on-call LAZARA johns at [phone]    44F PMHx spina bifida, multiple bilateral foot surgeries,  shunt (managed [facility] neurosurgery), GERD, uterine fibroids, and recent PE on Eliquis 1/2025 presenting with substernal and right sided chest pain. Admitted to medicine for further management and monitoring. Evaluated by cards, TTE ECHO and NST unremarkable. GI consulted for dysphagia/gerd.     HDS. Labs: normocytic anemia, hgb 10s, no abd imaging this admission, CTAP 1/2025 w/ large fecal load    # GERD  # dysphagia to solids  - endorses burning chest pain and dysphagia to solids with intermittent vomiting since this past January, when consumes solids or 'heavy foods' feels bolus get stuck in mid chest which is painful, either food will slowly to pass to stomach or will immediately regurgitate undigested food (occurred daily for a week this past January but since improved with no recent regurgitation or pp vomiting), no issue with liquids, currently tolerating reg diet, prior improvement with ppi at home but ran out of med, no hematemesis, no abd pain or blood in stool, no prior egd; given clinical hx suspect gerd, r/o component of esophageal dysphagia- ie. esophagitis, stricture, other structural etiology, etc  # chronic constipation  - baseline 1bm/wk with pushing/straining, noted prior imaging with large fecal load, also on opioids, suspect iso limited mobility/component of underlying dysmotility  # recent PE on eliquis  # uterine fibroids/vaginal bleeding    Recommendations-  - obtain BE with timed barium tablet in interim  - pls hold ac if deemed acceptable risk or switch to short acting agent and can plan for tentative egd to further evaluate in ~48hrs  - trial of PPI BID, maalox prn  - GERD precautions, avoidance of acid reflux-inducing foods (excessive caffeine, chocolate, alcohol, peppermint, fatty foods, excessive spices such as garlic, tomato sauce, onions, peppers), avoidance of late meals (eating at least 3+ hours prior to bedtime), head of bed elevation if excessive night-time symptoms   - start miralax daily (uptitrate as needed for 1 soft bm/day)  - avoid opioids as able, oobat  - rest of care as per primary team    dw gi attg    CASSI Osorio PA-C, RD, CDN  available on TEAMS M/T/TH/F from 7am - 4pm    after hours/weekends: non urgent issues --> email [email or web address], urgent issues --> contact on-call GI fellow at [phone]

## 2025-03-04 NOTE — PROGRESS NOTE ADULT - ASSESSMENT
44F PMHx spina bifida, multiple bilateral foot surgeries,  shunt (managed [facility] neurosurgery), GERD, uterine fibroids, and recent PE on Eliquis presenting with pleuritic substernal and right sided chest pain associated with SOB. Admitted to medicine for further management and monitoring. Detailed plan as below:    Bleeding PV:  Pelvic sono: Fibroid  Gyn eval appreciated    GERD / dysphagia to solid   seen by GI   recommend BE   also hold AC : and will plan for EGD     #Chest pain.   ·  Plan: ::Atypical chest pain without troponinemia; ECG with no ischemic changes; Hemodynamically stable. No evidence of volume overload or shock on exam. Low suspicion ACS given pleuritic, positional, and reproducible pain by palpation. Known PE on Eliquis. Low suspicion PTX, aortic dissection, PNA, cardiac effusion or tamponade. Symptoms more likely i/s/o GERD  -c/w Eliquis 5 mg BID  -Maalox PRN  -CTA chest: No PE  Cardio f/up appreciated  NST: neg for ischemia      Problem/Plan - 2:  ·  Problem: Pulmonary embolism.   ·  Plan: -Continue with home Eliquis 5 mg BID  -CTA chest: Prel no PE  Pul f/up appreciated  Duoneb.     Problem/Plan - 3:  ·  Problem: Neuropathic pain.   ·  Plan: -Continue with home gabapentin 100 mg TID.     Problem/Plan - 4:  ·  Problem: H/O iron deficiency anemia.   ·  Plan: -Continue with home Ferrous sulfate QOD.    dispo: as per GI : hold AC from am , and get barium enema studies , EGD will be scheduled later

## 2025-03-04 NOTE — CONSULT NOTE ADULT - NS ATTEND AMEND GEN_ALL_CORE FT
44-year-old female with spina bifida/multiple bilateral foot surgeries/ shunt/GERD/fibroids/recent PE diagnosis on Eliquis since January 2025 who initially presented with severe chest pain/sternal discomfort with radiation to her arm ruled out for ACS with TTE/NST negative for cardiac origin.  We are being consulted for significant dysphagia to solids as well as retrosternal burning/regurgitation which has been ongoing for the past month or greater.  Impression  Dysphagia to solids greater than liquids, in the setting of retrosternal burning, rule out esophagitis/esophageal stricture, rule out GI dysmotility disorder achalasia    Recommendation  1.  Hold Eliquis x 2 days in anticipation for EGD plus or minus dilation with Endoflip evaluation of the LES  In the interim, obtain barium esophagram with tablet challenge  2.  Liquid diet x 24 hours prior to anticipated EGD in 48 hours

## 2025-03-04 NOTE — CONSULT NOTE ADULT - SUBJECTIVE AND OBJECTIVE BOX
Chief Complaint:  Patient is a 44y old  Female who presents with a chief complaint of Chest pain (03 Mar 2025 19:58)    HPI: 44F PMHx spina bifida, multiple bilateral foot surgeries,  shunt (managed [facility] neurosurgery), GERD, uterine fibroids, and recent PE on Eliquis 1/2025 presenting with substernal and right sided chest pain. Admitted to medicine for further management and monitoring. TTE ECHO and NST unremarkable.    GI consulted for dysphagia/gerd. pt seen and examined.    currently avss  normocytic anemia hgb 10s  no abd imaging this admission, ct 1/2025 w/ large fecal load      Allergies:  lactose (Unknown)  shellfish (Unknown)  latex (Hives; Rash)  Zyrtec (Rash)      PMHX/PSHX:  Spina bifida    Fibroids    Wound of right foot    Wound of left foot    S/P  shunt    S/P foot surgery, left    S/P foot surgery, right        Family history:  No pertinent family history in first degree relatives    Social History: as above    Home Medications: as per h&p    Hospital Medications:  acetaminophen     Tablet .. 650 milliGRAM(s) Oral every 6 hours PRN  albuterol/ipratropium for Nebulization 3 milliLiter(s) Nebulizer every 6 hours  aluminum hydroxide/magnesium hydroxide/simethicone Suspension 30 milliLiter(s) Oral every 6 hours PRN  apixaban      apixaban 5 milliGRAM(s) Oral every 12 hours  ferrous    sulfate 325 milliGRAM(s) Oral <User Schedule>  fluticasone propionate/ salmeterol 250-50 MICROgram(s) Diskus 1 Dose(s) Inhalation two times a day  gabapentin 100 milliGRAM(s) Oral three times a day  oxyCODONE    IR 5 milliGRAM(s) Oral every 6 hours PRN  pantoprazole    Tablet 40 milliGRAM(s) Oral before breakfast        Pertinent ROS as per HPI      Vital Signs:  Vital Signs Last 24 Hrs  T(C): 36.8 (04 Mar 2025 06:07), Max: 37.1 (03 Mar 2025 13:01)  T(F): 98.2 (04 Mar 2025 06:07), Max: 98.7 (03 Mar 2025 13:01)  HR: 68 (04 Mar 2025 06:07) (68 - 94)  BP: 98/54 (04 Mar 2025 06:07) (98/54 - 157/98)  BP(mean): --  RR: 17 (04 Mar 2025 06:07) (16 - 18)  SpO2: 100% (04 Mar 2025 06:07) (97% - 100%)    Parameters below as of 04 Mar 2025 06:07  Patient On (Oxygen Delivery Method): room air      Daily     Daily     LABS:                        10.4   3.93  )-----------( 280      ( 03 Mar 2025 05:27 )             31.9       03-03    137  |  104  |  16  ----------------------------<  91  4.2   |  21[L]  |  0.58    Ca    8.9      03 Mar 2025 05:27  Phos  4.4     03-03  Mg     2.10     03-03          Urinalysis Basic - ( 03 Mar 2025 05:27 )    Color: x / Appearance: x / SG: x / pH: x  Gluc: 91 mg/dL / Ketone: x  / Bili: x / Urobili: x   Blood: x / Protein: x / Nitrite: x   Leuk Esterase: x / RBC: x / WBC x   Sq Epi: x / Non Sq Epi: x / Bacteria: x                              10.4   3.93  )-----------( 280      ( 03 Mar 2025 05:27 )             31.9                         10.7   3.67  )-----------( 297      ( 02 Mar 2025 04:29 )             32.3       PHYSICAL EXAM:   GENERAL:  Lying in bed in NAD  HEENT:  Normocephalic/atraumatic, no scleral icterus  NECK: Trachea midline  CHEST:  Resp even, non labored   ABDOMEN:  Soft, non-tender, non-distended  EXTREMITIES: WWP, no edema  SKIN:  No rash or jaundice  NEURO:  Alert and oriented x 3, no asterixis    Imaging: no abd imaging           Chief Complaint:  Patient is a 44y old  Female who presents with a chief complaint of Chest pain (03 Mar 2025 19:58)    HPI: 44F PMHx spina bifida, multiple bilateral foot surgeries,  shunt (managed [facility] neurosurgery), GERD, uterine fibroids, and recent PE on Eliquis 1/2025 presenting with substernal and right sided chest pain. Admitted to medicine for further management and monitoring. TTE ECHO and NST unremarkable.    GI consulted for dysphagia/gerd. pt seen and examined. states since this past January intermittently with burning chest pain and dysphagia to solids with intermittent vomiting. c/o chest burning/tightness after eating/drinking. when consumes solids or 'heavy foods' feels bolus get stuck in mid chest which is painful, either food will slowly to pass to stomach or will immediately regurgitate undigested food (states this happened daily for a week this past January but since improved with no recent regurgitation or pp vomiting). no issue with getting liquids down. was able to tolerate dinner last night. was taking ppi at home prior with some relief of chest burning but ran out of med. endorses chronic constipation (baseline 1bm/wk with pushing/straining), was taking lax at home but stopped as gave her loose stools, last bm this past saturday, brown, passing flatus. does note current vaginal bleeding. denies prior episodes of dysphagia/gerd. denies f/c, nausea, hematemesis, recent vomiting, abd pain, diarrhea, blood in stool. no prior egd or colon. hx of  shunt. meds- eliquis, iron. fhx nc. denies toxic habits.    currently avss  normocytic anemia hgb 10s  no abd imaging this admission, ct 1/2025 w/ large fecal load      Allergies:  lactose (Unknown)  shellfish (Unknown)  latex (Hives; Rash)  Zyrtec (Rash)      PMHX/PSHX:  Spina bifida    Fibroids    Wound of right foot    Wound of left foot    S/P  shunt    S/P foot surgery, left    S/P foot surgery, right        Family history:  No pertinent family history in first degree relatives    Social History: as above    Home Medications: as per h&p    Hospital Medications:  acetaminophen     Tablet .. 650 milliGRAM(s) Oral every 6 hours PRN  albuterol/ipratropium for Nebulization 3 milliLiter(s) Nebulizer every 6 hours  aluminum hydroxide/magnesium hydroxide/simethicone Suspension 30 milliLiter(s) Oral every 6 hours PRN  apixaban      apixaban 5 milliGRAM(s) Oral every 12 hours  ferrous    sulfate 325 milliGRAM(s) Oral <User Schedule>  fluticasone propionate/ salmeterol 250-50 MICROgram(s) Diskus 1 Dose(s) Inhalation two times a day  gabapentin 100 milliGRAM(s) Oral three times a day  oxyCODONE    IR 5 milliGRAM(s) Oral every 6 hours PRN  pantoprazole    Tablet 40 milliGRAM(s) Oral before breakfast        Pertinent ROS as per HPI      Vital Signs:  Vital Signs Last 24 Hrs  T(C): 36.8 (04 Mar 2025 06:07), Max: 37.1 (03 Mar 2025 13:01)  T(F): 98.2 (04 Mar 2025 06:07), Max: 98.7 (03 Mar 2025 13:01)  HR: 68 (04 Mar 2025 06:07) (68 - 94)  BP: 98/54 (04 Mar 2025 06:07) (98/54 - 157/98)  BP(mean): --  RR: 17 (04 Mar 2025 06:07) (16 - 18)  SpO2: 100% (04 Mar 2025 06:07) (97% - 100%)    Parameters below as of 04 Mar 2025 06:07  Patient On (Oxygen Delivery Method): room air      Daily     Daily     LABS:                        10.4   3.93  )-----------( 280      ( 03 Mar 2025 05:27 )             31.9       03-03    137  |  104  |  16  ----------------------------<  91  4.2   |  21[L]  |  0.58    Ca    8.9      03 Mar 2025 05:27  Phos  4.4     03-03  Mg     2.10     03-03          Urinalysis Basic - ( 03 Mar 2025 05:27 )    Color: x / Appearance: x / SG: x / pH: x  Gluc: 91 mg/dL / Ketone: x  / Bili: x / Urobili: x   Blood: x / Protein: x / Nitrite: x   Leuk Esterase: x / RBC: x / WBC x   Sq Epi: x / Non Sq Epi: x / Bacteria: x                              10.4   3.93  )-----------( 280      ( 03 Mar 2025 05:27 )             31.9                         10.7   3.67  )-----------( 297      ( 02 Mar 2025 04:29 )             32.3       PHYSICAL EXAM:   GENERAL: lying in bed,  in no apparent distress  HEENT: NCAT  NECK: trachea midline  CHEST:  respirations even, non labored  ABDOMEN:  soft, distended (states recently baseline), non tender, old wh trocar sites  EXTREMITIES: WWP  SKIN:  warm/dry  PSYCH: appropriate affect, A&O x 3  NEURO: no tremor noted     Imaging: no abd imaging

## 2025-03-04 NOTE — PROGRESS NOTE ADULT - ASSESSMENT
44F w/ PMH of spina bifida,  shunt and recent segmental PE on Eliquis here for new onset R sided cp. No positional or pleuritic component. EKG with NSR no sig STT changes. Trop and BNP wnl.     1. Cp   -repeat CTPA no PE   -cont AC with eliquis   -TTE ECHO and NST unremarkable   -possible GI component, on PPI   -op f/u    2. HTN   -monitor BP  -consider adding norvasc 5 mg qd if needed     3. Prior PE   -cont eliquis   -holding for GI eval and EGD   -on heparin gtt     Nohemy Ramirez MD FAC  Attending Interventional Cardiologist, [facility].   Avaliable on [facility] [place] Team

## 2025-03-04 NOTE — PROGRESS NOTE ADULT - ASSESSMENT
44F PMHx spina bifida, multiple bilateral foot surgeries,  shunt (managed [facility] neurosurgery), GERD, uterine fibroids, and recent PE on Eliquis presenting with substernal and right sided chest pain. Patient was diagnosed with PE on 1/23/25 and has been on Eliquis. Patient states that her CP started after consumption of chili pepper yesterday. It improved last night was reoccurred today. It is rate 10/10, characterized as squeezing, pleuritic and radiating to the left upper extremity. Reports mild SOB. No exertional activity. Denies any associated symptoms fatigue, distress, diaphoresis, nausea, vomiting, abdominal pain, or leg swelling. Patient denies immobility, long plane/car rides, malignancy, or history of clotting disorder. No family history of CVD before the age of 65. No smoking history. No hx of hypothyroidism or HIV infection or Hepatitis C or autoimmune disorders. No psychosocial factors including stress/anxiety/depression.   (27 Feb 2025 21:50)  she says she has sob too but most of the issues started after eating  chilly peppers; ; currently sheis doing  ok ; does not looks to in any resp distress       Recent PE  Spina bifida/ recent foot surgery   s/p  shunt  GERD      Recent Pe  she has been complaint with her Eliquis  she is on 2 L of oxygen at this time:   her symptoms more likely due to GERD symptoms  cont PPI:   new cta is pending      3/1:  new cta showed: No pulmonary embolism. No acute pulmonary process.  she seems to have MSK pain too as pressing on her sternum and ribs do hurt  :   she has increased bleeding during urination:  ? gyn consult:   her HB has dropped from 12 to 10    may have dahlia too : cont PPI     she also says her son has asthma and she could have asthma too and at times she found her wheezing:   trial of BD to see if that helps her in tightness in chest     3/2:  cont BD:  cont advair:    cont eliquis;  US pelvis showed: Limited transabdominal exam. The patient declined the transvaginal portion. Enlarged multi fibroid uterus largely obscuring visualization of the endometrium and right ovary.  monitor or bleeding;     3/3: she seems OK;  still complains off feeling pain in the chest after she eats:  amberly alerdsy on ppi:  has pe on eliquis:   ? go co nsult     3/4:  seems to be doing  ok :  no sob:   but was not able to eat chicken last night:  as she feels it is with great difficulty the checken paseed last night from her food pipe:  seen by gi :? for possible endoscopy:     Spina bifida/ recent foot surgery   per primary team   3/1: has lesion on rigth foot:  : defer to primary team    3/2: per primary team   3/3: seems OK:  no sob;       s/p  shunt  supportive care   3/1: ct head is normal  No CT evidence of acute intracranial pathology. The ventricular system appears slitlike and decompressed, unchanged from   3/3: seems O K:     GERD  cont PPI     dw pt :

## 2025-03-05 LAB
ANION GAP SERPL CALC-SCNC: 10 MMOL/L — SIGNIFICANT CHANGE UP (ref 7–14)
APTT BLD: 136 SEC — CRITICAL HIGH (ref 24.5–35.6)
APTT BLD: 88.9 SEC — HIGH (ref 24.5–35.6)
APTT BLD: 90.8 SEC — HIGH (ref 24.5–35.6)
BUN SERPL-MCNC: 8 MG/DL — SIGNIFICANT CHANGE UP (ref 7–23)
CALCIUM SERPL-MCNC: 8.8 MG/DL — SIGNIFICANT CHANGE UP (ref 8.4–10.5)
CHLORIDE SERPL-SCNC: 105 MMOL/L — SIGNIFICANT CHANGE UP (ref 98–107)
CO2 SERPL-SCNC: 20 MMOL/L — LOW (ref 22–31)
CREAT SERPL-MCNC: 0.38 MG/DL — LOW (ref 0.5–1.3)
EGFR: 125 ML/MIN/1.73M2 — SIGNIFICANT CHANGE UP
EGFR: 125 ML/MIN/1.73M2 — SIGNIFICANT CHANGE UP
GLUCOSE SERPL-MCNC: 85 MG/DL — SIGNIFICANT CHANGE UP (ref 70–99)
HCT VFR BLD CALC: 32.1 % — LOW (ref 34.5–45)
HCT VFR BLD CALC: 32.6 % — LOW (ref 34.5–45)
HGB BLD-MCNC: 10.6 G/DL — LOW (ref 11.5–15.5)
HGB BLD-MCNC: 10.7 G/DL — LOW (ref 11.5–15.5)
MAGNESIUM SERPL-MCNC: 2 MG/DL — SIGNIFICANT CHANGE UP (ref 1.6–2.6)
MCHC RBC-ENTMCNC: 28.5 PG — SIGNIFICANT CHANGE UP (ref 27–34)
MCHC RBC-ENTMCNC: 28.8 PG — SIGNIFICANT CHANGE UP (ref 27–34)
MCHC RBC-ENTMCNC: 32.5 G/DL — SIGNIFICANT CHANGE UP (ref 32–36)
MCHC RBC-ENTMCNC: 33.3 G/DL — SIGNIFICANT CHANGE UP (ref 32–36)
MCV RBC AUTO: 85.6 FL — SIGNIFICANT CHANGE UP (ref 80–100)
MCV RBC AUTO: 88.6 FL — SIGNIFICANT CHANGE UP (ref 80–100)
NRBC # BLD AUTO: 0 K/UL — SIGNIFICANT CHANGE UP (ref 0–0)
NRBC # BLD AUTO: 0 K/UL — SIGNIFICANT CHANGE UP (ref 0–0)
NRBC # FLD: 0 K/UL — SIGNIFICANT CHANGE UP (ref 0–0)
NRBC # FLD: 0 K/UL — SIGNIFICANT CHANGE UP (ref 0–0)
NRBC BLD AUTO-RTO: 0 /100 WBCS — SIGNIFICANT CHANGE UP (ref 0–0)
NRBC BLD AUTO-RTO: 0 /100 WBCS — SIGNIFICANT CHANGE UP (ref 0–0)
PHOSPHATE SERPL-MCNC: 3.1 MG/DL — SIGNIFICANT CHANGE UP (ref 2.5–4.5)
PLATELET # BLD AUTO: 325 K/UL — SIGNIFICANT CHANGE UP (ref 150–400)
PLATELET # BLD AUTO: 329 K/UL — SIGNIFICANT CHANGE UP (ref 150–400)
POTASSIUM SERPL-MCNC: 4.3 MMOL/L — SIGNIFICANT CHANGE UP (ref 3.5–5.3)
POTASSIUM SERPL-SCNC: 4.3 MMOL/L — SIGNIFICANT CHANGE UP (ref 3.5–5.3)
RBC # BLD: 3.68 M/UL — LOW (ref 3.8–5.2)
RBC # BLD: 3.75 M/UL — LOW (ref 3.8–5.2)
RBC # FLD: 15.5 % — HIGH (ref 10.3–14.5)
RBC # FLD: 15.8 % — HIGH (ref 10.3–14.5)
SODIUM SERPL-SCNC: 135 MMOL/L — SIGNIFICANT CHANGE UP (ref 135–145)
WBC # BLD: 4.23 K/UL — SIGNIFICANT CHANGE UP (ref 3.8–10.5)
WBC # BLD: 4.49 K/UL — SIGNIFICANT CHANGE UP (ref 3.8–10.5)
WBC # FLD AUTO: 4.23 K/UL — SIGNIFICANT CHANGE UP (ref 3.8–10.5)
WBC # FLD AUTO: 4.49 K/UL — SIGNIFICANT CHANGE UP (ref 3.8–10.5)

## 2025-03-05 PROCEDURE — 99232 SBSQ HOSP IP/OBS MODERATE 35: CPT | Mod: GC

## 2025-03-05 PROCEDURE — 99232 SBSQ HOSP IP/OBS MODERATE 35: CPT

## 2025-03-05 PROCEDURE — 99223 1ST HOSP IP/OBS HIGH 75: CPT

## 2025-03-05 PROCEDURE — 74220 X-RAY XM ESOPHAGUS 1CNTRST: CPT | Mod: 26

## 2025-03-05 RX ADMIN — IPRATROPIUM BROMIDE AND ALBUTEROL SULFATE 3 MILLILITER(S): .5; 2.5 SOLUTION RESPIRATORY (INHALATION) at 04:29

## 2025-03-05 RX ADMIN — OXYCODONE HYDROCHLORIDE 5 MILLIGRAM(S): 30 TABLET ORAL at 13:52

## 2025-03-05 RX ADMIN — HEPARIN SODIUM 0 UNIT(S)/HR: 1000 INJECTION INTRAVENOUS; SUBCUTANEOUS at 09:01

## 2025-03-05 RX ADMIN — Medication 325 MILLIGRAM(S): at 09:07

## 2025-03-05 RX ADMIN — GABAPENTIN 100 MILLIGRAM(S): 400 CAPSULE ORAL at 13:49

## 2025-03-05 RX ADMIN — Medication 1 APPLICATION(S): at 13:50

## 2025-03-05 RX ADMIN — HEPARIN SODIUM 1100 UNIT(S)/HR: 1000 INJECTION INTRAVENOUS; SUBCUTANEOUS at 02:04

## 2025-03-05 RX ADMIN — IPRATROPIUM BROMIDE AND ALBUTEROL SULFATE 3 MILLILITER(S): .5; 2.5 SOLUTION RESPIRATORY (INHALATION) at 21:16

## 2025-03-05 RX ADMIN — HEPARIN SODIUM 1100 UNIT(S)/HR: 1000 INJECTION INTRAVENOUS; SUBCUTANEOUS at 07:40

## 2025-03-05 RX ADMIN — HEPARIN SODIUM 900 UNIT(S)/HR: 1000 INJECTION INTRAVENOUS; SUBCUTANEOUS at 10:15

## 2025-03-05 RX ADMIN — HEPARIN SODIUM 900 UNIT(S)/HR: 1000 INJECTION INTRAVENOUS; SUBCUTANEOUS at 18:15

## 2025-03-05 RX ADMIN — Medication 1 DOSE(S): at 09:02

## 2025-03-05 RX ADMIN — GABAPENTIN 100 MILLIGRAM(S): 400 CAPSULE ORAL at 21:54

## 2025-03-05 RX ADMIN — Medication 650 MILLIGRAM(S): at 18:18

## 2025-03-05 RX ADMIN — HEPARIN SODIUM 900 UNIT(S)/HR: 1000 INJECTION INTRAVENOUS; SUBCUTANEOUS at 19:12

## 2025-03-05 RX ADMIN — Medication 40 MILLIGRAM(S): at 06:01

## 2025-03-05 RX ADMIN — OXYCODONE HYDROCHLORIDE 5 MILLIGRAM(S): 30 TABLET ORAL at 14:52

## 2025-03-05 RX ADMIN — IPRATROPIUM BROMIDE AND ALBUTEROL SULFATE 3 MILLILITER(S): .5; 2.5 SOLUTION RESPIRATORY (INHALATION) at 09:40

## 2025-03-05 RX ADMIN — Medication 40 MILLIGRAM(S): at 18:16

## 2025-03-05 RX ADMIN — IPRATROPIUM BROMIDE AND ALBUTEROL SULFATE 3 MILLILITER(S): .5; 2.5 SOLUTION RESPIRATORY (INHALATION) at 16:04

## 2025-03-05 RX ADMIN — GABAPENTIN 100 MILLIGRAM(S): 400 CAPSULE ORAL at 06:01

## 2025-03-05 NOTE — PROGRESS NOTE ADULT - ASSESSMENT
44F PMHx spina bifida, multiple bilateral foot surgeries,  shunt (managed [facility] neurosurgery), GERD, uterine fibroids, and recent PE on Eliquis presenting with substernal and right sided chest pain. Patient was diagnosed with PE on 1/23/25 and has been on Eliquis. Patient states that her CP started after consumption of chili pepper yesterday. It improved last night was reoccurred today. It is rate 10/10, characterized as squeezing, pleuritic and radiating to the left upper extremity. Reports mild SOB. No exertional activity. Denies any associated symptoms fatigue, distress, diaphoresis, nausea, vomiting, abdominal pain, or leg swelling. Patient denies immobility, long plane/car rides, malignancy, or history of clotting disorder. No family history of CVD before the age of 65. No smoking history. No hx of hypothyroidism or HIV infection or Hepatitis C or autoimmune disorders. No psychosocial factors including stress/anxiety/depression.   (27 Feb 2025 21:50)  she says she has sob too but most of the issues started after eating  chilly peppers; ; currently sheis doing  ok ; does not looks to in any resp distress       Recent PE  Spina bifida/ recent foot surgery   s/p  shunt  GERD      Recent Pe  she has been complaint with her Eliquis  she is on 2 L of oxygen at this time:   her symptoms more likely due to GERD symptoms  cont PPI:   new cta is pending      3/1:  new cta showed: No pulmonary embolism. No acute pulmonary process.  she seems to have MSK pain too as pressing on her sternum and ribs do hurt  :   she has increased bleeding during urination:  ? gyn consult:   her HB has dropped from 12 to 10    may have dahlia too : cont PPI     she also says her son has asthma and she could have asthma too and at times she found her wheezing:   trial of BD to see if that helps her in tightness in chest     3/2:  cont BD:  cont advair:    cont eliquis;  US pelvis showed: Limited transabdominal exam. The patient declined the transvaginal portion. Enlarged multi fibroid uterus largely obscuring visualization of the endometrium and right ovary.  monitor or bleeding;     3/3: she seems OK;  still complains off feeling pain in the chest after she eats:  amberly alerdsy on ppi:  has pe on eliquis:   ? go co nsult     3/4:  seems to be doing  ok :  no sob:   but was not able to eat chicken last night:  as she feels it is with great difficulty the checken paseed last night from her food pipe:  seen by gi :? for possible endoscopy:     Spina bifida/ recent foot surgery   per primary team   3/1: has lesion on rigth foot:  : defer to primary team    3/2: per primary team   3/3: seems OK:  no sob;     3/5: seems to be doing  ok : no sob:   no cough ;   no phlegm   on iv heparin : awaiting for upper endoscopy:   ba esophogram        s/p  shunt  supportive care   3/1: ct head is normal  No CT evidence of acute intracranial pathology. The ventricular system appears slitlike and decompressed, unchanged from   3/3: seems O K:   3/5: stable    GERD  cont PPI     dw pt :

## 2025-03-05 NOTE — PROGRESS NOTE ADULT - ASSESSMENT
44F PMHx spina bifida, multiple bilateral foot surgeries,  shunt (managed [facility] neurosurgery), GERD, uterine fibroids, and recent PE on Eliquis presenting with pleuritic substernal and right sided chest pain associated with SOB. Admitted to medicine for further management and monitoring. Detailed plan as below:    Bleeding PV:  Pelvic sono: Fibroid  Gyn eval appreciated    GERD / dysphagia to solid   seen by GI   recommend BE   also hold AC : and will plan for EGD     #Chest pain.   ·  Plan: ::Atypical chest pain without troponinemia; ECG with no ischemic changes; Hemodynamically stable. No evidence of volume overload or shock on exam. Low suspicion ACS given pleuritic, positional, and reproducible pain by palpation. Known PE on Eliquis. Low suspicion PTX, aortic dissection, PNA, cardiac effusion or tamponade. Symptoms more likely i/s/o GERD  -c/w Eliquis 5 mg BID  -Maalox PRN  -CTA chest: No PE  Cardio f/up appreciated  NST: neg for ischemia      Problem/Plan - 2:  ·  Problem: Pulmonary embolism.   ·  Plan: -Continue with home Eliquis 5 mg BID  -CTA chest: Prel no PE  Pul f/up appreciated  Duoneb.     Problem/Plan - 3:  ·  Problem: Neuropathic pain.   ·  Plan: -Continue with home gabapentin 100 mg TID.     Problem/Plan - 4:  ·  Problem: H/O iron deficiency anemia.   ·  Plan: -Continue with home Ferrous sulfate QOD.    dispo: as per GI : IV Heparin ,  EGD this week.

## 2025-03-05 NOTE — DISCHARGE NOTE PROVIDER - NSDCCPCAREPLAN_GEN_ALL_CORE_FT
PRINCIPAL DISCHARGE DIAGNOSIS  Diagnosis: Chest pain  Assessment and Plan of Treatment: Your were evaluated by cardiology, and the cardiac work up was negative. Gastroenterology evaluated you.      SECONDARY DISCHARGE DIAGNOSES  Diagnosis: Pulmonary embolism  Assessment and Plan of Treatment:      PRINCIPAL DISCHARGE DIAGNOSIS  Diagnosis: Chest pain  Assessment and Plan of Treatment: Your were evaluated by cardiology, and the cardiac work up was negative. EKG was normal, troponin, and cardiac enzymes all normal.   Gastroenterology evaluated you, and determined gastroesophageal reflux likely a acontributing factor.      SECONDARY DISCHARGE DIAGNOSES  Diagnosis: GERD (gastroesophageal reflux disease)  Assessment and Plan of Treatment: You were evaluated by Gastroenterology , and underwent an endoscopy. Results were showing abnormal motility suspicious for achalasia versus esophagogastric juncion outflow obstruction. You will need  further outpatient work up that is not done in the hospital with manometry studies.This is done with the Gastroenterologist as an outpatient. Follow up outpatient with Dr Graff for further GI management, and continue on the pantoprazole as directed.  Continue on soft, moist minced, bite sized, pureed diet.      Diagnosis: Pulmonary embolism  Assessment and Plan of Treatment: Continue on the eliquis.    Diagnosis: Uterine fibroid  Assessment and Plan of Treatment: You had reported having vaginal bleeding that has since subsided. GYN evaluated, TransVaginal Ultrasound that revealed many uterine fibroids.  Follow up outpatient with GYN for further gynecological medical management.     PRINCIPAL DISCHARGE DIAGNOSIS  Diagnosis: Chest pain  Assessment and Plan of Treatment: Your were evaluated by cardiology, and the cardiac work up was negative. EKG was normal, troponin, and cardiac enzymes all normal.   Gastroenterology evaluated you, and determined gastroesophageal reflux likely a acontributing factor.      SECONDARY DISCHARGE DIAGNOSES  Diagnosis: Pulmonary embolism  Assessment and Plan of Treatment: Continue on the eliquis.    Diagnosis: GERD (gastroesophageal reflux disease)  Assessment and Plan of Treatment: You were evaluated by Gastroenterology , and underwent an endoscopy. Results were showing abnormal motility suspicious for achalasia versus esophagogastric juncion outflow obstruction. You will need  further outpatient work up that is not done in the hospital with manometry studies.This is done with the Gastroenterologist as an outpatient. Follow up outpatient with Dr Graff for further GI management, and continue on the pantoprazole as directed.  Continue on soft, moist minced, bite sized, pureed diet.      Diagnosis: Helicobacter pylori gastritis  Assessment and Plan of Treatment: During the Endoscopy of your stomach, you were found to have a bacteria that infects your stomach, known as H PYLORI (Helicobacter pylori).  This can damage the tissue in your stomach causing pain, inflammation, and ulcers in your digestive tract.  You were started on a FOUR MEDICATION treatment plan for this which you will take for TWO WEEKS.  Please complete this course as prescribed, and follow up with GI in 4 weeks to confirm via stool study that this bacteria has been completely treated.    Diagnosis: Uterine fibroid  Assessment and Plan of Treatment: You had reported having vaginal bleeding that has since subsided. GYN evaluated, TransVaginal Ultrasound that revealed many uterine fibroids.  Follow up outpatient with GYN for further gynecological medical management.

## 2025-03-05 NOTE — DISCHARGE NOTE PROVIDER - CARE PROVIDERS DIRECT ADDRESSES
[email or web address],DirectAddress_Unknown,DirectAddress_Unknown [email or web address],DirectAddress_Unknown,DirectAddress_Unknown,DirectAddress_Unknown

## 2025-03-05 NOTE — DISCHARGE NOTE PROVIDER - PROVIDER TOKENS
PROVIDER:[TOKEN:[[id number]]],FREE:[LAST:[[facility] Neurologist],PHONE:[(   )    -],FAX:[(   )    -],ADDRESS:[Please follow up outpatient for further medical management of your shunt following discharge]],FREE:[LAST:[OB/GYN],PHONE:[(   )    -],FAX:[(   )    -],ADDRESS:[Follow up outpatient with your OB/GYN for further GYN medical care following discharge]] PROVIDER:[TOKEN:[[id number]]],FREE:[LAST:[[facility] Neurologist],PHONE:[(   )    -],FAX:[(   )    -],ADDRESS:[Please follow up outpatient for further medical management of your shunt following discharge]],FREE:[LAST:[OB/GYN],PHONE:[(   )    -],FAX:[(   )    -],ADDRESS:[Follow up outpatient with your OB/GYN for further GYN medical care following discharge]],PROVIDER:[TOKEN:[[id number]],FOLLOWUP:[1 week]]

## 2025-03-05 NOTE — DISCHARGE NOTE PROVIDER - CARE PROVIDER_API CALL
Merle Graff  Gastroenterology  [address] [place] [address], [address]  [place] [address]  Phone: [phone]  Fax: [phone]  Follow Up Time:     [facility] Neurologist,   Please follow up outpatient for further medical management of your shunt following discharge  Phone: (   )    -  Fax: (   )    -  Follow Up Time:     OB/GYN,   Follow up outpatient with your OB/GYN for further GYN medical care following discharge  Phone: (   )    -  Fax: (   )    -  Follow Up Time:    Merle Graff  Gastroenterology  [address] [place] [address], [address]  [place] [address]  Phone: [phone]  Fax: [phone]  Follow Up Time:     [facility] Neurologist,   Please follow up outpatient for further medical management of your shunt following discharge  Phone: (   )    -  Fax: (   )    -  Follow Up Time:     OB/GYN,   Follow up outpatient with your OB/GYN for further GYN medical care following discharge  Phone: (   )    -  Fax: (   )    -  Follow Up Time:     Waterhouse, Joseph Cameron  Podiatric Medicine and Surgery  [address] [place] [id number]  Phone: [phone]  Fax: [phone]  Follow Up Time: 1 week

## 2025-03-05 NOTE — CONSULT NOTE ADULT - SUBJECTIVE AND OBJECTIVE BOX
Patient is a 44y old  Female who presents with a chief complaint of Chest pain (05 Mar 2025 12:02)      HPI:  44F PMHx spina bifida, multiple bilateral foot surgeries,  shunt (managed [facility] neurosurgery), GERD, uterine fibroids, and recent PE on Eliquis presenting with substernal and right sided chest pain. Patient was diagnosed with PE on 1/23/25 and has been on Eliquis. Patient states that her CP started after consumption of chili pepper yesterday. It improved last night was reoccurred today. It is rate 10/10, characterized as squeezing, pleuritic and radiating to the left upper extremity. Reports mild SOB. No exertional activity. Denies any associated symptoms fatigue, distress, diaphoresis, nausea, vomiting, abdominal pain, or leg swelling. Patient denies immobility, long plane/car rides, malignancy, or history of clotting disorder. No family history of CVD before the age of 65. No smoking history. No hx of hypothyroidism or HIV infection or Hepatitis C or autoimmune disorders. No psychosocial factors including stress/anxiety/depression.   (27 Feb 2025 21:50)      PAST MEDICAL & SURGICAL HISTORY:  Spina bifida      Fibroids      Wound of right foot      Wound of left foot      S/P  shunt      S/P foot surgery, left      S/P foot surgery, right          MEDICATIONS  (STANDING):  albuterol/ipratropium for Nebulization 3 milliLiter(s) Nebulizer every 6 hours  chlorhexidine 2% Cloths 1 Application(s) Topical daily  ferrous    sulfate 325 milliGRAM(s) Oral <User Schedule>  fluticasone propionate/ salmeterol 250-50 MICROgram(s) Diskus 1 Dose(s) Inhalation two times a day  gabapentin 100 milliGRAM(s) Oral three times a day  heparin  Infusion.  Unit(s)/Hr (11 mL/Hr) IV Continuous <Continuous>  pantoprazole    Tablet 40 milliGRAM(s) Oral every 12 hours  polyethylene glycol 3350 17 Gram(s) Oral daily    MEDICATIONS  (PRN):  acetaminophen     Tablet .. 650 milliGRAM(s) Oral every 6 hours PRN Temp greater or equal to 38C (100.4F), Mild Pain (1 - 3)  aluminum hydroxide/magnesium hydroxide/simethicone Suspension 30 milliLiter(s) Oral every 6 hours PRN Dyspepsia  heparin   Injectable 4500 Unit(s) IV Push every 6 hours PRN For aPTT less than 40  heparin   Injectable 2000 Unit(s) IV Push every 6 hours PRN For aPTT between 40 - 57  oxyCODONE    IR 5 milliGRAM(s) Oral every 6 hours PRN Severe Pain (7 - 10)      Allergies    shellfish (Unknown)  latex (Hives; Rash)  Zyrtec (Rash)    Intolerances    lactose (Unknown)      VITALS:    Vital Signs Last 24 Hrs  T(C): 36.9 (05 Mar 2025 13:08), Max: 37 (04 Mar 2025 20:45)  T(F): 98.4 (05 Mar 2025 13:08), Max: 98.6 (04 Mar 2025 20:45)  HR: 84 (05 Mar 2025 13:08) (62 - 101)  BP: 106/81 (05 Mar 2025 13:08) (100/82 - 136/87)  BP(mean): --  RR: 17 (05 Mar 2025 13:08) (17 - 18)  SpO2: 99% (05 Mar 2025 13:08) (95% - 100%)    Parameters below as of 05 Mar 2025 13:08  Patient On (Oxygen Delivery Method): room air        LABS:                          10.7   4.23  )-----------( 329      ( 05 Mar 2025 07:42 )             32.1       03-05    135  |  105  |  8   ----------------------------<  85  4.3   |  20[L]  |  0.38[L]    Ca    8.8      05 Mar 2025 07:42  Phos  3.1     03-05  Mg     2.00     03-05        CAPILLARY BLOOD GLUCOSE          PTT - ( 05 Mar 2025 07:42 )  PTT:136.0 sec    LOWER EXTREMITY PHYSICAL EXAM:  Vascular: DP/PT 1/4, B/L, CFT < 3 seconds B/L, Temperature gradient  warm to warm, B/L.   Neuro: Epicritic sensation diminished to the level of digits, B/L.  Musculoskeletal/Ortho: dropfoot, b/l  Skin: Right foot dorsomedial well-adhered eschar, no erythema or edema, no drainage, no malodor, no fluctuance, left foot no wounds, now healing with eschar intact      RADIOLOGY & ADDITIONAL STUDIES:

## 2025-03-05 NOTE — CONSULT NOTE ADULT - ASSESSMENT
Assessment/Plan: 44F PMHx spina bifida, multiple bilateral foot surgeries,  shunt (managed [facility] neurosurgery), GERD, uterine fibroids, and recent PE on Eliquis presenting with pleuritic substernal and right sided chest pain associated with SOB. Admitted to medicine for further management and monitoring    Wound Consult requested to assist w/ management of  LE wounds, patient known to wound care services seen multiple times in previous admission last seen 1/24/25 for left ischium stage 4 pressure injury present on admission. During that admission was seen by podiatry team for right foot wound.     Today right foot wound presenting as dry crust vs. non fluctuant fixed scab. No s/s of acute infection   -Consider podiatry consult for evaluation of dry crust vs. scab over over surgical site, prev podiatry recommended betadine and dry dressing. In addition, patient with hx of foot surgeries due to spina bifida reporting she is not able to walk at this time because she needs to be fitted for bilateral foot orthotics. 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 Patient AOX3, and requesting to keep diaper in place for dignity purposes. Per records patient was educated regarding risks of diaper usage.   -Topical recommendations: Clean wound and periwound skin with NS. Pat dry. Apply Liquid barrier film to periwound skin. Lightly pack wiht Aquacel hydrofiber ribbon, make sure to leave 2" out at end visible to ensure full removal of packing with subsequent dressing changes. Cover with silicone foam with border. Change daily or prn if soiled.   -follow up closely at wound care center outpatient, per patient has an upcoming appt March 12, likely would benefit of debridement of rolled wound edges   -Continue to offload pressure; low airloss support surface, t&P per protocol with use of fluidized positioning devices. Continue to educate patient and benefits of single breathable incontinence pads and risks of diaper usage. If patient continues to prefer diaper usage, please follow up with frequent rounding for perineal care.  -Risks for moisture associated dermatitis in sacrum/bilateral buttocks/perineum, right ischium: Gently clean with skin cleanser. Pat dry. Apply a thin layer of Willian barrier moisture cream, apply twice a day or prn if soiled.   -Pending Speech and swallow consult to r/o dysphagia, recommend nutrition consult when feasible to optimize in patient with left ischium stage 4 pressure injury   -Resume VNS services for wound care once discharge to home     Continue to encourage turning and positioning w/ offloading assistive devices as per protocol  Continue w/ Pericare as per protocol  Waffle Cushion to chair if oob to chair, please limit seating time to less than 2 consecutive hours if patient is out of bed to the chair   Continue w/ low air loss fluidized bed surface     Care as per medicine, will follow w/ you periodically during hopsital stay. Please reconsult earlier if needed     Upon discharge f/u as outpatient at [facility] [address] [place], [place] [phone]  D/w team ACP     Thank you for this consult  KATHY Perez-BC, DAISY (pager #[phone] or available in MS teams)    If after 4PM or before 7:30AM on Mon-Friday or weekend/holiday please contact general surgery for urgent matters.   Team A- [id number]   Team B- [id number]  For non-urgent matters e-mail [email or web address]    I spent 75 minutes face to face with this patient of which more than 50% of the time was spent counseling & coordinating care of this pt

## 2025-03-05 NOTE — DISCHARGE NOTE PROVIDER - NSDCFUADDAPPT_GEN_ALL_CORE_FT
Patient declined information Podiatry Discharge Instructions:  Follow up: Please follow up with Dr. Waterhouse within 1 week of discharge from the hospital, please call [phone] for appointment and discuss that you recently were seen in the hospital.  Wound Care: Please apply betadine and allevyn pad to right foot wound daily  Weight bearing: Please weight bear as tolerated   Antibiotics: Please continue as instructed. Podiatry Discharge Instructions:  Follow up: Please follow up with Dr. Waterhouse within 1 week of discharge from the hospital, please call [phone] for appointment and discuss that you recently were seen in the hospital.  Wound Care: Please apply betadine and allevyn pad to right foot wound daily  Weight bearing: Please weight bear as tolerated   Antibiotics: Please continue as instructed.  Please follow up outpatient with Gastroenterology in 1 week following discharge for Manometry Studies

## 2025-03-05 NOTE — CONSULT NOTE ADULT - ASSESSMENT
44F presents for right foot wound , neuropathy  -Pt was seen and evaluated  -Right foot dorsomedial well-adhered eschar, no erythema or edema, no drainage, no malodor, no fluctuance, left foot no wounds, now healing with eschar intact  -Right foot xray: no OM, no gas  -No culture obtained 2/2 no acute signs of infection  -Pt to wear z-flow boots at all times when in bed  -Wound care orders placed to be performed by nursing daily  -No podiatric surgery intervention, please re-consult prn  -Pt is stable for discharge from podiatry standpoint  -Wound care and follow-up information placed in discharge provider note  -Discussed with attending

## 2025-03-05 NOTE — DISCHARGE NOTE PROVIDER - DETAILS OF MALNUTRITION DIAGNOSIS/DIAGNOSES
This patient has been assessed with a concern for Malnutrition and was treated during this hospitalization for the following Nutrition diagnosis/diagnoses:     -  03/06/2025: Severe protein-calorie malnutrition

## 2025-03-05 NOTE — CONSULT NOTE ADULT - SUBJECTIVE AND OBJECTIVE BOX
Wound SURGERY CONSULT NOTE    HPI:44F PMHx spina bifida, multiple bilateral foot surgeries,  shunt (managed [facility] neurosurgery), GERD, uterine fibroids, and recent PE on Eliquis presenting with substernal and right sided chest pain. Patient was diagnosed with PE on 1/23/25 and has been on Eliquis. Patient states that her CP started after consumption of chili pepper yesterday. It improved last night was reoccurred today. It is rate 10/10, characterized as squeezing, pleuritic and radiating to the left upper extremity. Reports mild SOB. No exertional activity. Denies any associated symptoms fatigue, distress, diaphoresis, nausea, vomiting, abdominal pain, or leg swelling. Patient denies immobility, long plane/car rides, malignancy, or history of clotting disorder. No family history of CVD before the age of 65. No smoking history. No hx of hypothyroidism or HIV infection or Hepatitis C or autoimmune disorders. No psychosocial factors including stress/anxiety/depression.(27 Feb 2025 21:50)    Wound consult requested to assist w/ management of LE wounds. Patient endorses continues close f/u at wound care center at "[facility]" wound care center. Endorses she was last seen on Feb. 12 for left ischium wound, patient reports undergoing debridement of wound and the wound ws bleeding procedure was stopped. Patient was told to return for f/u March 12 she has an appointment. Patient reports she has VNS 3x a week for wound care, she is currently using Aquacel to pack wound and endorses she has being told the wound is getting better. Patient endorses the day she came to the hospital she had removed the packing dressing from her wound and she was feeling so ill that she couldn't redress her wound. Patient denies purulence drainage from wound. Patient endorses she recently received a " foam" mattress it was ordered after her last hospitalization in [facility] in January. Patient reports she has a " seat cushion" for her wheelchair. Patient endorses she is not able to ambulate because she does not have foot "orthotics " patient reporting she is supposed to  see a orthopedic surgeon to fit her for new orthotics. Patient reports neuropathy of bilateral legs and feet. Patient also endorses she has no sensation in the trunk area or the legs. Patient endorses her appetite has being affected due to persistent nausea and feels like the food is " getting stuck". Patient endorses right foot wound has healed but she was told to keep area cover. Patient reporting heavy vaginal bleeding yesterday today reports noticing some " clots".    Current Diet: Diet, Clear Liquid (03-05-25 @ 09:48)    PAST MEDICAL & SURGICAL HISTORY:  Spina bifida      Fibroids      Wound of right foot      Wound of left foot      S/P  shunt      S/P foot surgery, left      S/P foot surgery, right    REVIEW OF SYSTEMS:   General/Skin/ MSK: see above     MEDICATIONS  (STANDING):  albuterol/ipratropium for Nebulization 3 milliLiter(s) Nebulizer every 6 hours  ferrous    sulfate 325 milliGRAM(s) Oral <User Schedule>  fluticasone propionate/ salmeterol 250-50 MICROgram(s) Diskus 1 Dose(s) Inhalation two times a day  gabapentin 100 milliGRAM(s) Oral three times a day  heparin  Infusion.  Unit(s)/Hr (11 mL/Hr) IV Continuous <Continuous>  pantoprazole    Tablet 40 milliGRAM(s) Oral every 12 hours  polyethylene glycol 3350 17 Gram(s) Oral daily    MEDICATIONS  (PRN):  acetaminophen     Tablet .. 650 milliGRAM(s) Oral every 6 hours PRN Temp greater or equal to 38C (100.4F), Mild Pain (1 - 3)  aluminum hydroxide/magnesium hydroxide/simethicone Suspension 30 milliLiter(s) Oral every 6 hours PRN Dyspepsia  heparin   Injectable 4500 Unit(s) IV Push every 6 hours PRN For aPTT less than 40  heparin   Injectable 2000 Unit(s) IV Push every 6 hours PRN For aPTT between 40 - 57  oxyCODONE    IR 5 milliGRAM(s) Oral every 6 hours PRN Severe Pain (7 - 10)    Allergies    shellfish (Unknown)  latex (Hives; Rash)  Zyrtec (Rash)    Intolerances    lactose (Unknown)      SOCIAL HISTORY: Lives at home with son. Wheelchairbound.     FAMILY HISTORY:    PHYSICAL EXAM:  Vital Signs Last 24 Hrs  T(C): 36.6 (05 Mar 2025 06:00), Max: 37 (04 Mar 2025 20:45)  T(F): 97.9 (05 Mar 2025 06:00), Max: 98.6 (04 Mar 2025 20:45)  HR: 101 (05 Mar 2025 09:47) (62 - 101)  BP: 109/70 (05 Mar 2025 06:00) (100/82 - 136/87)  BP(mean): --  RR: 18 (05 Mar 2025 06:00) (18 - 18)  SpO2: 100% (05 Mar 2025 06:00) (95% - 100%)    Parameters below as of 05 Mar 2025 09:47  Patient On (Oxygen Delivery Method): room air    Weight (kg): 59 (27 Feb 2025 12:00)  BMI (kg/m2): 26.3 (27 Feb 2025 12:00)    Physical Exam   Constitutional: NAD, A&Ox3. Thin, (+) protruding bony prominences to sacrum and bilateral trochanters/bilateral ischium. Patient receiving Heparin drip   (+) low airloss support surface, (+) fluidized positioning devices, heels elevated with complete cair boots. Independently mobile in bed.  HEENT: NC/AT, non icteric, mucosa moist  Cardiovascular: Tachy   Respiratory: nonlabored, room air, equal chest expansion   Gastrointestinal: soft NT/ND, fecal incontinence of small pasty stool, incontinence care provided, diapers changed as patient requests.    : functionally incontinent, wearing diaper.  Musculoskeletal: full aROM to bilateral UE, limited aROM to lower extremities. Mild foot drop bilaterally. Right dorsal foot bone deformity?   Vascular: Cool to touch from mid foot to toes, no edema, no cyanosis, no overt signs of ischemia, (+2) DP pulses to bilateral feet. Left lateral malleolus with hypopigmentation.     Skin:  moist w/ good turgor  Right ischium area of hypopigmentation from previous healed wound?  Lumbar spine, right ankle, Left achilles left dorsal foot, bilateral distal shins, medial malleolus, surgical scars, well healed.  Right dorsal foot circular dry exudate vs. non fluctuant scab over area of bone deformity and surrounded hypopigmentation/hyperpigmentation   -Entire area measuring 1.5cmx1.4wml9vc, dry crust vs. 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 and visible wound walls with pale pink agranular, macerated edges   -Unable to visualized base of wound given rolled edges/soft tissue defect   -moist base, macerated granular tissue noted,  no drainage noted, no purulent drainage, no odor.  **Aquacel ribbon and silicone foam with border applied.   Bilateral buttocks/inner buttocks areas of hypopigmentation, no open ulcerations.  Psych: calm and cooperative.     LABS/ CULTURES/ RADIOLOGY:                        10.7   4.23  )-----------( 329      ( 05 Mar 2025 07:42 )             32.1       135  |  105  |  8   ----------------------------<  85      [03-05-25 @ 07:42]  4.3   |  20  |  0.38        Ca     8.8     [03-05-25 @ 07:42]      Mg     2.00     [03-05-25 @ 07:42]      Phos  3.1     [03-05-25 @ 07:42]        PTT: 136.0      [03-05-25 @ 07:42]        ACC: [id number] EXAM:  XR FOOT COMP MIN 3 VIEWS RT   ORDERED BY: MAICO COLE     PROCEDURE DATE:  01/24/2025          INTERPRETATION:  CLINICAL INDICATION: Foot wound. Pain.    TECHNIQUE: 3 views right foot.    COMPARISON: Foot x-rays 8/17/2024.      FINDINGS/  IMPRESSION:  There is an skin wound with soft tissue swelling adjacent to the skin   wound along the medial midfoot. No tracking soft tissue gas identified.   No cortical erosion or destruction is seen. No periosteal reaction.   Similar findings of fusion across the hindfoot/midfoot. There is no acute   fracture. No dislocation. No advanced productive changes about the joints.    --- End of Report ---            TA HARRIS MD; Attending Radiologist  This document has been electronically signed. Jan 24 2025  2:49PM        ACC: [id number] EXAM:  CT ABDOMEN AND PELVIS IC   ORDERED BY: ERIC ZAVALA     PROCEDURE DATE:  01/16/2025          INTERPRETATION:  CLINICAL INFORMATION: Right lower quadrant abdominal pain    COMPARISON: CT abdomen pelvis 5/15/2024    CONTRAST/COMPLICATIONS:  IV Contrast: Omnipaque 350  70 cc administered   30 cc discarded  Oral Contrast: NONE  .    PROCEDURE:  CT of the Abdomen and Pelvis was performed.  Sagittal and coronal reformats were performed.    FINDINGS:  LOWER CHEST: Within normal limits.    LIVER: Stable cysts and additional subcentimeter hypodense foci that are   too small to characterize..  BILE DUCTS: Normal caliber.  GALLBLADDER: Within normal limits.  SPLEEN: Within normal limits.  PANCREAS: Within normal limits.  ADRENALS:Within normal limits.  KIDNEYS/URETERS: Within normal limits.    BLADDER: Within normal limits.  REPRODUCTIVE ORGANS: Enlarged fibroid uterus extending above the level of   the umbilicus, measuring 17.8 cm in craniocaudad dimension.    BOWEL: No bowel obstruction. Appendix is normal. Large stool burden.  PERITONEUM/RETROPERITONEUM:  shunt catheter terminates in the right   lower quadrant.  VESSELS: Within normal limits.  LYMPH NODES: No lymphadenopathy.  ABDOMINAL WALL:  shunt catheter in theanterior abdominal wall and   trace peritoneal cavity in the midline.. Decubitus ulcer overlying the   left ischium with subjacent soft tissue. No drainable fluid collection.  BONES: Degenerative changes.    IMPRESSION:  Large fecal load.    Left ischial decubitus ulcer with subjacent soft tissue that may   represent scarring or phlegmon. No drainable fluid collection or evidence   of adjacent osteomyelitis.        --- End of Report ---          JOSUÉ VILLEGAS DO; Resident Radiologist  This document has been electronically signed.  MYRA GARCIA MD; Attending Radiologist  This document has been electronically signed. Jan 16 2025  3:29AM

## 2025-03-05 NOTE — DISCHARGE NOTE PROVIDER - NSDCMRMEDTOKEN_GEN_ALL_CORE_FT
Eliquis 5 mg oral tablet: 2 tab(s) orally 2 times a day Take 10mg two times a day(2 pills 2 times a day) till 2/1/25 then take 5mg(one pill 2 times a day) after.  ferrous sulfate 325 mg (65 mg elemental iron) oral tablet: 1 tab(s) orally once a day  gabapentin 100 mg oral capsule: 1 cap(s) orally 3 times a day  pantoprazole 40 mg oral delayed release tablet: 1 tab(s) orally once a day   Eliquis 5 mg oral tablet: 2 tab(s) orally 2 times a day Take 10mg two times a day(2 pills 2 times a day) till 2/1/25 then take 5mg(one pill 2 times a day) after.  ferrous sulfate 325 mg (65 mg elemental iron) oral tablet: 1 tab(s) orally 3 times a week Irdzqn-Dxmgiehog-Bmmgqe  gabapentin 100 mg oral capsule: 1 cap(s) orally 3 times a day  pantoprazole 40 mg oral delayed release tablet: 1 tab(s) orally once a day   Advair Diskus 250 mcg-50 mcg/inh inhalation powder: 1 inhaled 2 times a day  apixaban 5 mg oral tablet: 1 tab(s) orally 2 times a day  ferrous sulfate 325 mg (65 mg elemental iron) oral tablet: 1 tab(s) orally 3 times a week Rvjywv-Qjwfvujob-Cmlttg  gabapentin 100 mg oral capsule: 1 cap(s) orally 3 times a day  pantoprazole 40 mg oral delayed release tablet: 1 tab(s) orally once a day   Advair Diskus 250 mcg-50 mcg/inh inhalation powder: 1 inhaled 2 times a day  apixaban 5 mg oral tablet: 1 tab(s) orally 2 times a day  bismuth subsalicylate 525 mg oral tablet: 1 tab(s) orally 4 times a day  ferrous sulfate 325 mg (65 mg elemental iron) oral tablet: 1 tab(s) orally 3 times a week Jyspsm-Tmxguwxdg-Efrofh  gabapentin 100 mg oral capsule: 1 cap(s) orally 3 times a day  metroNIDAZOLE 500 mg oral tablet: 1 tab(s) orally 4 times a day  pantoprazole 40 mg oral delayed release tablet: 1 tab(s) orally 2 times a day  tetracycline 500 mg oral tablet: 1 tab(s) orally 4 times a day

## 2025-03-05 NOTE — CONSULT NOTE ADULT - CONSULT REQUESTED DATE/TIME
04-Mar-2025 07:34
05-Mar-2025 13:37
27-Feb-2025 23:17
28-Feb-2025 11:09
01-Mar-2025 18:40
05-Mar-2025 12:03

## 2025-03-05 NOTE — DISCHARGE NOTE PROVIDER - NSFOLLOWUPCLINICS_GEN_ALL_ED_FT
[facility]  Gastroenterology  [address] [place] [address]  Phone: [phone]  Fax:

## 2025-03-05 NOTE — DISCHARGE NOTE PROVIDER - DISCHARGE DIET
Pureed Diet/Minced and Moist Diet DASH Diet/Low Sodium Diet/Soft and Bite-Sized Diet/Pureed Diet/Minced and Moist Diet

## 2025-03-05 NOTE — DISCHARGE NOTE PROVIDER - HOSPITAL COURSE
44F PMHx spina bifida, multiple bilateral foot surgeries,  shunt (managed [facility] neurosurgery), GERD, uterine fibroids, and recent PE on Eliquis presenting with pleuritic substernal and right sided chest pain associated with SOB. Admitted to medicine for further management and monitoring.    Chest pain.   ·  Plan: Atypical chest pain without troponinemia; ECG with no ischemic changes; Hemodynamically stable. No evidence of volume overload or shock on exam. Low suspicion ACS given pleuritic, positional, and reproducible pain by palpation. Known PE on Eliquis. Low suspicion PTX, aortic dissection, PNA, cardiac effusion or tamponade. Symptoms more likely i/s/o GERD  -Troponin: <6  -CXR: No acute cardiopulmonary process  -pro-BNP 73  -Admit to Telemetry; cardiac monitor  -c/w Eliquis 5 mg BID  -ECG STAT for repeat pain  -Maalox PRN  -CTA chest ordered in ED to evaluate for clot burden.     Pulmonary embolism.   ·  Plan: -Continue with home Eliquis 5 mg BID  -Pulm consulted appreciate recs  -CTA chest negative    GERD (gastroesophageal reflux disease).   ·  Plan: -Continue with home Pantoprazole 40 mg QD.  -GI consulted appreciate recs  -Dysphagia to solids greater than liquids, in the setting of retrosternal burning, rule out esophagitis/esophageal stricture, rule out GI dysmotility disorder achalasia  s/p EGD without presence of stricture but EndoFLIP suspicious for incomplete relaxation of the LES  -c/w soft bite sized diet  - will need outpatient GI follow up for outpatient manometry next week      Problem/Plan - 4:  ·  Problem: Neuropathic pain.   ·  Plan: -Continue with home gabapentin 100 mg TID.    H/O iron deficiency anemia.   ·  Plan: -Continue with home Ferrous sulfate QOD    Dysphagia to solids greater than liquids, in the setting of retrosternal burning, rule out esophagitis/esophageal stricture, rule out GI dysmotility disorder achalasia    s/p EGD without presence of stricture but EndoFLIP suspicious for incomplete relaxation of the LES  Rec-  1. soft bite sized diet  2- will set up for outpatient manometry next week . 44F PMHx spina bifida, multiple bilateral foot surgeries,  shunt (managed [facility]), GERD, uterine fibroids, and recent PE on Eliquis presenting with pleuritic substernal and right sided chest pain associated with SOB. Admitted to medicine for further management and monitoring.    Chest pain.   ·  Plan: Atypical chest pain without troponinemia; ECG with no ischemic changes; Hemodynamically stable. No evidence of volume overload or shock on exam. Low suspicion ACS given pleuritic, positional, and reproducible pain by palpation. Known PE on Eliquis. Low suspicion PTX, aortic dissection, PNA, cardiac effusion or tamponade. Symptoms more likely i/s/o GERD  -Cardiology consulted appreciate recs  - EKG with NSR no sig STT changes.   -Trop and BNP wnl.   -CXR: No acute cardiopulmonary process  -pro-BNP 73  -Admit to Telemetry; cardiac monitor  -c/w Eliquis 5 mg BID  -ECG STAT for repeat pain  -Maalox PRN  -CTA chest ordered in ED to evaluate for clot burden.     Pulmonary embolism.   ·  Plan: -Continue with home Eliquis 5 mg BID  -Pulm consulted appreciate recs  -CTA chest negative    GERD (gastroesophageal reflux disease).   ·  Plan: -Continue with home Pantoprazole 40 mg QD.  -GI consulted appreciate recs  -Dysphagia to solids greater than liquids, in the setting of retrosternal burning, rule out esophagitis/esophageal stricture, rule out GI dysmotility disorder achalasia  s/p EGD without presence of stricture but EndoFLIP suspicious for incomplete relaxation of the LES  -c/w soft bite sized diet, as per GI  - will need outpatient GI follow up for outpatient manometry next week  following discharge     Problem/Plan - 4:  ·  Problem: Neuropathic pain.   ·  Plan: -Continue with home gabapentin 100 mg TID.    H/O iron deficiency anemia.   ·  Plan: -Continue with home Ferrous sulfate QOD    Patient seen and evaluated. Reviewed discharge medications with patient and attending. All new medications requiring new prescriptions were sent to the pharmacy of patient's choice. Reviewed need for prescription for previous home medications and new prescriptions sent if requested. Medically cleared/stable for discharge as of 3/9 as per Dr. Henry with appropriate follow up. Patient understands and agrees with plan of care.   44F PMHx spina bifida, multiple bilateral foot surgeries,  shunt (managed [facility] neuro[facility]), GERD, uterine fibroids, and recent PE on Eliquis presenting with pleuritic substernal and right sided chest pain associated with SOB. Admitted to medicine for further management and monitoring.    Chest pain.   ·  Plan: Atypical chest pain without troponinemia; ECG with no ischemic changes; Hemodynamically stable. No evidence of volume overload or shock on exam. Low suspicion ACS given pleuritic, positional, and reproducible pain by palpation. Known PE on Eliquis. Low suspicion PTX, aortic dissection, PNA, cardiac effusion or tamponade. Symptoms more likely i/s/o GERD  -Cardiology consulted appreciate recs  - EKG with NSR no sig STT changes.   -Trop and BNP wnl.   -CXR: No acute cardiopulmonary process  -pro-BNP 73  -Admit to Telemetry; cardiac monitor  -c/w Eliquis 5 mg BID  -ECG STAT for repeat pain  -Maalox PRN  -CTA neg  -GI consulted      Pulmonary embolism.   ·  Plan: -Continue with home Eliquis 5 mg BID  -Pulm consulted appreciate recs  -CTA chest negative  -Continue on Advair inhaler on discharge    GERD (gastroesophageal reflux disease).   ·  Plan: -Continue with home Pantoprazole 40 mg QD.  -GI consulted appreciate recs  -Dysphagia to solids greater than liquids, in the setting of retrosternal burning, rule out esophagitis/esophageal stricture, rule out GI dysmotility disorder achalasia  s/p EGD without presence of stricture but EndoFLIP suspicious for incomplete relaxation of the LES  -c/w soft bite sized diet, as per GI  - will need outpatient GI follow up for outpatient manometry next week  following discharge     Problem/Plan - 4:  ·  Problem: Neuropathic pain.   ·  Plan: -Continue with home gabapentin 100 mg TID.    H/O iron deficiency anemia.   ·  Plan: -Continue with home Ferrous sulfate QOD    Patient seen and evaluated. Reviewed discharge medications with patient and attending. All new medications requiring new prescriptions were sent to the pharmacy of patient's choice. Reviewed need for prescription for previous home medications and new prescriptions sent if requested. Medically cleared/stable for discharge as of 3/9 as per Dr. Henry with appropriate follow up. Patient understands and agrees with plan of care.   44F PMHx spina bifida, multiple bilateral foot surgeries,  shunt (managed [facility]), GERD, uterine fibroids, and recent PE on Eliquis presenting with pleuritic substernal and right sided chest pain associated with SOB. Admitted to medicine for further management and monitoring.    Chest pain.   ·  Plan: Atypical chest pain without troponinemia; ECG with no ischemic changes; Hemodynamically stable. No evidence of volume overload or shock on exam. Low suspicion ACS given pleuritic, positional, and reproducible pain by palpation. Known PE on Eliquis. Low suspicion PTX, aortic dissection, PNA, cardiac effusion or tamponade. Symptoms more likely i/s/o GERD  -Cardiology consulted appreciate recs  - EKG with NSR no sig STT changes.   -Trop and BNP wnl.   -CXR: No acute cardiopulmonary process  -pro-BNP 73  -Admit to Telemetry; cardiac monitor  -c/w Eliquis 5 mg BID  -ECG STAT for repeat pain  -Maalox PRN  -CTA neg  -GI consulted      Pulmonary embolism.   ·  Plan: -Continue with home Eliquis 5 mg BID  -Pulm consulted appreciate recs  -CTA chest negative  -Continue on Advair inhaler on discharge    GERD (gastroesophageal reflux disease).   ·  Plan: -Continue with home Pantoprazole 40 mg QD.  -GI consulted appreciate recs  -Dysphagia to solids greater than liquids, in the setting of retrosternal burning, rule out esophagitis/esophageal stricture, rule out GI dysmotility disorder achalasia  s/p EGD without presence of stricture but EndoFLIP suspicious for incomplete relaxation of the LES  -c/w soft bite sized diet, as per GI  - will need outpatient GI follow up for outpatient manometry next week  following discharge  - EGD shows HPylori, patient updated on results at bedside by GI, will dc on Quadruple treatment for H.pylori: Pantoprazole 40 PO bid for 14 days, Bismuth 525 qid for 14 days, Tetracyclin 500 qid 14 days, Metronidazole 500 Qid for 14 days.        Problem/Plan - 4:  ·  Problem: Neuropathic pain.   ·  Plan: -Continue with home gabapentin 100 mg TID.    H/O iron deficiency anemia.   ·  Plan: -Continue with home Ferrous sulfate QOD    Patient seen and evaluated. Reviewed discharge medications with patient and attending. All new medications requiring new prescriptions were sent to the pharmacy of patient's choice. Reviewed need for prescription for previous home medications and new prescriptions sent if requested. Medically cleared/stable for discharge as of 3/9 as per Dr. Henry with appropriate follow up. Patient understands and agrees with plan of care.

## 2025-03-05 NOTE — PROGRESS NOTE ADULT - ASSESSMENT
44F w/ PMH of spina bifida,  shunt and recent segmental PE on Eliquis here for new onset R sided cp. No positional or pleuritic component. EKG with NSR no sig STT changes. Trop and BNP wnl.     1. Cp   -repeat CTPA no PE   -cont AC with eliquis   -TTE ECHO and NST unremarkable   -possible GI component, on PPI   -op f/u    2. HTN   -monitor BP  -consider adding norvasc 5 mg qd if needed     3. Prior PE   -cont eliquis   -holding for GI eval and EGD   -on heparin gtt     Nohemy Ramirez MD FAC  Attending Interventional Cardiologist, [facility].   Avaliable on [employer] Team

## 2025-03-06 ENCOUNTER — RESULT REVIEW (OUTPATIENT)
Age: 45
End: 2025-03-06

## 2025-03-06 LAB
ADD ON TEST-SPECIMEN IN LAB: SIGNIFICANT CHANGE UP
ANION GAP SERPL CALC-SCNC: 11 MMOL/L — SIGNIFICANT CHANGE UP (ref 7–14)
APTT BLD: 75.5 SEC — HIGH (ref 24.5–35.6)
BUN SERPL-MCNC: 8 MG/DL — SIGNIFICANT CHANGE UP (ref 7–23)
CALCIUM SERPL-MCNC: 8.4 MG/DL — SIGNIFICANT CHANGE UP (ref 8.4–10.5)
CHLORIDE SERPL-SCNC: 104 MMOL/L — SIGNIFICANT CHANGE UP (ref 98–107)
CO2 SERPL-SCNC: 21 MMOL/L — LOW (ref 22–31)
CREAT SERPL-MCNC: 0.43 MG/DL — LOW (ref 0.5–1.3)
EGFR: 121 ML/MIN/1.73M2 — SIGNIFICANT CHANGE UP
EGFR: 121 ML/MIN/1.73M2 — SIGNIFICANT CHANGE UP
GLUCOSE SERPL-MCNC: 86 MG/DL — SIGNIFICANT CHANGE UP (ref 70–99)
HCG SERPL-ACNC: <1 MIU/ML — SIGNIFICANT CHANGE UP
HCT VFR BLD CALC: 32.1 % — LOW (ref 34.5–45)
HGB BLD-MCNC: 10.3 G/DL — LOW (ref 11.5–15.5)
MAGNESIUM SERPL-MCNC: 2.2 MG/DL — SIGNIFICANT CHANGE UP (ref 1.6–2.6)
MCHC RBC-ENTMCNC: 28.4 PG — SIGNIFICANT CHANGE UP (ref 27–34)
MCHC RBC-ENTMCNC: 32.1 G/DL — SIGNIFICANT CHANGE UP (ref 32–36)
MCV RBC AUTO: 88.4 FL — SIGNIFICANT CHANGE UP (ref 80–100)
MRSA PCR RESULT.: SIGNIFICANT CHANGE UP
NRBC # BLD AUTO: 0 K/UL — SIGNIFICANT CHANGE UP (ref 0–0)
NRBC # FLD: 0 K/UL — SIGNIFICANT CHANGE UP (ref 0–0)
NRBC BLD AUTO-RTO: 0 /100 WBCS — SIGNIFICANT CHANGE UP (ref 0–0)
PHOSPHATE SERPL-MCNC: 3.7 MG/DL — SIGNIFICANT CHANGE UP (ref 2.5–4.5)
PLATELET # BLD AUTO: 334 K/UL — SIGNIFICANT CHANGE UP (ref 150–400)
POTASSIUM SERPL-MCNC: 3.9 MMOL/L — SIGNIFICANT CHANGE UP (ref 3.5–5.3)
POTASSIUM SERPL-SCNC: 3.9 MMOL/L — SIGNIFICANT CHANGE UP (ref 3.5–5.3)
RBC # BLD: 3.63 M/UL — LOW (ref 3.8–5.2)
RBC # FLD: 15.9 % — HIGH (ref 10.3–14.5)
S AUREUS DNA NOSE QL NAA+PROBE: SIGNIFICANT CHANGE UP
SODIUM SERPL-SCNC: 136 MMOL/L — SIGNIFICANT CHANGE UP (ref 135–145)
WBC # BLD: 4.15 K/UL — SIGNIFICANT CHANGE UP (ref 3.8–10.5)
WBC # FLD AUTO: 4.15 K/UL — SIGNIFICANT CHANGE UP (ref 3.8–10.5)

## 2025-03-06 PROCEDURE — 99232 SBSQ HOSP IP/OBS MODERATE 35: CPT

## 2025-03-06 PROCEDURE — 43239 EGD BIOPSY SINGLE/MULTIPLE: CPT | Mod: GC

## 2025-03-06 PROCEDURE — 88305 TISSUE EXAM BY PATHOLOGIST: CPT | Mod: 26

## 2025-03-06 PROCEDURE — 91040 ESOPH BALLOON DISTENSION TST: CPT | Mod: 26,GC

## 2025-03-06 DEVICE — CATH ENDOFLIP MEASURE 16MM: Type: IMPLANTABLE DEVICE | Status: FUNCTIONAL

## 2025-03-06 RX ORDER — OXYCODONE HYDROCHLORIDE 30 MG/1
5 TABLET ORAL EVERY 6 HOURS
Refills: 0 | Status: DISCONTINUED | OUTPATIENT
Start: 2025-03-06 | End: 2025-03-07

## 2025-03-06 RX ADMIN — Medication 40 MILLIGRAM(S): at 05:17

## 2025-03-06 RX ADMIN — Medication 1 DOSE(S): at 09:09

## 2025-03-06 RX ADMIN — GABAPENTIN 100 MILLIGRAM(S): 400 CAPSULE ORAL at 21:47

## 2025-03-06 RX ADMIN — HEPARIN SODIUM 900 UNIT(S)/HR: 1000 INJECTION INTRAVENOUS; SUBCUTANEOUS at 07:15

## 2025-03-06 RX ADMIN — HEPARIN SODIUM 900 UNIT(S)/HR: 1000 INJECTION INTRAVENOUS; SUBCUTANEOUS at 05:19

## 2025-03-06 RX ADMIN — Medication 1 DOSE(S): at 21:48

## 2025-03-06 RX ADMIN — IPRATROPIUM BROMIDE AND ALBUTEROL SULFATE 3 MILLILITER(S): .5; 2.5 SOLUTION RESPIRATORY (INHALATION) at 09:47

## 2025-03-06 RX ADMIN — GABAPENTIN 100 MILLIGRAM(S): 400 CAPSULE ORAL at 05:17

## 2025-03-06 RX ADMIN — Medication 1 APPLICATION(S): at 18:25

## 2025-03-06 RX ADMIN — Medication 40 MILLIGRAM(S): at 18:26

## 2025-03-06 RX ADMIN — IPRATROPIUM BROMIDE AND ALBUTEROL SULFATE 3 MILLILITER(S): .5; 2.5 SOLUTION RESPIRATORY (INHALATION) at 20:43

## 2025-03-06 NOTE — DIETITIAN INITIAL EVALUATION ADULT - OTHER INFO
44F PMHx spina bifida, multiple bilateral foot surgeries,  shunt (managed [facility] neuro[facility]), GERD, uterine fibroids, and recent PE on Eliquis presenting with pleuritic substernal and right sided chest pain associated with SOB. Admitted to medicine for further management and monitoring. Detailed plan as below:      Keep patient NPO.  For EGD plus Endoflip today in the afternoon Propranolol Pregnancy And Lactation Text: This medication is Pregnancy Category C and it isn't known if it is safe during pregnancy. It is excreted in breast milk. Pt 43 yo male with PMHx of spina bifida, multiple bilateral foot surgeries,  shunt (managed [facility] neuro[facility]), GERD, uterine fibroids, recent PE on Eliquis presented with pleuritic substernal and right sided chest pain associated with SOB. Admitted to medicine for further management and monitoring.     At time of visit, Pt awake, alert. Of note, Pt NPO now for EGD plus Endoflip today in the afternoon. Per Pt, her appetite not well since January; Pt with unintentional weight loss since then. Pt c/o acid reflux. Pt usually with no chewing or swallowing difficulty; no report of nausea, vomiting or diarrhea @ this time.     Per Pt, her height: 4'11" & her actual body weight: 100#; Pt with unintentional weight loss of 20# in 2 months. Better food options discussed with Pt. 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 RD answered Pt's concerns related to diet. RD remains available, Pt made aware.

## 2025-03-06 NOTE — DIETITIAN INITIAL EVALUATION ADULT - ADD RECOMMEND
1. Once clinically indicates, advance PO diet to Clear Liquids; If Pt tolerates well advance PO diet to Regular, gastroesophageal reflux disease;   2. Once PO diet resumes, encourage & assist Pt with meals as needed; Monitor PO diet tolerance;  3. Monitor labs, hydration status;  1. Once clinically indicates, advance PO diet to Clear Liquids; If Pt tolerates well advance PO diet to Regular, gastroesophageal reflux disease;   2. Once PO diet resumes, encourage & assist Pt with meals as needed; Monitor PO diet tolerance;  3. Add Multivitamins daily for micronutrient coverage;   4. Monitor labs, hydration status;

## 2025-03-06 NOTE — PROGRESS NOTE ADULT - ASSESSMENT
44F w/ PMH of spina bifida,  shunt and recent segmental PE on Eliquis here for new onset R sided cp. No positional or pleuritic component. EKG with NSR no sig STT changes. Trop and BNP wnl.     1. Cp   -repeat CTPA no PE   -cont AC with eliquis   -TTE ECHO and NST unremarkable   -possible GI component, on PPI   -op f/u    2. HTN   -monitor BP  -consider adding norvasc 5 mg qd if needed     3. Prior PE   -cont eliquis   -holding for GI eval and EGD   -on heparin gtt     Nohemy Ramirez MD FAC  Attending Interventional Cardiologist, [facility].   Avaliable on [employer] (Closed) Team

## 2025-03-06 NOTE — DIETITIAN INITIAL EVALUATION ADULT - PERTINENT LABORATORY DATA
10.3   4.15  )-----------( 334      ( 06 Mar 2025 06:39 )             32.1   03-06  136  |  104  |  8   ----------------------------<  86  3.9   |  21[L]  |  0.43[L]  Ca    8.4      06 Mar 2025 06:39  Phos  3.7     03-06  Mg     2.20     03-06  A1C with Estimated Average Glucose Result: 5.3 % (02-01-25 @ 13:10)

## 2025-03-06 NOTE — DIETITIAN INITIAL EVALUATION ADULT - SIGNS/SYMPTOMS
Pt with stage IV pressure injury to L Ischium  weight change: 16.6% x 2 months; <75% of kcal intake x 2 months

## 2025-03-06 NOTE — DIETITIAN INITIAL EVALUATION ADULT - PERTINENT MEDS FT
MEDICATIONS  (STANDING):  albuterol/ipratropium for Nebulization 3 milliLiter(s) Nebulizer every 6 hours  chlorhexidine 2% Cloths 1 Application(s) Topical daily  ferrous    sulfate 325 milliGRAM(s) Oral <User Schedule>  fluticasone propionate/ salmeterol 250-50 MICROgram(s) Diskus 1 Dose(s) Inhalation two times a day  gabapentin 100 milliGRAM(s) Oral three times a day  pantoprazole    Tablet 40 milliGRAM(s) Oral every 12 hours  polyethylene glycol 3350 17 Gram(s) Oral daily    MEDICATIONS  (PRN):  acetaminophen     Tablet .. 650 milliGRAM(s) Oral every 6 hours PRN Temp greater or equal to 38C (100.4F), Mild Pain (1 - 3)  aluminum hydroxide/magnesium hydroxide/simethicone Suspension 30 milliLiter(s) Oral every 6 hours PRN Dyspepsia  oxyCODONE    IR 5 milliGRAM(s) Oral every 6 hours PRN Severe Pain (7 - 10)

## 2025-03-06 NOTE — DIETITIAN INITIAL EVALUATION ADULT - OTHER CALCULATIONS
Of note, Pt's weights: 100#/45.3 kg (per Pt, now), 46.7 kg (HIE - 2/1/25), 56.7 kg (HIE - 1/15/25), 44.2 kg (HIE - 5/29/24), 54.4 kg (HIE - 5/12/24) Of note, Pt's weights: 100#/45.3 kg (per Pt, ABW), 46.7 kg (HIE - 2/1/25), 56.7 kg (HIE - 1/15/25), 44.2 kg (HIE - 5/29/24), 54.4 kg (HIE  - 5/12/24)

## 2025-03-06 NOTE — PROGRESS NOTE ADULT - ASSESSMENT
44F PMHx spina bifida, multiple bilateral foot surgeries,  shunt (managed [facility] neurosurgery), GERD, uterine fibroids, and recent PE on Eliquis presenting with substernal and right sided chest pain. Patient was diagnosed with PE on 1/23/25 and has been on Eliquis. Patient states that her CP started after consumption of chili pepper yesterday. It improved last night was reoccurred today. It is rate 10/10, characterized as squeezing, pleuritic and radiating to the left upper extremity. Reports mild SOB. No exertional activity. Denies any associated symptoms fatigue, distress, diaphoresis, nausea, vomiting, abdominal pain, or leg swelling. Patient denies immobility, long plane/car rides, malignancy, or history of clotting disorder. No family history of CVD before the age of 65. No smoking history. No hx of hypothyroidism or HIV infection or Hepatitis C or autoimmune disorders. No psychosocial factors including stress/anxiety/depression.   (27 Feb 2025 21:50)  she says she has sob too but most of the issues started after eating  chilly peppers; ; currently sheis doing  ok ; does not looks to in any resp distress       Recent PE  Spina bifida/ recent foot surgery   s/p  shunt  GERD      Recent Pe  she has been complaint with her Eliquis  she is on 2 L of oxygen at this time:   her symptoms more likely due to GERD symptoms  cont PPI:   new cta is pending      3/1:  new cta showed: No pulmonary embolism. No acute pulmonary process.  she seems to have MSK pain too as pressing on her sternum and ribs do hurt  :   she has increased bleeding during urination:  ? gyn consult:   her HB has dropped from 12 to 10    may have dahlia too : cont PPI     she also says her son has asthma and she could have asthma too and at times she found her wheezing:   trial of BD to see if that helps her in tightness in chest     3/2:  cont BD:  cont advair:    cont eliquis;  US pelvis showed: Limited transabdominal exam. The patient declined the transvaginal portion. Enlarged multi fibroid uterus largely obscuring visualization of the endometrium and right ovary.  monitor or bleeding;     3/3: she seems OK;  still complains off feeling pain in the chest after she eats:  amberly alerdsy on ppi:  has pe on eliquis:   ? go co nsult     3/4:  seems to be doing  ok :  no sob:   but was not able to eat chicken last night:  as she feels it is with great difficulty the checken paseed last night from her food pipe:  seen by gi :? for possible endoscopy:     3/6: she seems stable:   no wheezing:  no sob:  for endoscopy today      Spina bifida/ recent foot surgery   per primary team   3/1: has lesion on rigth foot:  : defer to primary team    3/2: per primary team   3/3: seems OK:  no sob;     3/5: seems to be doing  ok : no sob:   no cough ;   no phlegm   on iv heparin : awaiting for upper endoscopy:   ba esophogram  noted    3/6: for endoscopy:       s/p  shunt  supportive care   3/1: ct head is normal  No CT evidence of acute intracranial pathology. The ventricular system appears slitlike and decompressed, unchanged from   3/3: seems O K:   3/5: stable    GERD  cont PPI     dw pt : acp

## 2025-03-06 NOTE — PROGRESS NOTE ADULT - ASSESSMENT
44F PMHx spina bifida, multiple bilateral foot surgeries,  shunt (managed [facility] neurosurgery), GERD, uterine fibroids, and recent PE on Eliquis presenting with pleuritic substernal and right sided chest pain associated with SOB. Admitted to medicine for further management and monitoring. Detailed plan as below:      GERD / dysphagia to solid     GI f/up appreciated  EGD: Achalasia vs GEJOO  Cw PPI  OP manometry    #Chest pain.    Likely from GERD  -c/w Eliquis 5 mg BID  -Maalox PRN  -CTA chest: No PE  Cardio f/up appreciated  NST: neg for ischemia

## 2025-03-06 NOTE — PROGRESS NOTE ADULT - ASSESSMENT
44F presents for right foot wound , neuropathy  -Pt was seen and evaluated  -Right foot dorsomedial well-adhered eschar, no erythema or edema, no drainage, no malodor, no fluctuance, left foot no wounds, now healing with eschar intact  -Right foot xray: no OM, no gas  -No culture obtained 2/2 no acute signs of infection  -Pt to wear z-flow boots at all times when in bed  -Wound care orders placed to be performed by nursing daily  -No podiatric surgery intervention, please re-consult prn  -Pt is stable for discharge from podiatry standpoint  -Wound care and follow-up information placed in discharge provider note

## 2025-03-07 LAB
ANION GAP SERPL CALC-SCNC: 10 MMOL/L — SIGNIFICANT CHANGE UP (ref 7–14)
BUN SERPL-MCNC: 7 MG/DL — SIGNIFICANT CHANGE UP (ref 7–23)
CALCIUM SERPL-MCNC: 9.3 MG/DL — SIGNIFICANT CHANGE UP (ref 8.4–10.5)
CHLORIDE SERPL-SCNC: 105 MMOL/L — SIGNIFICANT CHANGE UP (ref 98–107)
CK MB BLD-MCNC: SIGNIFICANT CHANGE UP % (ref 0–2.5)
CK MB CFR SERPL CALC: <1 NG/ML — SIGNIFICANT CHANGE UP
CK SERPL-CCNC: 56 U/L — SIGNIFICANT CHANGE UP (ref 25–170)
CO2 SERPL-SCNC: 21 MMOL/L — LOW (ref 22–31)
CREAT SERPL-MCNC: 0.49 MG/DL — LOW (ref 0.5–1.3)
EGFR: 118 ML/MIN/1.73M2 — SIGNIFICANT CHANGE UP
EGFR: 118 ML/MIN/1.73M2 — SIGNIFICANT CHANGE UP
GI PCR PANEL: SIGNIFICANT CHANGE UP
GLUCOSE SERPL-MCNC: 76 MG/DL — SIGNIFICANT CHANGE UP (ref 70–99)
HCT VFR BLD CALC: 32.8 % — LOW (ref 34.5–45)
HGB BLD-MCNC: 10.8 G/DL — LOW (ref 11.5–15.5)
MAGNESIUM SERPL-MCNC: 2.1 MG/DL — SIGNIFICANT CHANGE UP (ref 1.6–2.6)
MCHC RBC-ENTMCNC: 28.4 PG — SIGNIFICANT CHANGE UP (ref 27–34)
MCHC RBC-ENTMCNC: 32.9 G/DL — SIGNIFICANT CHANGE UP (ref 32–36)
MCV RBC AUTO: 86.3 FL — SIGNIFICANT CHANGE UP (ref 80–100)
NRBC # BLD AUTO: 0 K/UL — SIGNIFICANT CHANGE UP (ref 0–0)
NRBC # FLD: 0 K/UL — SIGNIFICANT CHANGE UP (ref 0–0)
NRBC BLD AUTO-RTO: 0 /100 WBCS — SIGNIFICANT CHANGE UP (ref 0–0)
PHOSPHATE SERPL-MCNC: 3.5 MG/DL — SIGNIFICANT CHANGE UP (ref 2.5–4.5)
PLATELET # BLD AUTO: 368 K/UL — SIGNIFICANT CHANGE UP (ref 150–400)
POTASSIUM SERPL-MCNC: 4.5 MMOL/L — SIGNIFICANT CHANGE UP (ref 3.5–5.3)
POTASSIUM SERPL-SCNC: 4.5 MMOL/L — SIGNIFICANT CHANGE UP (ref 3.5–5.3)
RBC # BLD: 3.8 M/UL — SIGNIFICANT CHANGE UP (ref 3.8–5.2)
RBC # FLD: 15.8 % — HIGH (ref 10.3–14.5)
SODIUM SERPL-SCNC: 136 MMOL/L — SIGNIFICANT CHANGE UP (ref 135–145)
TROPONIN T, HIGH SENSITIVITY RESULT: <6 NG/L — SIGNIFICANT CHANGE UP
WBC # BLD: 3.83 K/UL — SIGNIFICANT CHANGE UP (ref 3.8–10.5)
WBC # FLD AUTO: 3.83 K/UL — SIGNIFICANT CHANGE UP (ref 3.8–10.5)

## 2025-03-07 PROCEDURE — 99232 SBSQ HOSP IP/OBS MODERATE 35: CPT

## 2025-03-07 PROCEDURE — 99232 SBSQ HOSP IP/OBS MODERATE 35: CPT | Mod: GC

## 2025-03-07 PROCEDURE — 93010 ELECTROCARDIOGRAM REPORT: CPT

## 2025-03-07 RX ORDER — ACETAMINOPHEN 500 MG/5ML
650 LIQUID (ML) ORAL EVERY 6 HOURS
Refills: 0 | Status: DISCONTINUED | OUTPATIENT
Start: 2025-03-07 | End: 2025-03-10

## 2025-03-07 RX ORDER — APIXABAN 2.5 MG/1
5 TABLET, FILM COATED ORAL EVERY 12 HOURS
Refills: 0 | Status: DISCONTINUED | OUTPATIENT
Start: 2025-03-07 | End: 2025-03-07

## 2025-03-07 RX ORDER — APIXABAN 2.5 MG/1
5 TABLET, FILM COATED ORAL
Refills: 0 | Status: DISCONTINUED | OUTPATIENT
Start: 2025-03-07 | End: 2025-03-10

## 2025-03-07 RX ADMIN — OXYCODONE HYDROCHLORIDE 5 MILLIGRAM(S): 30 TABLET ORAL at 09:55

## 2025-03-07 RX ADMIN — GABAPENTIN 100 MILLIGRAM(S): 400 CAPSULE ORAL at 05:44

## 2025-03-07 RX ADMIN — Medication 40 MILLIGRAM(S): at 17:03

## 2025-03-07 RX ADMIN — Medication 325 MILLIGRAM(S): at 08:59

## 2025-03-07 RX ADMIN — GABAPENTIN 100 MILLIGRAM(S): 400 CAPSULE ORAL at 21:58

## 2025-03-07 RX ADMIN — Medication 650 MILLIGRAM(S): at 21:58

## 2025-03-07 RX ADMIN — Medication 1 DOSE(S): at 08:59

## 2025-03-07 RX ADMIN — IPRATROPIUM BROMIDE AND ALBUTEROL SULFATE 3 MILLILITER(S): .5; 2.5 SOLUTION RESPIRATORY (INHALATION) at 21:14

## 2025-03-07 RX ADMIN — OXYCODONE HYDROCHLORIDE 5 MILLIGRAM(S): 30 TABLET ORAL at 08:58

## 2025-03-07 RX ADMIN — IPRATROPIUM BROMIDE AND ALBUTEROL SULFATE 3 MILLILITER(S): .5; 2.5 SOLUTION RESPIRATORY (INHALATION) at 08:26

## 2025-03-07 RX ADMIN — IPRATROPIUM BROMIDE AND ALBUTEROL SULFATE 3 MILLILITER(S): .5; 2.5 SOLUTION RESPIRATORY (INHALATION) at 15:31

## 2025-03-07 RX ADMIN — Medication 1 APPLICATION(S): at 12:26

## 2025-03-07 RX ADMIN — APIXABAN 5 MILLIGRAM(S): 2.5 TABLET, FILM COATED ORAL at 17:05

## 2025-03-07 RX ADMIN — Medication 1 DOSE(S): at 21:59

## 2025-03-07 RX ADMIN — Medication 40 MILLIGRAM(S): at 05:44

## 2025-03-07 RX ADMIN — GABAPENTIN 100 MILLIGRAM(S): 400 CAPSULE ORAL at 12:25

## 2025-03-07 RX ADMIN — IPRATROPIUM BROMIDE AND ALBUTEROL SULFATE 3 MILLILITER(S): .5; 2.5 SOLUTION RESPIRATORY (INHALATION) at 03:38

## 2025-03-07 NOTE — PHYSICAL THERAPY INITIAL EVALUATION ADULT - PERTINENT HX OF CURRENT PROBLEM, REHAB EVAL
44F PMHx spina bifida, multiple bilateral foot surgeries,  shunt (managed [facility] neurosurgery), GERD, uterine fibroids, and recent PE on Eliquis presenting with substernal and right sided chest pain. Patient was diagnosed with PE on 1/23/25 and has been on Eliquis. Patient states that her CP started after consumption of chili pepper yesterday. It improved last night was reoccurred today. It is rate 10/10, characterized as squeezing, pleuritic and radiating to the left upper extremity. Reports mild SOB. No exertional activity. Denies any associated symptoms fatigue, distress, diaphoresis, nausea, vomiting, abdominal pain, or leg swelling. Patient denies immobility, long plane/car rides, malignancy, or history of clotting disorder. No family history of CVD before the age of 65. No smoking history. No hx of hypothyroidism or HIV infection or Hepatitis C or autoimmune disorders. No psychosocial factors including stress/anxiety/depression.

## 2025-03-07 NOTE — CHART NOTE - NSCHARTNOTEFT_GEN_A_CORE
Pt seen and examined pt c/o epigastric pain and burning. She is also c/o weakness as she is not able to eat solid food. Pt requesting to eat sandwich.  She does not want to eat pureed meal. note incompletee Yes Pt seen and examined pt c/o epigastric pain and burning. She is also c/o weakness as she is not able to eat solid food. Pt requesting to eat sandwich.  She does not want to eat pureed meal. Had in depth conversation with patient however , she is not happy with diet. Maalox ordered and will put in for  nutrition consult. EKG unchanged awaiting cardiac enzymes.

## 2025-03-07 NOTE — SWALLOW BEDSIDE ASSESSMENT ADULT - ASR SWALLOW REFERRAL
Recommend continue follow-up with GI due to reported signs/symptoms of esophageal dysphagia Recommend continue follow-up with GI due to esophageal dysphagia component

## 2025-03-07 NOTE — SWALLOW BEDSIDE ASSESSMENT ADULT - SWALLOW EVAL: DIAGNOSIS
Patient presents with functional oropharyngeal swallow, for trials provided. Oral stage characterized adequate oral containment with functional bolus manipulation and timely transit of all PO trials. No anterior loss or oral residue noted with any trials provided. Pharyngeal stage characterized by initiation of swallow trigger with hyolaryngeal elevation/excursion evidenced via digital palpation. No overt signs or symptoms of penetration/aspiration were noted. Patient remained with clear vocal quality throughout.  Patient with no complaint of globus sensation/odynophagia for trials provided. Of note, advanced solids were not trialed due to GI recommendations for Puree diet at this time.

## 2025-03-07 NOTE — PHYSICAL THERAPY INITIAL EVALUATION ADULT - GENERAL OBSERVATIONS, REHAB EVAL
Pt encountered in semisupine position, no distress, AxOx4, with +IV, and +pulse oximeter. Pt agreeable to participate in PT evaluation. Vitals taken; /98mmHg.

## 2025-03-07 NOTE — SWALLOW BEDSIDE ASSESSMENT ADULT - SLP PERTINENT HISTORY OF CURRENT PROBLEM
3/7 GI Note: "44F PMHx spina bifida, multiple bilateral foot surgeries,  shunt (managed [facility] neurosurgery), GERD, uterine fibroids, and recent PE on Eliquis 1/2025 presenting with substernal and right sided chest pain. Admitted to medicine for further management and monitoring. Evaluated by cards, TTE ECHO and NST unremarkable. GI consulted for dysphagia/gerd. "

## 2025-03-07 NOTE — PHYSICAL THERAPY INITIAL EVALUATION ADULT - ACTIVE RANGE OF MOTION EXAMINATION, REHAB EVAL
except pt unable to dorsiflex bilateral ankles/bilateral upper extremity Active ROM was WFL (within functional limits)/bilateral  lower extremity Active ROM was WFL (within functional limits)

## 2025-03-07 NOTE — SWALLOW BEDSIDE ASSESSMENT ADULT - ASR SWALLOW RECOMMEND DIAG
Cinesophagram is not warranted due to no overt signs/symptoms of penetration/aspiration seen at bedside

## 2025-03-07 NOTE — PROGRESS NOTE ADULT - ATTENDING COMMENTS
44-year-old female with spina bifida/multiple bilateral foot surgeries/ shunt/GERD/fibroids/recent PE diagnosis on Eliquis since January 2025 who initially presented with severe chest pain/sternal discomfort with radiation to her arm ruled out for ACS with TTE/NST negative for cardiac origin.  We are being consulted for significant dysphagia to solids as well as retrosternal burning/regurgitation which has been ongoing for the past month or greater.  Impression  Dysphagia to solids greater than liquids, in the setting of retrosternal burning, rule out esophagitis/esophageal stricture, rule out GI dysmotility disorder achalasia    s/p EGD without presence of stricture but EndoFLIP suspicious for incomplete relaxation of the LES  Rec-  1. soft bite sized diet  2- will set up for outpatient manometry next week

## 2025-03-07 NOTE — PROGRESS NOTE ADULT - ASSESSMENT
44F w/ PMH of spina bifida,  shunt and recent segmental PE on Eliquis here for new onset R sided cp. No positional or pleuritic component. EKG with NSR no sig STT changes. Trop and BNP wnl.     1. Cp   -repeat CTPA no PE   -cont AC with eliquis   -TTE ECHO and NST unremarkable   -possible GI component, on PPI   -op f/u    2. HTN   -monitor BP  -consider adding norvasc 5 mg qd if needed     3. Prior PE   -cont eliquis   -holding for GI eval and EGD   -on heparin gttl, resume eliquis when able     Nohemy Ramirez MD FACC  Attending Interventional Cardiologist, [facility]-NS/JULI[facility].   Avaliable on [employer] Team

## 2025-03-07 NOTE — PHYSICAL THERAPY INITIAL EVALUATION ADULT - MANUAL MUSCLE TESTING RESULTS, REHAB EVAL
Bilateral upper extremities 4-/5, bilateral hamstrings 3/5, bilateral quadriceps 3-/5, bilateral ankles 0/5

## 2025-03-07 NOTE — PROGRESS NOTE ADULT - ASSESSMENT
44F PMHx spina bifida, multiple bilateral foot surgeries,  shunt (managed [facility] neurosurgery), GERD, uterine fibroids, and recent PE on Eliquis presenting with substernal and right sided chest pain. Patient was diagnosed with PE on 1/23/25 and has been on Eliquis. Patient states that her CP started after consumption of chili pepper yesterday. It improved last night was reoccurred today. It is rate 10/10, characterized as squeezing, pleuritic and radiating to the left upper extremity. Reports mild SOB. No exertional activity. Denies any associated symptoms fatigue, distress, diaphoresis, nausea, vomiting, abdominal pain, or leg swelling. Patient denies immobility, long plane/car rides, malignancy, or history of clotting disorder. No family history of CVD before the age of 65. No smoking history. No hx of hypothyroidism or HIV infection or Hepatitis C or autoimmune disorders. No psychosocial factors including stress/anxiety/depression.   (27 Feb 2025 21:50)  she says she has sob too but most of the issues started after eating  chilly peppers; ; currently sheis doing  ok ; does not looks to in any resp distress       Recent PE  Spina bifida/ recent foot surgery   s/p  shunt  GERD      Recent Pe  she has been complaint with her Eliquis  she is on 2 L of oxygen at this time:   her symptoms more likely due to GERD symptoms  cont PPI:   new cta is pending      3/1:  new cta showed: No pulmonary embolism. No acute pulmonary process.  she seems to have MSK pain too as pressing on her sternum and ribs do hurt  :   she has increased bleeding during urination:  ? gyn consult:   her HB has dropped from 12 to 10    may have dahlia too : cont PPI     she also says her son has asthma and she could have asthma too and at times she found her wheezing:   trial of BD to see if that helps her in tightness in chest     3/2:  cont BD:  cont advair:    cont eliquis;  US pelvis showed: Limited transabdominal exam. The patient declined the transvaginal portion. Enlarged multi fibroid uterus largely obscuring visualization of the endometrium and right ovary.  monitor or bleeding;     3/3: she seems OK;  still complains off feeling pain in the chest after she eats:  amberly alerdsy on ppi:  has pe on eliquis:   ? go co nsult     3/4:  seems to be doing  ok :  no sob:   but was not able to eat chicken last night:  as she feels it is with great difficulty the checken paseed last night from her food pipe:  seen by gi :? for possible endoscopy:     3/6: she seems stable:   no wheezing:  no sob:  for endoscopy today      3/7: seems to be doing ok ; no cough :   no phlegm  : no wheezing/     Spina bifida/ recent foot surgery   per primary team   3/1: has lesion on rigth foot:  : defer to primary team    3/2: per primary team   3/3: seems OK:  no sob;     3/5: seems to be doing  ok : no sob:   no cough ;   no phlegm   on iv heparin : awaiting for upper endoscopy:   ba esophogram  noted    3/6: for endoscopy:   3/7; cont current rx:  s/p endoscopy: defer to gi       s/p  shunt  supportive care   3/1: ct head is normal  No CT evidence of acute intracranial pathology. The ventricular system appears slitlike and decompressed, unchanged from   3/3: seems O K:   3/5: stable    GERD  cont PPI     dw pt : acp

## 2025-03-07 NOTE — PROGRESS NOTE ADULT - ASSESSMENT
44F PMHx spina bifida, multiple bilateral foot surgeries,  shunt (managed [facility] neurosurgery), GERD, uterine fibroids, and recent PE on Eliquis presenting with pleuritic substernal and right sided chest pain associated with SOB. Admitted to medicine for further management and monitoring. Detailed plan as below:      GERD / dysphagia to solid   GI f/up appreciated  EGD: Achalasia vs GEJOO  Cw PPI  OP manometry  recommend puree food for now     #Chest pain.    Likely from GERD  -resume eliquis   -Maalox PRN  -CTA chest: No PE  Cardio f/up appreciated  NST: neg for ischemia     dispo: f/u with GI as out pt , will need manometry testing as OP   ok for d/c home

## 2025-03-07 NOTE — PHYSICAL THERAPY INITIAL EVALUATION ADULT - ADDITIONAL COMMENTS
Pt lives in a private house with her mother with 2 steps to enter; no handrails; bedroom/bathroom is on the first floor. Prior to hospital admission, pt was completely independent and used a rollator with ambulation. Pt reports 1 recent fall while she was in the hospital in [place]. Pt states that she used to use BLE braces from her hip to foot; however stopped using them in December.     Pt left comfortable in bed, NAD, all lines intact, all precautions maintained, with call bell in reach, bed alarm on, and covering ABDIEL Palmer aware of PT evaluation and pt's chest pain

## 2025-03-07 NOTE — PROGRESS NOTE ADULT - ASSESSMENT
44F PMHx spina bifida, multiple bilateral foot surgeries,  shunt (managed [facility] neurosurgery), GERD, uterine fibroids, and recent PE on Eliquis 1/2025 presenting with substernal and right sided chest pain. Admitted to medicine for further management and monitoring. Evaluated by cards, TTE ECHO and NST unremarkable. GI consulted for dysphagia/gerd.     HDS. Labs: normocytic anemia, hgb 10s, no abd imaging this admission, CTAP 1/2025 w/ large fecal load    # GERD  # Dysphagia to solids  S/p barium esophagram - tertiary contractions mid and distal esophagus, mild delayed clearance of contrast in to the stomach  S/p EGD/endoflip 3/6 - showing abnormal motility suspicious for achalasia 1 vs EGJOO. Patient would need further eval with manometry  #Chronic constipation  - baseline 1bm/wk with pushing/straining, noted prior imaging with large fecal load, also on opioids, suspect iso limited mobility/component of underlying dysmotility  # Recent PE on eliquis  # uterine fibroids/vaginal bleeding    Recommendations     - Recommend Puree diet  - Would recommend alternate pain control other than opiods as opiods can elevate LE pressure and cause worsening dysphagia.   - Bowel regimen to help with constipation - Miralax daily plus Senna.  - Ok to resume AC from GI stand point.     The above is not finalized until attending' s attestation    Marquita Humphrey, PGY4  Gastroenterology and Hepatology  Available on TEAMS    For non urgent consult, please email [email or web address] (For [place]) or [email or web address] (For [place])  Long range page number [phone]. Short range [phone]     44F PMHx spina bifida, multiple bilateral foot surgeries,  shunt (managed [facility] neurosurgery), GERD, uterine fibroids, and recent PE on Eliquis 1/2025 presenting with substernal and right sided chest pain. Admitted to medicine for further management and monitoring. Evaluated by cards, TTE ECHO and NST unremarkable. GI consulted for dysphagia/gerd.     HDS. Labs: normocytic anemia, hgb 10s, no abd imaging this admission, CTAP 1/2025 w/ large fecal load    # GERD  # Dysphagia to solids  S/p barium esophagram - tertiary contractions mid and distal esophagus, mild delayed clearance of contrast in to the stomach  S/p EGD/endoflip 3/6 - showing abnormal motility suspicious for achalasia 1 vs EGJOO. Patient would need further eval with manometry  #Chronic constipation  - baseline 1bm/wk with pushing/straining, noted prior imaging with large fecal load, also on opioids, suspect iso limited mobility/component of underlying dysmotility  # Recent PE on eliquis  # uterine fibroids/vaginal bleeding    Recommendations     - Patient would need outpatient manometry  - She would need to follow GI outpatient on discharge.   - Recommend Puree diet  - Would recommend alternate pain control other than opiods as opiods can elevate LE pressure and cause worsening dysphagia.   - Bowel regimen to help with constipation - Miralax daily plus Senna.  - Ok to resume AC from GI stand point.     The above is not finalized until attending' s attestation    Marquita Humphrey, PGY4  Gastroenterology and Hepatology  Available on TEAMS    For non urgent consult, please email [email or web address] (For [place]) or [email or web address] (For [place])  Long range page number [phone]. Short range [phone]

## 2025-03-07 NOTE — SWALLOW BEDSIDE ASSESSMENT ADULT - COMMENTS
3/6 Internal Medicine Note: "44F PMHx spina bifida, multiple bilateral foot surgeries,  shunt (managed [facility] neurosurgery), GERD, uterine fibroids, and recent PE on Eliquis presenting with pleuritic substernal and right sided chest pain associated with SOB. Admitted to medicine for further management and monitoring."    3/7 Gastroenterology Note: "Patient seen and examined at bedside. S/p EGD 3/6 with endoflip - abnormal esophageal motility suspicious for EGJOO vs achalasia 1, biopsies taken" (See note for further recommendations).    3/1 Chest Xray:  "IMPRESSION: Clear lungs."    Patient received sitting upright in bed, awake and alert. Patient is A&Ox4 and agreeable to PO trials for evaluation. Patient reports "chest pain" when eating solids; s/p EGD yesterday with recommendations made for Puree diet per GI. 3/6 Internal Medicine Note: "44F PMHx spina bifida, multiple bilateral foot surgeries,  shunt (managed [facility] neurosurgery), GERD, uterine fibroids, and recent PE on Eliquis presenting with pleuritic substernal and right sided chest pain associated with SOB. Admitted to medicine for further management and monitoring."      Patient received sitting upright in bed, awake and alert. Patient is A&Ox4 and agreeable to PO trials for evaluation. Patient reports "chest pain" when eating solids; s/p EGD yesterday with recommendations made for Puree diet per GI.

## 2025-03-07 NOTE — SWALLOW BEDSIDE ASSESSMENT ADULT - ADDITIONAL RECOMMENDATIONS
This service to follow up as schedule permits   Medical team advised to reconsult this service should there be a change in patient status Advance diet per GI recommendations due to esophageal dysphagia component   Medical team advised to reconsult this service should there be a change in patient status

## 2025-03-08 LAB
ANION GAP SERPL CALC-SCNC: 12 MMOL/L — SIGNIFICANT CHANGE UP (ref 7–14)
BUN SERPL-MCNC: 7 MG/DL — SIGNIFICANT CHANGE UP (ref 7–23)
CALCIUM SERPL-MCNC: 9.7 MG/DL — SIGNIFICANT CHANGE UP (ref 8.4–10.5)
CHLORIDE SERPL-SCNC: 101 MMOL/L — SIGNIFICANT CHANGE UP (ref 98–107)
CO2 SERPL-SCNC: 22 MMOL/L — SIGNIFICANT CHANGE UP (ref 22–31)
CREAT SERPL-MCNC: 0.49 MG/DL — LOW (ref 0.5–1.3)
EGFR: 118 ML/MIN/1.73M2 — SIGNIFICANT CHANGE UP
EGFR: 118 ML/MIN/1.73M2 — SIGNIFICANT CHANGE UP
GLUCOSE SERPL-MCNC: 82 MG/DL — SIGNIFICANT CHANGE UP (ref 70–99)
HCT VFR BLD CALC: 34.9 % — SIGNIFICANT CHANGE UP (ref 34.5–45)
HGB BLD-MCNC: 11.5 G/DL — SIGNIFICANT CHANGE UP (ref 11.5–15.5)
MAGNESIUM SERPL-MCNC: 1.9 MG/DL — SIGNIFICANT CHANGE UP (ref 1.6–2.6)
MCHC RBC-ENTMCNC: 28.9 PG — SIGNIFICANT CHANGE UP (ref 27–34)
MCHC RBC-ENTMCNC: 33 G/DL — SIGNIFICANT CHANGE UP (ref 32–36)
MCV RBC AUTO: 87.7 FL — SIGNIFICANT CHANGE UP (ref 80–100)
NRBC # BLD AUTO: 0 K/UL — SIGNIFICANT CHANGE UP (ref 0–0)
NRBC # FLD: 0 K/UL — SIGNIFICANT CHANGE UP (ref 0–0)
NRBC BLD AUTO-RTO: 0 /100 WBCS — SIGNIFICANT CHANGE UP (ref 0–0)
PHOSPHATE SERPL-MCNC: 4.3 MG/DL — SIGNIFICANT CHANGE UP (ref 2.5–4.5)
PLATELET # BLD AUTO: 372 K/UL — SIGNIFICANT CHANGE UP (ref 150–400)
POTASSIUM SERPL-MCNC: 3.8 MMOL/L — SIGNIFICANT CHANGE UP (ref 3.5–5.3)
POTASSIUM SERPL-SCNC: 3.8 MMOL/L — SIGNIFICANT CHANGE UP (ref 3.5–5.3)
RBC # BLD: 3.98 M/UL — SIGNIFICANT CHANGE UP (ref 3.8–5.2)
RBC # FLD: 15.9 % — HIGH (ref 10.3–14.5)
SODIUM SERPL-SCNC: 135 MMOL/L — SIGNIFICANT CHANGE UP (ref 135–145)
WBC # BLD: 3.71 K/UL — LOW (ref 3.8–10.5)
WBC # FLD AUTO: 3.71 K/UL — LOW (ref 3.8–10.5)

## 2025-03-08 PROCEDURE — 99232 SBSQ HOSP IP/OBS MODERATE 35: CPT

## 2025-03-08 RX ORDER — SUMATRIPTAN 100 MG/1
25 TABLET, FILM COATED ORAL ONCE
Refills: 0 | Status: COMPLETED | OUTPATIENT
Start: 2025-03-08 | End: 2025-03-08

## 2025-03-08 RX ORDER — KETOROLAC TROMETHAMINE 30 MG/ML
15 INJECTION, SOLUTION INTRAMUSCULAR; INTRAVENOUS ONCE
Refills: 0 | Status: DISCONTINUED | OUTPATIENT
Start: 2025-03-08 | End: 2025-03-08

## 2025-03-08 RX ADMIN — SUMATRIPTAN 25 MILLIGRAM(S): 100 TABLET, FILM COATED ORAL at 17:45

## 2025-03-08 RX ADMIN — Medication 1 APPLICATION(S): at 11:05

## 2025-03-08 RX ADMIN — Medication 1 DOSE(S): at 21:35

## 2025-03-08 RX ADMIN — APIXABAN 5 MILLIGRAM(S): 2.5 TABLET, FILM COATED ORAL at 17:44

## 2025-03-08 RX ADMIN — Medication 1 DOSE(S): at 11:00

## 2025-03-08 RX ADMIN — GABAPENTIN 100 MILLIGRAM(S): 400 CAPSULE ORAL at 05:19

## 2025-03-08 RX ADMIN — APIXABAN 5 MILLIGRAM(S): 2.5 TABLET, FILM COATED ORAL at 05:20

## 2025-03-08 RX ADMIN — IPRATROPIUM BROMIDE AND ALBUTEROL SULFATE 3 MILLILITER(S): .5; 2.5 SOLUTION RESPIRATORY (INHALATION) at 03:42

## 2025-03-08 RX ADMIN — Medication 40 MILLIGRAM(S): at 17:44

## 2025-03-08 RX ADMIN — IPRATROPIUM BROMIDE AND ALBUTEROL SULFATE 3 MILLILITER(S): .5; 2.5 SOLUTION RESPIRATORY (INHALATION) at 08:21

## 2025-03-08 RX ADMIN — GABAPENTIN 100 MILLIGRAM(S): 400 CAPSULE ORAL at 21:34

## 2025-03-08 RX ADMIN — GABAPENTIN 100 MILLIGRAM(S): 400 CAPSULE ORAL at 11:00

## 2025-03-08 RX ADMIN — IPRATROPIUM BROMIDE AND ALBUTEROL SULFATE 3 MILLILITER(S): .5; 2.5 SOLUTION RESPIRATORY (INHALATION) at 20:53

## 2025-03-08 RX ADMIN — IPRATROPIUM BROMIDE AND ALBUTEROL SULFATE 3 MILLILITER(S): .5; 2.5 SOLUTION RESPIRATORY (INHALATION) at 16:32

## 2025-03-08 RX ADMIN — Medication 40 MILLIGRAM(S): at 05:20

## 2025-03-08 RX ADMIN — KETOROLAC TROMETHAMINE 15 MILLIGRAM(S): 30 INJECTION, SOLUTION INTRAMUSCULAR; INTRAVENOUS at 11:01

## 2025-03-08 RX ADMIN — KETOROLAC TROMETHAMINE 15 MILLIGRAM(S): 30 INJECTION, SOLUTION INTRAMUSCULAR; INTRAVENOUS at 12:00

## 2025-03-08 NOTE — PROGRESS NOTE ADULT - ASSESSMENT
44F w/ PMH of spina bifida,  shunt and recent segmental PE on Eliquis here for new onset R sided cp. No positional or pleuritic component. EKG with NSR no sig STT changes. Trop and BNP wnl.     1. Cp   -repeat CTPA no PE   -cont AC with eliquis   -TTE ECHO and NST unremarkable   -possible GI component, on PPI   -op f/u    2. HTN   -monitor BP  -consider adding norvasc 5 mg qd if needed     3. Prior PE   -cont eliquis   -holding for GI eval and EGD   -on heparin gttl, resume eliquis when able     Nohemy Ramirez MD FACC  Attending Interventional Cardiologist, [facility].   Avaliable on [employer] Team

## 2025-03-08 NOTE — CHART NOTE - NSCHARTNOTEFT_GEN_A_CORE
Pt seen and examined with reports of intermittent c/o epigastric pain, along with inability to tolerate puree food, and is requesting "soft & bite/chopped food". GI Attending Dr Graff had recommended soft & bite sized diet, though speech and swallow rec puree diet. Attempted to re-educate patient on staying on puree diet, though patient is refusing to eat puree food. Case discussed with covering Attending Dr Srinivasan, will order soft and bite sized diet and re-assess patient for tolerance. Patient understands and agrees with plan.

## 2025-03-08 NOTE — PROGRESS NOTE ADULT - ASSESSMENT
44F PMHx spina bifida, multiple bilateral foot surgeries,  shunt (managed [facility] neurosurgery), GERD, uterine fibroids, and recent PE on Eliquis presenting with substernal and right sided chest pain. Patient was diagnosed with PE on 1/23/25 and has been on Eliquis. Patient states that her CP started after consumption of chili pepper yesterday. It improved last night was reoccurred today. It is rate 10/10, characterized as squeezing, pleuritic and radiating to the left upper extremity. Reports mild SOB. No exertional activity. Denies any associated symptoms fatigue, distress, diaphoresis, nausea, vomiting, abdominal pain, or leg swelling. Patient denies immobility, long plane/car rides, malignancy, or history of clotting disorder. No family history of CVD before the age of 65. No smoking history. No hx of hypothyroidism or HIV infection or Hepatitis C or autoimmune disorders. No psychosocial factors including stress/anxiety/depression.   (27 Feb 2025 21:50)  she says she has sob too but most of the issues started after eating  chilly peppers; ; currently sheis doing  ok ; does not looks to in any resp distress       Recent PE  Spina bifida/ recent foot surgery   s/p  shunt  GERD      Recent Pe  she has been complaint with her Eliquis  she is on 2 L of oxygen at this time:   her symptoms more likely due to GERD symptoms  cont PPI:   new cta is pending      3/1:  new cta showed: No pulmonary embolism. No acute pulmonary process.  she seems to have MSK pain too as pressing on her sternum and ribs do hurt  :   she has increased bleeding during urination:  ? gyn consult:   her HB has dropped from 12 to 10    may have dahlia too : cont PPI     she also says her son has asthma and she could have asthma too and at times she found her wheezing:   trial of BD to see if that helps her in tightness in chest     3/2:  cont BD:  cont advair:    cont eliquis;  US pelvis showed: Limited transabdominal exam. The patient declined the transvaginal portion. Enlarged multi fibroid uterus largely obscuring visualization of the endometrium and right ovary.  monitor or bleeding;     3/3: she seems OK;  still complains off feeling pain in the chest after she eats:  amberly alerdsy on ppi:  has pe on eliquis:   ? go co nsult     3/4:  seems to be doing  ok :  no sob:   but was not able to eat chicken last night:  as she feels it is with great difficulty the checken paseed last night from her food pipe:  seen by gi :? for possible endoscopy:     3/6: she seems stable:   no wheezing:  no sob:  for endoscopy today      3/7: seems to be doing ok ; no cough :   no phlegm  : no wheezing/   3/8: PE wise she seems OK;  no sob:  on room air   cont advair and eliquis     Spina bifida/ recent foot surgery   per primary team   3/1: has lesion on rigth foot:  : defer to primary team    3/2: per primary team   3/3: seems OK:  no sob;     3/5: seems to be doing  ok : no sob:   no cough ;   no phlegm   on iv heparin : awaiting for upper endoscopy:   ba esophogram  noted    3/6: for endoscopy:   3/7; cont current rx:  s/p endoscopy: defer to gi   3/8; endoscopy revealed achalasia  defer to primary team       s/p  shunt  supportive care   3/1: ct head is normal  No CT evidence of acute intracranial pathology. The ventricular system appears slitlike and decompressed, unchanged from   3/3: seems O K:   3/5: stable    GERD  cont PPI     dw pt : paged acp

## 2025-03-08 NOTE — PROGRESS NOTE ADULT - ASSESSMENT
44F PMHx spina bifida, multiple bilateral foot surgeries,  shunt (managed [facility] neurosurgery), GERD, uterine fibroids, and recent PE on Eliquis presenting with pleuritic substernal and right sided chest pain associated with SOB. Admitted to medicine for further management and monitoring. Detailed plan as below:      GERD / dysphagia to solid   GI f/up appreciated  EGD: Achalasia vs GEJOO  Cw PPI  OP manometry  recommend puree food for now     Headache:  ?Migraine  Trial of Imitrex      #Chest pain.    Likely from GERD  -resume eliquis   -Maalox PRN  -CTA chest: No PE  Cardio f/up appreciated  NST: neg for ischemia     dispo: f/u with GI as out pt , will need manometry testing as OP   ok for d/c home

## 2025-03-09 LAB
ANION GAP SERPL CALC-SCNC: 12 MMOL/L — SIGNIFICANT CHANGE UP (ref 7–14)
BUN SERPL-MCNC: 13 MG/DL — SIGNIFICANT CHANGE UP (ref 7–23)
CALCIUM SERPL-MCNC: 9 MG/DL — SIGNIFICANT CHANGE UP (ref 8.4–10.5)
CHLORIDE SERPL-SCNC: 103 MMOL/L — SIGNIFICANT CHANGE UP (ref 98–107)
CO2 SERPL-SCNC: 21 MMOL/L — LOW (ref 22–31)
CREAT SERPL-MCNC: 0.49 MG/DL — LOW (ref 0.5–1.3)
EGFR: 118 ML/MIN/1.73M2 — SIGNIFICANT CHANGE UP
EGFR: 118 ML/MIN/1.73M2 — SIGNIFICANT CHANGE UP
GLUCOSE SERPL-MCNC: 80 MG/DL — SIGNIFICANT CHANGE UP (ref 70–99)
HCT VFR BLD CALC: 34.2 % — LOW (ref 34.5–45)
HGB BLD-MCNC: 11.2 G/DL — LOW (ref 11.5–15.5)
MAGNESIUM SERPL-MCNC: 1.9 MG/DL — SIGNIFICANT CHANGE UP (ref 1.6–2.6)
MCHC RBC-ENTMCNC: 28.6 PG — SIGNIFICANT CHANGE UP (ref 27–34)
MCHC RBC-ENTMCNC: 32.7 G/DL — SIGNIFICANT CHANGE UP (ref 32–36)
MCV RBC AUTO: 87.2 FL — SIGNIFICANT CHANGE UP (ref 80–100)
NRBC # BLD AUTO: 0 K/UL — SIGNIFICANT CHANGE UP (ref 0–0)
NRBC # FLD: 0 K/UL — SIGNIFICANT CHANGE UP (ref 0–0)
NRBC BLD AUTO-RTO: 0 /100 WBCS — SIGNIFICANT CHANGE UP (ref 0–0)
PHOSPHATE SERPL-MCNC: 4.2 MG/DL — SIGNIFICANT CHANGE UP (ref 2.5–4.5)
PLATELET # BLD AUTO: 350 K/UL — SIGNIFICANT CHANGE UP (ref 150–400)
POTASSIUM SERPL-MCNC: 3.9 MMOL/L — SIGNIFICANT CHANGE UP (ref 3.5–5.3)
POTASSIUM SERPL-SCNC: 3.9 MMOL/L — SIGNIFICANT CHANGE UP (ref 3.5–5.3)
RBC # BLD: 3.92 M/UL — SIGNIFICANT CHANGE UP (ref 3.8–5.2)
RBC # FLD: 15.7 % — HIGH (ref 10.3–14.5)
SODIUM SERPL-SCNC: 136 MMOL/L — SIGNIFICANT CHANGE UP (ref 135–145)
WBC # BLD: 3.91 K/UL — SIGNIFICANT CHANGE UP (ref 3.8–10.5)
WBC # FLD AUTO: 3.91 K/UL — SIGNIFICANT CHANGE UP (ref 3.8–10.5)

## 2025-03-09 RX ORDER — GABAPENTIN 400 MG/1
100 CAPSULE ORAL ONCE
Refills: 0 | Status: COMPLETED | OUTPATIENT
Start: 2025-03-09 | End: 2025-03-09

## 2025-03-09 RX ORDER — APIXABAN 2.5 MG/1
1 TABLET, FILM COATED ORAL
Qty: 60 | Refills: 0
Start: 2025-03-09 | End: 2025-04-07

## 2025-03-09 RX ORDER — FERROUS SULFATE 137(45) MG
1 TABLET, EXTENDED RELEASE ORAL
Qty: 13 | Refills: 0
Start: 2025-03-09 | End: 2025-04-07

## 2025-03-09 RX ORDER — GABAPENTIN 400 MG/1
1 CAPSULE ORAL
Qty: 90 | Refills: 3
Start: 2025-03-09 | End: 2025-07-06

## 2025-03-09 RX ORDER — SUMATRIPTAN 100 MG/1
25 TABLET, FILM COATED ORAL ONCE
Refills: 0 | Status: COMPLETED | OUTPATIENT
Start: 2025-03-09 | End: 2025-03-09

## 2025-03-09 RX ORDER — ACETAMINOPHEN 500 MG/5ML
500 LIQUID (ML) ORAL ONCE
Refills: 0 | Status: COMPLETED | OUTPATIENT
Start: 2025-03-09 | End: 2025-03-09

## 2025-03-09 RX ADMIN — SUMATRIPTAN 25 MILLIGRAM(S): 100 TABLET, FILM COATED ORAL at 17:16

## 2025-03-09 RX ADMIN — Medication 1 APPLICATION(S): at 13:22

## 2025-03-09 RX ADMIN — GABAPENTIN 100 MILLIGRAM(S): 400 CAPSULE ORAL at 18:51

## 2025-03-09 RX ADMIN — Medication 40 MILLIGRAM(S): at 17:17

## 2025-03-09 RX ADMIN — IPRATROPIUM BROMIDE AND ALBUTEROL SULFATE 3 MILLILITER(S): .5; 2.5 SOLUTION RESPIRATORY (INHALATION) at 20:30

## 2025-03-09 RX ADMIN — APIXABAN 5 MILLIGRAM(S): 2.5 TABLET, FILM COATED ORAL at 05:23

## 2025-03-09 RX ADMIN — APIXABAN 5 MILLIGRAM(S): 2.5 TABLET, FILM COATED ORAL at 17:17

## 2025-03-09 RX ADMIN — SUMATRIPTAN 25 MILLIGRAM(S): 100 TABLET, FILM COATED ORAL at 18:16

## 2025-03-09 RX ADMIN — IPRATROPIUM BROMIDE AND ALBUTEROL SULFATE 3 MILLILITER(S): .5; 2.5 SOLUTION RESPIRATORY (INHALATION) at 08:52

## 2025-03-09 RX ADMIN — Medication 1 DOSE(S): at 21:40

## 2025-03-09 RX ADMIN — GABAPENTIN 100 MILLIGRAM(S): 400 CAPSULE ORAL at 13:21

## 2025-03-09 RX ADMIN — GABAPENTIN 100 MILLIGRAM(S): 400 CAPSULE ORAL at 21:43

## 2025-03-09 RX ADMIN — Medication 1 DOSE(S): at 13:18

## 2025-03-09 RX ADMIN — Medication 650 MILLIGRAM(S): at 18:51

## 2025-03-09 RX ADMIN — IPRATROPIUM BROMIDE AND ALBUTEROL SULFATE 3 MILLILITER(S): .5; 2.5 SOLUTION RESPIRATORY (INHALATION) at 14:42

## 2025-03-09 RX ADMIN — Medication 40 MILLIGRAM(S): at 05:22

## 2025-03-09 RX ADMIN — GABAPENTIN 100 MILLIGRAM(S): 400 CAPSULE ORAL at 05:22

## 2025-03-09 RX ADMIN — Medication 650 MILLIGRAM(S): at 19:50

## 2025-03-09 NOTE — CHART NOTE - NSCHARTNOTEFT_GEN_A_CORE
Case discussed with Attending, patient with chronic neuropathic leg pain, will give an extra dose of gabapentin along with extra-strength tylenol prior to discharge.

## 2025-03-09 NOTE — PROGRESS NOTE ADULT - ASSESSMENT
44F PMHx spina bifida, multiple bilateral foot surgeries,  shunt (managed [facility] neurosurgery), GERD, uterine fibroids, and recent PE on Eliquis presenting with pleuritic substernal and right sided chest pain associated with SOB. Admitted to medicine for further management and monitoring. Detailed plan as below:      GERD / dysphagia to solid   GI f/up appreciated  EGD: Achalasia vs GEJOO  Cw PPI  OP manometry    Headache:  Migraine  Improved with Imitrex      #Chest pain.    Likely from GERD  -resume eliquis   -Maalox PRN  -CTA chest: No PE  Cardio f/up appreciated  NST: neg for ischemia     dispo: f/u with GI as out pt , will need manometry testing as OP   Medically stable for d/c home

## 2025-03-09 NOTE — CHART NOTE - NSCHARTNOTEFT_GEN_A_CORE
Case reviewed with Pulmonologist Dr Gregg, patient to continue on Advair, and follow up with her Primary Care Physician on discharge.

## 2025-03-09 NOTE — PROGRESS NOTE ADULT - ASSESSMENT
44F PMHx spina bifida, multiple bilateral foot surgeries,  shunt (managed [facility] neurosurgery), GERD, uterine fibroids, and recent PE on Eliquis presenting with substernal and right sided chest pain. Patient was diagnosed with PE on 1/23/25 and has been on Eliquis. Patient states that her CP started after consumption of chili pepper yesterday. It improved last night was reoccurred today. It is rate 10/10, characterized as squeezing, pleuritic and radiating to the left upper extremity. Reports mild SOB. No exertional activity. Denies any associated symptoms fatigue, distress, diaphoresis, nausea, vomiting, abdominal pain, or leg swelling. Patient denies immobility, long plane/car rides, malignancy, or history of clotting disorder. No family history of CVD before the age of 65. No smoking history. No hx of hypothyroidism or HIV infection or Hepatitis C or autoimmune disorders. No psychosocial factors including stress/anxiety/depression.   (27 Feb 2025 21:50)  she says she has sob too but most of the issues started after eating  chilly peppers; ; currently sheis doing  ok ; does not looks to in any resp distress       Recent PE  Spina bifida/ recent foot surgery   s/p  shunt  GERD      Recent Pe  she has been complaint with her Eliquis  she is on 2 L of oxygen at this time:   her symptoms more likely due to GERD symptoms  cont PPI:   new cta is pending      3/1:  new cta showed: No pulmonary embolism. No acute pulmonary process.  she seems to have MSK pain too as pressing on her sternum and ribs do hurt  :   she has increased bleeding during urination:  ? gyn consult:   her HB has dropped from 12 to 10    may have dahlia too : cont PPI     she also says her son has asthma and she could have asthma too and at times she found her wheezing:   trial of BD to see if that helps her in tightness in chest     3/2:  cont BD:  cont advair:    cont eliquis;  US pelvis showed: Limited transabdominal exam. The patient declined the transvaginal portion. Enlarged multi fibroid uterus largely obscuring visualization of the endometrium and right ovary.  monitor or bleeding;     3/3: she seems OK;  still complains off feeling pain in the chest after she eats:  amberly alerdsy on ppi:  has pe on eliquis:   ? go co nsult     3/4:  seems to be doing  ok :  no sob:   but was not able to eat chicken last night:  as she feels it is with great difficulty the checken paseed last night from her food pipe:  seen by gi :? for possible endoscopy:     3/6: she seems stable:   no wheezing:  no sob:  for endoscopy today      3/7: seems to be doing ok ; no cough :   no phlegm  : no wheezing/   3/8: PE wise she seems OK;  no sob:  on room air   cont advair and eliquis     3/9:  seems to be doing ok ; still with some headache;  no sob:  no wheezing:  ct head was negative:   shunt study was normal :  she is not wheezing  remains on room air        Spina bifida/ recent foot surgery   per primary team   3/1: has lesion on rigth foot:  : defer to primary team    3/2: per primary team   3/3: seems OK:  no sob;     3/5: seems to be doing  ok : no sob:   no cough ;   no phlegm   on iv heparin : awaiting for upper endoscopy:   ba esophogram  noted    3/6: for endoscopy:   3/7; cont current rx:  s/p endoscopy: defer to gi   3/8; endoscopy revealed achalasia  defer to primary team   3/9: p er gi  :       s/p  shunt  supportive care   3/1: ct head is normal  No CT evidence of acute intracranial pathology. The ventricular system appears slitlike and decompressed, unchanged from   3/3: seems O K:   3/5: stable  3/9: normal functioning     GERD  cont PPI     dw pt :

## 2025-03-10 ENCOUNTER — NON-APPOINTMENT (OUTPATIENT)
Age: 45
End: 2025-03-10

## 2025-03-10 ENCOUNTER — TRANSCRIPTION ENCOUNTER (OUTPATIENT)
Age: 45
End: 2025-03-10

## 2025-03-10 VITALS
TEMPERATURE: 98 F | HEART RATE: 60 BPM | SYSTOLIC BLOOD PRESSURE: 135 MMHG | RESPIRATION RATE: 16 BRPM | DIASTOLIC BLOOD PRESSURE: 74 MMHG | OXYGEN SATURATION: 98 %

## 2025-03-10 LAB
ANION GAP SERPL CALC-SCNC: 12 MMOL/L — SIGNIFICANT CHANGE UP (ref 7–14)
BUN SERPL-MCNC: 10 MG/DL — SIGNIFICANT CHANGE UP (ref 7–23)
CALCIUM SERPL-MCNC: 8.9 MG/DL — SIGNIFICANT CHANGE UP (ref 8.4–10.5)
CHLORIDE SERPL-SCNC: 102 MMOL/L — SIGNIFICANT CHANGE UP (ref 98–107)
CO2 SERPL-SCNC: 20 MMOL/L — LOW (ref 22–31)
CREAT SERPL-MCNC: 0.52 MG/DL — SIGNIFICANT CHANGE UP (ref 0.5–1.3)
EGFR: 116 ML/MIN/1.73M2 — SIGNIFICANT CHANGE UP
EGFR: 116 ML/MIN/1.73M2 — SIGNIFICANT CHANGE UP
GLUCOSE SERPL-MCNC: 78 MG/DL — SIGNIFICANT CHANGE UP (ref 70–99)
HCT VFR BLD CALC: 32 % — LOW (ref 34.5–45)
HGB BLD-MCNC: 10.5 G/DL — LOW (ref 11.5–15.5)
MAGNESIUM SERPL-MCNC: 2 MG/DL — SIGNIFICANT CHANGE UP (ref 1.6–2.6)
MCHC RBC-ENTMCNC: 28.7 PG — SIGNIFICANT CHANGE UP (ref 27–34)
MCHC RBC-ENTMCNC: 32.8 G/DL — SIGNIFICANT CHANGE UP (ref 32–36)
MCV RBC AUTO: 87.4 FL — SIGNIFICANT CHANGE UP (ref 80–100)
NRBC # BLD AUTO: 0 K/UL — SIGNIFICANT CHANGE UP (ref 0–0)
NRBC # FLD: 0 K/UL — SIGNIFICANT CHANGE UP (ref 0–0)
NRBC BLD AUTO-RTO: 0 /100 WBCS — SIGNIFICANT CHANGE UP (ref 0–0)
PHOSPHATE SERPL-MCNC: 3.4 MG/DL — SIGNIFICANT CHANGE UP (ref 2.5–4.5)
PLATELET # BLD AUTO: 351 K/UL — SIGNIFICANT CHANGE UP (ref 150–400)
POTASSIUM SERPL-MCNC: 3.8 MMOL/L — SIGNIFICANT CHANGE UP (ref 3.5–5.3)
POTASSIUM SERPL-SCNC: 3.8 MMOL/L — SIGNIFICANT CHANGE UP (ref 3.5–5.3)
RBC # BLD: 3.66 M/UL — LOW (ref 3.8–5.2)
RBC # FLD: 15.7 % — HIGH (ref 10.3–14.5)
SODIUM SERPL-SCNC: 134 MMOL/L — LOW (ref 135–145)
SURGICAL PATHOLOGY STUDY: SIGNIFICANT CHANGE UP
WBC # BLD: 3.93 K/UL — SIGNIFICANT CHANGE UP (ref 3.8–10.5)
WBC # FLD AUTO: 3.93 K/UL — SIGNIFICANT CHANGE UP (ref 3.8–10.5)

## 2025-03-10 RX ORDER — BISMUTH SUBSALICYLATE 262 MG/1
1 TABLET, CHEWABLE ORAL
Qty: 56 | Refills: 0
Start: 2025-03-10 | End: 2025-03-23

## 2025-03-10 RX ORDER — TETRACYCLINE HCL 250 MG
1 CAPSULE ORAL
Qty: 56 | Refills: 0
Start: 2025-03-10 | End: 2025-03-23

## 2025-03-10 RX ORDER — METRONIDAZOLE 250 MG
1 TABLET ORAL
Qty: 56 | Refills: 0
Start: 2025-03-10 | End: 2025-03-23

## 2025-03-10 RX ADMIN — Medication 40 MILLIGRAM(S): at 06:29

## 2025-03-10 RX ADMIN — IPRATROPIUM BROMIDE AND ALBUTEROL SULFATE 3 MILLILITER(S): .5; 2.5 SOLUTION RESPIRATORY (INHALATION) at 15:23

## 2025-03-10 RX ADMIN — Medication 1 APPLICATION(S): at 13:35

## 2025-03-10 RX ADMIN — GABAPENTIN 100 MILLIGRAM(S): 400 CAPSULE ORAL at 13:23

## 2025-03-10 RX ADMIN — Medication 325 MILLIGRAM(S): at 09:23

## 2025-03-10 RX ADMIN — IPRATROPIUM BROMIDE AND ALBUTEROL SULFATE 3 MILLILITER(S): .5; 2.5 SOLUTION RESPIRATORY (INHALATION) at 07:21

## 2025-03-10 RX ADMIN — APIXABAN 5 MILLIGRAM(S): 2.5 TABLET, FILM COATED ORAL at 06:29

## 2025-03-10 NOTE — CHART NOTE - NSCHARTNOTESELECT_GEN_ALL_CORE
Event Note
GI fellow/Event Note
GI fellow/Event Note
GYN update
Headache/Event Note
ACP-NP Note/Event Note
Event Note
ISTOP/Event Note

## 2025-03-10 NOTE — PROGRESS NOTE ADULT - REASON FOR ADMISSION
Chest pain

## 2025-03-10 NOTE — CHART NOTE - NSCHARTNOTEFT_GEN_A_CORE
Patient medically cleared for dc yesterday, remains medically cleared for dc today, awaiting pickup.  Patient is s/p EGD on 3/6, pathology today shows active Helicobacter pylori associated gastritis.    Findings discussed w/ GI Fellow who recommends quadruple treatment for H.pylori as follows: Pantoprazole 40 PO bid for 14 days, Bismuth 525 qid for 14 days, Tetracyclin 500 qid 14 days, Metronidazole 500 Qid for 14 days.     Patient seen/examined at bedside, patient made aware of these updated findings and treatment plan upon discharge.  Patient verbalized understanding, all questions answered.  Patient to follow up with GI Clinic outpatient in 4 weeks to assess for resolution. Patient medically cleared for dc yesterday, remains medically cleared for dc today, awaiting pickup.  Patient is s/p EGD on 3/6, pathology today shows active Helicobacter pylori associated gastritis.    Findings discussed w/ GI Fellow who recommends quadruple treatment for H.pylori as follows: Pantoprazole 40 PO bid for 14 days, Bismuth 525 qid for 14 days, Tetracyclin 500 qid 14 days, Metronidazole 500 Qid for 14 days.   Prescriptions sent to [facility] as requested by patient.    Patient seen/examined at bedside, patient made aware of these updated findings and treatment plan upon discharge.  Patient verbalized understanding, all questions answered.  Patient to follow up with GI Clinic outpatient in 4 weeks to assess for resolution.

## 2025-03-10 NOTE — PROGRESS NOTE ADULT - NUTRITIONAL ASSESSMENT
This patient has been assessed with a concern for Malnutrition and has been determined to have a diagnosis/diagnoses of Severe protein-calorie malnutrition.    This patient is being managed with:   Diet Pureed-  DASH/TLC {Sodium & Cholesterol Restricted} (DASH)  Entered: Mar  7 2025  9:32AM  
This patient has been assessed with a concern for Malnutrition and has been determined to have a diagnosis/diagnoses of Severe protein-calorie malnutrition.    This patient is being managed with:   Diet Full Liquid-  Entered: Mar  6 2025  6:28PM  
This patient has been assessed with a concern for Malnutrition and has been determined to have a diagnosis/diagnoses of Severe protein-calorie malnutrition.    This patient is being managed with:   Diet Soft and Bite Sized-  DASH/TLC {Sodium & Cholesterol Restricted} (DASH)  Entered: Mar  8 2025  2:00PM  

## 2025-03-10 NOTE — DISCHARGE NOTE NURSING/CASE MANAGEMENT/SOCIAL WORK - PATIENT PORTAL LINK FT
You can access the FollowMyHealth Patient Portal offered by [facility] by registering at the following website: [email or web address]. By joining [facility]’s FollowMyHealth portal, you will also be able to view your health information using other applications (apps) compatible with our system.

## 2025-03-10 NOTE — PROGRESS NOTE ADULT - SUBJECTIVE AND OBJECTIVE BOX
SUBJECTIVE: Patient seen and examined at bedside. S/p EGD 3/6 with endoflip - abnormal esophageal motility suspicious for EGJOO vs achalasia 1, biopsies taken    OBJECTIVE:    VITAL SIGNS:  ICU Vital Signs Last 24 Hrs  T(C): 37 (07 Mar 2025 05:30), Max: 37 (06 Mar 2025 15:17)  T(F): 98.6 (07 Mar 2025 05:30), Max: 98.6 (06 Mar 2025 15:17)  HR: 76 (07 Mar 2025 08:33) (70 - 92)  BP: 115/59 (07 Mar 2025 05:30) (105/117 - 144/96)  BP(mean): --  ABP: --  ABP(mean): --  RR: 17 (07 Mar 2025 05:30) (12 - 20)  SpO2: 99% (07 Mar 2025 05:30) (97% - 100%)    O2 Parameters below as of 07 Mar 2025 08:33  Patient On (Oxygen Delivery Method): room air                PHYSICAL EXAM:    General: NAD  HEENT: NC/AT  Neck: supple  Respiratory: Regular RR, no increase in WOB  Cardiovascular: RRR  Abdomen: soft, NT/ND; +BS x4  Extremities: WWP, 2+ peripheral pulses b/l  Skin: normal color and turgor; no rash  Neurological: A&OX    MEDICATIONS:  MEDICATIONS  (STANDING):  albuterol/ipratropium for Nebulization 3 milliLiter(s) Nebulizer every 6 hours  chlorhexidine 2% Cloths 1 Application(s) Topical daily  ferrous    sulfate 325 milliGRAM(s) Oral <User Schedule>  fluticasone propionate/ salmeterol 250-50 MICROgram(s) Diskus 1 Dose(s) Inhalation two times a day  gabapentin 100 milliGRAM(s) Oral three times a day  pantoprazole    Tablet 40 milliGRAM(s) Oral every 12 hours    MEDICATIONS  (PRN):  acetaminophen     Tablet .. 650 milliGRAM(s) Oral every 6 hours PRN Temp greater or equal to 38C (100.4F), Mild Pain (1 - 3)  aluminum hydroxide/magnesium hydroxide/simethicone Suspension 30 milliLiter(s) Oral every 6 hours PRN Dyspepsia  oxyCODONE    IR 5 milliGRAM(s) Oral every 6 hours PRN Severe Pain (7 - 10)      ALLERGIES:  Allergies    shellfish (Unknown)  latex (Hives; Rash)  Zyrtec (Rash)    Intolerances    lactose (Unknown)      LABS:                        10.3   4.15  )-----------( 334      ( 06 Mar 2025 06:39 )             32.1     03-06    136  |  104  |  8   ----------------------------<  86  3.9   |  21[L]  |  0.43[L]    Ca    8.4      06 Mar 2025 06:39  Phos  3.7     03-06  Mg     2.20     03-06      PTT - ( 06 Mar 2025 00:57 )  PTT:75.5 sec  Urinalysis Basic - ( 06 Mar 2025 06:39 )    Color: x / Appearance: x / SG: x / pH: x  Gluc: 86 mg/dL / Ketone: x  / Bili: x / Urobili: x   Blood: x / Protein: x / Nitrite: x   Leuk Esterase: x / RBC: x / WBC x   Sq Epi: x / Non Sq Epi: x / Bacteria: x        
Date of Service: 03-01-25 @ 10:14    Patient is a 44y old  Female who presents with a chief complaint of Chest pain (28 Feb 2025 15:39)      Any change in ROS: she still has pin 9in the chest and burining when she swallows:   on room ait today  :   breathing seems OK:   she also says she has bleeding in urination:  she had seen gyn prior to coming to hospital  she is on eliquis for pe     MEDICATIONS  (STANDING):  acetaminophen   IVPB .. 1000 milliGRAM(s) IV Intermittent once  apixaban      apixaban 5 milliGRAM(s) Oral every 12 hours  ferrous    sulfate 325 milliGRAM(s) Oral <User Schedule>  gabapentin 100 milliGRAM(s) Oral three times a day  pantoprazole    Tablet 40 milliGRAM(s) Oral before breakfast    MEDICATIONS  (PRN):  acetaminophen     Tablet .. 650 milliGRAM(s) Oral every 6 hours PRN Temp greater or equal to 38C (100.4F), Mild Pain (1 - 3)  aluminum hydroxide/magnesium hydroxide/simethicone Suspension 30 milliLiter(s) Oral every 6 hours PRN Dyspepsia  oxyCODONE    IR 5 milliGRAM(s) Oral every 6 hours PRN Severe Pain (7 - 10)    Vital Signs Last 24 Hrs  T(C): 37.1 (01 Mar 2025 09:00), Max: 37.2 (01 Mar 2025 04:17)  T(F): 98.7 (01 Mar 2025 09:00), Max: 98.9 (01 Mar 2025 04:17)  HR: 76 (01 Mar 2025 09:00) (63 - 82)  BP: 122/83 (01 Mar 2025 09:00) (105/75 - 148/96)  BP(mean): --  RR: 17 (01 Mar 2025 09:00) (17 - 19)  SpO2: 100% (01 Mar 2025 09:00) (99% - 100%)    Parameters below as of 01 Mar 2025 09:00  Patient On (Oxygen Delivery Method): room air        I&O's Summary    28 Feb 2025 07:01  -  01 Mar 2025 07:00  --------------------------------------------------------  IN: 240 mL / OUT: 200 mL / NET: 40 mL    01 Mar 2025 07:01  -  01 Mar 2025 10:14  --------------------------------------------------------  IN: 240 mL / OUT: 0 mL / NET: 240 mL          Physical Exam:   GENERAL: NAD, well-groomed, well-developed  HEENT: NETTIE/   Atraumatic, Normocephalic  ENMT: No tonsillar erythema, exudates, or enlargement; Moist mucous membranes, Good dentition, No lesions  NECK: Supple, No JVD, Normal thyroid  CHEST/LUNG: Clear to auscultaion : tenderness on ant chest    CVS: Regular rate and rhythm; No murmurs, rubs, or gallops  GI: : Soft, Nontender, Nondistended; Bowel sounds present  NERVOUS SYSTEM:  Alert & Oriented X3  EXTREMITIES: - edema  LYMPH: No lymphadenopathy noted  SKIN: No rashes or lesions  ENDOCRINOLOGY: No Thyromegaly  PSYCH: Appropriate    Labs:    CARDIAC MARKERS ( 28 Feb 2025 09:50 )  x     / x     / x     / x     / <1.0 ng/mL                            10.0   3.85  )-----------( 289      ( 01 Mar 2025 04:21 )             30.1                         11.4   3.41  )-----------( 317      ( 28 Feb 2025 09:50 )             34.2                         12.2   4.61  )-----------( 317      ( 27 Feb 2025 14:32 )             36.0     03-01    137  |  105  |  16  ----------------------------<  82  3.9   |  22  |  0.61  02-28    138  |  105  |  11  ----------------------------<  106[H]  3.7   |  20[L]  |  0.48[L]  02-27    135  |  103  |  6[L]  ----------------------------<  88  4.4   |  18[L]  |  0.43[L]  02-27    132[L]  |  100  |  6[L]  ----------------------------<  79  6.3[HH]   |  14[L]  |  0.37[L]    Ca    8.3[L]      01 Mar 2025 04:21  Ca    8.9      28 Feb 2025 09:50  Ca    8.8      27 Feb 2025 15:36  Ca    8.9      27 Feb 2025 14:32  Phos  3.7     03-01  Phos  2.6     02-28  Mg     2.40     03-01  Mg     2.30     02-28    TPro  7.2  /  Alb  3.7  /  TBili  0.3  /  DBili  x   /  AST  21  /  ALT  9   /  AlkPhos  62  02-27  TPro  7.1  /  Alb  3.6  /  TBili  0.3  /  DBili  x   /  AST  TNP  /  ALT  TNP  /  AlkPhos  62  02-27    CAPILLARY BLOOD GLUCOSE          LIVER FUNCTIONS - ( 27 Feb 2025 15:36 )  Alb: 3.7 g/dL / Pro: 7.2 g/dL / ALK PHOS: 62 U/L / ALT: 9 U/L / AST: 21 U/L / GGT: x             Urinalysis Basic - ( 01 Mar 2025 04:21 )    Color: x / Appearance: x / SG: x / pH: x  Gluc: 82 mg/dL / Ketone: x  / Bili: x / Urobili: x   Blood: x / Protein: x / Nitrite: x   Leuk Esterase: x / RBC: x / WBC x   Sq Epi: x / Non Sq Epi: x / Bacteria: x      rad< from: CT Angio Chest PE Protocol w/ IV Cont (02.28.25 @ 20:37) >  COMPARISON: CTA chest 2/1/2025    CONTRAST/COMPLICATIONS:  IV Contrast: Omnipaque 350  70 cc administered   30 cc discarded  Oral Contrast: NONE  .    PROCEDURE:  CT Angiography of the Chest.  Sagittal and coronal reformats were performed as well as 3D (MIP)   reconstructions.    FINDINGS:    LUNGS AND LARGE AIRWAYS: Patentcentral airways. No pulmonary nodules or   parenchymal consolidation.  PLEURA: No pleural effusion.  VESSELS: No pulmonary embolism.  HEART: Heart size is normal. No pericardial effusion.  MEDIASTINUM AND SIERRA: No lymphadenopathy.  CHEST WALL AND LOWER NECK: Partially visualized  shunt.  VISUALIZED UPPER ABDOMEN: Scattered hepatic cysts.  BONES: Within normal limits.    IMPRESSION:  No pulmonary embolism. No acute pulmonary process.    --- End of Report ---          ROSARIO CASTRO DO; Resident Radiologist  This document has been electronically signed.  BHARAT MORSE MD; Attending Radiologist  This document has been electronically signed. Mar  1 2025  7:44AM    < end of copied text >  < from: CT Head No Cont (02.28.25 @ 20:37) >    VISUALIZED SINUSES:  Clear.  TYMPANOMASTOID CAVITIES:  Clear.  VISUALIZED ORBITS: Normal.  CALVARIUM: Old parietal shivam holes bilaterally.    MISCELLANEOUS: None.      IMPRESSION:  No CT evidence of acute intracranial pathology.    The ventricular system appears slitlike and decompressed, unchanged from   05/14/2024.        --- End of Report ---    < end of copied text >        RECENT CULTURES:        RESPIRATORY CULTURES:    ct< from: CT Angio Chest PE Protocol w/ IV Cont (02.28.25 @ 20:37) >    LUNGS AND LARGE AIRWAYS: Patentcentral airways. No pulmonary nodules or   parenchymal consolidation.  PLEURA: No pleural effusion.  VESSELS: No pulmonary embolism.  HEART: Heart size is normal. No pericardial effusion.  MEDIASTINUM AND SIERRA: No lymphadenopathy.  CHEST WALL AND LOWER NECK: Partially visualized  shunt.  VISUALIZED UPPER ABDOMEN: Scattered hepatic cysts.  BONES: Within normal limits.    IMPRESSION:  No pulmonary embolism. No acute pulmonary process.    --- End of Report ---          ROSARIO CASTRO DO; Resident Radiologist  This document has been electronically signed.  BHARAT MORSE MD; Attending Radiologist  This document has been electronically signed. Mar  1 2025  7:44AM    < end of copied text >        Studies  Chest X-RAY  CT SCAN Chest   Venous Dopplers: LE:   CT Abdomen  Others              
Date of Service: 03-08-25 @ 12:43    Patient is a 44y old  Female who presents with a chief complaint of Chest pain (07 Mar 2025 19:19)      Any change in ROS: she seems OK:  complaining of headache  on room air;   no sob:   no wheezing     MEDICATIONS  (STANDING):  albuterol/ipratropium for Nebulization 3 milliLiter(s) Nebulizer every 6 hours  apixaban 5 milliGRAM(s) Oral two times a day  chlorhexidine 2% Cloths 1 Application(s) Topical daily  ferrous    sulfate 325 milliGRAM(s) Oral <User Schedule>  fluticasone propionate/ salmeterol 250-50 MICROgram(s) Diskus 1 Dose(s) Inhalation two times a day  gabapentin 100 milliGRAM(s) Oral three times a day  pantoprazole    Tablet 40 milliGRAM(s) Oral every 12 hours    MEDICATIONS  (PRN):  acetaminophen     Tablet .. 650 milliGRAM(s) Oral every 6 hours PRN Temp greater or equal to 38C (100.4F), Moderate Pain (4 - 6)  aluminum hydroxide/magnesium hydroxide/simethicone Suspension 30 milliLiter(s) Oral every 6 hours PRN Dyspepsia    Vital Signs Last 24 Hrs  T(C): 36.8 (08 Mar 2025 05:00), Max: 36.8 (07 Mar 2025 14:45)  T(F): 98.3 (08 Mar 2025 05:00), Max: 98.3 (07 Mar 2025 17:50)  HR: 91 (08 Mar 2025 08:26) (72 - 92)  BP: 109/68 (08 Mar 2025 05:00) (109/68 - 118/80)  BP(mean): --  RR: 16 (08 Mar 2025 05:00) (16 - 17)  SpO2: 96% (08 Mar 2025 05:00) (96% - 100%)    Parameters below as of 08 Mar 2025 08:26  Patient On (Oxygen Delivery Method): room air        I&O's Summary        Physical Exam:   GENERAL: NAD, well-groomed, well-developed  HEENT: NETTIE/   Atraumatic, Normocephalic  ENMT: No tonsillar erythema, exudates, or enlargement; Moist mucous membranes, Good dentition, No lesions  NECK: Supple, No JVD, Normal thyroid  CHEST/LUNG: Clear to auscultaion  CVS: Regular rate and rhythm; No murmurs, rubs, or gallops  GI: : Soft, Nontender, Nondistended; Bowel sounds present  NERVOUS SYSTEM:  Alert & Oriented X3  EXTREMITIES: -edema  LYMPH: No lymphadenopathy noted  SKIN: No rashes or lesions  ENDOCRINOLOGY: No Thyromegaly  PSYCH: Appropriate    Labs:    CARDIAC MARKERS ( 07 Mar 2025 16:54 )  x     / x     / x     / x     / <1.0 ng/mL                            11.5   3.71  )-----------( 372      ( 08 Mar 2025 07:20 )             34.9                         10.8   3.83  )-----------( 368      ( 07 Mar 2025 09:39 )             32.8                         10.3   4.15  )-----------( 334      ( 06 Mar 2025 06:39 )             32.1                         10.7   4.23  )-----------( 329      ( 05 Mar 2025 07:42 )             32.1                         10.6   4.49  )-----------( 325      ( 05 Mar 2025 01:09 )             32.6     03-08    135  |  101  |  7   ----------------------------<  82  3.8   |  22  |  0.49[L]  03-07    136  |  105  |  7   ----------------------------<  76  4.5   |  21[L]  |  0.49[L]  03-06    136  |  104  |  8   ----------------------------<  86  3.9   |  21[L]  |  0.43[L]  03-05    135  |  105  |  8   ----------------------------<  85  4.3   |  20[L]  |  0.38[L]    Ca    9.7      08 Mar 2025 07:20  Ca    9.3      07 Mar 2025 09:39  Phos  4.3     03-08  Phos  3.5     03-07  Mg     1.90     03-08  Mg     2.10     03-07      CAPILLARY BLOOD GLUCOSE              Urinalysis Basic - ( 08 Mar 2025 07:20 )    Color: x / Appearance: x / SG: x / pH: x  Gluc: 82 mg/dL / Ketone: x  / Bili: x / Urobili: x   Blood: x / Protein: x / Nitrite: x   Leuk Esterase: x / RBC: x / WBC x   Sq Epi: x / Non Sq Epi: x / Bacteria: x        rad< from: Xray Esophagram Single Contrast (03.05.25 @ 08:44) >  distal esophagus.    Mild delayed clearance of contrast into the stomach. into the stomach. No   hiatal hernia or gastroesophageal reflux was demonstrated during this   examination.    13 mm barium tablet was swallowed and passed into the stomach without   difficulty.    IMPRESSION:  No esophageal strictures as clinically questioned.    Tertiary contractions of the mid and distal esophagus with mild delayed   clearance of contrast into the stomach.    --- End of Report ---          CYNDI MCDOWELL MD; Resident Radiologist  This document has been electronically signed.  LIANNA BROWN MD; Attending Radiologist  This document has been electronically signed. Mar  5 2025 11:23AM    < end of copied text >  < from: Upper Endoscopy (03.06.25 @ 15:02) >     - Abnormal esophageal motility, suspicious for                        achalasia. Biopsied.                       - Z-line regular, 39 cmfrom the incisors.                       - Gastroesophageal flap valve classified as Hill Grade I                        (prominent fold, tight to endoscope).                       - Erythematous mucosa in the stomach. Biopsied.  - Normal examined duodenum.                       - EndoFLIP: ACR with REO, suspicious for potential EGJOO                        vs achalasia 1.  Recommendation:      - Return patient to referring hospital for ongoing care.                       - Advance diet as tolerated.                       - Await pathology results.                       - Perform routine esophageal manometry at appointment to                        be scheduled.                       Attending Participation:       I personally performed the entire procedure.    < end of copied text >      RECENT CULTURES:        RESPIRATORY CULTURES:          Studies  Chest X-RAY  CT SCAN Chest   Venous Dopplers: LE:   CT Abdomen  Others              
[facility] Attending Follow-up Note     Patient seen and examined at bedside.    Overnight Events:     events noted   for scope with GI     REVIEW OF SYSTEMS:  Constitutional:     [x ] negative [ ] fevers [ ] chills [ ] weight loss [ ] weight gain  HEENT:                  [x ] negative [ ] dry eyes [ ] eye irritation [ ] postnasal drip [ ] nasal congestion  CV:                         [ x] negative  [ ] chest pain [ ] orthopnea [ ] palpitations [ ] murmur  Resp:                     [ x] negative [ ] cough [ ] shortness of breath [ ] dyspnea [ ] wheezing [ ] sputum [ ]hemoptysis  GI:                          [ x] negative [ ] nausea [ ] vomiting [ ] diarrhea [ ] constipation [ ] abd pain [ ] dysphagia   :                        [ x] negative [ ] dysuria [ ] nocturia [ ] hematuria [ ] increased urinary frequency  Musculoskeletal: [ x] negative [ ] back pain [ ] myalgias [ ] arthralgias [ ] fracture  Skin:                       [ x] negative [ ] rash [ ] itch  Neurological:        [x ] negative [ ] headache [ ] dizziness [ ] syncope [ ] weakness [ ] numbness  Psychiatric:           [ x] negative [ ] anxiety [ ] depression  Endocrine:            [ x] negative [ ] diabetes [ ] thyroid problem  Heme/Lymph:      [ x] negative [ ] anemia [ ] bleeding problem  Allergic/Immune: [ x] negative [ ] itchy eyes [ ] nasal discharge [ ] hives [ ] angioedema    [ x] All other systems negative  [ ] Unable to assess ROS due to    Current Meds:  acetaminophen     Tablet .. 650 milliGRAM(s) Oral every 6 hours PRN  acetaminophen     Tablet .. 500 milliGRAM(s) Oral once  albuterol/ipratropium for Nebulization 3 milliLiter(s) Nebulizer every 6 hours  aluminum hydroxide/magnesium hydroxide/simethicone Suspension 30 milliLiter(s) Oral every 6 hours PRN  apixaban 5 milliGRAM(s) Oral two times a day  chlorhexidine 2% Cloths 1 Application(s) Topical daily  ferrous    sulfate 325 milliGRAM(s) Oral <User Schedule>  fluticasone propionate/ salmeterol 250-50 MICROgram(s) Diskus 1 Dose(s) Inhalation two times a day  gabapentin 100 milliGRAM(s) Oral three times a day  pantoprazole    Tablet 40 milliGRAM(s) Oral every 12 hours      PAST MEDICAL & SURGICAL HISTORY:  Spina bifida      Fibroids      Wound of right foot      Wound of left foot      S/P  shunt      S/P foot surgery, left      S/P foot surgery, right          Vitals:  T(F): 98.1 (03-09), Max: 98.1 (03-09)  HR: 84 (03-09) (81 - 88)  BP: 130/83 (03-09) (125/84 - 138/62)  RR: 18 (03-09)  SpO2: 98% (03-09)  I&O's Summary      Physical Exam:  Appearance: No acute distress  HENT: No JVD   Cardiovascular: RRR, S1/S2, no murmurs  Respiratory: CTABL  Gastrointestinal: soft, NT ND, +BS  Musculoskeletal: No clubbing, no edema   Neurologic: Non-focal  Skin: No rashes, ecchymoses, or cyanosis                          11.2   3.91  )-----------( 350      ( 09 Mar 2025 06:50 )             34.2     03-09    136  |  103  |  13  ----------------------------<  80  3.9   |  21[L]  |  0.49[L]    Ca    9.0      09 Mar 2025 06:50  Phos  4.2     03-09  Mg     1.90     03-09                    Cardiovascular Testings:     
Patient is a 44y old  Female who presents with a chief complaint of Chest pain (04 Mar 2025 10:22)      INTERVAL HPI/OVERNIGHT EVENTS: seen and examined, c/o dysphagia to solid   T(C): 37 (03-04-25 @ 20:45), Max: 37 (03-04-25 @ 00:15)  HR: 80 (03-04-25 @ 21:20) (68 - 86)  BP: 136/87 (03-04-25 @ 20:45) (98/54 - 136/87)  RR: 18 (03-04-25 @ 20:45) (16 - 18)  SpO2: 95% (03-04-25 @ 21:20) (95% - 100%)  Wt(kg): --  I&O's Summary    03 Mar 2025 07:01  -  04 Mar 2025 07:00  --------------------------------------------------------  IN: 1370 mL / OUT: 0 mL / NET: 1370 mL    04 Mar 2025 07:01  -  04 Mar 2025 23:27  --------------------------------------------------------  IN: 731 mL / OUT: 500 mL / NET: 231 mL        PAST MEDICAL & SURGICAL HISTORY:  Spina bifida      Fibroids      Wound of right foot      Wound of left foot      S/P  shunt      S/P foot surgery, left      S/P foot surgery, right          SOCIAL HISTORY  Alcohol:  Tobacco:  Illicit substance use:    FAMILY HISTORY:    REVIEW OF SYSTEMS:  CONSTITUTIONAL: No fever, weight loss, or fatigue  EYES: No eye pain, visual disturbances, or discharge  ENMT:  No difficulty hearing, tinnitus, vertigo; No sinus or throat pain  NECK: No pain or stiffness  RESPIRATORY: No cough, wheezing, chills or hemoptysis; No shortness of breath  CARDIOVASCULAR: No chest pain, palpitations, dizziness, or leg swelling  GASTROINTESTINAL: No abdominal or epigastric pain. No nausea, vomiting, or hematemesis; No diarrhea or constipation. No melena or hematochezia.  GENITOURINARY: No dysuria, frequency, hematuria, or incontinence  NEUROLOGICAL: No headaches, memory loss, loss of strength, numbness, or tremors  SKIN: No itching, burning, rashes, or lesions   LYMPH NODES: No enlarged glands  ENDOCRINE: No heat or cold intolerance; No hair loss  MUSCULOSKELETAL: No joint pain or swelling; No muscle, back, or extremity pain  PSYCHIATRIC: No depression, anxiety, mood swings, or difficulty sleeping  HEME/LYMPH: No easy bruising, or bleeding gums  ALLERY AND IMMUNOLOGIC: No hives or eczema    RADIOLOGY & ADDITIONAL TESTS:    Imaging Personally Reviewed:  [ ] YES  [ ] NO    Consultant(s) Notes Reviewed:  [ ] YES  [ ] NO    PHYSICAL EXAM:  GENERAL: NAD, well-groomed, well-developed  HEAD:  Atraumatic, Normocephalic  EYES: EOMI, PERRLA, conjunctiva and sclera clear  ENMT: No tonsillar erythema, exudates, or enlargement; Moist mucous membranes, Good dentition, No lesions  NECK: Supple, No JVD, Normal thyroid  NERVOUS SYSTEM:  Alert & Oriented X3, Good concentration; Motor Strength 5/5 B/L upper and lower extremities; DTRs 2+ intact and symmetric  CHEST/LUNG: Clear to percussion bilaterally; No rales, rhonchi, wheezing, or rubs  HEART: Regular rate and rhythm; No murmurs, rubs, or gallops  ABDOMEN: Soft, Nontender, Nondistended; Bowel sounds present  EXTREMITIES:  2+ Peripheral Pulses, No clubbing, cyanosis, or edema  LYMPH: No lymphadenopathy noted  SKIN: No rashes or lesions    LABS:                        10.7   3.29  )-----------( 322      ( 04 Mar 2025 06:04 )             31.8     03-04    136  |  104  |  12  ----------------------------<  93  3.7   |  21[L]  |  0.43[L]    Ca    8.9      04 Mar 2025 06:04  Phos  3.6     03-04  Mg     2.10     03-04      PTT - ( 04 Mar 2025 18:07 )  PTT:32.4 sec  Urinalysis Basic - ( 04 Mar 2025 06:04 )    Color: x / Appearance: x / SG: x / pH: x  Gluc: 93 mg/dL / Ketone: x  / Bili: x / Urobili: x   Blood: x / Protein: x / Nitrite: x   Leuk Esterase: x / RBC: x / WBC x   Sq Epi: x / Non Sq Epi: x / Bacteria: x      CAPILLARY BLOOD GLUCOSE            Urinalysis Basic - ( 04 Mar 2025 06:04 )    Color: x / Appearance: x / SG: x / pH: x  Gluc: 93 mg/dL / Ketone: x  / Bili: x / Urobili: x   Blood: x / Protein: x / Nitrite: x   Leuk Esterase: x / RBC: x / WBC x   Sq Epi: x / Non Sq Epi: x / Bacteria: x        MEDICATIONS  (STANDING):  albuterol/ipratropium for Nebulization 3 milliLiter(s) Nebulizer every 6 hours  ferrous    sulfate 325 milliGRAM(s) Oral <User Schedule>  fluticasone propionate/ salmeterol 250-50 MICROgram(s) Diskus 1 Dose(s) Inhalation two times a day  gabapentin 100 milliGRAM(s) Oral three times a day  heparin  Infusion.  Unit(s)/Hr (11 mL/Hr) IV Continuous <Continuous>  pantoprazole    Tablet 40 milliGRAM(s) Oral every 12 hours  polyethylene glycol 3350 17 Gram(s) Oral daily    MEDICATIONS  (PRN):  acetaminophen     Tablet .. 650 milliGRAM(s) Oral every 6 hours PRN Temp greater or equal to 38C (100.4F), Mild Pain (1 - 3)  aluminum hydroxide/magnesium hydroxide/simethicone Suspension 30 milliLiter(s) Oral every 6 hours PRN Dyspepsia  heparin   Injectable 4500 Unit(s) IV Push every 6 hours PRN For aPTT less than 40  heparin   Injectable 2000 Unit(s) IV Push every 6 hours PRN For aPTT between 40 - 57  oxyCODONE    IR 5 milliGRAM(s) Oral every 6 hours PRN Severe Pain (7 - 10)      Care Discussed with Consultants/Other Providers [ ] YES  [ ] NO
Patient is a 44y old  Female who presents with a chief complaint of Chest pain (09 Mar 2025 14:11)       INTERVAL HPI/OVERNIGHT EVENTS:  Patient seen and evaluated at bedside.  Pt is resting comfortable in NAD. Denies N/V/F/C.  Pain rated at X/10    Allergies    shellfish (Unknown)  latex (Hives; Rash)  Zyrtec (Rash)    Intolerances    lactose (Unknown)      Vital Signs Last 24 Hrs  T(C): 36.7 (10 Mar 2025 07:41), Max: 36.7 (09 Mar 2025 20:30)  T(F): 98.1 (10 Mar 2025 07:41), Max: 98.1 (09 Mar 2025 20:30)  HR: 63 (10 Mar 2025 07:41) (63 - 84)  BP: 106/75 (10 Mar 2025 07:41) (106/75 - 130/83)  BP(mean): --  RR: 18 (10 Mar 2025 07:41) (18 - 18)  SpO2: 100% (10 Mar 2025 07:41) (98% - 100%)    Parameters below as of 10 Mar 2025 07:41  Patient On (Oxygen Delivery Method): room air        LABS:                        10.5   3.93  )-----------( 351      ( 10 Mar 2025 06:56 )             32.0     03-10    134[L]  |  102  |  10  ----------------------------<  78  3.8   |  20[L]  |  0.52    Ca    8.9      10 Mar 2025 06:56  Phos  3.4     03-10  Mg     2.00     03-10        Urinalysis Basic - ( 10 Mar 2025 06:56 )    Color: x / Appearance: x / SG: x / pH: x  Gluc: 78 mg/dL / Ketone: x  / Bili: x / Urobili: x   Blood: x / Protein: x / Nitrite: x   Leuk Esterase: x / RBC: x / WBC x   Sq Epi: x / Non Sq Epi: x / Bacteria: x      CAPILLARY BLOOD GLUCOSE          Lower Extremity Physical Exam:  Vascular: DP/PT 1/4, B/L, CFT < 3 seconds B/L, Temperature gradient  warm to warm, B/L.   Neuro: Epicritic sensation diminished to the level of digits, B/L.  Musculoskeletal/Ortho: dropfoot, b/l  Skin: Right foot dorsomedial well-adhered eschar, no erythema or edema, no drainage, no malodor, no fluctuance, left foot no wounds, now healing with eschar intact  RADIOLOGY & ADDITIONAL TESTS:  
[facility] Attending Follow-up Note     Patient seen and examined at bedside.    Overnight Events:     events noted     REVIEW OF SYSTEMS:  Constitutional:     [x ] negative [ ] fevers [ ] chills [ ] weight loss [ ] weight gain  HEENT:                  [x ] negative [ ] dry eyes [ ] eye irritation [ ] postnasal drip [ ] nasal congestion  CV:                         [ x] negative  [ ] chest pain [ ] orthopnea [ ] palpitations [ ] murmur  Resp:                     [ x] negative [ ] cough [ ] shortness of breath [ ] dyspnea [ ] wheezing [ ] sputum [ ]hemoptysis  GI:                          [ x] negative [ ] nausea [ ] vomiting [ ] diarrhea [ ] constipation [ ] abd pain [ ] dysphagia   :                        [ x] negative [ ] dysuria [ ] nocturia [ ] hematuria [ ] increased urinary frequency  Musculoskeletal: [ x] negative [ ] back pain [ ] myalgias [ ] arthralgias [ ] fracture  Skin:                       [ x] negative [ ] rash [ ] itch  Neurological:        [x ] negative [ ] headache [ ] dizziness [ ] syncope [ ] weakness [ ] numbness  Psychiatric:           [ x] negative [ ] anxiety [ ] depression  Endocrine:            [ x] negative [ ] diabetes [ ] thyroid problem  Heme/Lymph:      [ x] negative [ ] anemia [ ] bleeding problem  Allergic/Immune: [ x] negative [ ] itchy eyes [ ] nasal discharge [ ] hives [ ] angioedema    [ x] All other systems negative  [ ] Unable to assess ROS due to    Current Meds:  acetaminophen     Tablet .. 650 milliGRAM(s) Oral every 6 hours PRN  acetaminophen     Tablet .. 500 milliGRAM(s) Oral once  albuterol/ipratropium for Nebulization 3 milliLiter(s) Nebulizer every 6 hours  aluminum hydroxide/magnesium hydroxide/simethicone Suspension 30 milliLiter(s) Oral every 6 hours PRN  apixaban 5 milliGRAM(s) Oral two times a day  chlorhexidine 2% Cloths 1 Application(s) Topical daily  ferrous    sulfate 325 milliGRAM(s) Oral <User Schedule>  fluticasone propionate/ salmeterol 250-50 MICROgram(s) Diskus 1 Dose(s) Inhalation two times a day  gabapentin 100 milliGRAM(s) Oral three times a day  pantoprazole    Tablet 40 milliGRAM(s) Oral every 12 hours      PAST MEDICAL & SURGICAL HISTORY:  Spina bifida      Fibroids      Wound of right foot      Wound of left foot      S/P  shunt      S/P foot surgery, left      S/P foot surgery, right          Vitals:  T(F): 98.1 (03-09), Max: 98.1 (03-09)  HR: 84 (03-09) (81 - 88)  BP: 130/83 (03-09) (125/84 - 138/62)  RR: 18 (03-09)  SpO2: 98% (03-09)  I&O's Summary      Physical Exam:  Appearance: No acute distress  HENT: No JVD   Cardiovascular: RRR, S1/S2, no murmurs  Respiratory: CTABL  Gastrointestinal: soft, NT ND, +BS  Musculoskeletal: No clubbing, no edema   Neurologic: Non-focal  Skin: No rashes, ecchymoses, or cyanosis                          11.2   3.91  )-----------( 350      ( 09 Mar 2025 06:50 )             34.2     03-09    136  |  103  |  13  ----------------------------<  80  3.9   |  21[L]  |  0.49[L]    Ca    9.0      09 Mar 2025 06:50  Phos  4.2     03-09  Mg     1.90     03-09                    Cardiovascular Testings:     
Date of Service: 03-04-25 @ 10:22    Patient is a 44y old  Female who presents with a chief complaint of Chest pain (04 Mar 2025 07:34)      Any change in ROS: seems to be doing ok ; seen by gi today      MEDICATIONS  (STANDING):  albuterol/ipratropium for Nebulization 3 milliLiter(s) Nebulizer every 6 hours  apixaban      apixaban 5 milliGRAM(s) Oral every 12 hours  ferrous    sulfate 325 milliGRAM(s) Oral <User Schedule>  fluticasone propionate/ salmeterol 250-50 MICROgram(s) Diskus 1 Dose(s) Inhalation two times a day  gabapentin 100 milliGRAM(s) Oral three times a day  pantoprazole    Tablet 40 milliGRAM(s) Oral before breakfast    MEDICATIONS  (PRN):  acetaminophen     Tablet .. 650 milliGRAM(s) Oral every 6 hours PRN Temp greater or equal to 38C (100.4F), Mild Pain (1 - 3)  aluminum hydroxide/magnesium hydroxide/simethicone Suspension 30 milliLiter(s) Oral every 6 hours PRN Dyspepsia  oxyCODONE    IR 5 milliGRAM(s) Oral every 6 hours PRN Severe Pain (7 - 10)    Vital Signs Last 24 Hrs  T(C): 36.9 (04 Mar 2025 09:00), Max: 37.1 (03 Mar 2025 13:01)  T(F): 98.5 (04 Mar 2025 09:00), Max: 98.7 (03 Mar 2025 13:01)  HR: 83 (04 Mar 2025 09:00) (68 - 94)  BP: 126/96 (04 Mar 2025 09:00) (98/54 - 157/98)  BP(mean): --  RR: 18 (04 Mar 2025 09:00) (16 - 18)  SpO2: 100% (04 Mar 2025 09:00) (97% - 100%)    Parameters below as of 04 Mar 2025 09:00  Patient On (Oxygen Delivery Method): room air        I&O's Summary    03 Mar 2025 07:01  -  04 Mar 2025 07:00  --------------------------------------------------------  IN: 1370 mL / OUT: 0 mL / NET: 1370 mL    04 Mar 2025 07:01  -  04 Mar 2025 10:22  --------------------------------------------------------  IN: 120 mL / OUT: 200 mL / NET: -80 mL          Physical Exam:   GENERAL: NAD, well-groomed, well-developed  HEENT: NETTIE/   Atraumatic, Normocephalic  ENMT: No tonsillar erythema, exudates, or enlargement; Moist mucous membranes, Good dentition, No lesions  NECK: Supple, No JVD, Normal thyroid  CHEST/LUNG: Clear to auscultaion  CVS: Regular rate and rhythm; No murmurs, rubs, or gallops  GI: : Soft, Nontender, Nondistended; Bowel sounds present  NERVOUS SYSTEM:  Alert & Oriented X3  EXTREMITIES: - edema  LYMPH: No lymphadenopathy noted  SKIN: No rashes or lesions  ENDOCRINOLOGY: No Thyromegaly  PSYCH: Appropriate    Labs:                              10.7   3.29  )-----------( 322      ( 04 Mar 2025 06:04 )             31.8                         10.4   3.93  )-----------( 280      ( 03 Mar 2025 05:27 )             31.9                         10.7   3.67  )-----------( 297      ( 02 Mar 2025 04:29 )             32.3                         10.0   3.85  )-----------( 289      ( 01 Mar 2025 04:21 )             30.1     03-04    136  |  104  |  12  ----------------------------<  93  3.7   |  21[L]  |  0.43[L]  03-03    137  |  104  |  16  ----------------------------<  91  4.2   |  21[L]  |  0.58  03-02    137  |  104  |  14  ----------------------------<  88  4.2   |  22  |  0.59  03-01    137  |  105  |  16  ----------------------------<  82  3.9   |  22  |  0.61    Ca    8.9      04 Mar 2025 06:04  Ca    8.9      03 Mar 2025 05:27  Phos  3.6     03-04  Phos  4.4     03-03  Mg     2.10     03-04  Mg     2.10     03-03      CAPILLARY BLOOD GLUCOSE              Urinalysis Basic - ( 04 Mar 2025 06:04 )    Color: x / Appearance: x / SG: x / pH: x  Gluc: 93 mg/dL / Ketone: x  / Bili: x / Urobili: x   Blood: x / Protein: x / Nitrite: x   Leuk Esterase: x / RBC: x / WBC x   Sq Epi: x / Non Sq Epi: x / Bacteria: x        rad< from: Xray Shunt Series (03.04.25 @ 08:26) >    ACC: [id number] EXAM:  XR SHUNT SERIES ABDOMEN 1V+   ORDERED BY: REYNA MEJIAS     ACC: [id number] EXAM:  XR SHUNT SERIES SKULL CHS ABD+   ORDERED BY: REYNA MEJIAS     ACC: [id number] EXAM:  RAD  SHUNT SERIES+   ORDERED BY: REYNA MEJIAS     ACC: [id number] EXAM:  XR SHUNT SERIES CHEST 1V+   ORDERED BY: REYNA MEJIAS     PROCEDURE DATE:  03/03/2025          INTERPRETATION:   CLINICAL INFORMATION: Chest pain and headache; history   of shunt.    TECHNIQUE: AP and lateral radiographs of the skull, chest, abdomen/pelvis.    COMPARISON: None    FINDINGS:  There is a  shunt coursing out of a left-sided shivam hole.    The  shunt tip is identified in the region of the lateral ventricle and   courses within the soft tissues of the  neck, left hemithorax, and   finally into the abdomen. The distal end is coiled within the left lower   quadrant. There is no discontinuity or kinks.    IMPRESSION: Normal  shunt study.      --- End of Report ---            LIANNA BROWN MD; Attending Radiologist  This document has been electronically signed. Mar  3 2025  5:07PM    < end of copied text >  < from: Xray Chest 1 View- PORTABLE-Urgent (Xray Chest 1 View- PORTABLE-Urgent .) (03.01.25 @ 12:03) >  PROCEDURE DATE:  03/01/2025          INTERPRETATION:  EXAMINATION: XR CHEST URGENT    CLINICAL INDICATION: chest pain    TECHNIQUE: Single frontal portable view of the chest from 3/1/2025 12:03   PM    COMPARISON: Chest x-ray 2/27/2025.    FINDINGS:  Partially imaged left-sided  shunts.  The heart size is normal.  The lungs are clear.  There is no pneumothorax or pleural effusion.    IMPRESSION:  Clear lungs.    ---End of Report ---          NIMESH BRYSON MD; Resident Radiologist  This document has been electronically signed.  ROGERS BAH MD; Attending Radiologist  This document has been electronically signed. Mar  1 2025 12:36PM    < end of copied text >      RECENT CULTURES:        RESPIRATORY CULTURES:          Studies  Chest X-RAY  CT SCAN Chest   Venous Dopplers: LE:   CT Abdomen  Others              
Date of Service: 03-05-25 @ 13:38    Patient is a 44y old  Female who presents with a chief complaint of Chest pain (05 Mar 2025 12:02)      Any change in ROS: seems OK:  on room air:     MEDICATIONS  (STANDING):  albuterol/ipratropium for Nebulization 3 milliLiter(s) Nebulizer every 6 hours  chlorhexidine 2% Cloths 1 Application(s) Topical daily  ferrous    sulfate 325 milliGRAM(s) Oral <User Schedule>  fluticasone propionate/ salmeterol 250-50 MICROgram(s) Diskus 1 Dose(s) Inhalation two times a day  gabapentin 100 milliGRAM(s) Oral three times a day  heparin  Infusion.  Unit(s)/Hr (11 mL/Hr) IV Continuous <Continuous>  pantoprazole    Tablet 40 milliGRAM(s) Oral every 12 hours  polyethylene glycol 3350 17 Gram(s) Oral daily    MEDICATIONS  (PRN):  acetaminophen     Tablet .. 650 milliGRAM(s) Oral every 6 hours PRN Temp greater or equal to 38C (100.4F), Mild Pain (1 - 3)  aluminum hydroxide/magnesium hydroxide/simethicone Suspension 30 milliLiter(s) Oral every 6 hours PRN Dyspepsia  heparin   Injectable 4500 Unit(s) IV Push every 6 hours PRN For aPTT less than 40  heparin   Injectable 2000 Unit(s) IV Push every 6 hours PRN For aPTT between 40 - 57  oxyCODONE    IR 5 milliGRAM(s) Oral every 6 hours PRN Severe Pain (7 - 10)    Vital Signs Last 24 Hrs  T(C): 36.9 (05 Mar 2025 13:08), Max: 37 (04 Mar 2025 20:45)  T(F): 98.4 (05 Mar 2025 13:08), Max: 98.6 (04 Mar 2025 20:45)  HR: 84 (05 Mar 2025 13:08) (62 - 101)  BP: 106/81 (05 Mar 2025 13:08) (100/82 - 136/87)  BP(mean): --  RR: 17 (05 Mar 2025 13:08) (17 - 18)  SpO2: 99% (05 Mar 2025 13:08) (95% - 100%)    Parameters below as of 05 Mar 2025 13:08  Patient On (Oxygen Delivery Method): room air        I&O's Summary    04 Mar 2025 07:01  -  05 Mar 2025 07:00  --------------------------------------------------------  IN: 731 mL / OUT: 500 mL / NET: 231 mL          Physical Exam:   GENERAL: NAD, well-groomed, well-developed  HEENT: NETTIE/   Atraumatic, Normocephalic  ENMT: No tonsillar erythema, exudates, or enlargement; Moist mucous membranes, Good dentition, No lesions  NECK: Supple, No JVD, Normal thyroid  CHEST/LUNG: Clear to auscultaion  CVS: Regular rate and rhythm; No murmurs, rubs, or gallops  GI: : Soft, Nontender, Nondistended; Bowel sounds present  NERVOUS SYSTEM:  Alert & Oriented X3  EXTREMITIES:  - edema  LYMPH: No lymphadenopathy noted  SKIN: No rashes or lesions  ENDOCRINOLOGY: No Thyromegaly  PSYCH: Appropriate    Labs:                              10.7   4.23  )-----------( 329      ( 05 Mar 2025 07:42 )             32.1                         10.6   4.49  )-----------( 325      ( 05 Mar 2025 01:09 )             32.6                         10.7   3.29  )-----------( 322      ( 04 Mar 2025 06:04 )             31.8                         10.4   3.93  )-----------( 280      ( 03 Mar 2025 05:27 )             31.9                         10.7   3.67  )-----------( 297      ( 02 Mar 2025 04:29 )             32.3     03-05    135  |  105  |  8   ----------------------------<  85  4.3   |  20[L]  |  0.38[L]  03-04    136  |  104  |  12  ----------------------------<  93  3.7   |  21[L]  |  0.43[L]  03-03    137  |  104  |  16  ----------------------------<  91  4.2   |  21[L]  |  0.58  03-02    137  |  104  |  14  ----------------------------<  88  4.2   |  22  |  0.59    Ca    8.8      05 Mar 2025 07:42  Ca    8.9      04 Mar 2025 06:04  Phos  3.1     03-05  Phos  3.6     03-04  Mg     2.00     03-05  Mg     2.10     03-04      CAPILLARY BLOOD GLUCOSE            PTT - ( 05 Mar 2025 07:42 )  PTT:136.0 sec  Urinalysis Basic - ( 05 Mar 2025 07:42 )    Color: x / Appearance: x / SG: x / pH: x  Gluc: 85 mg/dL / Ketone: x  / Bili: x / Urobili: x   Blood: x / Protein: x / Nitrite: x   Leuk Esterase: x / RBC: x / WBC x   Sq Epi: x / Non Sq Epi: x / Bacteria: x        rad< from: Xray Esophagram Single Contrast (03.05.25 @ 08:44) >  esophageal strictures. Tertiary contractions seen along the mid and   distal esophagus.    Mild delayed clearance of contrast into the stomach. into the stomach. No   hiatal hernia or gastroesophageal reflux was demonstrated during this   examination.    13 mm barium tablet was swallowed and passed into the stomach without   difficulty.    IMPRESSION:  No esophageal strictures as clinically questioned.    Tertiary contractions of the mid and distal esophagus with mild delayed   clearance of contrast into the stomach.    --- End of Report ---          CYNDI MCDOWELL MD; Resident Radiologist  This document has been electronically signed.  LIANNA BROWN MD; Attending Radiologist  This document has been electronically signed. Mar  5 2025 11:23AM    < end of copied text >      RECENT CULTURES:        RESPIRATORY CULTURES:          Studies  Chest X-RAY  CT SCAN Chest   Venous Dopplers: LE:   CT Abdomen  Others              
Date of Service: 03-06-25 @ 12:06    Patient is a 44y old  Female who presents with a chief complaint of Chest pain (06 Mar 2025 08:06)      Any change in ROS: seems to be doing  ok ; on BD:  for endoscopy todday      MEDICATIONS  (STANDING):  albuterol/ipratropium for Nebulization 3 milliLiter(s) Nebulizer every 6 hours  chlorhexidine 2% Cloths 1 Application(s) Topical daily  ferrous    sulfate 325 milliGRAM(s) Oral <User Schedule>  fluticasone propionate/ salmeterol 250-50 MICROgram(s) Diskus 1 Dose(s) Inhalation two times a day  gabapentin 100 milliGRAM(s) Oral three times a day  pantoprazole    Tablet 40 milliGRAM(s) Oral every 12 hours  polyethylene glycol 3350 17 Gram(s) Oral daily    MEDICATIONS  (PRN):  acetaminophen     Tablet .. 650 milliGRAM(s) Oral every 6 hours PRN Temp greater or equal to 38C (100.4F), Mild Pain (1 - 3)  aluminum hydroxide/magnesium hydroxide/simethicone Suspension 30 milliLiter(s) Oral every 6 hours PRN Dyspepsia  oxyCODONE    IR 5 milliGRAM(s) Oral every 6 hours PRN Severe Pain (7 - 10)    Vital Signs Last 24 Hrs  T(C): 36.9 (06 Mar 2025 11:37), Max: 36.9 (05 Mar 2025 13:08)  T(F): 98.4 (06 Mar 2025 11:37), Max: 98.4 (05 Mar 2025 13:08)  HR: 90 (06 Mar 2025 11:37) (69 - 90)  BP: 105/117 (06 Mar 2025 11:37) (105/117 - 129/79)  BP(mean): --  RR: 18 (06 Mar 2025 11:37) (17 - 18)  SpO2: 100% (06 Mar 2025 11:37) (99% - 100%)    Parameters below as of 06 Mar 2025 11:37  Patient On (Oxygen Delivery Method): room air        I&O's Summary        Physical Exam:   GENERAL: NAD, well-groomed, well-developed  HEENT: NETTIE/   Atraumatic, Normocephalic  ENMT: No tonsillar erythema, exudates, or enlargement; Moist mucous membranes, Good dentition, No lesions  NECK: Supple, No JVD, Normal thyroid  CHEST/LUNG: Clear to auscultaion  CVS: Regular rate and rhythm; No murmurs, rubs, or gallops  GI: : Soft, Nontender, Nondistended; Bowel sounds present  NERVOUS SYSTEM:  Alert & Oriented X3  EXTREMITIES:  - edema  LYMPH: No lymphadenopathy noted  SKIN: No rashes or lesions  ENDOCRINOLOGY: No Thyromegaly  PSYCH: Appropriate    Labs:                              10.3   4.15  )-----------( 334      ( 06 Mar 2025 06:39 )             32.1                         10.7   4.23  )-----------( 329      ( 05 Mar 2025 07:42 )             32.1                         10.6   4.49  )-----------( 325      ( 05 Mar 2025 01:09 )             32.6                         10.7   3.29  )-----------( 322      ( 04 Mar 2025 06:04 )             31.8                         10.4   3.93  )-----------( 280      ( 03 Mar 2025 05:27 )             31.9     03-06    136  |  104  |  8   ----------------------------<  86  3.9   |  21[L]  |  0.43[L]  03-05    135  |  105  |  8   ----------------------------<  85  4.3   |  20[L]  |  0.38[L]  03-04    136  |  104  |  12  ----------------------------<  93  3.7   |  21[L]  |  0.43[L]  03-03    137  |  104  |  16  ----------------------------<  91  4.2   |  21[L]  |  0.58    Ca    8.4      06 Mar 2025 06:39  Ca    8.8      05 Mar 2025 07:42  Phos  3.7     03-06  Phos  3.1     03-05  Mg     2.20     03-06  Mg     2.00     03-05      CAPILLARY BLOOD GLUCOSE            PTT - ( 06 Mar 2025 00:57 )  PTT:75.5 sec  Urinalysis Basic - ( 06 Mar 2025 06:39 )    Color: x / Appearance: x / SG: x / pH: x  Gluc: 86 mg/dL / Ketone: x  / Bili: x / Urobili: x   Blood: x / Protein: x / Nitrite: x   Leuk Esterase: x / RBC: x / WBC x   Sq Epi: x / Non Sq Epi: x / Bacteria: x        rad< from: Xray Esophagram Single Contrast (03.05.25 @ 08:44) >    Mild delayed clearance of contrast into the stomach. into the stomach. No   hiatal hernia or gastroesophageal reflux was demonstrated during this   examination.    13 mm barium tablet was swallowed and passed into the stomach without   difficulty.    IMPRESSION:  No esophageal strictures as clinically questioned.    Tertiary contractions of the mid and distal esophagus with mild delayed   clearance of contrast into the stomach.    --- End of Report ---          CYNDI MCDOWELL MD; Resident Radiologist  This document has been electronically signed.  LIANNA BROWN MD; Attending Radiologist  This document has been electronically signed. Mar  5 2025 11:23AM    < end of copied text >      RECENT CULTURES:        RESPIRATORY CULTURES:          Studies  Chest X-RAY  CT SCAN Chest   Venous Dopplers: LE:   CT Abdomen  Others              
[facility] Attending Follow-up Note     Patient seen and examined at bedside.    Overnight Events:     no events   s/p NST normal perfusion     REVIEW OF SYSTEMS:  Constitutional:     [x ] negative [ ] fevers [ ] chills [ ] weight loss [ ] weight gain  HEENT:                  [x ] negative [ ] dry eyes [ ] eye irritation [ ] postnasal drip [ ] nasal congestion  CV:                         [ x] negative  [ ] chest pain [ ] orthopnea [ ] palpitations [ ] murmur  Resp:                     [ x] negative [ ] cough [ ] shortness of breath [ ] dyspnea [ ] wheezing [ ] sputum [ ]hemoptysis  GI:                          [ x] negative [ ] nausea [ ] vomiting [ ] diarrhea [ ] constipation [ ] abd pain [ ] dysphagia   :                        [ x] negative [ ] dysuria [ ] nocturia [ ] hematuria [ ] increased urinary frequency  Musculoskeletal: [ x] negative [ ] back pain [ ] myalgias [ ] arthralgias [ ] fracture  Skin:                       [ x] negative [ ] rash [ ] itch  Neurological:        [x ] negative [ ] headache [ ] dizziness [ ] syncope [ ] weakness [ ] numbness  Psychiatric:           [ x] negative [ ] anxiety [ ] depression  Endocrine:            [ x] negative [ ] diabetes [ ] thyroid problem  Heme/Lymph:      [ x] negative [ ] anemia [ ] bleeding problem  Allergic/Immune: [ x] negative [ ] itchy eyes [ ] nasal discharge [ ] hives [ ] angioedema    [ x] All other systems negative  [ ] Unable to assess ROS due to    Current Meds:  acetaminophen     Tablet .. 650 milliGRAM(s) Oral every 6 hours PRN  albuterol/ipratropium for Nebulization 3 milliLiter(s) Nebulizer every 6 hours  aluminum hydroxide/magnesium hydroxide/simethicone Suspension 30 milliLiter(s) Oral every 6 hours PRN  apixaban      apixaban 5 milliGRAM(s) Oral every 12 hours  ferrous    sulfate 325 milliGRAM(s) Oral <User Schedule>  fluticasone propionate/ salmeterol 250-50 MICROgram(s) Diskus 1 Dose(s) Inhalation two times a day  gabapentin 100 milliGRAM(s) Oral three times a day  oxyCODONE    IR 5 milliGRAM(s) Oral every 6 hours PRN  pantoprazole    Tablet 40 milliGRAM(s) Oral before breakfast      PAST MEDICAL & SURGICAL HISTORY:  Spina bifida      Fibroids      Wound of right foot      Wound of left foot      S/P  shunt      S/P foot surgery, left      S/P foot surgery, right          Vitals:  T(F): 97.8 (03-03), Max: 98.7 (03-03)  HR: 76 (03-03) (67 - 94)  BP: 139/68 (03-03) (106/64 - 157/98)  RR: 17 (03-03)  SpO2: 98% (03-03)  I&O's Summary    02 Mar 2025 07:01  -  03 Mar 2025 07:00  --------------------------------------------------------  IN: 1320 mL / OUT: 200 mL / NET: 1120 mL    03 Mar 2025 07:01  -  03 Mar 2025 22:14  --------------------------------------------------------  IN: 850 mL / OUT: 0 mL / NET: 850 mL        Physical Exam:  Appearance: No acute distress  HENT: No JVD   Cardiovascular: RRR, S1/S2, no murmurs  Respiratory: CTABL  Gastrointestinal: soft, NT ND, +BS  Musculoskeletal: No clubbing, no edema   Neurologic: Non-focal  Skin: No rashes, ecchymoses, or cyanosis                          10.4   3.93  )-----------( 280      ( 03 Mar 2025 05:27 )             31.9     03-03    137  |  104  |  16  ----------------------------<  91  4.2   |  21[L]  |  0.58    Ca    8.9      03 Mar 2025 05:27  Phos  4.4     03-03  Mg     2.10     03-03                    Cardiovascular Testings:     
Patient is a 44y old  Female who presents with a chief complaint of Chest pain (09 Mar 2025 12:03)      SUBJECTIVE / OVERNIGHT EVENTS:    Events noted.  CONSTITUTIONAL: LE neuropathic pain  RESPIRATORY: No cough, wheezing,  No shortness of breath  CARDIOVASCULAR: No chest pain, palpitations, dizziness, or leg swelling  GASTROINTESTINAL: No abdominal or epigastric pain. No nausea, vomiting.  NEUROLOGICAL: No headache    MEDICATIONS  (STANDING):  acetaminophen     Tablet .. 500 milliGRAM(s) Oral once  albuterol/ipratropium for Nebulization 3 milliLiter(s) Nebulizer every 6 hours  apixaban 5 milliGRAM(s) Oral two times a day  chlorhexidine 2% Cloths 1 Application(s) Topical daily  ferrous    sulfate 325 milliGRAM(s) Oral <User Schedule>  fluticasone propionate/ salmeterol 250-50 MICROgram(s) Diskus 1 Dose(s) Inhalation two times a day  gabapentin 100 milliGRAM(s) Oral three times a day  pantoprazole    Tablet 40 milliGRAM(s) Oral every 12 hours    MEDICATIONS  (PRN):  acetaminophen     Tablet .. 650 milliGRAM(s) Oral every 6 hours PRN Temp greater or equal to 38C (100.4F), Moderate Pain (4 - 6)  aluminum hydroxide/magnesium hydroxide/simethicone Suspension 30 milliLiter(s) Oral every 6 hours PRN Dyspepsia        CAPILLARY BLOOD GLUCOSE        I&O's Summary      T(C): 36.7 (03-09-25 @ 20:30), Max: 36.7 (03-09-25 @ 05:00)  HR: 84 (03-09-25 @ 20:30) (81 - 88)  BP: 130/83 (03-09-25 @ 20:30) (125/84 - 138/62)  RR: 18 (03-09-25 @ 20:30) (16 - 18)  SpO2: 98% (03-09-25 @ 20:30) (97% - 100%)    PHYSICAL EXAM:  GENERAL: NAD  NECK: Supple, No JVD  CHEST/LUNG: Clear to auscultation bilaterally; No wheezing.  HEART: Regular rate and rhythm; No murmurs, rubs, or gallops  ABDOMEN: Soft, Nontender, Nondistended; Bowel sounds present  EXTREMITIES:   No edema  NEUROLOGY: AAO X 3      LABS:                        11.2   3.91  )-----------( 350      ( 09 Mar 2025 06:50 )             34.2     03-09    136  |  103  |  13  ----------------------------<  80  3.9   |  21[L]  |  0.49[L]    Ca    9.0      09 Mar 2025 06:50  Phos  4.2     03-09  Mg     1.90     03-09            Urinalysis Basic - ( 09 Mar 2025 06:50 )    Color: x / Appearance: x / SG: x / pH: x  Gluc: 80 mg/dL / Ketone: x  / Bili: x / Urobili: x   Blood: x / Protein: x / Nitrite: x   Leuk Esterase: x / RBC: x / WBC x   Sq Epi: x / Non Sq Epi: x / Bacteria: x      CAPILLARY BLOOD GLUCOSE            RADIOLOGY & ADDITIONAL TESTS:    Imaging Personally Reviewed:    Consultant(s) Notes Reviewed:      Care Discussed with Consultants/Other Providers:    Felipe Henry MD, CMD, FACP    [id number] [place] [address]  Office Tel: [phone]  Cell: [phone]  
[facility] Cardiology Attending Follow-up Note     Patient seen and examined at bedside.    Overnight Events:     episode of atypical cp   vaginal bleed, gyn consulted     REVIEW OF SYSTEMS:  Constitutional:     [x ] negative [ ] fevers [ ] chills [ ] weight loss [ ] weight gain  HEENT:                  [x ] negative [ ] dry eyes [ ] eye irritation [ ] postnasal drip [ ] nasal congestion  CV:                         [ x] negative  [ ] chest pain [ ] orthopnea [ ] palpitations [ ] murmur  Resp:                     [ x] negative [ ] cough [ ] shortness of breath [ ] dyspnea [ ] wheezing [ ] sputum [ ]hemoptysis  GI:                          [ x] negative [ ] nausea [ ] vomiting [ ] diarrhea [ ] constipation [ ] abd pain [ ] dysphagia   :                        [ x] negative [ ] dysuria [ ] nocturia [ ] hematuria [ ] increased urinary frequency  Musculoskeletal: [ x] negative [ ] back pain [ ] myalgias [ ] arthralgias [ ] fracture  Skin:                       [ x] negative [ ] rash [ ] itch  Neurological:        [x ] negative [ ] headache [ ] dizziness [ ] syncope [ ] weakness [ ] numbness  Psychiatric:           [ x] negative [ ] anxiety [ ] depression  Endocrine:            [ x] negative [ ] diabetes [ ] thyroid problem  Heme/Lymph:      [ x] negative [ ] anemia [ ] bleeding problem  Allergic/Immune: [ x] negative [ ] itchy eyes [ ] nasal discharge [ ] hives [ ] angioedema    [ x] All other systems negative  [ ] Unable to assess ROS due to    Current Meds:  acetaminophen     Tablet .. 650 milliGRAM(s) Oral every 6 hours PRN  albuterol/ipratropium for Nebulization 3 milliLiter(s) Nebulizer every 6 hours  aluminum hydroxide/magnesium hydroxide/simethicone Suspension 30 milliLiter(s) Oral every 6 hours PRN  apixaban      apixaban 5 milliGRAM(s) Oral every 12 hours  ferrous    sulfate 325 milliGRAM(s) Oral <User Schedule>  fluticasone propionate/ salmeterol 250-50 MICROgram(s) Diskus 1 Dose(s) Inhalation two times a day  gabapentin 100 milliGRAM(s) Oral three times a day  oxyCODONE    IR 5 milliGRAM(s) Oral every 6 hours PRN  pantoprazole    Tablet 40 milliGRAM(s) Oral before breakfast      PAST MEDICAL & SURGICAL HISTORY:  Spina bifida      Fibroids      Wound of right foot      Wound of left foot      S/P  shunt      S/P foot surgery, left      S/P foot surgery, right          Vitals:  T(F): 98.1 (03-01), Max: 98.9 (03-01)  HR: 69 (03-01) (63 - 77)  BP: 143/89 (03-01) (111/70 - 150/88)  RR: 18 (03-01)  SpO2: 99% (03-01)  I&O's Summary    28 Feb 2025 07:01  -  01 Mar 2025 07:00  --------------------------------------------------------  IN: 240 mL / OUT: 200 mL / NET: 40 mL    01 Mar 2025 07:01  -  02 Mar 2025 01:06  --------------------------------------------------------  IN: 480 mL / OUT: 0 mL / NET: 480 mL        Physical Exam:  Appearance: No acute distress  HENT: No JVD   Cardiovascular: RRR, S1/S2, no murmurs  Respiratory: CTABL  Gastrointestinal: soft, NT ND, +BS  Musculoskeletal: No clubbing, no edema   Neurologic: Non-focal  Skin: No rashes, ecchymoses, or cyanosis                          10.0   3.85  )-----------( 289      ( 01 Mar 2025 04:21 )             30.1     03-01    137  |  105  |  16  ----------------------------<  82  3.9   |  22  |  0.61    Ca    8.3[L]      01 Mar 2025 04:21  Phos  3.7     03-01  Mg     2.40     03-01        CARDIAC MARKERS ( 01 Mar 2025 10:00 )  x     / x     / x     / x     / <1.0 ng/mL  CARDIAC MARKERS ( 28 Feb 2025 09:50 )  x     / x     / x     / x     / <1.0 ng/mL              Cardiovascular Testings:     
Date of Service: 03-02-25 @ 10:46    Patient is a 44y old  Female who presents with a chief complaint of Chest pain (01 Mar 2025 18:39)      Any change in ROS: doing same  :  still with some trtrosternal butning:   started on BD     MEDICATIONS  (STANDING):  albuterol/ipratropium for Nebulization 3 milliLiter(s) Nebulizer every 6 hours  apixaban      apixaban 5 milliGRAM(s) Oral every 12 hours  ferrous    sulfate 325 milliGRAM(s) Oral <User Schedule>  fluticasone propionate/ salmeterol 250-50 MICROgram(s) Diskus 1 Dose(s) Inhalation two times a day  gabapentin 100 milliGRAM(s) Oral three times a day  pantoprazole    Tablet 40 milliGRAM(s) Oral before breakfast    MEDICATIONS  (PRN):  acetaminophen     Tablet .. 650 milliGRAM(s) Oral every 6 hours PRN Temp greater or equal to 38C (100.4F), Mild Pain (1 - 3)  aluminum hydroxide/magnesium hydroxide/simethicone Suspension 30 milliLiter(s) Oral every 6 hours PRN Dyspepsia  oxyCODONE    IR 5 milliGRAM(s) Oral every 6 hours PRN Severe Pain (7 - 10)    Vital Signs Last 24 Hrs  T(C): 36.8 (02 Mar 2025 09:48), Max: 37 (02 Mar 2025 01:12)  T(F): 98.3 (02 Mar 2025 09:48), Max: 98.6 (02 Mar 2025 01:12)  HR: 81 (02 Mar 2025 09:48) (64 - 100)  BP: 125/83 (02 Mar 2025 09:48) (109/75 - 150/88)  BP(mean): --  RR: 17 (02 Mar 2025 09:48) (16 - 19)  SpO2: 100% (02 Mar 2025 09:48) (97% - 100%)    Parameters below as of 02 Mar 2025 09:48  Patient On (Oxygen Delivery Method): room air        I&O's Summary    01 Mar 2025 07:01  -  02 Mar 2025 07:00  --------------------------------------------------------  IN: 480 mL / OUT: 0 mL / NET: 480 mL    02 Mar 2025 07:01  -  02 Mar 2025 10:46  --------------------------------------------------------  IN: 240 mL / OUT: 0 mL / NET: 240 mL          Physical Exam:   GENERAL: NAD, well-groomed, well-developed  HEENT: NETTIE/   Atraumatic, Normocephalic  ENMT: No tonsillar erythema, exudates, or enlargement; Moist mucous membranes, Good dentition, No lesions  NECK: Supple, No JVD, Normal thyroid  CHEST/LUNG: no wheezing  CVS: Regular rate and rhythm; No murmurs, rubs, or gallops  GI: : Soft, Nontender, Nondistended; Bowel sounds present  NERVOUS SYSTEM:  Alert & Oriented X3  EXTREMITIES: - edema  LYMPH: No lymphadenopathy noted  SKIN: No rashes or lesions  ENDOCRINOLOGY: No Thyromegaly  PSYCH: Appropriate    Labs:    CARDIAC MARKERS ( 01 Mar 2025 10:00 )  x     / x     / x     / x     / <1.0 ng/mL                            10.7   3.67  )-----------( 297      ( 02 Mar 2025 04:29 )             32.3                         10.0   3.85  )-----------( 289      ( 01 Mar 2025 04:21 )             30.1                         11.4   3.41  )-----------( 317      ( 28 Feb 2025 09:50 )             34.2                         12.2   4.61  )-----------( 317      ( 27 Feb 2025 14:32 )             36.0     03-02    137  |  104  |  14  ----------------------------<  88  4.2   |  22  |  0.59  03-01    137  |  105  |  16  ----------------------------<  82  3.9   |  22  |  0.61  02-28    138  |  105  |  11  ----------------------------<  106[H]  3.7   |  20[L]  |  0.48[L]  02-27    135  |  103  |  6[L]  ----------------------------<  88  4.4   |  18[L]  |  0.43[L]  02-27    132[L]  |  100  |  6[L]  ----------------------------<  79  6.3[HH]   |  14[L]  |  0.37[L]    Ca    8.8      02 Mar 2025 04:29  Ca    8.3[L]      01 Mar 2025 04:21  Phos  4.6     03-02  Phos  3.7     03-01  Mg     2.20     03-02  Mg     2.40     03-01    TPro  7.2  /  Alb  3.7  /  TBili  0.3  /  DBili  x   /  AST  21  /  ALT  9   /  AlkPhos  62  02-27  TPro  7.1  /  Alb  3.6  /  TBili  0.3  /  DBili  x   /  AST  TNP  /  ALT  TNP  /  AlkPhos  62  02-27    CAPILLARY BLOOD GLUCOSE              Urinalysis Basic - ( 02 Mar 2025 04:29 )    Color: x / Appearance: x / SG: x / pH: x  Gluc: 88 mg/dL / Ketone: x  / Bili: x / Urobili: x   Blood: x / Protein: x / Nitrite: x   Leuk Esterase: x / RBC: x / WBC x   Sq Epi: x / Non Sq Epi: x / Bacteria: x      rad< from: Xray Chest 1 View- PORTABLE-Urgent (Xray Chest 1 View- PORTABLE-Urgent .) (03.01.25 @ 12:03) >    COMPARISON: Chest x-ray 2/27/2025.    FINDINGS:  Partially imaged left-sided  shunts.  The heart size is normal.  The lungs are clear.  There is no pneumothorax or pleural effusion.    IMPRESSION:  Clear lungs.    ---End of Report ---          NIMESH BRYSON MD; Resident Radiologist  This document has been electronically signed.  ROGERS BAH MD; Attending Radiologist  This document has been electronically signed. Mar  1 2025 12:36PM    < end of copied text >  < from: US Pelvis Complete (03.01.25 @ 21:36) >  Endometrium: 6 mm. largely obscured by fibroids    Right ovary: Not seen.  Left ovary: 2.6 cm x 1.4 cm x 2.0 cm. Within normal limits.    Fluid: None.    IMPRESSION:  Limited transabdominal exam. The patient declined the transvaginal   portion.  Enlarged multi fibroid uterus largely obscuring visualization of the   endometrium and right ovary.      --- End of Report ---            DOMITILA DUBOSE MD; Attending Radiologist  This document has been electronically signed. Mar  1 2025 10:25PM    < end of copied text >  < from: CT Head No Cont (02.28.25 @ 20:37) >  TYMPANOMASTOID CAVITIES:  Clear.  VISUALIZED ORBITS: Normal.  CALVARIUM: Old parietal shivam holes bilaterally.    MISCELLANEOUS: None.      IMPRESSION:  No CT evidence of acute intracranial pathology.    The ventricular system appears slitlike and decompressed, unchanged from   05/14/2024.        --- End of Report ---    < end of copied text >  < from: CT Angio Chest PE Protocol w/ IV Cont (02.28.25 @ 20:37) >  PROCEDURE:  CT Angiography of the Chest.  Sagittal and coronal reformats were performed as well as 3D (MIP)   reconstructions.    FINDINGS:    LUNGS AND LARGE AIRWAYS: Patentcentral airways. No pulmonary nodules or   parenchymal consolidation.  PLEURA: No pleural effusion.  VESSELS: No pulmonary embolism.  HEART: Heart size is normal. No pericardial effusion.  MEDIASTINUM AND SIERRA: No lymphadenopathy.  CHEST WALL AND LOWER NECK: Partially visualized  shunt.  VISUALIZED UPPER ABDOMEN: Scattered hepatic cysts.  BONES: Within normal limits.    IMPRESSION:  No pulmonary embolism. No acute pulmonary process.    --- End of Report ---          ROSARIO CASTRO DO; Resident Radiologist  This document has been electronically signed.  BHARAT MORSE MD; Attending Radiologist  This document has been electronically signed. Mar  1 2025  7:44AM    < end of copied text >        RECENT CULTURES:        RESPIRATORY CULTURES:          Studies  Chest X-RAY  CT SCAN Chest   Venous Dopplers: LE:   CT Abdomen  Others              
Date of Service: 03-07-25 @ 13:09    Patient is a 44y old  Female who presents with a chief complaint of Chest pain (07 Mar 2025 08:58)      Any change in ROS: seems to be doing  ok : no sob:  on room air     MEDICATIONS  (STANDING):  albuterol/ipratropium for Nebulization 3 milliLiter(s) Nebulizer every 6 hours  apixaban 5 milliGRAM(s) Oral two times a day  chlorhexidine 2% Cloths 1 Application(s) Topical daily  ferrous    sulfate 325 milliGRAM(s) Oral <User Schedule>  fluticasone propionate/ salmeterol 250-50 MICROgram(s) Diskus 1 Dose(s) Inhalation two times a day  gabapentin 100 milliGRAM(s) Oral three times a day  pantoprazole    Tablet 40 milliGRAM(s) Oral every 12 hours    MEDICATIONS  (PRN):  acetaminophen     Tablet .. 650 milliGRAM(s) Oral every 6 hours PRN Temp greater or equal to 38C (100.4F), Moderate Pain (4 - 6)  aluminum hydroxide/magnesium hydroxide/simethicone Suspension 30 milliLiter(s) Oral every 6 hours PRN Dyspepsia    Vital Signs Last 24 Hrs  T(C): 36.9 (07 Mar 2025 12:16), Max: 37 (06 Mar 2025 15:17)  T(F): 98.4 (07 Mar 2025 12:16), Max: 98.6 (06 Mar 2025 15:17)  HR: 67 (07 Mar 2025 12:16) (67 - 92)  BP: 134/88 (07 Mar 2025 12:16) (115/59 - 144/95)  BP(mean): --  RR: 17 (07 Mar 2025 12:16) (12 - 20)  SpO2: 100% (07 Mar 2025 12:16) (97% - 100%)    Parameters below as of 07 Mar 2025 12:16  Patient On (Oxygen Delivery Method): room air        I&O's Summary        Physical Exam:   GENERAL: NAD, well-groomed, well-developed  HEENT: NETTIE/   Atraumatic, Normocephalic  ENMT: No tonsillar erythema, exudates, or enlargement; Moist mucous membranes, Good dentition, No lesions  NECK: Supple, No JVD, Normal thyroid  CHEST/LUNG: Clear to auscultaion  CVS: Regular rate and rhythm; No murmurs, rubs, or gallops  GI: : Soft, Nontender, Nondistended; Bowel sounds present  NERVOUS SYSTEM:  Alert & Oriented X3  EXTREMITIES:  2+ Peripheral Pulses, No clubbing, cyanosis, or edema  LYMPH: No lymphadenopathy noted  SKIN: No rashes or lesions  ENDOCRINOLOGY: No Thyromegaly  PSYCH: Appropriate    Labs:                              10.8   3.83  )-----------( 368      ( 07 Mar 2025 09:39 )             32.8                         10.3   4.15  )-----------( 334      ( 06 Mar 2025 06:39 )             32.1                         10.7   4.23  )-----------( 329      ( 05 Mar 2025 07:42 )             32.1                         10.6   4.49  )-----------( 325      ( 05 Mar 2025 01:09 )             32.6                         10.7   3.29  )-----------( 322      ( 04 Mar 2025 06:04 )             31.8     03-07    136  |  105  |  7   ----------------------------<  76  4.5   |  21[L]  |  0.49[L]  03-06    136  |  104  |  8   ----------------------------<  86  3.9   |  21[L]  |  0.43[L]  03-05    135  |  105  |  8   ----------------------------<  85  4.3   |  20[L]  |  0.38[L]  03-04    136  |  104  |  12  ----------------------------<  93  3.7   |  21[L]  |  0.43[L]    Ca    9.3      07 Mar 2025 09:39  Ca    8.4      06 Mar 2025 06:39  Phos  3.5     03-07  Phos  3.7     03-06  Mg     2.10     03-07  Mg     2.20     03-06      CAPILLARY BLOOD GLUCOSE            PTT - ( 06 Mar 2025 00:57 )  PTT:75.5 sec  Urinalysis Basic - ( 07 Mar 2025 09:39 )    Color: x / Appearance: x / SG: x / pH: x  Gluc: 76 mg/dL / Ketone: x  / Bili: x / Urobili: x   Blood: x / Protein: x / Nitrite: x   Leuk Esterase: x / RBC: x / WBC x   Sq Epi: x / Non Sq Epi: x / Bacteria: x      rad< from: Xray Esophagram Single Contrast (03.05.25 @ 08:44) >  esophageal strictures. Tertiary contractions seen along the mid and   distal esophagus.    Mild delayed clearance of contrast into the stomach. into the stomach. No   hiatal hernia or gastroesophageal reflux was demonstrated during this   examination.    13 mm barium tablet was swallowed and passed into the stomach without   difficulty.    IMPRESSION:  No esophageal strictures as clinically questioned.    Tertiary contractions of the mid and distal esophagus with mild delayed   clearance of contrast into the stomach.    --- End of Report ---          CYNDI MCDOWELL MD; Resident Radiologist  This document has been electronically signed.  LIANNA BROWN MD; Attending Radiologist  This document has been electronically signed. Mar  5 2025 11:23AM    < end of copied text >        RECENT CULTURES:        RESPIRATORY CULTURES:          Studies  Chest X-RAY  CT SCAN Chest   Venous Dopplers: LE:   CT Abdomen  Others              
Date of Service: 03-10-25 @ 12:29    Patient is a 44y old  Female who presents with a chief complaint of Chest pain (10 Mar 2025 12:16)      Any change in ROS: seems OK:   no sob:  no wheezing     MEDICATIONS  (STANDING):  albuterol/ipratropium for Nebulization 3 milliLiter(s) Nebulizer every 6 hours  apixaban 5 milliGRAM(s) Oral two times a day  chlorhexidine 2% Cloths 1 Application(s) Topical daily  ferrous    sulfate 325 milliGRAM(s) Oral <User Schedule>  fluticasone propionate/ salmeterol 250-50 MICROgram(s) Diskus 1 Dose(s) Inhalation two times a day  gabapentin 100 milliGRAM(s) Oral three times a day  pantoprazole    Tablet 40 milliGRAM(s) Oral every 12 hours    MEDICATIONS  (PRN):  acetaminophen     Tablet .. 650 milliGRAM(s) Oral every 6 hours PRN Temp greater or equal to 38C (100.4F), Moderate Pain (4 - 6)  aluminum hydroxide/magnesium hydroxide/simethicone Suspension 30 milliLiter(s) Oral every 6 hours PRN Dyspepsia    Vital Signs Last 24 Hrs  T(C): 36.7 (10 Mar 2025 07:41), Max: 36.7 (09 Mar 2025 20:30)  T(F): 98.1 (10 Mar 2025 07:41), Max: 98.1 (09 Mar 2025 20:30)  HR: 63 (10 Mar 2025 07:41) (63 - 84)  BP: 106/75 (10 Mar 2025 07:41) (106/75 - 130/83)  BP(mean): --  RR: 18 (10 Mar 2025 07:41) (18 - 18)  SpO2: 100% (10 Mar 2025 07:41) (98% - 100%)    Parameters below as of 10 Mar 2025 07:41  Patient On (Oxygen Delivery Method): room air        I&O's Summary        Physical Exam:   GENERAL: NAD, well-groomed, well-developed  HEENT: NETTIE/   Atraumatic, Normocephalic  ENMT: No tonsillar erythema, exudates, or enlargement; Moist mucous membranes, Good dentition, No lesions  NECK: Supple, No JVD, Normal thyroid  CHEST/LUNG: Clear to auscultaion  CVS: Regular rate and rhythm; No murmurs, rubs, or gallops  GI: : Soft, Nontender, Nondistended; Bowel sounds present  NERVOUS SYSTEM:  Alert & Oriented X3  EXTREMITIES: - edema  LYMPH: No lymphadenopathy noted  SKIN: No rashes or lesions  ENDOCRINOLOGY: No Thyromegaly  PSYCH: calm    Labs:                              10.5   3.93  )-----------( 351      ( 10 Mar 2025 06:56 )             32.0                         11.2   3.91  )-----------( 350      ( 09 Mar 2025 06:50 )             34.2                         11.5   3.71  )-----------( 372      ( 08 Mar 2025 07:20 )             34.9                         10.8   3.83  )-----------( 368      ( 07 Mar 2025 09:39 )             32.8     03-10    134[L]  |  102  |  10  ----------------------------<  78  3.8   |  20[L]  |  0.52  03-09    136  |  103  |  13  ----------------------------<  80  3.9   |  21[L]  |  0.49[L]  03-08    135  |  101  |  7   ----------------------------<  82  3.8   |  22  |  0.49[L]  03-07    136  |  105  |  7   ----------------------------<  76  4.5   |  21[L]  |  0.49[L]    Ca    8.9      10 Mar 2025 06:56  Ca    9.0      09 Mar 2025 06:50  Phos  3.4     03-10  Phos  4.2     03-09  Mg     2.00     03-10  Mg     1.90     03-09      CAPILLARY BLOOD GLUCOSE              Urinalysis Basic - ( 10 Mar 2025 06:56 )    Color: x / Appearance: x / SG: x / pH: x  Gluc: 78 mg/dL / Ketone: x  / Bili: x / Urobili: x   Blood: x / Protein: x / Nitrite: x   Leuk Esterase: x / RBC: x / WBC x   Sq Epi: x / Non Sq Epi: x / Bacteria: x      < from: Xray Chest 1 View- PORTABLE-Urgent (Xray Chest 1 View- PORTABLE-Urgent .) (03.01.25 @ 12:03) >  TECHNIQUE: Single frontal portable view of the chest from 3/1/2025 12:03   PM    COMPARISON: Chest x-ray 2/27/2025.    FINDINGS:  Partially imaged left-sided  shunts.  The heart size is normal.  The lungs are clear.  There is no pneumothorax or pleural effusion.    IMPRESSION:  Clear lungs.    ---End of Report ---          NIMESH BRYSON MD; Resident Radiologist  This document has been electronically signed.  ROGERS BAH MD; Attending Radiologist  This document has been electronically signed. Mar  1 2025 12:36PM    < end of copied text >        RECENT CULTURES:        RESPIRATORY CULTURES:          Studies  Chest X-RAY  CT SCAN Chest   Venous Dopplers: LE:   CT Abdomen  Others              
[facility] Attending Follow-up Note     Patient seen and examined at bedside.    Overnight Events:     w/ HA, CTH and shunt series orderd     REVIEW OF SYSTEMS:  Constitutional:     [x ] negative [ ] fevers [ ] chills [ ] weight loss [ ] weight gain  HEENT:                  [x ] negative [ ] dry eyes [ ] eye irritation [ ] postnasal drip [ ] nasal congestion  CV:                         [ x] negative  [ ] chest pain [ ] orthopnea [ ] palpitations [ ] murmur  Resp:                     [ x] negative [ ] cough [ ] shortness of breath [ ] dyspnea [ ] wheezing [ ] sputum [ ]hemoptysis  GI:                          [ x] negative [ ] nausea [ ] vomiting [ ] diarrhea [ ] constipation [ ] abd pain [ ] dysphagia   :                        [ x] negative [ ] dysuria [ ] nocturia [ ] hematuria [ ] increased urinary frequency  Musculoskeletal: [ x] negative [ ] back pain [ ] myalgias [ ] arthralgias [ ] fracture  Skin:                       [ x] negative [ ] rash [ ] itch  Neurological:        [x ] negative [ ] headache [ ] dizziness [ ] syncope [ ] weakness [ ] numbness  Psychiatric:           [ x] negative [ ] anxiety [ ] depression  Endocrine:            [ x] negative [ ] diabetes [ ] thyroid problem  Heme/Lymph:      [ x] negative [ ] anemia [ ] bleeding problem  Allergic/Immune: [ x] negative [ ] itchy eyes [ ] nasal discharge [ ] hives [ ] angioedema    [ x] All other systems negative  [ ] Unable to assess ROS due to    Current Meds:  acetaminophen     Tablet .. 650 milliGRAM(s) Oral every 6 hours PRN  albuterol/ipratropium for Nebulization 3 milliLiter(s) Nebulizer every 6 hours  aluminum hydroxide/magnesium hydroxide/simethicone Suspension 30 milliLiter(s) Oral every 6 hours PRN  apixaban      apixaban 5 milliGRAM(s) Oral every 12 hours  ferrous    sulfate 325 milliGRAM(s) Oral <User Schedule>  fluticasone propionate/ salmeterol 250-50 MICROgram(s) Diskus 1 Dose(s) Inhalation two times a day  gabapentin 100 milliGRAM(s) Oral three times a day  oxyCODONE    IR 5 milliGRAM(s) Oral every 6 hours PRN  pantoprazole    Tablet 40 milliGRAM(s) Oral before breakfast      PAST MEDICAL & SURGICAL HISTORY:  Spina bifida      Fibroids      Wound of right foot      Wound of left foot      S/P  shunt      S/P foot surgery, left      S/P foot surgery, right          Vitals:  T(F): 98.1 (03-01), Max: 98.9 (03-01)  HR: 69 (03-01) (63 - 77)  BP: 143/89 (03-01) (111/70 - 150/88)  RR: 18 (03-01)  SpO2: 99% (03-01)  I&O's Summary    28 Feb 2025 07:01  -  01 Mar 2025 07:00  --------------------------------------------------------  IN: 240 mL / OUT: 200 mL / NET: 40 mL    01 Mar 2025 07:01  -  02 Mar 2025 01:03  --------------------------------------------------------  IN: 480 mL / OUT: 0 mL / NET: 480 mL        Physical Exam:  Appearance: No acute distress  HENT: No JVD   Cardiovascular: RRR, S1/S2, no murmurs  Respiratory: CTABL  Gastrointestinal: soft, NT ND, +BS  Musculoskeletal: No clubbing, no edema   Neurologic: Non-focal  Skin: No rashes, ecchymoses, or cyanosis                          10.0   3.85  )-----------( 289      ( 01 Mar 2025 04:21 )             30.1     03-01    137  |  105  |  16  ----------------------------<  82  3.9   |  22  |  0.61    Ca    8.3[L]      01 Mar 2025 04:21  Phos  3.7     03-01  Mg     2.40     03-01        CARDIAC MARKERS ( 01 Mar 2025 10:00 )  x     / x     / x     / x     / <1.0 ng/mL  CARDIAC MARKERS ( 28 Feb 2025 09:50 )  x     / x     / x     / x     / <1.0 ng/mL              Cardiovascular Testings:     
[facility] Attending Follow-up Note     Patient seen and examined at bedside.    Overnight Events:     events noted     REVIEW OF SYSTEMS:  Constitutional:     [x ] negative [ ] fevers [ ] chills [ ] weight loss [ ] weight gain  HEENT:                  [x ] negative [ ] dry eyes [ ] eye irritation [ ] postnasal drip [ ] nasal congestion  CV:                         [ x] negative  [ ] chest pain [ ] orthopnea [ ] palpitations [ ] murmur  Resp:                     [ x] negative [ ] cough [ ] shortness of breath [ ] dyspnea [ ] wheezing [ ] sputum [ ]hemoptysis  GI:                          [ x] negative [ ] nausea [ ] vomiting [ ] diarrhea [ ] constipation [ ] abd pain [ ] dysphagia   :                        [ x] negative [ ] dysuria [ ] nocturia [ ] hematuria [ ] increased urinary frequency  Musculoskeletal: [ x] negative [ ] back pain [ ] myalgias [ ] arthralgias [ ] fracture  Skin:                       [ x] negative [ ] rash [ ] itch  Neurological:        [x ] negative [ ] headache [ ] dizziness [ ] syncope [ ] weakness [ ] numbness  Psychiatric:           [ x] negative [ ] anxiety [ ] depression  Endocrine:            [ x] negative [ ] diabetes [ ] thyroid problem  Heme/Lymph:      [ x] negative [ ] anemia [ ] bleeding problem  Allergic/Immune: [ x] negative [ ] itchy eyes [ ] nasal discharge [ ] hives [ ] angioedema    [ x] All other systems negative  [ ] Unable to assess ROS due to    Current Meds:  acetaminophen     Tablet .. 650 milliGRAM(s) Oral every 6 hours PRN  albuterol/ipratropium for Nebulization 3 milliLiter(s) Nebulizer every 6 hours  aluminum hydroxide/magnesium hydroxide/simethicone Suspension 30 milliLiter(s) Oral every 6 hours PRN  chlorhexidine 2% Cloths 1 Application(s) Topical daily  ferrous    sulfate 325 milliGRAM(s) Oral <User Schedule>  fluticasone propionate/ salmeterol 250-50 MICROgram(s) Diskus 1 Dose(s) Inhalation two times a day  gabapentin 100 milliGRAM(s) Oral three times a day  heparin   Injectable 4500 Unit(s) IV Push every 6 hours PRN  heparin   Injectable 2000 Unit(s) IV Push every 6 hours PRN  heparin  Infusion.  Unit(s)/Hr IV Continuous <Continuous>  oxyCODONE    IR 5 milliGRAM(s) Oral every 6 hours PRN  pantoprazole    Tablet 40 milliGRAM(s) Oral every 12 hours  polyethylene glycol 3350 17 Gram(s) Oral daily      PAST MEDICAL & SURGICAL HISTORY:  Spina bifida      Fibroids      Wound of right foot      Wound of left foot      S/P  shunt      S/P foot surgery, left      S/P foot surgery, right          Vitals:  T(F): 98.3 (03-05), Max: 98.4 (03-05)  HR: 81 (03-05) (81 - 101)  BP: 129/79 (03-05) (106/81 - 129/79)  RR: 18 (03-05)  SpO2: 99% (03-05)  I&O's Summary    04 Mar 2025 07:01  -  05 Mar 2025 07:00  --------------------------------------------------------  IN: 731 mL / OUT: 500 mL / NET: 231 mL        Physical Exam:  Appearance: No acute distress  HENT: No JVD   Cardiovascular: RRR, S1/S2, no murmurs  Respiratory: CTABL  Gastrointestinal: soft, NT ND, +BS  Musculoskeletal: No clubbing, no edema   Neurologic: Non-focal  Skin: No rashes, ecchymoses, or cyanosis                          10.7   4.23  )-----------( 329      ( 05 Mar 2025 07:42 )             32.1     03-05    135  |  105  |  8   ----------------------------<  85  4.3   |  20[L]  |  0.38[L]    Ca    8.8      05 Mar 2025 07:42  Phos  3.1     03-05  Mg     2.00     03-05      PTT - ( 06 Mar 2025 00:57 )  PTT:75.5 sec              Cardiovascular Testings:     
Patient is a 44y old  Female who presents with a chief complaint of Chest pain (05 Mar 2025 15:17)       INTERVAL HPI/OVERNIGHT EVENTS:  Patient seen and evaluated at bedside.  Pt is resting comfortable in NAD. Denies N/V/F/C.  Pain rated at X/10    Allergies    shellfish (Unknown)  latex (Hives; Rash)  Zyrtec (Rash)    Intolerances    lactose (Unknown)      Vital Signs Last 24 Hrs  T(C): 36.9 (06 Mar 2025 05:00), Max: 36.9 (05 Mar 2025 13:08)  T(F): 98.4 (06 Mar 2025 05:00), Max: 98.4 (05 Mar 2025 13:08)  HR: 69 (06 Mar 2025 05:00) (69 - 101)  BP: 119/72 (06 Mar 2025 05:00) (106/81 - 129/79)  BP(mean): --  RR: 18 (06 Mar 2025 05:00) (17 - 18)  SpO2: 99% (06 Mar 2025 05:00) (99% - 99%)    Parameters below as of 06 Mar 2025 05:00  Patient On (Oxygen Delivery Method): room air        LABS:                        10.3   4.15  )-----------( 334      ( 06 Mar 2025 06:39 )             32.1     03-06    136  |  104  |  8   ----------------------------<  86  3.9   |  21[L]  |  0.43[L]    Ca    8.4      06 Mar 2025 06:39  Phos  3.7     03-06  Mg     2.20     03-06      PTT - ( 06 Mar 2025 00:57 )  PTT:75.5 sec  Urinalysis Basic - ( 06 Mar 2025 06:39 )    Color: x / Appearance: x / SG: x / pH: x  Gluc: 86 mg/dL / Ketone: x  / Bili: x / Urobili: x   Blood: x / Protein: x / Nitrite: x   Leuk Esterase: x / RBC: x / WBC x   Sq Epi: x / Non Sq Epi: x / Bacteria: x      CAPILLARY BLOOD GLUCOSE          Lower Extremity Physical Exam:  LOWER EXTREMITY PHYSICAL EXAM:  Vascular: DP/PT 1/4, B/L, CFT < 3 seconds B/L, Temperature gradient  warm to warm, B/L.   Neuro: Epicritic sensation diminished to the level of digits, B/L.  Musculoskeletal/Ortho: dropfoot, b/l  Skin: Right foot dorsomedial well-adhered eschar, no erythema or edema, no drainage, no malodor, no fluctuance, left foot no wounds, now healing with eschar intact    RADIOLOGY & ADDITIONAL TESTS:  
[facility] Attending Follow-up Note     Patient seen and examined at bedside.    Overnight Events:     events noted     REVIEW OF SYSTEMS:  Constitutional:     [x ] negative [ ] fevers [ ] chills [ ] weight loss [ ] weight gain  HEENT:                  [x ] negative [ ] dry eyes [ ] eye irritation [ ] postnasal drip [ ] nasal congestion  CV:                         [ x] negative  [ ] chest pain [ ] orthopnea [ ] palpitations [ ] murmur  Resp:                     [ x] negative [ ] cough [ ] shortness of breath [ ] dyspnea [ ] wheezing [ ] sputum [ ]hemoptysis  GI:                          [ x] negative [ ] nausea [ ] vomiting [ ] diarrhea [ ] constipation [ ] abd pain [ ] dysphagia   :                        [ x] negative [ ] dysuria [ ] nocturia [ ] hematuria [ ] increased urinary frequency  Musculoskeletal: [ x] negative [ ] back pain [ ] myalgias [ ] arthralgias [ ] fracture  Skin:                       [ x] negative [ ] rash [ ] itch  Neurological:        [x ] negative [ ] headache [ ] dizziness [ ] syncope [ ] weakness [ ] numbness  Psychiatric:           [ x] negative [ ] anxiety [ ] depression  Endocrine:            [ x] negative [ ] diabetes [ ] thyroid problem  Heme/Lymph:      [ x] negative [ ] anemia [ ] bleeding problem  Allergic/Immune: [ x] negative [ ] itchy eyes [ ] nasal discharge [ ] hives [ ] angioedema    [ x] All other systems negative  [ ] Unable to assess ROS due to    Current Meds:  acetaminophen     Tablet .. 650 milliGRAM(s) Oral every 6 hours PRN  albuterol/ipratropium for Nebulization 3 milliLiter(s) Nebulizer every 6 hours  aluminum hydroxide/magnesium hydroxide/simethicone Suspension 30 milliLiter(s) Oral every 6 hours PRN  chlorhexidine 2% Cloths 1 Application(s) Topical daily  ferrous    sulfate 325 milliGRAM(s) Oral <User Schedule>  fluticasone propionate/ salmeterol 250-50 MICROgram(s) Diskus 1 Dose(s) Inhalation two times a day  gabapentin 100 milliGRAM(s) Oral three times a day  heparin   Injectable 4500 Unit(s) IV Push every 6 hours PRN  heparin   Injectable 2000 Unit(s) IV Push every 6 hours PRN  heparin  Infusion.  Unit(s)/Hr IV Continuous <Continuous>  oxyCODONE    IR 5 milliGRAM(s) Oral every 6 hours PRN  pantoprazole    Tablet 40 milliGRAM(s) Oral every 12 hours  polyethylene glycol 3350 17 Gram(s) Oral daily      PAST MEDICAL & SURGICAL HISTORY:  Spina bifida      Fibroids      Wound of right foot      Wound of left foot      S/P  shunt      S/P foot surgery, left      S/P foot surgery, right          Vitals:  T(F): 98.3 (03-05), Max: 98.4 (03-05)  HR: 81 (03-05) (81 - 101)  BP: 129/79 (03-05) (106/81 - 129/79)  RR: 18 (03-05)  SpO2: 99% (03-05)  I&O's Summary    04 Mar 2025 07:01  -  05 Mar 2025 07:00  --------------------------------------------------------  IN: 731 mL / OUT: 500 mL / NET: 231 mL        Physical Exam:  Appearance: No acute distress  HENT: No JVD   Cardiovascular: RRR, S1/S2, no murmurs  Respiratory: CTABL  Gastrointestinal: soft, NT ND, +BS  Musculoskeletal: No clubbing, no edema   Neurologic: Non-focal  Skin: No rashes, ecchymoses, or cyanosis                          10.7   4.23  )-----------( 329      ( 05 Mar 2025 07:42 )             32.1     03-05    135  |  105  |  8   ----------------------------<  85  4.3   |  20[L]  |  0.38[L]    Ca    8.8      05 Mar 2025 07:42  Phos  3.1     03-05  Mg     2.00     03-05      PTT - ( 06 Mar 2025 00:57 )  PTT:75.5 sec              Cardiovascular Testings:     
[facility] Attending Follow-up Note     Patient seen and examined at bedside.    Overnight Events:     no events   pending nst     REVIEW OF SYSTEMS:  Constitutional:     [x ] negative [ ] fevers [ ] chills [ ] weight loss [ ] weight gain  HEENT:                  [x ] negative [ ] dry eyes [ ] eye irritation [ ] postnasal drip [ ] nasal congestion  CV:                         [ x] negative  [ ] chest pain [ ] orthopnea [ ] palpitations [ ] murmur  Resp:                     [ x] negative [ ] cough [ ] shortness of breath [ ] dyspnea [ ] wheezing [ ] sputum [ ]hemoptysis  GI:                          [ x] negative [ ] nausea [ ] vomiting [ ] diarrhea [ ] constipation [ ] abd pain [ ] dysphagia   :                        [ x] negative [ ] dysuria [ ] nocturia [ ] hematuria [ ] increased urinary frequency  Musculoskeletal: [ x] negative [ ] back pain [ ] myalgias [ ] arthralgias [ ] fracture  Skin:                       [ x] negative [ ] rash [ ] itch  Neurological:        [x ] negative [ ] headache [ ] dizziness [ ] syncope [ ] weakness [ ] numbness  Psychiatric:           [ x] negative [ ] anxiety [ ] depression  Endocrine:            [ x] negative [ ] diabetes [ ] thyroid problem  Heme/Lymph:      [ x] negative [ ] anemia [ ] bleeding problem  Allergic/Immune: [ x] negative [ ] itchy eyes [ ] nasal discharge [ ] hives [ ] angioedema    [ x] All other systems negative  [ ] Unable to assess ROS due to    Current Meds:  acetaminophen     Tablet .. 650 milliGRAM(s) Oral every 6 hours PRN  albuterol/ipratropium for Nebulization 3 milliLiter(s) Nebulizer every 6 hours  aluminum hydroxide/magnesium hydroxide/simethicone Suspension 30 milliLiter(s) Oral every 6 hours PRN  apixaban      apixaban 5 milliGRAM(s) Oral every 12 hours  ferrous    sulfate 325 milliGRAM(s) Oral <User Schedule>  fluticasone propionate/ salmeterol 250-50 MICROgram(s) Diskus 1 Dose(s) Inhalation two times a day  gabapentin 100 milliGRAM(s) Oral three times a day  oxyCODONE    IR 5 milliGRAM(s) Oral every 6 hours PRN  pantoprazole    Tablet 40 milliGRAM(s) Oral before breakfast      PAST MEDICAL & SURGICAL HISTORY:  Spina bifida      Fibroids      Wound of right foot      Wound of left foot      S/P  shunt      S/P foot surgery, left      S/P foot surgery, right          Vitals:  T(F): 98.2 (03-03), Max: 99 (03-02)  HR: 67 (03-03) (62 - 100)  BP: 106/64 (03-03) (106/64 - 146/98)  RR: 18 (03-03)  SpO2: 100% (03-03)  I&O's Summary    01 Mar 2025 07:01  -  02 Mar 2025 07:00  --------------------------------------------------------  IN: 480 mL / OUT: 0 mL / NET: 480 mL    02 Mar 2025 07:01  -  03 Mar 2025 01:10  --------------------------------------------------------  IN: 1080 mL / OUT: 200 mL / NET: 880 mL        Physical Exam:  Appearance: No acute distress  HENT: No JVD   Cardiovascular: RRR, S1/S2, no murmurs  Respiratory: CTABL  Gastrointestinal: soft, NT ND, +BS  Musculoskeletal: No clubbing, no edema   Neurologic: Non-focal  Skin: No rashes, ecchymoses, or cyanosis                          10.7   3.67  )-----------( 297      ( 02 Mar 2025 04:29 )             32.3     03-02    137  |  104  |  14  ----------------------------<  88  4.2   |  22  |  0.59    Ca    8.8      02 Mar 2025 04:29  Phos  4.6     03-02  Mg     2.20     03-02        CARDIAC MARKERS ( 01 Mar 2025 10:00 )  x     / x     / x     / x     / <1.0 ng/mL              Cardiovascular Testings:     
Date of Service: 03-03-25 @ 11:36    Patient is a 44y old  Female who presents with a chief complaint of Chest pain (02 Mar 2025 14:52)      Any change in ROS: she seems OK:  still feels something when she eats     MEDICATIONS  (STANDING):  albuterol/ipratropium for Nebulization 3 milliLiter(s) Nebulizer every 6 hours  apixaban      apixaban 5 milliGRAM(s) Oral every 12 hours  ferrous    sulfate 325 milliGRAM(s) Oral <User Schedule>  fluticasone propionate/ salmeterol 250-50 MICROgram(s) Diskus 1 Dose(s) Inhalation two times a day  gabapentin 100 milliGRAM(s) Oral three times a day  pantoprazole    Tablet 40 milliGRAM(s) Oral before breakfast    MEDICATIONS  (PRN):  acetaminophen     Tablet .. 650 milliGRAM(s) Oral every 6 hours PRN Temp greater or equal to 38C (100.4F), Mild Pain (1 - 3)  aluminum hydroxide/magnesium hydroxide/simethicone Suspension 30 milliLiter(s) Oral every 6 hours PRN Dyspepsia  oxyCODONE    IR 5 milliGRAM(s) Oral every 6 hours PRN Severe Pain (7 - 10)    Vital Signs Last 24 Hrs  T(C): 36.9 (03 Mar 2025 04:50), Max: 37.2 (02 Mar 2025 17:45)  T(F): 98.5 (03 Mar 2025 04:50), Max: 99 (02 Mar 2025 17:45)  HR: 75 (03 Mar 2025 04:50) (62 - 91)  BP: 111/70 (03 Mar 2025 04:50) (106/64 - 135/88)  BP(mean): --  RR: 17 (03 Mar 2025 04:50) (17 - 18)  SpO2: 97% (03 Mar 2025 04:50) (97% - 100%)    Parameters below as of 03 Mar 2025 04:50  Patient On (Oxygen Delivery Method): room air        I&O's Summary    02 Mar 2025 07:01  -  03 Mar 2025 07:00  --------------------------------------------------------  IN: 1320 mL / OUT: 200 mL / NET: 1120 mL          Physical Exam:   GENERAL: NAD, well-groomed, well-developed  HEENT: NETTIE/   Atraumatic, Normocephalic  ENMT: No tonsillar erythema, exudates, or enlargement; Moist mucous membranes, Good dentition, No lesions  NECK: Supple, No JVD, Normal thyroid  CHEST/LUNG: Clear to auscultaion  CVS: Regular rate and rhythm; No murmurs, rubs, or gallops  GI: : Soft, Nontender, Nondistended; Bowel sounds present  NERVOUS SYSTEM:  Alert & Oriented X3  EXTREMITIES:  - edema  LYMPH: No lymphadenopathy noted  SKIN: No rashes or lesions  ENDOCRINOLOGY: No Thyromegaly  PSYCH: Appropriate    Labs:                              10.4   3.93  )-----------( 280      ( 03 Mar 2025 05:27 )             31.9                         10.7   3.67  )-----------( 297      ( 02 Mar 2025 04:29 )             32.3                         10.0   3.85  )-----------( 289      ( 01 Mar 2025 04:21 )             30.1                         11.4   3.41  )-----------( 317      ( 28 Feb 2025 09:50 )             34.2                         12.2   4.61  )-----------( 317      ( 27 Feb 2025 14:32 )             36.0     03-03    137  |  104  |  16  ----------------------------<  91  4.2   |  21[L]  |  0.58  03-02    137  |  104  |  14  ----------------------------<  88  4.2   |  22  |  0.59  03-01    137  |  105  |  16  ----------------------------<  82  3.9   |  22  |  0.61  02-28    138  |  105  |  11  ----------------------------<  106[H]  3.7   |  20[L]  |  0.48[L]  02-27    135  |  103  |  6[L]  ----------------------------<  88  4.4   |  18[L]  |  0.43[L]  02-27    132[L]  |  100  |  6[L]  ----------------------------<  79  6.3[HH]   |  14[L]  |  0.37[L]    Ca    8.9      03 Mar 2025 05:27  Ca    8.8      02 Mar 2025 04:29  Phos  4.4     03-03  Phos  4.6     03-02  Mg     2.10     03-03  Mg     2.20     03-02    TPro  7.2  /  Alb  3.7  /  TBili  0.3  /  DBili  x   /  AST  21  /  ALT  9   /  AlkPhos  62  02-27  TPro  7.1  /  Alb  3.6  /  TBili  0.3  /  DBili  x   /  AST  TNP  /  ALT  TNP  /  AlkPhos  62  02-27    CAPILLARY BLOOD GLUCOSE              Urinalysis Basic - ( 03 Mar 2025 05:27 )    Color: x / Appearance: x / SG: x / pH: x  Gluc: 91 mg/dL / Ketone: x  / Bili: x / Urobili: x   Blood: x / Protein: x / Nitrite: x   Leuk Esterase: x / RBC: x / WBC x   Sq Epi: x / Non Sq Epi: x / Bacteria: x        rad< from: US Pelvis Complete (03.01.25 @ 21:36) >    COMPARISON: CT abdomen and pelvis 1/16/2025    TECHNIQUE:  Transabdominal pelvic sonogram only.    FINDINGS:  Uterus: 12.6 cm. enlarged multi fibroid uterus measuring up to 7.5 cm on   the left and 6.5 cm on the right  Endometrium: 6 mm. largely obscured by fibroids    Right ovary: Not seen.  Left ovary: 2.6 cm x 1.4 cm x 2.0 cm. Within normal limits.    Fluid: None.    IMPRESSION:  Limited transabdominal exam. The patient declined the transvaginal   portion.  Enlarged multi fibroid uterus largely obscuring visualization of the   endometrium and right ovary.      --- End of Report ---            DOMITILAJOHN DUBOSE MD; Attending Radiologist  This document has been electronically signed. Mar  1 2025 10:25PM    < end of copied text >  < from: CT Head No Cont (02.28.25 @ 20:37) >  CALVARIUM: Old parietal shivam holes bilaterally.    MISCELLANEOUS: None.      IMPRESSION:  No CT evidence of acute intracranial pathology.    The ventricular system appears slitlike and decompressed, unchanged from   05/14/2024.        --- End of Report ---    < end of copied text >      RECENT CULTURES:        RESPIRATORY CULTURES:          Studies  Chest X-RAY  CT SCAN Chest   Venous Dopplers: LE:   CT Abdomen  Others              
Date of Service: 03-09-25 @ 12:03    Patient is a 44y old  Female who presents with a chief complaint of Chest pain (08 Mar 2025 17:33)      Any change in ROS: she is complaining of headache  no sob:  no wheezing:  no abd pain      MEDICATIONS  (STANDING):  albuterol/ipratropium for Nebulization 3 milliLiter(s) Nebulizer every 6 hours  apixaban 5 milliGRAM(s) Oral two times a day  chlorhexidine 2% Cloths 1 Application(s) Topical daily  ferrous    sulfate 325 milliGRAM(s) Oral <User Schedule>  fluticasone propionate/ salmeterol 250-50 MICROgram(s) Diskus 1 Dose(s) Inhalation two times a day  gabapentin 100 milliGRAM(s) Oral three times a day  pantoprazole    Tablet 40 milliGRAM(s) Oral every 12 hours    MEDICATIONS  (PRN):  acetaminophen     Tablet .. 650 milliGRAM(s) Oral every 6 hours PRN Temp greater or equal to 38C (100.4F), Moderate Pain (4 - 6)  aluminum hydroxide/magnesium hydroxide/simethicone Suspension 30 milliLiter(s) Oral every 6 hours PRN Dyspepsia    Vital Signs Last 24 Hrs  T(C): 36.6 (09 Mar 2025 11:46), Max: 36.8 (08 Mar 2025 17:44)  T(F): 97.9 (09 Mar 2025 11:46), Max: 98.3 (08 Mar 2025 17:44)  HR: 85 (09 Mar 2025 11:46) (74 - 92)  BP: 125/84 (09 Mar 2025 11:46) (120/90 - 147/96)  BP(mean): --  RR: 17 (09 Mar 2025 11:46) (16 - 18)  SpO2: 100% (09 Mar 2025 11:46) (97% - 100%)    Parameters below as of 09 Mar 2025 11:46  Patient On (Oxygen Delivery Method): room air        I&O's Summary        Physical Exam:   GENERAL: NAD, well-groomed, well-developed  HEENT: NETTIE/   Atraumatic, Normocephalic  ENMT: No tonsillar erythema, exudates, or enlargement; Moist mucous membranes, Good dentition, No lesions  NECK: Supple, No JVD, Normal thyroid  CHEST/LUNG: Clear to auscultaion  CVS: Regular rate and rhythm; No murmurs, rubs, or gallops  GI: : Soft, Nontender, Nondistended; Bowel sounds present  NERVOUS SYSTEM:  Alert & Oriented X3  EXTREMITIES:-edema  LYMPH: No lymphadenopathy noted  SKIN: No rashes or lesions  ENDOCRINOLOGY: No Thyromegaly  PSYCH: calm     Labs:    CARDIAC MARKERS ( 07 Mar 2025 16:54 )  x     / x     / x     / x     / <1.0 ng/mL                            11.2   3.91  )-----------( 350      ( 09 Mar 2025 06:50 )             34.2                         11.5   3.71  )-----------( 372      ( 08 Mar 2025 07:20 )             34.9                         10.8   3.83  )-----------( 368      ( 07 Mar 2025 09:39 )             32.8                         10.3   4.15  )-----------( 334      ( 06 Mar 2025 06:39 )             32.1     03-09    136  |  103  |  13  ----------------------------<  80  3.9   |  21[L]  |  0.49[L]  03-08    135  |  101  |  7   ----------------------------<  82  3.8   |  22  |  0.49[L]  03-07    136  |  105  |  7   ----------------------------<  76  4.5   |  21[L]  |  0.49[L]  03-06    136  |  104  |  8   ----------------------------<  86  3.9   |  21[L]  |  0.43[L]    Ca    9.0      09 Mar 2025 06:50  Ca    9.7      08 Mar 2025 07:20  Phos  4.2     03-09  Phos  4.3     03-08  Mg     1.90     03-09  Mg     1.90     03-08      CAPILLARY BLOOD GLUCOSE      rad< from: Xray Shunt Series (03.04.25 @ 08:26) >    INTERPRETATION:   CLINICAL INFORMATION: Chest pain and headache; history   of shunt.    TECHNIQUE: AP and lateral radiographs of the skull, chest, abdomen/pelvis.    COMPARISON: None    FINDINGS:  There is a  shunt coursing out of a left-sided shivam hole.    The  shunt tip is identified in the region of the lateral ventricle and   courses within the soft tissues of the  neck, left hemithorax, and   finally into the abdomen. The distal end is coiled within the left lower   quadrant. There is no discontinuity or kinks.    IMPRESSION: Normal  shunt study.      --- End of Report ---            LIANNA BROWN MD; Attending Radiologist  This document has been electronically signed. Mar  3 2025  5:07PM    < end of copied text >  < from: Xray Esophagram Single Contrast (03.05.25 @ 08:44) >    Mild delayed clearance of contrast into the stomach. into the stomach. No   hiatal hernia or gastroesophageal reflux was demonstrated during this   examination.    13 mm barium tablet was swallowed and passed into the stomach without   difficulty.    IMPRESSION:  No esophageal strictures as clinically questioned.    Tertiary contractions of the mid and distal esophagus with mild delayed   clearance of contrast into the stomach.    --- End of Report ---          CYNDI MCDOWELL MD; Resident Radiologist  This document has been electronically signed.  LIANNA BROWN MD; Attending Radiologist  This document has been electronically signed. Mar  5 2025 11:23AM    < end of copied text >  < from: Xray Shunt Series (03.04.25 @ 08:26) >    TECHNIQUE: AP and lateral radiographs of the skull, chest, abdomen/pelvis.    COMPARISON: None    FINDINGS:  There is a  shunt coursing out of a left-sided shivam hole.    The  shunt tip is identified in the region of the lateral ventricle and   courses within the soft tissues of the  neck, left hemithorax, and   finally into the abdomen. The distal end is coiled within the left lower   quadrant. There is no discontinuity or kinks.    IMPRESSION: Normal  shunt study.      --- End of Report ---            LIANNA BROWN MD; Attending Radiologist  This document has been electronically signed. Mar  3 2025  5:07PM    < end of copied text >  < from: CT Head No Cont (02.28.25 @ 20:37) >    IMPRESSION:  No CT evidence of acute intracranial pathology.    The ventricular system appears slitlike and decompressed, unchanged from   05/14/2024.    < end of copied text >  < from: CT Angio Chest PE Protocol w/ IV Cont (02.28.25 @ 20:37) >    LUNGS AND LARGE AIRWAYS: Patentcentral airways. No pulmonary nodules or   parenchymal consolidation.  PLEURA: No pleural effusion.  VESSELS: No pulmonary embolism.  HEART: Heart size is normal. No pericardial effusion.  MEDIASTINUM AND SIERRA: No lymphadenopathy.  CHEST WALL AND LOWER NECK: Partially visualized  shunt.  VISUALIZED UPPER ABDOMEN: Scattered hepatic cysts.  BONES: Within normal limits.    IMPRESSION:  No pulmonary embolism. No acute pulmonary process.    --- End of Report ---          ROSARIO CASTRO DO; Resident Radiologist  This document has been electronically signed.  BHARAT MORSE MD; Attending Radiologist  This document has been electronically signed. Mar  1 2025  7:44AM    < end of copied text >          Urinalysis Basic - ( 09 Mar 2025 06:50 )    Color: x / Appearance: x / SG: x / pH: x  Gluc: 80 mg/dL / Ketone: x  / Bili: x / Urobili: x   Blood: x / Protein: x / Nitrite: x   Leuk Esterase: x / RBC: x / WBC x   Sq Epi: x / Non Sq Epi: x / Bacteria: x            RECENT CULTURES:        RESPIRATORY CULTURES:          Studies  Chest X-RAY  CT SCAN Chest   Venous Dopplers: LE:   CT Abdomen  Others              
[facility] Attending Follow-up Note     Patient seen and examined at bedside.    Overnight Events:     events noted     REVIEW OF SYSTEMS:  Constitutional:     [x ] negative [ ] fevers [ ] chills [ ] weight loss [ ] weight gain  HEENT:                  [x ] negative [ ] dry eyes [ ] eye irritation [ ] postnasal drip [ ] nasal congestion  CV:                         [ x] negative  [ ] chest pain [ ] orthopnea [ ] palpitations [ ] murmur  Resp:                     [ x] negative [ ] cough [ ] shortness of breath [ ] dyspnea [ ] wheezing [ ] sputum [ ]hemoptysis  GI:                          [ x] negative [ ] nausea [ ] vomiting [ ] diarrhea [ ] constipation [ ] abd pain [ ] dysphagia   :                        [ x] negative [ ] dysuria [ ] nocturia [ ] hematuria [ ] increased urinary frequency  Musculoskeletal: [ x] negative [ ] back pain [ ] myalgias [ ] arthralgias [ ] fracture  Skin:                       [ x] negative [ ] rash [ ] itch  Neurological:        [x ] negative [ ] headache [ ] dizziness [ ] syncope [ ] weakness [ ] numbness  Psychiatric:           [ x] negative [ ] anxiety [ ] depression  Endocrine:            [ x] negative [ ] diabetes [ ] thyroid problem  Heme/Lymph:      [ x] negative [ ] anemia [ ] bleeding problem  Allergic/Immune: [ x] negative [ ] itchy eyes [ ] nasal discharge [ ] hives [ ] angioedema    [ x] All other systems negative  [ ] Unable to assess ROS due to    Current Meds:  acetaminophen     Tablet .. 650 milliGRAM(s) Oral every 6 hours PRN  acetaminophen     Tablet .. 500 milliGRAM(s) Oral once  albuterol/ipratropium for Nebulization 3 milliLiter(s) Nebulizer every 6 hours  aluminum hydroxide/magnesium hydroxide/simethicone Suspension 30 milliLiter(s) Oral every 6 hours PRN  apixaban 5 milliGRAM(s) Oral two times a day  chlorhexidine 2% Cloths 1 Application(s) Topical daily  ferrous    sulfate 325 milliGRAM(s) Oral <User Schedule>  fluticasone propionate/ salmeterol 250-50 MICROgram(s) Diskus 1 Dose(s) Inhalation two times a day  gabapentin 100 milliGRAM(s) Oral three times a day  pantoprazole    Tablet 40 milliGRAM(s) Oral every 12 hours      PAST MEDICAL & SURGICAL HISTORY:  Spina bifida      Fibroids      Wound of right foot      Wound of left foot      S/P  shunt      S/P foot surgery, left      S/P foot surgery, right          Vitals:  T(F): 98.1 (03-09), Max: 98.1 (03-09)  HR: 84 (03-09) (81 - 88)  BP: 130/83 (03-09) (125/84 - 138/62)  RR: 18 (03-09)  SpO2: 98% (03-09)  I&O's Summary      Physical Exam:  Appearance: No acute distress  HENT: No JVD   Cardiovascular: RRR, S1/S2, no murmurs  Respiratory: CTABL  Gastrointestinal: soft, NT ND, +BS  Musculoskeletal: No clubbing, no edema   Neurologic: Non-focal  Skin: No rashes, ecchymoses, or cyanosis                          11.2   3.91  )-----------( 350      ( 09 Mar 2025 06:50 )             34.2     03-09    136  |  103  |  13  ----------------------------<  80  3.9   |  21[L]  |  0.49[L]    Ca    9.0      09 Mar 2025 06:50  Phos  4.2     03-09  Mg     1.90     03-09                    Cardiovascular Testings:     
Patient is a 44y old  Female who presents with a chief complaint of Chest pain (05 Mar 2025 13:40)      SUBJECTIVE / OVERNIGHT EVENTS:    Events noted.  CONSTITUTIONAL: No fever,  or fatigue  RESPIRATORY: No cough, wheezing,  No shortness of breath  CARDIOVASCULAR: No chest pain, palpitations, dizziness, or leg swelling  GASTROINTESTINAL: Dyspepsia/Pain in epi  NEUROLOGICAL: No headache    MEDICATIONS  (STANDING):  albuterol/ipratropium for Nebulization 3 milliLiter(s) Nebulizer every 6 hours  chlorhexidine 2% Cloths 1 Application(s) Topical daily  ferrous    sulfate 325 milliGRAM(s) Oral <User Schedule>  fluticasone propionate/ salmeterol 250-50 MICROgram(s) Diskus 1 Dose(s) Inhalation two times a day  gabapentin 100 milliGRAM(s) Oral three times a day  heparin  Infusion.  Unit(s)/Hr (11 mL/Hr) IV Continuous <Continuous>  pantoprazole    Tablet 40 milliGRAM(s) Oral every 12 hours  polyethylene glycol 3350 17 Gram(s) Oral daily    MEDICATIONS  (PRN):  acetaminophen     Tablet .. 650 milliGRAM(s) Oral every 6 hours PRN Temp greater or equal to 38C (100.4F), Mild Pain (1 - 3)  aluminum hydroxide/magnesium hydroxide/simethicone Suspension 30 milliLiter(s) Oral every 6 hours PRN Dyspepsia  heparin   Injectable 4500 Unit(s) IV Push every 6 hours PRN For aPTT less than 40  heparin   Injectable 2000 Unit(s) IV Push every 6 hours PRN For aPTT between 40 - 57  oxyCODONE    IR 5 milliGRAM(s) Oral every 6 hours PRN Severe Pain (7 - 10)        CAPILLARY BLOOD GLUCOSE        I&O's Summary    04 Mar 2025 07:01  -  05 Mar 2025 07:00  --------------------------------------------------------  IN: 731 mL / OUT: 500 mL / NET: 231 mL        T(C): 36.9 (03-05-25 @ 13:08), Max: 37 (03-04-25 @ 20:45)  HR: 84 (03-05-25 @ 13:08) (62 - 101)  BP: 106/81 (03-05-25 @ 13:08) (106/81 - 136/87)  RR: 17 (03-05-25 @ 13:08) (17 - 18)  SpO2: 99% (03-05-25 @ 13:08) (95% - 100%)    PHYSICAL EXAM:  GENERAL: NAD  NECK: Supple, No JVD  CHEST/LUNG: Clear to auscultation bilaterally; No wheezing.  HEART: Regular rate and rhythm; No murmurs, rubs, or gallops  ABDOMEN: Soft, Nontender, Nondistended; Bowel sounds present  EXTREMITIES:   No edema  NEUROLOGY: AAO X 3      LABS:                        10.7   4.23  )-----------( 329      ( 05 Mar 2025 07:42 )             32.1     03-05    135  |  105  |  8   ----------------------------<  85  4.3   |  20[L]  |  0.38[L]    Ca    8.8      05 Mar 2025 07:42  Phos  3.1     03-05  Mg     2.00     03-05      PTT - ( 05 Mar 2025 16:59 )  PTT:88.9 sec      Urinalysis Basic - ( 05 Mar 2025 07:42 )    Color: x / Appearance: x / SG: x / pH: x  Gluc: 85 mg/dL / Ketone: x  / Bili: x / Urobili: x   Blood: x / Protein: x / Nitrite: x   Leuk Esterase: x / RBC: x / WBC x   Sq Epi: x / Non Sq Epi: x / Bacteria: x      CAPILLARY BLOOD GLUCOSE            RADIOLOGY & ADDITIONAL TESTS:    Imaging Personally Reviewed:    Consultant(s) Notes Reviewed:      Care Discussed with Consultants/Other Providers:    Felipe Henry MD, CMD, FACP    [address] [place] [address]  Office Tel: [phone]  Cell: [phone]  
Patient is a 44y old  Female who presents with a chief complaint of Chest pain (06 Mar 2025 12:06)      SUBJECTIVE / OVERNIGHT EVENTS:    Events noted.  CONSTITUTIONAL: No fever,  or fatigue  RESPIRATORY: No cough, wheezing,  No shortness of breath  CARDIOVASCULAR: No chest pain, palpitations, dizziness, or leg swelling  GASTROINTESTINAL: Burning pain in epi      MEDICATIONS  (STANDING):  albuterol/ipratropium for Nebulization 3 milliLiter(s) Nebulizer every 6 hours  chlorhexidine 2% Cloths 1 Application(s) Topical daily  ferrous    sulfate 325 milliGRAM(s) Oral <User Schedule>  fluticasone propionate/ salmeterol 250-50 MICROgram(s) Diskus 1 Dose(s) Inhalation two times a day  gabapentin 100 milliGRAM(s) Oral three times a day  pantoprazole    Tablet 40 milliGRAM(s) Oral every 12 hours  polyethylene glycol 3350 17 Gram(s) Oral daily    MEDICATIONS  (PRN):  acetaminophen     Tablet .. 650 milliGRAM(s) Oral every 6 hours PRN Temp greater or equal to 38C (100.4F), Mild Pain (1 - 3)  aluminum hydroxide/magnesium hydroxide/simethicone Suspension 30 milliLiter(s) Oral every 6 hours PRN Dyspepsia  oxyCODONE    IR 5 milliGRAM(s) Oral every 6 hours PRN Severe Pain (7 - 10)        CAPILLARY BLOOD GLUCOSE        I&O's Summary      T(C): 36.8 (03-06-25 @ 18:00), Max: 37 (03-06-25 @ 15:17)  HR: 77 (03-06-25 @ 18:00) (69 - 90)  BP: 138/92 (03-06-25 @ 18:00) (105/117 - 144/96)  RR: 17 (03-06-25 @ 18:00) (12 - 20)  SpO2: 97% (03-06-25 @ 18:00) (97% - 100%)    PHYSICAL EXAM:    NECK: Supple, No JVD  CHEST/LUNG: Clear to auscultation bilaterally; No wheezing.  HEART: Regular rate and rhythm; No murmurs, rubs, or gallops  ABDOMEN: Soft, Nontender, Nondistended; Bowel sounds present  EXTREMITIES:   No edema  NEUROLOGY: AAO X 3      LABS:                        10.3   4.15  )-----------( 334      ( 06 Mar 2025 06:39 )             32.1     03-06    136  |  104  |  8   ----------------------------<  86  3.9   |  21[L]  |  0.43[L]    Ca    8.4      06 Mar 2025 06:39  Phos  3.7     03-06  Mg     2.20     03-06      PTT - ( 06 Mar 2025 00:57 )  PTT:75.5 sec      Urinalysis Basic - ( 06 Mar 2025 06:39 )    Color: x / Appearance: x / SG: x / pH: x  Gluc: 86 mg/dL / Ketone: x  / Bili: x / Urobili: x   Blood: x / Protein: x / Nitrite: x   Leuk Esterase: x / RBC: x / WBC x   Sq Epi: x / Non Sq Epi: x / Bacteria: x      CAPILLARY BLOOD GLUCOSE            RADIOLOGY & ADDITIONAL TESTS:    Imaging Personally Reviewed:    Consultant(s) Notes Reviewed:      Care Discussed with Consultants/Other Providers:    Felipe Henry MD, CMD, FACP    [address] [place] [address]  Office Tel: [phone]  Cell: [phone]  
Patient is a 44y old  Female who presents with a chief complaint of Chest pain (07 Mar 2025 13:09)      INTERVAL HPI/OVERNIGHT EVENTS: seen and examined, NAD   T(C): 36.8 (03-07-25 @ 17:50), Max: 37 (03-07-25 @ 05:30)  HR: 77 (03-07-25 @ 17:50) (67 - 86)  BP: 118/80 (03-07-25 @ 17:50) (115/59 - 134/88)  RR: 17 (03-07-25 @ 17:50) (17 - 17)  SpO2: 98% (03-07-25 @ 17:50) (97% - 100%)  Wt(kg): --  I&O's Summary      PAST MEDICAL & SURGICAL HISTORY:  Spina bifida      Fibroids      Wound of right foot      Wound of left foot      S/P  shunt      S/P foot surgery, left      S/P foot surgery, right          SOCIAL HISTORY  Alcohol:  Tobacco:  Illicit substance use:    FAMILY HISTORY:    REVIEW OF SYSTEMS:  CONSTITUTIONAL: No fever, weight loss, or fatigue  EYES: No eye pain, visual disturbances, or discharge  ENMT:  No difficulty hearing, tinnitus, vertigo; No sinus or throat pain  NECK: No pain or stiffness  RESPIRATORY: No cough, wheezing, chills or hemoptysis; No shortness of breath  CARDIOVASCULAR: No chest pain, palpitations, dizziness, or leg swelling  GASTROINTESTINAL: No abdominal or epigastric pain. No nausea, vomiting, or hematemesis; No diarrhea or constipation. No melena or hematochezia.  GENITOURINARY: No dysuria, frequency, hematuria, or incontinence  NEUROLOGICAL: No headaches, memory loss, loss of strength, numbness, or tremors  SKIN: No itching, burning, rashes, or lesions   LYMPH NODES: No enlarged glands  ENDOCRINE: No heat or cold intolerance; No hair loss  MUSCULOSKELETAL: No joint pain or swelling; No muscle, back, or extremity pain  PSYCHIATRIC: No depression, anxiety, mood swings, or difficulty sleeping  HEME/LYMPH: No easy bruising, or bleeding gums  ALLERY AND IMMUNOLOGIC: No hives or eczema    RADIOLOGY & ADDITIONAL TESTS:    Imaging Personally Reviewed:  [ ] YES  [ ] NO    Consultant(s) Notes Reviewed:  [ ] YES  [ ] NO    PHYSICAL EXAM:  GENERAL: NAD, well-groomed, well-developed  HEAD:  Atraumatic, Normocephalic  EYES: EOMI, PERRLA, conjunctiva and sclera clear  ENMT: No tonsillar erythema, exudates, or enlargement; Moist mucous membranes, Good dentition, No lesions  NECK: Supple, No JVD, Normal thyroid  NERVOUS SYSTEM:  Alert & Oriented X3, Good concentration; Motor Strength 5/5 B/L upper and lower extremities; DTRs 2+ intact and symmetric  CHEST/LUNG: Clear to percussion bilaterally; No rales, rhonchi, wheezing, or rubs  HEART: Regular rate and rhythm; No murmurs, rubs, or gallops  ABDOMEN: Soft, Nontender, Nondistended; Bowel sounds present  EXTREMITIES:  2+ Peripheral Pulses, No clubbing, cyanosis, or edema  LYMPH: No lymphadenopathy noted  SKIN: No rashes or lesions    LABS:                        10.8   3.83  )-----------( 368      ( 07 Mar 2025 09:39 )             32.8     03-07    136  |  105  |  7   ----------------------------<  76  4.5   |  21[L]  |  0.49[L]    Ca    9.3      07 Mar 2025 09:39  Phos  3.5     03-07  Mg     2.10     03-07      PTT - ( 06 Mar 2025 00:57 )  PTT:75.5 sec  Urinalysis Basic - ( 07 Mar 2025 09:39 )    Color: x / Appearance: x / SG: x / pH: x  Gluc: 76 mg/dL / Ketone: x  / Bili: x / Urobili: x   Blood: x / Protein: x / Nitrite: x   Leuk Esterase: x / RBC: x / WBC x   Sq Epi: x / Non Sq Epi: x / Bacteria: x      CAPILLARY BLOOD GLUCOSE            Urinalysis Basic - ( 07 Mar 2025 09:39 )    Color: x / Appearance: x / SG: x / pH: x  Gluc: 76 mg/dL / Ketone: x  / Bili: x / Urobili: x   Blood: x / Protein: x / Nitrite: x   Leuk Esterase: x / RBC: x / WBC x   Sq Epi: x / Non Sq Epi: x / Bacteria: x        MEDICATIONS  (STANDING):  albuterol/ipratropium for Nebulization 3 milliLiter(s) Nebulizer every 6 hours  apixaban 5 milliGRAM(s) Oral two times a day  chlorhexidine 2% Cloths 1 Application(s) Topical daily  ferrous    sulfate 325 milliGRAM(s) Oral <User Schedule>  fluticasone propionate/ salmeterol 250-50 MICROgram(s) Diskus 1 Dose(s) Inhalation two times a day  gabapentin 100 milliGRAM(s) Oral three times a day  pantoprazole    Tablet 40 milliGRAM(s) Oral every 12 hours    MEDICATIONS  (PRN):  acetaminophen     Tablet .. 650 milliGRAM(s) Oral every 6 hours PRN Temp greater or equal to 38C (100.4F), Moderate Pain (4 - 6)  aluminum hydroxide/magnesium hydroxide/simethicone Suspension 30 milliLiter(s) Oral every 6 hours PRN Dyspepsia      Care Discussed with Consultants/Other Providers [ ] YES  [ ] NO
Patient is a 44y old  Female who presents with a chief complaint of Chest pain (01 Mar 2025 18:39)      SUBJECTIVE / OVERNIGHT EVENTS:    Events noted.  CONSTITUTIONAL: No fever,  or fatigue  RESPIRATORY: No cough, wheezing,  No shortness of breath  CARDIOVASCULAR:  Chest tightness  GASTROINTESTINAL: No abdominal or epigastric pain. No nausea, vomiting.      MEDICATIONS  (STANDING):  albuterol/ipratropium for Nebulization 3 milliLiter(s) Nebulizer every 6 hours  apixaban      apixaban 5 milliGRAM(s) Oral every 12 hours  ferrous    sulfate 325 milliGRAM(s) Oral <User Schedule>  fluticasone propionate/ salmeterol 250-50 MICROgram(s) Diskus 1 Dose(s) Inhalation two times a day  gabapentin 100 milliGRAM(s) Oral three times a day  pantoprazole    Tablet 40 milliGRAM(s) Oral before breakfast    MEDICATIONS  (PRN):  acetaminophen     Tablet .. 650 milliGRAM(s) Oral every 6 hours PRN Temp greater or equal to 38C (100.4F), Mild Pain (1 - 3)  aluminum hydroxide/magnesium hydroxide/simethicone Suspension 30 milliLiter(s) Oral every 6 hours PRN Dyspepsia  oxyCODONE    IR 5 milliGRAM(s) Oral every 6 hours PRN Severe Pain (7 - 10)        CAPILLARY BLOOD GLUCOSE        I&O's Summary    28 Feb 2025 07:01  -  01 Mar 2025 07:00  --------------------------------------------------------  IN: 240 mL / OUT: 200 mL / NET: 40 mL    01 Mar 2025 07:01  -  01 Mar 2025 21:06  --------------------------------------------------------  IN: 480 mL / OUT: 0 mL / NET: 480 mL        T(C): 36.9 (03-01-25 @ 20:58), Max: 37.2 (03-01-25 @ 04:17)  HR: 65 (03-01-25 @ 20:58) (63 - 77)  BP: 150/88 (03-01-25 @ 20:58) (111/70 - 150/88)  RR: 18 (03-01-25 @ 20:58) (17 - 19)  SpO2: 100% (03-01-25 @ 20:58) (100% - 100%)    PHYSICAL EXAM:  GENERAL: NAD  NECK: Supple, No JVD  CHEST/LUNG: Clear to auscultation bilaterally; No wheezing.  HEART: Regular rate and rhythm; No murmurs, rubs, or gallops  ABDOMEN: Soft, Nontender, Nondistended; Bowel sounds present  EXTREMITIES:   No edema  NEUROLOGY: AAO X 3      LABS:                        10.0   3.85  )-----------( 289      ( 01 Mar 2025 04:21 )             30.1     03-01    137  |  105  |  16  ----------------------------<  82  3.9   |  22  |  0.61    Ca    8.3[L]      01 Mar 2025 04:21  Phos  3.7     03-01  Mg     2.40     03-01        CARDIAC MARKERS ( 01 Mar 2025 10:00 )  x     / x     / x     / x     / <1.0 ng/mL  CARDIAC MARKERS ( 28 Feb 2025 09:50 )  x     / x     / x     / x     / <1.0 ng/mL      Urinalysis Basic - ( 01 Mar 2025 04:21 )    Color: x / Appearance: x / SG: x / pH: x  Gluc: 82 mg/dL / Ketone: x  / Bili: x / Urobili: x   Blood: x / Protein: x / Nitrite: x   Leuk Esterase: x / RBC: x / WBC x   Sq Epi: x / Non Sq Epi: x / Bacteria: x      CAPILLARY BLOOD GLUCOSE            RADIOLOGY & ADDITIONAL TESTS:    Imaging Personally Reviewed:    Consultant(s) Notes Reviewed:      Care Discussed with Consultants/Other Providers:    Felipe Henry MD, CMD, FACP    [address] [place] [address]  Office Tel: [phone]  Cell: [phone]  
Patient is a 44y old  Female who presents with a chief complaint of Chest pain (01 Mar 2025 18:39)      SUBJECTIVE / OVERNIGHT EVENTS:    Events noted.  CONSTITUTIONAL: No fever,  or fatigue  RESPIRATORY: No cough, wheezing,  No shortness of breath  CARDIOVASCULAR:  Chest tightness  GASTROINTESTINAL: No abdominal or epigastric pain. No nausea, vomiting.      T(C): 36.6 (03-03-25 @ 21:08), Max: 37.1 (03-03-25 @ 13:01)  HR: 76 (03-03-25 @ 21:08) (75 - 94)  BP: 139/68 (03-03-25 @ 21:08) (139/68 - 157/98)  RR: 17 (03-03-25 @ 21:08) (17 - 18)  SpO2: 98% (03-03-25 @ 21:08) (97% - 100%)          I&O's Summary    28 Feb 2025 07:01  -  01 Mar 2025 07:00  --------------------------------------------------------  IN: 240 mL / OUT: 200 mL / NET: 40 mL    01 Mar 2025 07:01  -  01 Mar 2025 21:06  --------------------------------------------------------  IN: 480 mL / OUT: 0 mL / NET: 480 mL        T(C): 36.9 (03-01-25 @ 20:58), Max: 37.2 (03-01-25 @ 04:17)  HR: 65 (03-01-25 @ 20:58) (63 - 77)  BP: 150/88 (03-01-25 @ 20:58) (111/70 - 150/88)  RR: 18 (03-01-25 @ 20:58) (17 - 19)  SpO2: 100% (03-01-25 @ 20:58) (100% - 100%)    PHYSICAL EXAM:  GENERAL: NAD  NECK: Supple, No JVD  CHEST/LUNG: Clear to auscultation bilaterally; No wheezing.  HEART: Regular rate and rhythm; No murmurs, rubs, or gallops  ABDOMEN: Soft, Nontender, Nondistended; Bowel sounds present  EXTREMITIES:   No edema  NEUROLOGY: AAO X 3                          10.4   3.93  )-----------( 280      ( 03 Mar 2025 05:27 )             31.9               137|104|16<91  4.2|21|0.58  8.9,2.10,4.4  03-03 @ 05:27    CARDIAC MARKERS ( 01 Mar 2025 10:00 )  x     / x     / x     / x     / <1.0 ng/mL  CARDIAC MARKERS ( 28 Feb 2025 09:50 )  x     / x     / x     / x     / <1.0 ng/mL      Urinalysis Basic - ( 01 Mar 2025 04:21 )    Color: x / Appearance: x / SG: x / pH: x  Gluc: 82 mg/dL / Ketone: x  / Bili: x / Urobili: x   Blood: x / Protein: x / Nitrite: x   Leuk Esterase: x / RBC: x / WBC x   Sq Epi: x / Non Sq Epi: x / Bacteria: x      CAPILLARY BLOOD GLUCOSE            RADIOLOGY & ADDITIONAL TESTS:    Imaging Personally Reviewed:    Consultant(s) Notes Reviewed:      Care Discussed with Consultants/Other Providers:    Felipe Henry MD, CMD, FACP    [address] [place] [address]  Office Tel: [phone]  Cell: [phone]  
Patient is a 44y old  Female who presents with a chief complaint of Chest pain (08 Mar 2025 12:43)      SUBJECTIVE / OVERNIGHT EVENTS:    Events noted.  CONSTITUTIONAL: No fever,  or fatigue  RESPIRATORY: No cough, wheezing,  No shortness of breath  CARDIOVASCULAR: No chest pain, palpitations, dizziness, or leg swelling  GASTROINTESTINAL: No abdominal or epigastric pain. No nausea, vomiting.  NEUROLOGICAL: Headache    MEDICATIONS  (STANDING):  albuterol/ipratropium for Nebulization 3 milliLiter(s) Nebulizer every 6 hours  apixaban 5 milliGRAM(s) Oral two times a day  chlorhexidine 2% Cloths 1 Application(s) Topical daily  ferrous    sulfate 325 milliGRAM(s) Oral <User Schedule>  fluticasone propionate/ salmeterol 250-50 MICROgram(s) Diskus 1 Dose(s) Inhalation two times a day  gabapentin 100 milliGRAM(s) Oral three times a day  pantoprazole    Tablet 40 milliGRAM(s) Oral every 12 hours    MEDICATIONS  (PRN):  acetaminophen     Tablet .. 650 milliGRAM(s) Oral every 6 hours PRN Temp greater or equal to 38C (100.4F), Moderate Pain (4 - 6)  aluminum hydroxide/magnesium hydroxide/simethicone Suspension 30 milliLiter(s) Oral every 6 hours PRN Dyspepsia        CAPILLARY BLOOD GLUCOSE        I&O's Summary      T(C): 36.8 (03-08-25 @ 17:44), Max: 36.8 (03-08-25 @ 05:00)  HR: 91 (03-08-25 @ 17:44) (72 - 92)  BP: 120/90 (03-08-25 @ 17:44) (109/68 - 132/88)  RR: 17 (03-08-25 @ 17:44) (16 - 18)  SpO2: 98% (03-08-25 @ 17:44) (96% - 100%)    PHYSICAL EXAM:  GENERAL: NAD  NECK: Supple, No JVD  CHEST/LUNG: Clear to auscultation bilaterally; No wheezing.  HEART: Regular rate and rhythm; No murmurs, rubs, or gallops  ABDOMEN: Soft, Nontender, Nondistended; Bowel sounds present  EXTREMITIES:   No edema  NEUROLOGY: AAO X 3      LABS:                        11.5   3.71  )-----------( 372      ( 08 Mar 2025 07:20 )             34.9     03-08    135  |  101  |  7   ----------------------------<  82  3.8   |  22  |  0.49[L]    Ca    9.7      08 Mar 2025 07:20  Phos  4.3     03-08  Mg     1.90     03-08        CARDIAC MARKERS ( 07 Mar 2025 16:54 )  x     / x     / x     / x     / <1.0 ng/mL      Urinalysis Basic - ( 08 Mar 2025 07:20 )    Color: x / Appearance: x / SG: x / pH: x  Gluc: 82 mg/dL / Ketone: x  / Bili: x / Urobili: x   Blood: x / Protein: x / Nitrite: x   Leuk Esterase: x / RBC: x / WBC x   Sq Epi: x / Non Sq Epi: x / Bacteria: x      CAPILLARY BLOOD GLUCOSE            RADIOLOGY & ADDITIONAL TESTS:    Imaging Personally Reviewed:    Consultant(s) Notes Reviewed:      Care Discussed with Consultants/Other Providers:    Felipe Henry MD, CMD, FACP    [address] [place] [address]  Office Tel: [phone]  Cell: [phone]  
Patient is a 44y old  Female who presents with a chief complaint of Chest pain (09 Mar 2025 12:03)      SUBJECTIVE / OVERNIGHT EVENTS:    Events noted.  CONSTITUTIONAL: LE neuropathic pain  RESPIRATORY: No cough, wheezing,  No shortness of breath  CARDIOVASCULAR: No chest pain, palpitations, dizziness, or leg swelling  GASTROINTESTINAL: No abdominal or epigastric pain. No nausea, vomiting.  NEUROLOGICAL: No headache    MEDICATIONS  (STANDING):  acetaminophen     Tablet .. 500 milliGRAM(s) Oral once  albuterol/ipratropium for Nebulization 3 milliLiter(s) Nebulizer every 6 hours  apixaban 5 milliGRAM(s) Oral two times a day  chlorhexidine 2% Cloths 1 Application(s) Topical daily  ferrous    sulfate 325 milliGRAM(s) Oral <User Schedule>  fluticasone propionate/ salmeterol 250-50 MICROgram(s) Diskus 1 Dose(s) Inhalation two times a day  gabapentin 100 milliGRAM(s) Oral three times a day  pantoprazole    Tablet 40 milliGRAM(s) Oral every 12 hours    MEDICATIONS  (PRN):  acetaminophen     Tablet .. 650 milliGRAM(s) Oral every 6 hours PRN Temp greater or equal to 38C (100.4F), Moderate Pain (4 - 6)  aluminum hydroxide/magnesium hydroxide/simethicone Suspension 30 milliLiter(s) Oral every 6 hours PRN Dyspepsia        CAPILLARY BLOOD GLUCOSE        I&O's Summary      T(C): 36.7 (03-09-25 @ 20:30), Max: 36.7 (03-09-25 @ 05:00)  HR: 84 (03-09-25 @ 20:30) (81 - 88)  BP: 130/83 (03-09-25 @ 20:30) (125/84 - 138/62)  RR: 18 (03-09-25 @ 20:30) (16 - 18)  SpO2: 98% (03-09-25 @ 20:30) (97% - 100%)    PHYSICAL EXAM:  GENERAL: NAD  NECK: Supple, No JVD  CHEST/LUNG: Clear to auscultation bilaterally; No wheezing.  HEART: Regular rate and rhythm; No murmurs, rubs, or gallops  ABDOMEN: Soft, Nontender, Nondistended; Bowel sounds present  EXTREMITIES:   No edema  NEUROLOGY: AAO X 3      LABS:                        11.2   3.91  )-----------( 350      ( 09 Mar 2025 06:50 )             34.2     03-09    136  |  103  |  13  ----------------------------<  80  3.9   |  21[L]  |  0.49[L]    Ca    9.0      09 Mar 2025 06:50  Phos  4.2     03-09  Mg     1.90     03-09            Urinalysis Basic - ( 09 Mar 2025 06:50 )    Color: x / Appearance: x / SG: x / pH: x  Gluc: 80 mg/dL / Ketone: x  / Bili: x / Urobili: x   Blood: x / Protein: x / Nitrite: x   Leuk Esterase: x / RBC: x / WBC x   Sq Epi: x / Non Sq Epi: x / Bacteria: x      CAPILLARY BLOOD GLUCOSE            RADIOLOGY & ADDITIONAL TESTS:    Imaging Personally Reviewed:    Consultant(s) Notes Reviewed:      Care Discussed with Consultants/Other Providers:    Felipe Henry MD, CMD, FACP    [address] [place] [address]  Office Tel: [phone]  Cell: [phone]  
Patient is a 44y old  Female who presents with a chief complaint of Chest pain (01 Mar 2025 18:39)      SUBJECTIVE / OVERNIGHT EVENTS:    Events noted.  CONSTITUTIONAL: No fever,  or fatigue  RESPIRATORY: No cough, wheezing,  No shortness of breath  CARDIOVASCULAR:  Chest tightness  GASTROINTESTINAL: No abdominal or epigastric pain. No nausea, vomiting.      MEDICATIONS  (STANDING):  albuterol/ipratropium for Nebulization 3 milliLiter(s) Nebulizer every 6 hours  apixaban      apixaban 5 milliGRAM(s) Oral every 12 hours  ferrous    sulfate 325 milliGRAM(s) Oral <User Schedule>  fluticasone propionate/ salmeterol 250-50 MICROgram(s) Diskus 1 Dose(s) Inhalation two times a day  gabapentin 100 milliGRAM(s) Oral three times a day  pantoprazole    Tablet 40 milliGRAM(s) Oral before breakfast    MEDICATIONS  (PRN):  acetaminophen     Tablet .. 650 milliGRAM(s) Oral every 6 hours PRN Temp greater or equal to 38C (100.4F), Mild Pain (1 - 3)  aluminum hydroxide/magnesium hydroxide/simethicone Suspension 30 milliLiter(s) Oral every 6 hours PRN Dyspepsia  oxyCODONE    IR 5 milliGRAM(s) Oral every 6 hours PRN Severe Pain (7 - 10)        CAPILLARY BLOOD GLUCOSE        I&O's Summary    28 Feb 2025 07:01  -  01 Mar 2025 07:00  --------------------------------------------------------  IN: 240 mL / OUT: 200 mL / NET: 40 mL    01 Mar 2025 07:01  -  01 Mar 2025 21:06  --------------------------------------------------------  IN: 480 mL / OUT: 0 mL / NET: 480 mL        T(C): 36.9 (03-01-25 @ 20:58), Max: 37.2 (03-01-25 @ 04:17)  HR: 65 (03-01-25 @ 20:58) (63 - 77)  BP: 150/88 (03-01-25 @ 20:58) (111/70 - 150/88)  RR: 18 (03-01-25 @ 20:58) (17 - 19)  SpO2: 100% (03-01-25 @ 20:58) (100% - 100%)    PHYSICAL EXAM:  GENERAL: NAD  NECK: Supple, No JVD  CHEST/LUNG: Clear to auscultation bilaterally; No wheezing.  HEART: Regular rate and rhythm; No murmurs, rubs, or gallops  ABDOMEN: Soft, Nontender, Nondistended; Bowel sounds present  EXTREMITIES:   No edema  NEUROLOGY: AAO X 3      LABS:                        10.0   3.85  )-----------( 289      ( 01 Mar 2025 04:21 )             30.1     03-01    137  |  105  |  16  ----------------------------<  82  3.9   |  22  |  0.61    Ca    8.3[L]      01 Mar 2025 04:21  Phos  3.7     03-01  Mg     2.40     03-01        CARDIAC MARKERS ( 01 Mar 2025 10:00 )  x     / x     / x     / x     / <1.0 ng/mL  CARDIAC MARKERS ( 28 Feb 2025 09:50 )  x     / x     / x     / x     / <1.0 ng/mL      Urinalysis Basic - ( 01 Mar 2025 04:21 )    Color: x / Appearance: x / SG: x / pH: x  Gluc: 82 mg/dL / Ketone: x  / Bili: x / Urobili: x   Blood: x / Protein: x / Nitrite: x   Leuk Esterase: x / RBC: x / WBC x   Sq Epi: x / Non Sq Epi: x / Bacteria: x      CAPILLARY BLOOD GLUCOSE            RADIOLOGY & ADDITIONAL TESTS:    Imaging Personally Reviewed:    Consultant(s) Notes Reviewed:      Care Discussed with Consultants/Other Providers:    Felipe Henry MD, CMD, FACP    [address] [place] [address]  Office Tel: [phone]  Cell: [phone]  
Patient is a 44y old  Female who presents with a chief complaint of Chest pain (28 Feb 2025 11:07)      SUBJECTIVE / OVERNIGHT EVENTS:    Events noted.  CONSTITUTIONAL: No fever,  or fatigue  RESPIRATORY: No cough, wheezing,  On supp O2  CARDIOVASCULAR: No chest pain, palpitations, dizziness, or leg swelling  GASTROINTESTINAL: No abdominal or epigastric pain. No nausea, vomiting.  NEUROLOGICAL: No headache    MEDICATIONS  (STANDING):  acetaminophen   IVPB .. 1000 milliGRAM(s) IV Intermittent once  apixaban      apixaban 5 milliGRAM(s) Oral every 12 hours  ferrous    sulfate 325 milliGRAM(s) Oral <User Schedule>  gabapentin 100 milliGRAM(s) Oral three times a day  pantoprazole    Tablet 40 milliGRAM(s) Oral before breakfast    MEDICATIONS  (PRN):  acetaminophen     Tablet .. 650 milliGRAM(s) Oral every 6 hours PRN Temp greater or equal to 38C (100.4F), Mild Pain (1 - 3)  aluminum hydroxide/magnesium hydroxide/simethicone Suspension 30 milliLiter(s) Oral every 6 hours PRN Dyspepsia  oxyCODONE    IR 5 milliGRAM(s) Oral every 6 hours PRN Severe Pain (7 - 10)        CAPILLARY BLOOD GLUCOSE        I&O's Summary    28 Feb 2025 07:01  -  28 Feb 2025 21:39  --------------------------------------------------------  IN: 0 mL / OUT: 0 mL / NET: 0 mL        T(C): 36.7 (02-28-25 @ 20:09), Max: 37 (02-28-25 @ 16:25)  HR: 74 (02-28-25 @ 20:09) (71 - 83)  BP: 115/72 (02-28-25 @ 20:09) (105/75 - 148/96)  RR: 18 (02-28-25 @ 20:09) (14 - 19)  SpO2: 100% (02-28-25 @ 20:09) (99% - 100%)    PHYSICAL EXAM:  GENERAL: NAD  NECK: Supple, No JVD  CHEST/LUNG: Clear to auscultation bilaterally; No wheezing.  HEART: Regular rate and rhythm; No murmurs, rubs, or gallops  ABDOMEN: Soft, Nontender, Nondistended; Bowel sounds present  EXTREMITIES:   No edema  NEUROLOGY: AAO X 3      LABS:                        11.4   3.41  )-----------( 317      ( 28 Feb 2025 09:50 )             34.2     02-28    138  |  105  |  11  ----------------------------<  106[H]  3.7   |  20[L]  |  0.48[L]    Ca    8.9      28 Feb 2025 09:50  Phos  2.6     02-28  Mg     2.30     02-28    TPro  7.2  /  Alb  3.7  /  TBili  0.3  /  DBili  x   /  AST  21  /  ALT  9   /  AlkPhos  62  02-27      CARDIAC MARKERS ( 28 Feb 2025 09:50 )  x     / x     / x     / x     / <1.0 ng/mL      Urinalysis Basic - ( 28 Feb 2025 09:50 )    Color: x / Appearance: x / SG: x / pH: x  Gluc: 106 mg/dL / Ketone: x  / Bili: x / Urobili: x   Blood: x / Protein: x / Nitrite: x   Leuk Esterase: x / RBC: x / WBC x   Sq Epi: x / Non Sq Epi: x / Bacteria: x      CAPILLARY BLOOD GLUCOSE            RADIOLOGY & ADDITIONAL TESTS:    Imaging Personally Reviewed:    Consultant(s) Notes Reviewed:      Care Discussed with Consultants/Other Providers:    Felipe Henry MD, CMD, FACP    [address] [place] [address]  Office Tel: [phone]  Cell: [phone]

## 2025-03-10 NOTE — DISCHARGE NOTE NURSING/CASE MANAGEMENT/SOCIAL WORK - NSDCFUADDAPPT_GEN_ALL_CORE_FT
Podiatry Discharge Instructions:  Follow up: Please follow up with Dr. Waterhouse within 1 week of discharge from the hospital, please call [phone] for appointment and discuss that you recently were seen in the hospital.  Wound Care: Please apply betadine and allevyn pad to right foot wound daily  Weight bearing: Please weight bear as tolerated   Antibiotics: Please continue as instructed.  Please follow up outpatient with Gastroenterology in 1 week following discharge for Manometry Studies

## 2025-03-10 NOTE — DISCHARGE NOTE NURSING/CASE MANAGEMENT/SOCIAL WORK - FINANCIAL ASSISTANCE
[facility] provides services at a reduced cost to those who are determined to be eligible through [facility]’s financial assistance program. Information regarding [facility]’s financial assistance program can be found by going to [email or web address] or by calling [phone].

## 2025-03-10 NOTE — DISCHARGE NOTE NURSING/CASE MANAGEMENT/SOCIAL WORK - NSDCPEFALRISK_GEN_ALL_CORE
For information on Fall & Injury Prevention, visit: [email or web address] OR  [email or web address] OR  https://www.cdc.gov/steadi/patient.html

## 2025-03-10 NOTE — CHART NOTE - NSCHARTNOTEFT_GEN_A_CORE
Discussed with patient about path findings as illustrated below.    Plan    Quadruple treatment for H.pylori    Pantoprazole 40 PO bid for 14 days  Bismuth 525 qid for 14 days  Tetracyclin 500 qid 14 days  Metronidazole 500 Qid for 14 days.     #Patient will be scheduled for outpatient GI follow up for H.pylori eradication test.           Collected Date/Time: 3/6/2025 21:07 EST   Received Date/Time: 3/6/2025 21:07 EST   SurgicalPathology Report - Auth (Verified)   Specimen(s) Submitted   1- Gastric biopsy   2- Distal esophagus biopsy   3- Proximal esophagus biopsy   Final Diagnosis   1. Gastric, biopsy   - Active Helicobacter pylori associated gastritis.   - Negative forintestinal metaplasia and dysplasia.   2. Distal esophagus, biopsy   - Unremarkable squamous esophageal mucosa.   - Negative for eosinophils.   3. Proximal esophagus, biopsy   - Unremarkable squamous esophageal mucosa.   - Negative for eosinophils.       Marquita Humphrey, PGY4  Gastroenterology and Hepatology  Available on TEAMS    For non urgent consult, please email [email or web address] (For [place]) or [email or web address] (For [facility])  Long range page number [phone]. Short range [phone]

## 2025-03-10 NOTE — PROGRESS NOTE ADULT - ASSESSMENT
44F PMHx spina bifida, multiple bilateral foot surgeries,  shunt (managed [facility] neurosurgery), GERD, uterine fibroids, and recent PE on Eliquis presenting with substernal and right sided chest pain. Patient was diagnosed with PE on 1/23/25 and has been on Eliquis. Patient states that her CP started after consumption of chili pepper yesterday. It improved last night was reoccurred today. It is rate 10/10, characterized as squeezing, pleuritic and radiating to the left upper extremity. Reports mild SOB. No exertional activity. Denies any associated symptoms fatigue, distress, diaphoresis, nausea, vomiting, abdominal pain, or leg swelling. Patient denies immobility, long plane/car rides, malignancy, or history of clotting disorder. No family history of CVD before the age of 65. No smoking history. No hx of hypothyroidism or HIV infection or Hepatitis C or autoimmune disorders. No psychosocial factors including stress/anxiety/depression.   (27 Feb 2025 21:50)  she says she has sob too but most of the issues started after eating  chilly peppers; ; currently sheis doing  ok ; does not looks to in any resp distress       Recent PE  Spina bifida/ recent foot surgery   s/p  shunt  GERD      Recent Pe  she has been complaint with her Eliquis  she is on 2 L of oxygen at this time:   her symptoms more likely due to GERD symptoms  cont PPI:   new cta is pending      3/1:  new cta showed: No pulmonary embolism. No acute pulmonary process.  she seems to have MSK pain too as pressing on her sternum and ribs do hurt  :   she has increased bleeding during urination:  ? gyn consult:   her HB has dropped from 12 to 10    may have dahlia too : cont PPI     she also says her son has asthma and she could have asthma too and at times she found her wheezing:   trial of BD to see if that helps her in tightness in chest     3/2:  cont BD:  cont advair:    cont eliquis;  US pelvis showed: Limited transabdominal exam. The patient declined the transvaginal portion. Enlarged multi fibroid uterus largely obscuring visualization of the endometrium and right ovary.  monitor or bleeding;     3/3: she seems OK;  still complains off feeling pain in the chest after she eats:  amberly alerdsy on ppi:  has pe on eliquis:   ? go co nsult     3/4:  seems to be doing  ok :  no sob:   but was not able to eat chicken last night:  as she feels it is with great difficulty the checken paseed last night from her food pipe:  seen by gi :? for possible endoscopy:     3/6: she seems stable:   no wheezing:  no sob:  for endoscopy today      3/7: seems to be doing ok ; no cough :   no phlegm  : no wheezing/   3/8: PE wise she seems OK;  no sob:  on room air   cont advair and eliquis     3/9:  seems to be doing ok ; still with some headache;  no sob:  no wheezing:  ct head was negative:   shunt study was normal :  she is not wheezing  remains on room air    3/10: seems OK;   no sob:   on ac;   on room air       Spina bifida/ recent foot surgery   per primary team   3/1: has lesion on rigth foot:  : defer to primary team    3/2: per primary team   3/3: seems OK:  no sob;     3/5: seems to be doing  ok : no sob:   no cough ;   no phlegm   on iv heparin : awaiting for upper endoscopy:   ba esophogram  noted    3/6: for endoscopy:   3/7; cont current rx:  s/p endoscopy: defer to gi   3/8; endoscopy revealed achalasia  defer to primary team   3/9: p er gi  :   3/10: seems to be doing ok ;       s/p  shunt  supportive care   3/1: ct head is normal  No CT evidence of acute intracranial pathology. The ventricular system appears slitlike and decompressed, unchanged from   3/3: seems O K:   3/5: stable  3/9: normal functioning     GERD  cont PPI     dw pt : and acp

## 2025-03-10 NOTE — PROGRESS NOTE ADULT - PROVIDER SPECIALTY LIST ADULT
Cardiology
Internal Medicine
Internal Medicine
Podiatry
Pulmonology
Cardiology
Internal Medicine
Podiatry
Pulmonology
Pulmonology
Gastroenterology
Internal Medicine
Pulmonology
Cardiology
Cardiology
Pulmonology
Internal Medicine

## 2025-03-13 ENCOUNTER — APPOINTMENT (OUTPATIENT)
Dept: GASTROENTEROLOGY | Facility: CLINIC | Age: 45
End: 2025-03-13
Payer: MEDICAID

## 2025-03-13 DIAGNOSIS — Z98.2 PRESENCE OF CEREBROSPINAL FLUID DRAINAGE DEVICE: ICD-10-CM

## 2025-03-13 DIAGNOSIS — A04.8 OTHER SPECIFIED BACTERIAL INTESTINAL INFECTIONS: ICD-10-CM

## 2025-03-13 DIAGNOSIS — R93.3 ABNORMAL FINDINGS ON DIAGNOSTIC IMAGING OF OTHER PARTS OF DIGESTIVE TRACT: ICD-10-CM

## 2025-03-13 DIAGNOSIS — D21.9 BENIGN NEOPLASM OF CONNECTIVE AND OTHER SOFT TISSUE, UNSPECIFIED: ICD-10-CM

## 2025-03-13 DIAGNOSIS — R13.10 DYSPHAGIA, UNSPECIFIED: ICD-10-CM

## 2025-03-13 DIAGNOSIS — K21.9 GASTRO-ESOPHAGEAL REFLUX DISEASE W/OUT ESOPHAGITIS: ICD-10-CM

## 2025-03-13 DIAGNOSIS — Q05.9 SPINA BIFIDA, UNSPECIFIED: ICD-10-CM

## 2025-03-13 DIAGNOSIS — I26.99 OTHER PULMONARY EMBOLISM W/OUT ACUTE COR PULMONALE: ICD-10-CM

## 2025-03-13 PROCEDURE — 99214 OFFICE O/P EST MOD 30 MIN: CPT | Mod: 93

## 2025-03-13 RX ORDER — GABAPENTIN 100 MG/1
100 CAPSULE ORAL
Refills: 0 | Status: ACTIVE | COMMUNITY

## 2025-03-13 RX ORDER — APIXABAN 5 MG/1
5 TABLET, FILM COATED ORAL
Refills: 0 | Status: ACTIVE | COMMUNITY

## 2025-03-13 RX ORDER — FERROUS SULFATE 325(65) MG
325 TABLET ORAL
Refills: 0 | Status: ACTIVE | COMMUNITY

## 2025-03-13 RX ORDER — FLUTICASONE PROPIONATE AND SALMETEROL 50; 250 UG/1; UG/1
250-50 POWDER RESPIRATORY (INHALATION)
Refills: 0 | Status: ACTIVE | COMMUNITY

## 2025-04-09 ENCOUNTER — APPOINTMENT (OUTPATIENT)
Dept: GASTROENTEROLOGY | Facility: HOSPITAL | Age: 45
End: 2025-04-09

## 2025-06-04 NOTE — DISCHARGE NOTE PROVIDER - NSRESEARCHGRANT_PROPHYLAXISRECOMFT_GEN_A_CORE
"Medical screening examination initiated.  I have conducted a focused provider triage encounter, findings are as follows:    Brief history of present illness:  increased SOB with exertion over the last week. Was sent here bc BP elevated at clinic. Did not take BP meds this am.     Vitals:    06/04/25 1232   BP: (!) 229/108   BP Location: Left arm   Pulse: 70   Resp: 19   Temp: 98.2 °F (36.8 °C)   TempSrc: Oral   SpO2: 99%   Weight: 52.2 kg (115 lb)   Height: 5' 4" (1.626 m)       Pertinent physical exam:  Lungs mildly wheezy in all lobes    Brief workup plan:  cardiac    Preliminary workup initiated; this workup will be continued and followed by the physician or advanced practice provider that is assigned to the patient when roomed.  " IMPROVE-DD Application Not Available

## 2025-06-19 NOTE — CHART NOTE - NSCHARTNOTEFT_GEN_A_CORE
6/19/2025      Per Etienne  [id number] [facility] [id number]      Dear Colleague,    Thank you for referring your patient, Per Etienne, to the [facility]. Please see a copy of my visit note below.    Oncology Follow Up Visit: Jun 19, 2025      Oncologist: Dr Garcia  PCP: Per Chen    Diagnosis: Metastatic hormone sensitive prostate cancer  Per Etienne is a 59 yo male who presented to his PCP in December 2019 with slow urinary stream.  PSA = 124. On 1/8/2020 CT abdomen pelvis showed groundglass opacity in the left lower lobe,  dystrophic calcification of the prostate gland, and a fatty liver.  Same-day bone scan was negative. On January 10, 2010 prostate biopsy showed on the left [place] score 4+3 involving 7 cores and on the right Robyn score 3+4 involving 6 cores, with positive perineural invasion.  He proceeded to undergo RRP by Dr. Colmenares on 2/24/2020.  Pathology revealed acinar adenocarcinoma [place] 4+3 with focal ROBERTO, bladder neck base invasion, positive SVI, positive PNI, and multiple positive margins.  There was no LVSI and 0 out of 3 lymph nodes removed were involved with tumor; pT3bN0.    Treatment:   4/20/2020 received Eligard.     5/21/2020 MRI of the Prostate= 14 x 9 mm T2 intermediate structure in the prostatectomy bed at the left aspect of urinary bladder neck with restricted diffusion in the DWI images and early enhancement in the contrast  enhanced images. This was concerning for locally recurrent/residual prostatic cancer.  There was a T2 hypointense structure in the in the area of removed right seminal vesicle measuring 13 x 11 mm  demonstrating restricted  diffusion in the DWI images. This may represent a recurrent/residual tumor. There were 2 bone metastases in the left pubic inferior ramus and inferior aspect of left pubic body.There is a 7 mm suspicious lymph nodes in the right obturator area  corresponding to to FACBC avid  Keep patient NPO.  For EGD plus Endoflip today.  Labs - cbc, bmp, mg and pos.     Onkeyona Ugonabo, PGY4  Gastroenterology and Hepatology  Available on TEAMS    For non urgent consult, please email [email or web address] (For [place]) or [email or web address] (For JULIEN)  Long range page number [phone]. Short range 64702 lymph node on the same date PET/CT  5/20/2020 PET/CT showed:  1.  2 foci of increased uptake in the left ischium, with underlying  sclerotic lesion, and symphysis pubis, these changes are indicative of  active prostate cancer metastasis.  2. Low-level increased uptake in 3 lymphnodes along the right external  and internal iliac nodes, the most suspicious of which is the deep  obturator.  3. Low-level increased uptake in the prostatectomy bed slightly to the  left side of the urethra, if clinically necessary, endoscopy would  would be necessary for confirmation of this being metastasis.  PSA was down to 27.7 on 4/21/2020.   4/20/2020 he received an Eligard injection.    6/12/2020 started Apalutamide(Erleada)  Bone scan 1/22/2021: no bone mets.    Interval History: Mr. Etienne is seen alone today in clinic with for review for continuation of Apalutamide / Leuprolide/ Xgeva.      - Pt feeling well without new symptoms- feels all is stable for now.   - Denies missing one dose of medication     - Neuropathy to the feet present  - Pain to pelvis and  low back- seeing pain and palliative care- on MS Contin 15mg bid with Norco up to 3 x daily as needed and this is working well.Gets Lumabr injections for back pain. Uses marijuana prn   - Stays active with job as  and feels he is able to continue with job for now  - Bowel and bladder are normal.  - Denies fevers, illness, headaches, chest pain, SOB.   - Scheduled labs every 3 months, typically done a couple of days before appointments.  - History of back pain due to previous work as a  for over 30 years.  - Quit smoking in January 2012 at age 45.  - Cancer is present in pelvic bones.  - Receiving Lupron and Xgeva injections. Has pain in butt after his injections  - Previous CT scans for lung cancer screening were stopped after the long term of the overseeing physician.  - Father had prostate cancer, diagnosed approximately 6.5 years ago.     Rest of  Keep patient NPO.  For EGD plus Endoflip today in the afternoon  Labs - cbc, bmp, mg and pos.   Hold heparin at 8am    Onyinye Luanaonabo, PGY4  Gastroenterology and Hepatology  Available on TEAMS    For non urgent consult, please email [email or web address] (For [place]) or [email or web address] (For JULIEN)  Long range page number [phone]. Short range 80168 comprehensive and complete ROS is reviewed and is negative.   Past Medical History:   Diagnosis Date     Gout      Hypertension goal BP (blood pressure) < 140/90 08/01/2022     Lumbar stenosis      Prostate cancer (H) 01/10/2020     Current Outpatient Medications   Medication Sig Dispense Refill     apalutamide (ERLEADA) 60 MG tablet Take 4 tablets (240 mg) by mouth daily. 120 tablet 0     denosumab (XGEVA) 120 MG/1.7ML SOLN injection Inject 1.7 mLs (120 mg) Subcutaneous every 3 months 1.7 mL 0     HYDROcodone-acetaminophen (NORCO) 5-325 MG tablet Take 1 tablet by mouth 3 times daily as needed for breakthrough pain or severe pain. Fill 6/9/2025 start 6/10/25 (30 day supply) 90 tablet 0     leuprolide (ELIGARD) 45 MG kit Inject 45 mg Subcutaneous every 6 months       morphine (MS CONTIN) 15 MG CR tablet Take 1 tablet (15 mg) by mouth every 12 hours. #60 tabs to last 30 days. Fill 3/8/2025, start 3/10/2025. 60 tablet 0     naloxone (NARCAN) 4 MG/0.1ML nasal spray Spray 1 spray (4 mg) into one nostril alternating nostrils as needed for opioid reversal. every 2-3 minutes until assistance arrives 0.2 mL 0     omeprazole (PRILOSEC) 40 MG DR capsule Take 1 capsule (40 mg) by mouth daily. at least 30 min before breakfast 90 capsule 3     oxyCODONE (OXYCONTIN) 10 MG 12 hr tablet Take 1 tablet (10 mg) by mouth every 12 hours. Fill / Start 5/19/25. 60 tablet 0     polyethylene glycol (MIRALAX) 17 GM/Dose powder Take 17 g (1 Capful) by mouth 2 times daily as needed for constipation 578 g 1     SENNA-docusate sodium (SENNA S) 8.6-50 MG tablet Take 1 tablet by mouth 2 times daily. 60 tablet 3     UNABLE TO FIND 1 tablet daily MEDICATION NAME: C, D, Zinc combination supplement       Current Facility-Administered Medications   Medication Dose Route Frequency Provider Last Rate Last Admin     denosumab (XGEVA) injection 120 mg  120 mg Subcutaneous Once Scott Garcia MD         leuprolide (LUPRON DEPOT) kit 22.5 mg  22.5 mg  "Intramuscular Q90 Days Scott Garcia MD         No Known Allergies    Physical Exam:BP (!) 150/84   Pulse 69   Resp 18   Ht 1.702 m (5' 7.01\")   Wt 85.2 kg (187 lb 12.8 oz)   SpO2 98%   BMI 29.41 kg/m    Constitutional: Alert, healthy, and in no distress with good movement on own.   ENT: Eyes bright, No mouth sores  Neck: Supple, No adenopathy.  Cardiac: Heart rate and rhythm is regular with normal S1 and S2 without murmur  Respiratory: Breathing easy. Lung sounds clear to auscultation  Abdomen: Soft, non-tender, BS normal. No masses or organomegaly  MS: Muscle tone normal- moving well on own without signs of pain, extremities normal with no edema.   Skin: No suspicious lesions or rashes  Neuro: Sensory grossly WNL, gait normal.   Lymph: Normal ant/post cervical, axillary, supraclavicular nodes  Psych: Mentation appears normal and affect normal/bright with good conversation.    Laboratory Results:   Recent Labs   Lab Test 06/17/25 1700 03/03/25 1646 11/29/24  0849 09/05/24  1348 05/30/24  1403    140 139 142 141   POTASSIUM 4.1 3.9 4.3 4.3 4.5   CHLORIDE 102 100 102 105 104   CO2 25 28 26 27 27   ANIONGAP 13 12 11 10 10   BUN 18.1 20.2 13.6 18.5 14.6   CR 0.99 0.98 0.96 1.06 0.83   GLC 95 102* 139* 129* 148*   NATALIYA 9.8 10.1 9.3 10.3 9.7     No results for input(s): \"MAG\", \"PHOS\" in the last 27117 hours.  Recent Labs   Lab Test 06/17/25 1700 03/03/25  1646 11/29/24  0849 09/05/24  1348 05/30/24  1403   WBC 6.5 18.8* 11.9* 15.1* 8.8   HGB 12.9* 13.5 14.2 13.6 13.4    226 224 225 299   MCV 92 92 91 93 91   NEUTROPHIL 57 84 72 79 68     Recent Labs   Lab Test 06/17/25 1700 03/03/25  1646 11/29/24  0849   BILITOTAL <0.2 0.5 0.3   ALKPHOS 79 81 98   ALT 42 29 22   AST 25 20 22   ALBUMIN 4.1 4.2 4.1     TSH   Date Value Ref Range Status   01/21/2022 1.03 0.40 - 4.00 mU/L Final   10/29/2021 1.91 0.40 - 4.00 mU/L Final   08/23/2021 2.49 0.40 - 4.00 mU/L Final   04/29/2021 2.57 0.40 - 4.00 mU/L " "Final   01/22/2021 3.46 0.40 - 4.00 mU/L Final   10/30/2020 1.97 0.40 - 4.00 mU/L Final     No results for input(s): \"CEA\" in the last 50634 hours.  Results for orders placed or performed in visit on 01/21/25   PAIN Interlaminar Epidural Steroid Injection Lumbar/Sacral    Narrative    Pre procedure Diagnosis: Lumbar radiculopathy  Post procedure Diagnosis: Same  Procedure performed: L4-5 interlaminar epidural steroid injection   Anesthesia: none  Complications: nonw  Operators: Issac Tapia MD     Indications:   Per Etienne is 58 year old male.  The patient has a history of lbp   rad to his le.  Examination shows + slump.  he has tried conservative   treatment including meds/pt.    MRI reviewed  Options/alternatives, benefits and risks were discussed with the patient   including but not limited to bleeding, infection, no pain relief, tissue   trauma, exposure to radiation, reaction to medications, spinal cord   injury, dural puncture, weakness, numbness and headache.  Questions were   answered to his satisfaction and he wishes to proceed. Voluntary informed   consent was obtained and signed.     Vitals were reviewed: Yes  Allergies were reviewed:  Yes   Medications were reviewed:  Yes   Pre-procedure pain score: 6/10    Procedure:  The patient's medical history, medications, and allergies were reviewed   and reconciled.  After obtaining signed informed consent, the patient was   brought into the procedure suite and was placed in a prone position on the   procedure table.   A Pause for the Cause was performed.  Patient was   prepped and draped in the usual sterile fashion.     The L4-5 interspace was identified with AP fluoroscopy.  A total of 4ml of   1% lidocaine was used to anesthetize the skin and subcutaneous tissues for   a  midline approach.    A 20gauge 3.5inch Touhy needle was advanced   utilizing intermittent AP and Lateral fluoroscopy and air for loss of   resistance.  The epidural space was " encountered on the first pass without   difficulties.  Aspiration for blood and CSF was negative.  Needle position   was verified by injecting 1 ml of Omnipaque utilizing real-time   fluoroscopy that showed good needle placement and epidural spread without   signs of intravascular or intrathecal uptake.  Omnipaque wasted:  9 ml.    Then, after repeated negative aspiration for blood or CSF, a combination   of Kenalog 40 mg, Bupivicaine 0.25% 2 ml, diluted with 3 ml of normal   saline to a total injectate volume of 6 ml was injected into the epidural   space in a slow and incremental fashion and the needle was restyletted and   withdrawn.  All injected medications were preservative free.    The injection site was cleaned and a sterile dressing was applied.    The patient tolerated the procedure well without complications and was   taken to the recovery room for continued observation.    Images were saved to PACS.    Post-procedure pain score: 6/10  Follow-up includes:   -f/u with referring provider     Issac Tapia MD  [facility] [place]     Recent Labs   Lab Test 06/17/25  1700 03/03/25  1646 11/29/24  0849 09/05/24  1348 05/30/24  1403 02/21/24  1502   PSA <0.01 <0.01 <0.01 <0.01 <0.01 <0.01   TESTOSTTOTAL  --  5* 11* 5* 5* 3*     Assessment and Plan:   Metastatic hormone sensitive prostate cancer with bone metastasis-patient continues plan with Erleada 240 mg po daily  as well as Xgeva and Lupron every 3 months and states he is tolerating well.   - Is reporting intermittent day of nausea and sweats - not sure if related to Pain plan or prostates treatment but states they improve quickly .   Review, labs and exam showing no sign of progression of the cancer.   - PSA levels undetectable, testosterone levels low. Patient is on Lupron and Xgeva injections.  - Continue Lupron and Xgeva injections every three months. Orders for oral apalutamide signed.    - He will receive Lupron and Xgeva with visit  today.  - He will return in 3 months to see me with labs(CBC differential, CMP, total testosterone level and PSA ), also for next Lupron/Xgeva administration    Bone metastasis with pain -Known pelvic bone mets - seeing palliative care team and using MS contin 15 mg po bid with Norco up to 3 x daily prn and smoking Marijuana as needed up to 2 x daily.   - Chronic back pain due to previous occupation as a .  - Continue current pain management regimen.   - also having symptoms of neuropathy of the feet but good shoes helping with this- not know to be caused by any current medications.    - I will not get DEXA scan as he is already on Xgeva    Lung cancer screening/ Pulmonary nodule- has 30 pack year smoking history-  quit in 2012.  Initially imaging was 1/2022 - last imaging 3/12/2024. Reviewed results.     - Previous CT scans for lung cancer screening stopped after Muriel retired.  - Reviewed restarting lung cancer screening program at next meeting which he is eager to do     Lung Cancer Screening Shared Decision Making Visit     Per Elainemichelle, a 58 year old male, is eligible for lung cancer screening    History   Smoking Status     Former     Types: Cigarettes, Cigars   Smokeless Tobacco     Never       I have discussed with patient the risks and benefits of screening for lung cancer with low-dose CT.     The risks include:    radiation exposure: one low dose chest CT has as much ionizing radiation as about 15 chest x-rays, or 6 months of background radiation living in [place]      false positives: most findings/nodules are NOT cancer, but some might still require additional diagnostic evaluation, including biopsy    over-diagnosis: some slow growing cancers that might never have been clinically significant will be detected and treated unnecessarily     The benefit of early detection of lung cancer is contingent upon adherence to annual screening or more frequent follow up if indicated.      Furthermore, to benefit from screening, Per must be willing and able to undergo diagnostic procedures, if indicated. Although no specific guide is available for determining severity of comorbidities, it is reasonable to withhold screening in patients who have greater mortality risk from other diseases.     We did discuss that the best way to prevent lung cancer is to not smoke. He quit 13-14 yrs ago. He notes he smokes the occasional cigars and does not inhale. Recommended to quit completely.    Some patients may value a numeric estimation of lung cancer risk when evaluating if lung cancer screening is right for them, here is one calculator:    ShouldIScreen      The longitudinal plan of care for the diagnosis(es)/condition(s) as documented were addressed during this visit. Due to the added complexity in care, I will continue to support Bill in the subsequent management and with ongoing continuity of care.     30 minutes spent on the date of the encounter doing chart review, history and exam, documentation and further activities as noted above     Again, thank you for allowing me to participate in the care of your patient.        Sincerely,        Scott Garcia MD    Electronically signed

## 2025-06-24 NOTE — DIETITIAN INITIAL EVALUATION ADULT - NS FNS DIET ORDER
NPO for Procedure/Test     NPO Start Date: 06-Mar-2025,   NPO Start Time: 05:38 (03-06-25 @ 05:37) no

## 2025-08-06 ENCOUNTER — INPATIENT (INPATIENT)
Facility: HOSPITAL | Age: 45
LOS: 8 days | Discharge: HOME | End: 2025-08-15
Attending: NEUROLOGICAL SURGERY | Admitting: NEUROLOGICAL SURGERY
Payer: MEDICAID

## 2025-08-06 VITALS
WEIGHT: 115.08 LBS | HEART RATE: 108 BPM | OXYGEN SATURATION: 99 % | HEIGHT: 62 IN | DIASTOLIC BLOOD PRESSURE: 116 MMHG | TEMPERATURE: 99 F | SYSTOLIC BLOOD PRESSURE: 165 MMHG | RESPIRATION RATE: 20 BRPM

## 2025-08-06 DIAGNOSIS — Z98.2 PRESENCE OF CEREBROSPINAL FLUID DRAINAGE DEVICE: Chronic | ICD-10-CM

## 2025-08-06 DIAGNOSIS — Z98.890 OTHER SPECIFIED POSTPROCEDURAL STATES: Chronic | ICD-10-CM

## 2025-08-06 LAB
ALBUMIN SERPL ELPH-MCNC: 4 G/DL — SIGNIFICANT CHANGE UP (ref 3.3–5)
ALP SERPL-CCNC: 59 U/L — SIGNIFICANT CHANGE UP (ref 40–120)
ALT FLD-CCNC: 19 U/L — SIGNIFICANT CHANGE UP (ref 4–33)
ANION GAP SERPL CALC-SCNC: 13 MMOL/L — SIGNIFICANT CHANGE UP (ref 7–14)
APPEARANCE UR: CLEAR — SIGNIFICANT CHANGE UP
APTT BLD: 31.3 SEC — SIGNIFICANT CHANGE UP (ref 26.1–36.8)
AST SERPL-CCNC: 27 U/L — SIGNIFICANT CHANGE UP (ref 4–32)
BACTERIA # UR AUTO: ABNORMAL /HPF
BASOPHILS # BLD AUTO: 0.03 K/UL — SIGNIFICANT CHANGE UP (ref 0–0.2)
BASOPHILS NFR BLD AUTO: 0.7 % — SIGNIFICANT CHANGE UP (ref 0–2)
BILIRUB SERPL-MCNC: 0.3 MG/DL — SIGNIFICANT CHANGE UP (ref 0.2–1.2)
BILIRUB UR-MCNC: NEGATIVE — SIGNIFICANT CHANGE UP
BUN SERPL-MCNC: 11 MG/DL — SIGNIFICANT CHANGE UP (ref 7–23)
CALCIUM SERPL-MCNC: 9 MG/DL — SIGNIFICANT CHANGE UP (ref 8.4–10.5)
CHLORIDE SERPL-SCNC: 104 MMOL/L — SIGNIFICANT CHANGE UP (ref 98–107)
CO2 SERPL-SCNC: 20 MMOL/L — LOW (ref 22–31)
COLOR SPEC: YELLOW — SIGNIFICANT CHANGE UP
CREAT SERPL-MCNC: 0.46 MG/DL — LOW (ref 0.5–1.3)
DIFF PNL FLD: ABNORMAL
EGFR: 119 ML/MIN/1.73M2 — SIGNIFICANT CHANGE UP
EGFR: 119 ML/MIN/1.73M2 — SIGNIFICANT CHANGE UP
EOSINOPHIL # BLD AUTO: 0.22 K/UL — SIGNIFICANT CHANGE UP (ref 0–0.5)
EOSINOPHIL NFR BLD AUTO: 5.2 % — SIGNIFICANT CHANGE UP (ref 0–6)
EPI CELLS # UR: 2 — SIGNIFICANT CHANGE UP
FLUAV AG NPH QL: SIGNIFICANT CHANGE UP
FLUBV AG NPH QL: SIGNIFICANT CHANGE UP
GLUCOSE SERPL-MCNC: 80 MG/DL — SIGNIFICANT CHANGE UP (ref 70–99)
GLUCOSE UR QL: NEGATIVE MG/DL — SIGNIFICANT CHANGE UP
HCG SERPL-ACNC: <1 MIU/ML — SIGNIFICANT CHANGE UP
HCT VFR BLD CALC: 39.2 % — SIGNIFICANT CHANGE UP (ref 34.5–45)
HGB BLD-MCNC: 13 G/DL — SIGNIFICANT CHANGE UP (ref 11.5–15.5)
IMM GRANULOCYTES # BLD AUTO: 0.01 K/UL — SIGNIFICANT CHANGE UP (ref 0–0.07)
IMM GRANULOCYTES NFR BLD AUTO: 0.2 % — SIGNIFICANT CHANGE UP (ref 0–0.9)
INR BLD: 1 RATIO — SIGNIFICANT CHANGE UP (ref 0.85–1.16)
KETONES UR QL: NEGATIVE MG/DL — SIGNIFICANT CHANGE UP
LEUKOCYTE ESTERASE UR-ACNC: NEGATIVE — SIGNIFICANT CHANGE UP
LIDOCAIN IGE QN: 33 U/L — SIGNIFICANT CHANGE UP (ref 7–60)
LYMPHOCYTES # BLD AUTO: 1.72 K/UL — SIGNIFICANT CHANGE UP (ref 1–3.3)
LYMPHOCYTES NFR BLD AUTO: 40.7 % — SIGNIFICANT CHANGE UP (ref 13–44)
MAGNESIUM SERPL-MCNC: 2.1 MG/DL — SIGNIFICANT CHANGE UP (ref 1.6–2.6)
MCHC RBC-ENTMCNC: 29.2 PG — SIGNIFICANT CHANGE UP (ref 27–34)
MCHC RBC-ENTMCNC: 33.2 G/DL — SIGNIFICANT CHANGE UP (ref 32–36)
MCV RBC AUTO: 88.1 FL — SIGNIFICANT CHANGE UP (ref 80–100)
MONOCYTES # BLD AUTO: 0.51 K/UL — SIGNIFICANT CHANGE UP (ref 0–0.9)
MONOCYTES NFR BLD AUTO: 12.1 % — SIGNIFICANT CHANGE UP (ref 2–14)
NEUTROPHILS # BLD AUTO: 1.74 K/UL — LOW (ref 1.8–7.4)
NEUTROPHILS NFR BLD AUTO: 41.1 % — LOW (ref 43–77)
NITRITE UR-MCNC: POSITIVE
NRBC # BLD AUTO: 0 K/UL — SIGNIFICANT CHANGE UP (ref 0–0)
NRBC # FLD: 0 K/UL — SIGNIFICANT CHANGE UP (ref 0–0)
NRBC BLD AUTO-RTO: 0 /100 WBCS — SIGNIFICANT CHANGE UP (ref 0–0)
NT-PROBNP SERPL-SCNC: 87 PG/ML — SIGNIFICANT CHANGE UP
PH UR: 7.5 — SIGNIFICANT CHANGE UP (ref 5–8)
PLATELET # BLD AUTO: 326 K/UL — SIGNIFICANT CHANGE UP (ref 150–400)
PMV BLD: 10.9 FL — SIGNIFICANT CHANGE UP (ref 7–13)
POTASSIUM SERPL-MCNC: 4.7 MMOL/L — SIGNIFICANT CHANGE UP (ref 3.5–5.3)
POTASSIUM SERPL-SCNC: 4.7 MMOL/L — SIGNIFICANT CHANGE UP (ref 3.5–5.3)
PROT SERPL-MCNC: 7.6 G/DL — SIGNIFICANT CHANGE UP (ref 6–8.3)
PROT UR-MCNC: SIGNIFICANT CHANGE UP MG/DL
PROTHROM AB SERPL-ACNC: 11.6 SEC — SIGNIFICANT CHANGE UP (ref 9.9–13.4)
RBC # BLD: 4.45 M/UL — SIGNIFICANT CHANGE UP (ref 3.8–5.2)
RBC # FLD: 14.3 % — SIGNIFICANT CHANGE UP (ref 10.3–14.5)
RBC CASTS # UR COMP ASSIST: >10 /HPF — HIGH (ref 0–4)
RSV RNA NPH QL NAA+NON-PROBE: SIGNIFICANT CHANGE UP
SARS-COV-2 RNA SPEC QL NAA+PROBE: SIGNIFICANT CHANGE UP
SODIUM SERPL-SCNC: 137 MMOL/L — SIGNIFICANT CHANGE UP (ref 135–145)
SOURCE RESPIRATORY: SIGNIFICANT CHANGE UP
SP GR SPEC: 1.03 — HIGH (ref 1–1.03)
TROPONIN T, HIGH SENSITIVITY RESULT: <6 NG/L — SIGNIFICANT CHANGE UP
UROBILINOGEN FLD QL: 0.2 MG/DL — SIGNIFICANT CHANGE UP (ref 0.2–1)
WBC # BLD: 4.23 K/UL — SIGNIFICANT CHANGE UP (ref 3.8–10.5)
WBC # FLD AUTO: 4.23 K/UL — SIGNIFICANT CHANGE UP (ref 3.8–10.5)
WBC UR QL: 4 /HPF — SIGNIFICANT CHANGE UP (ref 0–5)

## 2025-08-06 PROCEDURE — 74018 RADEX ABDOMEN 1 VIEW: CPT | Mod: 26

## 2025-08-06 PROCEDURE — 74177 CT ABD & PELVIS W/CONTRAST: CPT | Mod: 26

## 2025-08-06 PROCEDURE — 71275 CT ANGIOGRAPHY CHEST: CPT | Mod: 26

## 2025-08-06 PROCEDURE — 70260 X-RAY EXAM OF SKULL: CPT | Mod: 26

## 2025-08-06 PROCEDURE — 70450 CT HEAD/BRAIN W/O DYE: CPT | Mod: 26

## 2025-08-06 PROCEDURE — 71045 X-RAY EXAM CHEST 1 VIEW: CPT | Mod: 26,76

## 2025-08-06 PROCEDURE — 99285 EMERGENCY DEPT VISIT HI MDM: CPT

## 2025-08-06 RX ORDER — CEFAZOLIN SODIUM IN 0.9 % NACL 3 G/100 ML
1000 INTRAVENOUS SOLUTION, PIGGYBACK (ML) INTRAVENOUS ONCE
Refills: 0 | Status: DISCONTINUED | OUTPATIENT
Start: 2025-08-06 | End: 2025-08-07

## 2025-08-06 RX ORDER — MAGNESIUM, ALUMINUM HYDROXIDE 200-200 MG
30 TABLET,CHEWABLE ORAL ONCE
Refills: 0 | Status: COMPLETED | OUTPATIENT
Start: 2025-08-06 | End: 2025-08-06

## 2025-08-06 RX ORDER — ACETAMINOPHEN 500 MG/5ML
1000 LIQUID (ML) ORAL ONCE
Refills: 0 | Status: COMPLETED | OUTPATIENT
Start: 2025-08-06 | End: 2025-08-06

## 2025-08-06 RX ORDER — SODIUM CHLORIDE 9 G/1000ML
1000 INJECTION, SOLUTION INTRAVENOUS ONCE
Refills: 0 | Status: COMPLETED | OUTPATIENT
Start: 2025-08-06 | End: 2025-08-06

## 2025-08-06 RX ORDER — IPRATROPIUM BROMIDE AND ALBUTEROL SULFATE .5; 2.5 MG/3ML; MG/3ML
3 SOLUTION RESPIRATORY (INHALATION) ONCE
Refills: 0 | Status: COMPLETED | OUTPATIENT
Start: 2025-08-06 | End: 2025-08-06

## 2025-08-06 RX ORDER — ALBUTEROL SULFATE 2.5 MG/3ML
2.5 VIAL, NEBULIZER (ML) INHALATION
Refills: 0 | Status: DISCONTINUED | OUTPATIENT
Start: 2025-08-06 | End: 2025-08-06

## 2025-08-06 RX ORDER — ONDANSETRON HCL/PF 4 MG/2 ML
4 VIAL (ML) INJECTION ONCE
Refills: 0 | Status: COMPLETED | OUTPATIENT
Start: 2025-08-06 | End: 2025-08-06

## 2025-08-06 RX ORDER — METOCLOPRAMIDE HCL 10 MG
10 TABLET ORAL ONCE
Refills: 0 | Status: COMPLETED | OUTPATIENT
Start: 2025-08-06 | End: 2025-08-06

## 2025-08-06 RX ORDER — ANDEXANET ALFA 200 MG/20ML
400 INJECTION, POWDER, LYOPHILIZED, FOR SOLUTION INTRAVENOUS ONCE
Refills: 0 | Status: COMPLETED | OUTPATIENT
Start: 2025-08-06 | End: 2025-08-07

## 2025-08-06 RX ORDER — ANDEXANET ALFA 200 MG/20ML
480 INJECTION, POWDER, LYOPHILIZED, FOR SOLUTION INTRAVENOUS ONCE
Refills: 0 | Status: COMPLETED | OUTPATIENT
Start: 2025-08-06 | End: 2025-08-07

## 2025-08-06 RX ADMIN — Medication 20 MILLIGRAM(S): at 16:13

## 2025-08-06 RX ADMIN — SODIUM CHLORIDE 1000 MILLILITER(S): 9 INJECTION, SOLUTION INTRAVENOUS at 18:09

## 2025-08-06 RX ADMIN — Medication 4 MILLIGRAM(S): at 16:13

## 2025-08-06 RX ADMIN — IPRATROPIUM BROMIDE AND ALBUTEROL SULFATE 3 MILLILITER(S): .5; 2.5 SOLUTION RESPIRATORY (INHALATION) at 19:03

## 2025-08-06 RX ADMIN — Medication 10 MILLIGRAM(S): at 18:08

## 2025-08-06 RX ADMIN — Medication 2 MILLIGRAM(S): at 20:54

## 2025-08-06 RX ADMIN — Medication 30 MILLILITER(S): at 21:20

## 2025-08-06 RX ADMIN — Medication 4 MILLIGRAM(S): at 22:59

## 2025-08-06 RX ADMIN — Medication 400 MILLIGRAM(S): at 15:18

## 2025-08-07 DIAGNOSIS — G91.9 HYDROCEPHALUS, UNSPECIFIED: ICD-10-CM

## 2025-08-07 DIAGNOSIS — R09.89 OTHER SPECIFIED SYMPTOMS AND SIGNS INVOLVING THE CIRCULATORY AND RESPIRATORY SYSTEMS: ICD-10-CM

## 2025-08-07 DIAGNOSIS — T85.09XA OTHER MECHANICAL COMPLICATION OF VENTRICULAR INTRACRANIAL (COMMUNICATING) SHUNT, INITIAL ENCOUNTER: ICD-10-CM

## 2025-08-07 LAB
ALBUMIN SERPL ELPH-MCNC: 3.5 G/DL — SIGNIFICANT CHANGE UP (ref 3.3–5)
ALP SERPL-CCNC: 46 U/L — SIGNIFICANT CHANGE UP (ref 40–120)
ALT FLD-CCNC: 13 U/L — SIGNIFICANT CHANGE UP (ref 4–33)
ANION GAP SERPL CALC-SCNC: 11 MMOL/L — SIGNIFICANT CHANGE UP (ref 7–14)
APPEARANCE CSF: ABNORMAL
APPEARANCE SPUN FLD: COLORLESS — SIGNIFICANT CHANGE UP
APTT BLD: 25 SEC — LOW (ref 26.1–36.8)
AST SERPL-CCNC: 19 U/L — SIGNIFICANT CHANGE UP (ref 4–32)
BACTERIAL AG PNL SER: 0 % — SIGNIFICANT CHANGE UP
BILIRUB DIRECT SERPL-MCNC: <0.2 MG/DL — SIGNIFICANT CHANGE UP (ref 0–0.3)
BILIRUB INDIRECT FLD-MCNC: >0.1 MG/DL — SIGNIFICANT CHANGE UP (ref 0–1)
BILIRUB SERPL-MCNC: 0.3 MG/DL — SIGNIFICANT CHANGE UP (ref 0.2–1.2)
BLD GP AB SCN SERPL QL: NEGATIVE — SIGNIFICANT CHANGE UP
BLD GP AB SCN SERPL QL: NEGATIVE — SIGNIFICANT CHANGE UP
BUN SERPL-MCNC: 8 MG/DL — SIGNIFICANT CHANGE UP (ref 7–23)
CALCIUM SERPL-MCNC: 8.1 MG/DL — LOW (ref 8.4–10.5)
CHLORIDE SERPL-SCNC: 103 MMOL/L — SIGNIFICANT CHANGE UP (ref 98–107)
CO2 SERPL-SCNC: 19 MMOL/L — LOW (ref 22–31)
COLOR CSF: SIGNIFICANT CHANGE UP
CREAT SERPL-MCNC: 0.41 MG/DL — LOW (ref 0.5–1.3)
EGFR: 122 ML/MIN/1.73M2 — SIGNIFICANT CHANGE UP
EGFR: 122 ML/MIN/1.73M2 — SIGNIFICANT CHANGE UP
EOSINOPHIL # CSF: 0 % — SIGNIFICANT CHANGE UP
GLUCOSE CSF-MCNC: 56 MG/DL — SIGNIFICANT CHANGE UP (ref 40–70)
GLUCOSE SERPL-MCNC: 102 MG/DL — HIGH (ref 70–99)
GRAM STN FLD: SIGNIFICANT CHANGE UP
HCT VFR BLD CALC: 32.1 % — LOW (ref 34.5–45)
HGB BLD-MCNC: 10.6 G/DL — LOW (ref 11.5–15.5)
INR BLD: 0.91 RATIO — SIGNIFICANT CHANGE UP (ref 0.85–1.16)
LYMPHOCYTES # CSF: 43 % — SIGNIFICANT CHANGE UP
MAGNESIUM SERPL-MCNC: 2.1 MG/DL — SIGNIFICANT CHANGE UP (ref 1.6–2.6)
MCHC RBC-ENTMCNC: 29.2 PG — SIGNIFICANT CHANGE UP (ref 27–34)
MCHC RBC-ENTMCNC: 33 G/DL — SIGNIFICANT CHANGE UP (ref 32–36)
MCV RBC AUTO: 88.4 FL — SIGNIFICANT CHANGE UP (ref 80–100)
MONOS+MACROS NFR CSF: 1 % — SIGNIFICANT CHANGE UP
MRSA PCR RESULT.: SIGNIFICANT CHANGE UP
NEUTROPHILS # CSF: 7 % — SIGNIFICANT CHANGE UP
NRBC # BLD AUTO: 0 K/UL — SIGNIFICANT CHANGE UP (ref 0–0)
NRBC # FLD: 0 K/UL — SIGNIFICANT CHANGE UP (ref 0–0)
NRBC BLD AUTO-RTO: 0 /100 WBCS — SIGNIFICANT CHANGE UP (ref 0–0)
NRBC NFR CSF: 0 CELLS/UL — SIGNIFICANT CHANGE UP (ref 0–5)
OTHER CELLS CSF MANUAL: 0 % — SIGNIFICANT CHANGE UP
PHOSPHATE SERPL-MCNC: 3 MG/DL — SIGNIFICANT CHANGE UP (ref 2.5–4.5)
PLATELET # BLD AUTO: 239 K/UL — SIGNIFICANT CHANGE UP (ref 150–400)
PMV BLD: 10.2 FL — SIGNIFICANT CHANGE UP (ref 7–13)
POTASSIUM SERPL-MCNC: 3.4 MMOL/L — LOW (ref 3.5–5.3)
POTASSIUM SERPL-SCNC: 3.4 MMOL/L — LOW (ref 3.5–5.3)
PROT CSF-MCNC: 7 MG/DL — LOW (ref 15–45)
PROT SERPL-MCNC: 6.4 G/DL — SIGNIFICANT CHANGE UP (ref 6–8.3)
PROTHROM AB SERPL-ACNC: 10.8 SEC — SIGNIFICANT CHANGE UP (ref 9.9–13.4)
RBC # BLD: 3.63 M/UL — LOW (ref 3.8–5.2)
RBC # CSF: 2750 CELLS/UL — HIGH (ref 0–0)
RBC # FLD: 14.3 % — SIGNIFICANT CHANGE UP (ref 10.3–14.5)
RH IG SCN BLD-IMP: POSITIVE — SIGNIFICANT CHANGE UP
RH IG SCN BLD-IMP: POSITIVE — SIGNIFICANT CHANGE UP
S AUREUS DNA NOSE QL NAA+PROBE: DETECTED
SODIUM SERPL-SCNC: 133 MMOL/L — LOW (ref 135–145)
SPECIMEN SOURCE: SIGNIFICANT CHANGE UP
TOTAL CELLS COUNTED, SPINAL FLUID: 51 CELLS — SIGNIFICANT CHANGE UP
TUBE TYPE: SIGNIFICANT CHANGE UP
WBC # BLD: 6.28 K/UL — SIGNIFICANT CHANGE UP (ref 3.8–10.5)
WBC # FLD AUTO: 6.28 K/UL — SIGNIFICANT CHANGE UP (ref 3.8–10.5)

## 2025-08-07 PROCEDURE — 61107 TDH PNXR IMPLT VENTR CATH: CPT

## 2025-08-07 PROCEDURE — 70450 CT HEAD/BRAIN W/O DYE: CPT | Mod: 26

## 2025-08-07 PROCEDURE — 70490 CT SOFT TISSUE NECK W/O DYE: CPT | Mod: 26

## 2025-08-07 PROCEDURE — 99157 MOD SED OTHER PHYS/QHP EA: CPT

## 2025-08-07 PROCEDURE — 99156 MOD SED OTH PHYS/QHP 5/>YRS: CPT

## 2025-08-07 RX ORDER — POLYETHYLENE GLYCOL 3350 17 G/17G
17 POWDER, FOR SOLUTION ORAL DAILY
Refills: 0 | Status: DISCONTINUED | OUTPATIENT
Start: 2025-08-07 | End: 2025-08-15

## 2025-08-07 RX ORDER — FENTANYL CITRATE-0.9 % NACL/PF 100MCG/2ML
100 SYRINGE (ML) INTRAVENOUS ONCE
Refills: 0 | Status: DISCONTINUED | OUTPATIENT
Start: 2025-08-07 | End: 2025-08-07

## 2025-08-07 RX ORDER — CEFAZOLIN SODIUM IN 0.9 % NACL 3 G/100 ML
2000 INTRAVENOUS SOLUTION, PIGGYBACK (ML) INTRAVENOUS ONCE
Refills: 0 | Status: COMPLETED | OUTPATIENT
Start: 2025-08-07 | End: 2025-08-07

## 2025-08-07 RX ORDER — ONDANSETRON HCL/PF 4 MG/2 ML
4 VIAL (ML) INJECTION EVERY 6 HOURS
Refills: 0 | Status: DISCONTINUED | OUTPATIENT
Start: 2025-08-07 | End: 2025-08-15

## 2025-08-07 RX ORDER — ACETAMINOPHEN 500 MG/5ML
975 LIQUID (ML) ORAL EVERY 6 HOURS
Refills: 0 | Status: DISCONTINUED | OUTPATIENT
Start: 2025-08-07 | End: 2025-08-12

## 2025-08-07 RX ORDER — DEXMEDETOMIDINE HYDROCHLORIDE IN SODIUM CHLORIDE 4 UG/ML
0.7 INJECTION INTRAVENOUS
Qty: 400 | Refills: 0 | Status: DISCONTINUED | OUTPATIENT
Start: 2025-08-07 | End: 2025-08-07

## 2025-08-07 RX ORDER — AMPICILLIN SODIUM AND SULBACTAM SODIUM 1; .5 G/1; G/1
3 INJECTION, POWDER, FOR SOLUTION INTRAMUSCULAR; INTRAVENOUS EVERY 6 HOURS
Refills: 0 | Status: COMPLETED | OUTPATIENT
Start: 2025-08-07 | End: 2025-08-10

## 2025-08-07 RX ORDER — FENTANYL CITRATE-0.9 % NACL/PF 100MCG/2ML
25 SYRINGE (ML) INTRAVENOUS ONCE
Refills: 0 | Status: DISCONTINUED | OUTPATIENT
Start: 2025-08-07 | End: 2025-08-07

## 2025-08-07 RX ORDER — MIDAZOLAM IN 0.9 % SOD.CHLORID 1 MG/ML
2 PLASTIC BAG, INJECTION (ML) INTRAVENOUS ONCE
Refills: 0 | Status: DISCONTINUED | OUTPATIENT
Start: 2025-08-07 | End: 2025-08-07

## 2025-08-07 RX ORDER — SALINE 7; 19 G/118ML; G/118ML
1 ENEMA RECTAL ONCE
Refills: 0 | Status: COMPLETED | OUTPATIENT
Start: 2025-08-07 | End: 2025-08-07

## 2025-08-07 RX ORDER — FENTANYL CITRATE-0.9 % NACL/PF 100MCG/2ML
50 SYRINGE (ML) INTRAVENOUS ONCE
Refills: 0 | Status: DISCONTINUED | OUTPATIENT
Start: 2025-08-07 | End: 2025-08-07

## 2025-08-07 RX ORDER — AMPICILLIN SODIUM AND SULBACTAM SODIUM 1; .5 G/1; G/1
3 INJECTION, POWDER, FOR SOLUTION INTRAMUSCULAR; INTRAVENOUS ONCE
Refills: 0 | Status: COMPLETED | OUTPATIENT
Start: 2025-08-07 | End: 2025-08-07

## 2025-08-07 RX ORDER — MIDAZOLAM IN 0.9 % SOD.CHLORID 1 MG/ML
100 PLASTIC BAG, INJECTION (ML) INTRAVENOUS ONCE
Refills: 0 | Status: DISCONTINUED | OUTPATIENT
Start: 2025-08-07 | End: 2025-08-07

## 2025-08-07 RX ORDER — OXYCODONE HYDROCHLORIDE 30 MG/1
5 TABLET ORAL EVERY 4 HOURS
Refills: 0 | Status: DISCONTINUED | OUTPATIENT
Start: 2025-08-07 | End: 2025-08-14

## 2025-08-07 RX ORDER — AMPICILLIN SODIUM AND SULBACTAM SODIUM 1; .5 G/1; G/1
INJECTION, POWDER, FOR SOLUTION INTRAMUSCULAR; INTRAVENOUS
Refills: 0 | Status: COMPLETED | OUTPATIENT
Start: 2025-08-07 | End: 2025-08-10

## 2025-08-07 RX ORDER — CEFAZOLIN SODIUM IN 0.9 % NACL 3 G/100 ML
2000 INTRAVENOUS SOLUTION, PIGGYBACK (ML) INTRAVENOUS EVERY 8 HOURS
Refills: 0 | Status: DISCONTINUED | OUTPATIENT
Start: 2025-08-07 | End: 2025-08-07

## 2025-08-07 RX ORDER — LIDOCAINE HCL/PF 10 MG/ML
20 VIAL (ML) INJECTION ONCE
Refills: 0 | Status: COMPLETED | OUTPATIENT
Start: 2025-08-07 | End: 2025-08-07

## 2025-08-07 RX ORDER — SENNA 187 MG
2 TABLET ORAL AT BEDTIME
Refills: 0 | Status: DISCONTINUED | OUTPATIENT
Start: 2025-08-07 | End: 2025-08-15

## 2025-08-07 RX ORDER — OXYCODONE HYDROCHLORIDE 30 MG/1
2.5 TABLET ORAL ONCE
Refills: 0 | Status: DISCONTINUED | OUTPATIENT
Start: 2025-08-07 | End: 2025-08-07

## 2025-08-07 RX ORDER — CEFAZOLIN SODIUM IN 0.9 % NACL 3 G/100 ML
INTRAVENOUS SOLUTION, PIGGYBACK (ML) INTRAVENOUS
Refills: 0 | Status: DISCONTINUED | OUTPATIENT
Start: 2025-08-07 | End: 2025-08-07

## 2025-08-07 RX ORDER — ACETAMINOPHEN 500 MG/5ML
1000 LIQUID (ML) ORAL EVERY 6 HOURS
Refills: 0 | Status: COMPLETED | OUTPATIENT
Start: 2025-08-07 | End: 2025-08-07

## 2025-08-07 RX ADMIN — Medication 1000 MILLIGRAM(S): at 19:15

## 2025-08-07 RX ADMIN — OXYCODONE HYDROCHLORIDE 2.5 MILLIGRAM(S): 30 TABLET ORAL at 20:16

## 2025-08-07 RX ADMIN — Medication 400 MILLIGRAM(S): at 06:55

## 2025-08-07 RX ADMIN — AMPICILLIN SODIUM AND SULBACTAM SODIUM 200 GRAM(S): 1; .5 INJECTION, POWDER, FOR SOLUTION INTRAMUSCULAR; INTRAVENOUS at 12:03

## 2025-08-07 RX ADMIN — Medication 2 TABLET(S): at 20:16

## 2025-08-07 RX ADMIN — Medication 400 MILLIGRAM(S): at 18:00

## 2025-08-07 RX ADMIN — Medication 40 MILLIGRAM(S): at 12:03

## 2025-08-07 RX ADMIN — Medication 2 MILLIGRAM(S): at 02:10

## 2025-08-07 RX ADMIN — Medication 1000 MILLIGRAM(S): at 13:00

## 2025-08-07 RX ADMIN — Medication 400 MILLIGRAM(S): at 23:01

## 2025-08-07 RX ADMIN — AMPICILLIN SODIUM AND SULBACTAM SODIUM 200 GRAM(S): 1; .5 INJECTION, POWDER, FOR SOLUTION INTRAMUSCULAR; INTRAVENOUS at 04:47

## 2025-08-07 RX ADMIN — ANDEXANET ALFA 184.62 MILLIGRAM(S): 200 INJECTION, POWDER, LYOPHILIZED, FOR SOLUTION INTRAVENOUS at 02:13

## 2025-08-07 RX ADMIN — OXYCODONE HYDROCHLORIDE 2.5 MILLIGRAM(S): 30 TABLET ORAL at 20:46

## 2025-08-07 RX ADMIN — AMPICILLIN SODIUM AND SULBACTAM SODIUM 200 GRAM(S): 1; .5 INJECTION, POWDER, FOR SOLUTION INTRAMUSCULAR; INTRAVENOUS at 23:21

## 2025-08-07 RX ADMIN — Medication 1000 MILLIGRAM(S): at 23:31

## 2025-08-07 RX ADMIN — SALINE 1 ENEMA: 7; 19 ENEMA RECTAL at 18:47

## 2025-08-07 RX ADMIN — Medication 100 MILLIEQUIVALENT(S): at 12:18

## 2025-08-07 RX ADMIN — Medication 400 MILLIGRAM(S): at 12:03

## 2025-08-07 RX ADMIN — Medication 100 MILLIEQUIVALENT(S): at 08:05

## 2025-08-07 RX ADMIN — POLYETHYLENE GLYCOL 3350 17 GRAM(S): 17 POWDER, FOR SOLUTION ORAL at 12:04

## 2025-08-07 RX ADMIN — Medication 100 MICROGRAM(S): at 02:10

## 2025-08-07 RX ADMIN — Medication 100 MICROGRAM(S): at 02:00

## 2025-08-07 RX ADMIN — Medication 20 MILLILITER(S): at 02:30

## 2025-08-07 RX ADMIN — Medication 100 MILLIGRAM(S): at 00:54

## 2025-08-07 RX ADMIN — Medication 2 MILLIGRAM(S): at 02:00

## 2025-08-07 RX ADMIN — DEXMEDETOMIDINE HYDROCHLORIDE IN SODIUM CHLORIDE 9.14 MICROGRAM(S)/KG/HR: 4 INJECTION INTRAVENOUS at 02:00

## 2025-08-07 RX ADMIN — ANDEXANET ALFA 24 MILLIGRAM(S): 200 INJECTION, POWDER, LYOPHILIZED, FOR SOLUTION INTRAVENOUS at 02:24

## 2025-08-07 RX ADMIN — Medication 100 MILLIEQUIVALENT(S): at 05:23

## 2025-08-07 RX ADMIN — Medication 1 APPLICATION(S): at 12:04

## 2025-08-07 RX ADMIN — AMPICILLIN SODIUM AND SULBACTAM SODIUM 200 GRAM(S): 1; .5 INJECTION, POWDER, FOR SOLUTION INTRAMUSCULAR; INTRAVENOUS at 18:30

## 2025-08-08 ENCOUNTER — RESULT REVIEW (OUTPATIENT)
Age: 45
End: 2025-08-08

## 2025-08-08 LAB
ANION GAP SERPL CALC-SCNC: 10 MMOL/L — SIGNIFICANT CHANGE UP (ref 7–14)
ANION GAP SERPL CALC-SCNC: 12 MMOL/L — SIGNIFICANT CHANGE UP (ref 7–14)
BUN SERPL-MCNC: 7 MG/DL — SIGNIFICANT CHANGE UP (ref 7–23)
BUN SERPL-MCNC: 8 MG/DL — SIGNIFICANT CHANGE UP (ref 7–23)
CALCIUM SERPL-MCNC: 8.4 MG/DL — SIGNIFICANT CHANGE UP (ref 8.4–10.5)
CALCIUM SERPL-MCNC: 8.5 MG/DL — SIGNIFICANT CHANGE UP (ref 8.4–10.5)
CHLORIDE SERPL-SCNC: 102 MMOL/L — SIGNIFICANT CHANGE UP (ref 98–107)
CHLORIDE SERPL-SCNC: 103 MMOL/L — SIGNIFICANT CHANGE UP (ref 98–107)
CO2 SERPL-SCNC: 18 MMOL/L — LOW (ref 22–31)
CO2 SERPL-SCNC: 21 MMOL/L — LOW (ref 22–31)
CREAT SERPL-MCNC: 0.45 MG/DL — LOW (ref 0.5–1.3)
CREAT SERPL-MCNC: 0.46 MG/DL — LOW (ref 0.5–1.3)
EGFR: 119 ML/MIN/1.73M2 — SIGNIFICANT CHANGE UP
GLUCOSE SERPL-MCNC: 106 MG/DL — HIGH (ref 70–99)
GLUCOSE SERPL-MCNC: 113 MG/DL — HIGH (ref 70–99)
HCT VFR BLD CALC: 34 % — LOW (ref 34.5–45)
HGB BLD-MCNC: 11.6 G/DL — SIGNIFICANT CHANGE UP (ref 11.5–15.5)
MAGNESIUM SERPL-MCNC: 2 MG/DL — SIGNIFICANT CHANGE UP (ref 1.6–2.6)
MAGNESIUM SERPL-MCNC: 2.2 MG/DL — SIGNIFICANT CHANGE UP (ref 1.6–2.6)
MCHC RBC-ENTMCNC: 29.5 PG — SIGNIFICANT CHANGE UP (ref 27–34)
MCHC RBC-ENTMCNC: 34.1 G/DL — SIGNIFICANT CHANGE UP (ref 32–36)
MCV RBC AUTO: 86.5 FL — SIGNIFICANT CHANGE UP (ref 80–100)
NRBC # BLD AUTO: 0 K/UL — SIGNIFICANT CHANGE UP (ref 0–0)
NRBC # FLD: 0 K/UL — SIGNIFICANT CHANGE UP (ref 0–0)
NRBC BLD AUTO-RTO: 0 /100 WBCS — SIGNIFICANT CHANGE UP (ref 0–0)
PHOSPHATE SERPL-MCNC: 2.8 MG/DL — SIGNIFICANT CHANGE UP (ref 2.5–4.5)
PHOSPHATE SERPL-MCNC: 3 MG/DL — SIGNIFICANT CHANGE UP (ref 2.5–4.5)
PLATELET # BLD AUTO: 246 K/UL — SIGNIFICANT CHANGE UP (ref 150–400)
PMV BLD: 10.1 FL — SIGNIFICANT CHANGE UP (ref 7–13)
POTASSIUM SERPL-MCNC: 3.8 MMOL/L — SIGNIFICANT CHANGE UP (ref 3.5–5.3)
POTASSIUM SERPL-MCNC: 4.8 MMOL/L — SIGNIFICANT CHANGE UP (ref 3.5–5.3)
POTASSIUM SERPL-SCNC: 3.8 MMOL/L — SIGNIFICANT CHANGE UP (ref 3.5–5.3)
POTASSIUM SERPL-SCNC: 4.8 MMOL/L — SIGNIFICANT CHANGE UP (ref 3.5–5.3)
RBC # BLD: 3.93 M/UL — SIGNIFICANT CHANGE UP (ref 3.8–5.2)
RBC # FLD: 14.3 % — SIGNIFICANT CHANGE UP (ref 10.3–14.5)
SODIUM SERPL-SCNC: 133 MMOL/L — LOW (ref 135–145)
SODIUM SERPL-SCNC: 133 MMOL/L — LOW (ref 135–145)
WBC # BLD: 4.23 K/UL — SIGNIFICANT CHANGE UP (ref 3.8–10.5)
WBC # FLD AUTO: 4.23 K/UL — SIGNIFICANT CHANGE UP (ref 3.8–10.5)

## 2025-08-08 PROCEDURE — 77011 CT SCAN FOR LOCALIZATION: CPT | Mod: 26

## 2025-08-08 PROCEDURE — 74177 CT ABD & PELVIS W/CONTRAST: CPT | Mod: 26

## 2025-08-08 PROCEDURE — 76856 US EXAM PELVIC COMPLETE: CPT | Mod: 26

## 2025-08-08 PROCEDURE — 99223 1ST HOSP IP/OBS HIGH 75: CPT

## 2025-08-08 PROCEDURE — 93970 EXTREMITY STUDY: CPT | Mod: 26

## 2025-08-08 RX ORDER — ENOXAPARIN SODIUM 100 MG/ML
40 INJECTION SUBCUTANEOUS EVERY 24 HOURS
Refills: 0 | Status: COMPLETED | OUTPATIENT
Start: 2025-08-08 | End: 2025-08-08

## 2025-08-08 RX ORDER — SODIUM CHLORIDE 3 G/100ML
500 INJECTION, SOLUTION INTRAVENOUS
Refills: 0 | Status: DISCONTINUED | OUTPATIENT
Start: 2025-08-08 | End: 2025-08-09

## 2025-08-08 RX ORDER — CALAMINE 8% AND ZINC OXIDE 8% 160 MG/ML
1 LOTION TOPICAL DAILY
Refills: 0 | Status: DISCONTINUED | OUTPATIENT
Start: 2025-08-08 | End: 2025-08-15

## 2025-08-08 RX ORDER — MUPIROCIN CALCIUM 20 MG/G
1 CREAM TOPICAL
Refills: 0 | Status: COMPLETED | OUTPATIENT
Start: 2025-08-08 | End: 2025-08-13

## 2025-08-08 RX ORDER — SODIUM CHLORIDE 3 G/100ML
500 INJECTION, SOLUTION INTRAVENOUS
Refills: 0 | Status: DISCONTINUED | OUTPATIENT
Start: 2025-08-08 | End: 2025-08-08

## 2025-08-08 RX ORDER — ENOXAPARIN SODIUM 100 MG/ML
30 INJECTION SUBCUTANEOUS EVERY 24 HOURS
Refills: 0 | Status: DISCONTINUED | OUTPATIENT
Start: 2025-08-08 | End: 2025-08-08

## 2025-08-08 RX ORDER — SODIUM PHOSPHATE,DIBASIC DIHYD
15 POWDER (GRAM) MISCELLANEOUS ONCE
Refills: 0 | Status: COMPLETED | OUTPATIENT
Start: 2025-08-08 | End: 2025-08-08

## 2025-08-08 RX ORDER — BISACODYL 5 MG
10 TABLET, DELAYED RELEASE (ENTERIC COATED) ORAL ONCE
Refills: 0 | Status: COMPLETED | OUTPATIENT
Start: 2025-08-08 | End: 2025-08-08

## 2025-08-08 RX ADMIN — Medication 975 MILLIGRAM(S): at 17:48

## 2025-08-08 RX ADMIN — AMPICILLIN SODIUM AND SULBACTAM SODIUM 200 GRAM(S): 1; .5 INJECTION, POWDER, FOR SOLUTION INTRAMUSCULAR; INTRAVENOUS at 17:49

## 2025-08-08 RX ADMIN — Medication 975 MILLIGRAM(S): at 12:57

## 2025-08-08 RX ADMIN — Medication 2 TABLET(S): at 20:35

## 2025-08-08 RX ADMIN — Medication 975 MILLIGRAM(S): at 05:33

## 2025-08-08 RX ADMIN — SODIUM CHLORIDE 50 MILLILITER(S): 3 INJECTION, SOLUTION INTRAVENOUS at 19:37

## 2025-08-08 RX ADMIN — Medication 975 MILLIGRAM(S): at 23:33

## 2025-08-08 RX ADMIN — Medication 40 MILLIGRAM(S): at 11:37

## 2025-08-08 RX ADMIN — ENOXAPARIN SODIUM 40 MILLIGRAM(S): 100 INJECTION SUBCUTANEOUS at 11:36

## 2025-08-08 RX ADMIN — Medication 975 MILLIGRAM(S): at 11:36

## 2025-08-08 RX ADMIN — POLYETHYLENE GLYCOL 3350 17 GRAM(S): 17 POWDER, FOR SOLUTION ORAL at 11:36

## 2025-08-08 RX ADMIN — OXYCODONE HYDROCHLORIDE 5 MILLIGRAM(S): 30 TABLET ORAL at 00:21

## 2025-08-08 RX ADMIN — AMPICILLIN SODIUM AND SULBACTAM SODIUM 200 GRAM(S): 1; .5 INJECTION, POWDER, FOR SOLUTION INTRAMUSCULAR; INTRAVENOUS at 11:36

## 2025-08-08 RX ADMIN — Medication 10 MILLIGRAM(S): at 15:30

## 2025-08-08 RX ADMIN — OXYCODONE HYDROCHLORIDE 5 MILLIGRAM(S): 30 TABLET ORAL at 15:29

## 2025-08-08 RX ADMIN — OXYCODONE HYDROCHLORIDE 5 MILLIGRAM(S): 30 TABLET ORAL at 00:51

## 2025-08-08 RX ADMIN — Medication 1 APPLICATION(S): at 11:39

## 2025-08-08 RX ADMIN — Medication 975 MILLIGRAM(S): at 18:17

## 2025-08-08 RX ADMIN — OXYCODONE HYDROCHLORIDE 5 MILLIGRAM(S): 30 TABLET ORAL at 15:59

## 2025-08-08 RX ADMIN — Medication 975 MILLIGRAM(S): at 23:03

## 2025-08-08 RX ADMIN — Medication 63.75 MILLIMOLE(S): at 20:34

## 2025-08-08 RX ADMIN — MUPIROCIN CALCIUM 1 APPLICATION(S): 20 CREAM TOPICAL at 17:49

## 2025-08-08 RX ADMIN — AMPICILLIN SODIUM AND SULBACTAM SODIUM 200 GRAM(S): 1; .5 INJECTION, POWDER, FOR SOLUTION INTRAMUSCULAR; INTRAVENOUS at 05:03

## 2025-08-08 RX ADMIN — SODIUM CHLORIDE 90 MILLILITER(S): 3 INJECTION, SOLUTION INTRAVENOUS at 11:38

## 2025-08-08 RX ADMIN — Medication 975 MILLIGRAM(S): at 05:03

## 2025-08-08 RX ADMIN — OXYCODONE HYDROCHLORIDE 5 MILLIGRAM(S): 30 TABLET ORAL at 22:41

## 2025-08-08 RX ADMIN — OXYCODONE HYDROCHLORIDE 5 MILLIGRAM(S): 30 TABLET ORAL at 22:11

## 2025-08-09 ENCOUNTER — APPOINTMENT (OUTPATIENT)
Dept: NEUROSURGERY | Facility: HOSPITAL | Age: 45
End: 2025-08-09

## 2025-08-09 ENCOUNTER — RESULT REVIEW (OUTPATIENT)
Age: 45
End: 2025-08-09

## 2025-08-09 LAB
ANION GAP SERPL CALC-SCNC: 15 MMOL/L — HIGH (ref 7–14)
ANION GAP SERPL CALC-SCNC: 16 MMOL/L — HIGH (ref 7–14)
APTT BLD: 33.1 SEC — SIGNIFICANT CHANGE UP (ref 26.1–36.8)
BUN SERPL-MCNC: 5 MG/DL — LOW (ref 7–23)
BUN SERPL-MCNC: 7 MG/DL — SIGNIFICANT CHANGE UP (ref 7–23)
CALCIUM SERPL-MCNC: 8 MG/DL — LOW (ref 8.4–10.5)
CALCIUM SERPL-MCNC: 8.6 MG/DL — SIGNIFICANT CHANGE UP (ref 8.4–10.5)
CHLORIDE SERPL-SCNC: 102 MMOL/L — SIGNIFICANT CHANGE UP (ref 98–107)
CHLORIDE SERPL-SCNC: 102 MMOL/L — SIGNIFICANT CHANGE UP (ref 98–107)
CO2 SERPL-SCNC: 17 MMOL/L — LOW (ref 22–31)
CO2 SERPL-SCNC: 19 MMOL/L — LOW (ref 22–31)
CREAT SERPL-MCNC: 0.43 MG/DL — LOW (ref 0.5–1.3)
CREAT SERPL-MCNC: 0.48 MG/DL — LOW (ref 0.5–1.3)
EGFR: 117 ML/MIN/1.73M2 — SIGNIFICANT CHANGE UP
EGFR: 117 ML/MIN/1.73M2 — SIGNIFICANT CHANGE UP
EGFR: 121 ML/MIN/1.73M2 — SIGNIFICANT CHANGE UP
EGFR: 121 ML/MIN/1.73M2 — SIGNIFICANT CHANGE UP
GLUCOSE SERPL-MCNC: 120 MG/DL — HIGH (ref 70–99)
GLUCOSE SERPL-MCNC: 151 MG/DL — HIGH (ref 70–99)
HCG SERPL-ACNC: <1 MIU/ML — SIGNIFICANT CHANGE UP
HCT VFR BLD CALC: 32.7 % — LOW (ref 34.5–45)
HCT VFR BLD CALC: 38 % — SIGNIFICANT CHANGE UP (ref 34.5–45)
HGB BLD-MCNC: 11 G/DL — LOW (ref 11.5–15.5)
HGB BLD-MCNC: 12.5 G/DL — SIGNIFICANT CHANGE UP (ref 11.5–15.5)
INR BLD: 0.98 RATIO — SIGNIFICANT CHANGE UP (ref 0.85–1.16)
MAGNESIUM SERPL-MCNC: 1.9 MG/DL — SIGNIFICANT CHANGE UP (ref 1.6–2.6)
MAGNESIUM SERPL-MCNC: 2 MG/DL — SIGNIFICANT CHANGE UP (ref 1.6–2.6)
MCHC RBC-ENTMCNC: 29.3 PG — SIGNIFICANT CHANGE UP (ref 27–34)
MCHC RBC-ENTMCNC: 29.5 PG — SIGNIFICANT CHANGE UP (ref 27–34)
MCHC RBC-ENTMCNC: 32.9 G/DL — SIGNIFICANT CHANGE UP (ref 32–36)
MCHC RBC-ENTMCNC: 33.6 G/DL — SIGNIFICANT CHANGE UP (ref 32–36)
MCV RBC AUTO: 87.7 FL — SIGNIFICANT CHANGE UP (ref 80–100)
MCV RBC AUTO: 89 FL — SIGNIFICANT CHANGE UP (ref 80–100)
NRBC # BLD AUTO: 0 K/UL — SIGNIFICANT CHANGE UP (ref 0–0)
NRBC # BLD AUTO: 0 K/UL — SIGNIFICANT CHANGE UP (ref 0–0)
NRBC # FLD: 0 K/UL — SIGNIFICANT CHANGE UP (ref 0–0)
NRBC # FLD: 0 K/UL — SIGNIFICANT CHANGE UP (ref 0–0)
NRBC BLD AUTO-RTO: 0 /100 WBCS — SIGNIFICANT CHANGE UP (ref 0–0)
NRBC BLD AUTO-RTO: 0 /100 WBCS — SIGNIFICANT CHANGE UP (ref 0–0)
PHOSPHATE SERPL-MCNC: 3.2 MG/DL — SIGNIFICANT CHANGE UP (ref 2.5–4.5)
PHOSPHATE SERPL-MCNC: 4.8 MG/DL — HIGH (ref 2.5–4.5)
PLATELET # BLD AUTO: 246 K/UL — SIGNIFICANT CHANGE UP (ref 150–400)
PLATELET # BLD AUTO: 271 K/UL — SIGNIFICANT CHANGE UP (ref 150–400)
PMV BLD: 10 FL — SIGNIFICANT CHANGE UP (ref 7–13)
PMV BLD: 9.8 FL — SIGNIFICANT CHANGE UP (ref 7–13)
POTASSIUM SERPL-MCNC: 3.3 MMOL/L — LOW (ref 3.5–5.3)
POTASSIUM SERPL-MCNC: 3.8 MMOL/L — SIGNIFICANT CHANGE UP (ref 3.5–5.3)
POTASSIUM SERPL-SCNC: 3.3 MMOL/L — LOW (ref 3.5–5.3)
POTASSIUM SERPL-SCNC: 3.8 MMOL/L — SIGNIFICANT CHANGE UP (ref 3.5–5.3)
PROTHROM AB SERPL-ACNC: 11.4 SEC — SIGNIFICANT CHANGE UP (ref 9.9–13.4)
RBC # BLD: 3.73 M/UL — LOW (ref 3.8–5.2)
RBC # BLD: 4.27 M/UL — SIGNIFICANT CHANGE UP (ref 3.8–5.2)
RBC # FLD: 14.2 % — SIGNIFICANT CHANGE UP (ref 10.3–14.5)
RBC # FLD: 14.5 % — SIGNIFICANT CHANGE UP (ref 10.3–14.5)
SODIUM SERPL-SCNC: 134 MMOL/L — LOW (ref 135–145)
SODIUM SERPL-SCNC: 137 MMOL/L — SIGNIFICANT CHANGE UP (ref 135–145)
WBC # BLD: 3.81 K/UL — SIGNIFICANT CHANGE UP (ref 3.8–10.5)
WBC # BLD: 4.09 K/UL — SIGNIFICANT CHANGE UP (ref 3.8–10.5)
WBC # FLD AUTO: 3.81 K/UL — SIGNIFICANT CHANGE UP (ref 3.8–10.5)
WBC # FLD AUTO: 4.09 K/UL — SIGNIFICANT CHANGE UP (ref 3.8–10.5)

## 2025-08-09 PROCEDURE — 70450 CT HEAD/BRAIN W/O DYE: CPT | Mod: 26,GC

## 2025-08-09 PROCEDURE — 70250 X-RAY EXAM OF SKULL: CPT | Mod: 26

## 2025-08-09 PROCEDURE — 61781 SCAN PROC CRANIAL INTRA: CPT

## 2025-08-09 PROCEDURE — 62230 REPLACE/REVISE BRAIN SHUNT: CPT

## 2025-08-09 PROCEDURE — 88300 SURGICAL PATH GROSS: CPT | Mod: 26

## 2025-08-09 PROCEDURE — 71045 X-RAY EXAM CHEST 1 VIEW: CPT | Mod: 26

## 2025-08-09 PROCEDURE — 99291 CRITICAL CARE FIRST HOUR: CPT

## 2025-08-09 PROCEDURE — 62225 REPLACE/IRRIGATE CATHETER: CPT

## 2025-08-09 DEVICE — SURGIFOAM 8 X 12.25CM X 2MM: Type: IMPLANTABLE DEVICE | Status: FUNCTIONAL

## 2025-08-09 DEVICE — CATH CONNECTOR STRAIGHT 15MM: Type: IMPLANTABLE DEVICE | Status: FUNCTIONAL

## 2025-08-09 DEVICE — CATH DISTAL ARES: Type: IMPLANTABLE DEVICE | Status: FUNCTIONAL

## 2025-08-09 DEVICE — VLV CERTAS IN LINE: Type: IMPLANTABLE DEVICE | Status: FUNCTIONAL

## 2025-08-09 DEVICE — IMPLANTABLE DEVICE: Type: IMPLANTABLE DEVICE | Status: FUNCTIONAL

## 2025-08-09 DEVICE — SURGIFLO MATRIX WITH THROMBIN KIT: Type: IMPLANTABLE DEVICE | Status: FUNCTIONAL

## 2025-08-09 RX ORDER — CEFAZOLIN SODIUM IN 0.9 % NACL 3 G/100 ML
2000 INTRAVENOUS SOLUTION, PIGGYBACK (ML) INTRAVENOUS EVERY 8 HOURS
Refills: 0 | Status: COMPLETED | OUTPATIENT
Start: 2025-08-09 | End: 2025-08-09

## 2025-08-09 RX ORDER — ACETAMINOPHEN 500 MG/5ML
1000 LIQUID (ML) ORAL ONCE
Refills: 0 | Status: DISCONTINUED | OUTPATIENT
Start: 2025-08-09 | End: 2025-08-10

## 2025-08-09 RX ORDER — SODIUM CHLORIDE 9 G/1000ML
1000 INJECTION, SOLUTION INTRAVENOUS
Refills: 0 | Status: DISCONTINUED | OUTPATIENT
Start: 2025-08-09 | End: 2025-08-09

## 2025-08-09 RX ORDER — CEFAZOLIN SODIUM IN 0.9 % NACL 3 G/100 ML
2000 INTRAVENOUS SOLUTION, PIGGYBACK (ML) INTRAVENOUS EVERY 8 HOURS
Refills: 0 | Status: DISCONTINUED | OUTPATIENT
Start: 2025-08-09 | End: 2025-08-09

## 2025-08-09 RX ADMIN — Medication 100 MILLIGRAM(S): at 22:24

## 2025-08-09 RX ADMIN — AMPICILLIN SODIUM AND SULBACTAM SODIUM 200 GRAM(S): 1; .5 INJECTION, POWDER, FOR SOLUTION INTRAMUSCULAR; INTRAVENOUS at 13:36

## 2025-08-09 RX ADMIN — Medication 40 MILLIGRAM(S): at 13:36

## 2025-08-09 RX ADMIN — AMPICILLIN SODIUM AND SULBACTAM SODIUM 200 GRAM(S): 1; .5 INJECTION, POWDER, FOR SOLUTION INTRAMUSCULAR; INTRAVENOUS at 17:34

## 2025-08-09 RX ADMIN — Medication 100 MILLIEQUIVALENT(S): at 03:45

## 2025-08-09 RX ADMIN — AMPICILLIN SODIUM AND SULBACTAM SODIUM 200 GRAM(S): 1; .5 INJECTION, POWDER, FOR SOLUTION INTRAMUSCULAR; INTRAVENOUS at 05:14

## 2025-08-09 RX ADMIN — AMPICILLIN SODIUM AND SULBACTAM SODIUM 200 GRAM(S): 1; .5 INJECTION, POWDER, FOR SOLUTION INTRAMUSCULAR; INTRAVENOUS at 00:04

## 2025-08-09 RX ADMIN — MUPIROCIN CALCIUM 1 APPLICATION(S): 20 CREAM TOPICAL at 05:47

## 2025-08-09 RX ADMIN — Medication 975 MILLIGRAM(S): at 17:35

## 2025-08-09 RX ADMIN — Medication 80 MILLILITER(S): at 02:38

## 2025-08-09 RX ADMIN — Medication 100 MILLIEQUIVALENT(S): at 01:59

## 2025-08-09 RX ADMIN — Medication 100 MILLIGRAM(S): at 13:55

## 2025-08-09 RX ADMIN — Medication 100 MILLIEQUIVALENT(S): at 05:46

## 2025-08-09 RX ADMIN — SODIUM CHLORIDE 80 MILLILITER(S): 9 INJECTION, SOLUTION INTRAVENOUS at 00:27

## 2025-08-09 RX ADMIN — Medication 1 APPLICATION(S): at 13:38

## 2025-08-09 RX ADMIN — MUPIROCIN CALCIUM 1 APPLICATION(S): 20 CREAM TOPICAL at 17:35

## 2025-08-09 RX ADMIN — Medication 2 TABLET(S): at 22:24

## 2025-08-10 LAB
-  AMOXICILLIN/CLAVULANIC ACID: SIGNIFICANT CHANGE UP
-  AMPICILLIN/SULBACTAM: SIGNIFICANT CHANGE UP
-  AMPICILLIN: SIGNIFICANT CHANGE UP
-  AZTREONAM: SIGNIFICANT CHANGE UP
-  CEFAZOLIN: SIGNIFICANT CHANGE UP
-  CEFEPIME: SIGNIFICANT CHANGE UP
-  CEFOXITIN: SIGNIFICANT CHANGE UP
-  CEFTRIAXONE: SIGNIFICANT CHANGE UP
-  CIPROFLOXACIN: SIGNIFICANT CHANGE UP
-  ERTAPENEM: SIGNIFICANT CHANGE UP
-  LEVOFLOXACIN: SIGNIFICANT CHANGE UP
-  MEROPENEM: SIGNIFICANT CHANGE UP
-  NITROFURANTOIN: SIGNIFICANT CHANGE UP
-  PIPERACILLIN/TAZOBACTAM: SIGNIFICANT CHANGE UP
-  TRIMETHOPRIM/SULFAMETHOXAZOLE: SIGNIFICANT CHANGE UP
ANION GAP SERPL CALC-SCNC: 11 MMOL/L — SIGNIFICANT CHANGE UP (ref 7–14)
APTT BLD: 28 SEC — SIGNIFICANT CHANGE UP (ref 26.1–36.8)
BUN SERPL-MCNC: 7 MG/DL — SIGNIFICANT CHANGE UP (ref 7–23)
CALCIUM SERPL-MCNC: 8.7 MG/DL — SIGNIFICANT CHANGE UP (ref 8.4–10.5)
CHLORIDE SERPL-SCNC: 101 MMOL/L — SIGNIFICANT CHANGE UP (ref 98–107)
CO2 SERPL-SCNC: 21 MMOL/L — LOW (ref 22–31)
CREAT SERPL-MCNC: 0.47 MG/DL — LOW (ref 0.5–1.3)
CULTURE RESULTS: ABNORMAL
EGFR: 118 ML/MIN/1.73M2 — SIGNIFICANT CHANGE UP
EGFR: 118 ML/MIN/1.73M2 — SIGNIFICANT CHANGE UP
GLUCOSE SERPL-MCNC: 124 MG/DL — HIGH (ref 70–99)
HCT VFR BLD CALC: 36.8 % — SIGNIFICANT CHANGE UP (ref 34.5–45)
HGB BLD-MCNC: 12.3 G/DL — SIGNIFICANT CHANGE UP (ref 11.5–15.5)
INR BLD: 0.97 RATIO — SIGNIFICANT CHANGE UP (ref 0.85–1.16)
MAGNESIUM SERPL-MCNC: 2 MG/DL — SIGNIFICANT CHANGE UP (ref 1.6–2.6)
MCHC RBC-ENTMCNC: 29.1 PG — SIGNIFICANT CHANGE UP (ref 27–34)
MCHC RBC-ENTMCNC: 33.4 G/DL — SIGNIFICANT CHANGE UP (ref 32–36)
MCV RBC AUTO: 87.2 FL — SIGNIFICANT CHANGE UP (ref 80–100)
METHOD TYPE: SIGNIFICANT CHANGE UP
NRBC # BLD AUTO: 0 K/UL — SIGNIFICANT CHANGE UP (ref 0–0)
NRBC # FLD: 0 K/UL — SIGNIFICANT CHANGE UP (ref 0–0)
NRBC BLD AUTO-RTO: 0 /100 WBCS — SIGNIFICANT CHANGE UP (ref 0–0)
ORGANISM # SPEC MICROSCOPIC CNT: ABNORMAL
ORGANISM # SPEC MICROSCOPIC CNT: ABNORMAL
PHOSPHATE SERPL-MCNC: 2.3 MG/DL — LOW (ref 2.5–4.5)
PLATELET # BLD AUTO: 291 K/UL — SIGNIFICANT CHANGE UP (ref 150–400)
PMV BLD: 9.9 FL — SIGNIFICANT CHANGE UP (ref 7–13)
POTASSIUM SERPL-MCNC: 3.9 MMOL/L — SIGNIFICANT CHANGE UP (ref 3.5–5.3)
POTASSIUM SERPL-SCNC: 3.9 MMOL/L — SIGNIFICANT CHANGE UP (ref 3.5–5.3)
PROTHROM AB SERPL-ACNC: 11.2 SEC — SIGNIFICANT CHANGE UP (ref 9.9–13.4)
RBC # BLD: 4.22 M/UL — SIGNIFICANT CHANGE UP (ref 3.8–5.2)
RBC # FLD: 14.5 % — SIGNIFICANT CHANGE UP (ref 10.3–14.5)
SODIUM SERPL-SCNC: 133 MMOL/L — LOW (ref 135–145)
SPECIMEN SOURCE: SIGNIFICANT CHANGE UP
WBC # BLD: 7.62 K/UL — SIGNIFICANT CHANGE UP (ref 3.8–10.5)
WBC # FLD AUTO: 7.62 K/UL — SIGNIFICANT CHANGE UP (ref 3.8–10.5)

## 2025-08-10 PROCEDURE — 70450 CT HEAD/BRAIN W/O DYE: CPT | Mod: 26

## 2025-08-10 PROCEDURE — 99232 SBSQ HOSP IP/OBS MODERATE 35: CPT | Mod: 25

## 2025-08-10 RX ORDER — ENOXAPARIN SODIUM 100 MG/ML
30 INJECTION SUBCUTANEOUS EVERY 24 HOURS
Refills: 0 | Status: DISCONTINUED | OUTPATIENT
Start: 2025-08-10 | End: 2025-08-15

## 2025-08-10 RX ADMIN — Medication 975 MILLIGRAM(S): at 00:56

## 2025-08-10 RX ADMIN — AMPICILLIN SODIUM AND SULBACTAM SODIUM 200 GRAM(S): 1; .5 INJECTION, POWDER, FOR SOLUTION INTRAMUSCULAR; INTRAVENOUS at 05:53

## 2025-08-10 RX ADMIN — Medication 975 MILLIGRAM(S): at 18:22

## 2025-08-10 RX ADMIN — OXYCODONE HYDROCHLORIDE 5 MILLIGRAM(S): 30 TABLET ORAL at 22:23

## 2025-08-10 RX ADMIN — Medication 1 LOZENGE: at 09:21

## 2025-08-10 RX ADMIN — Medication 1 APPLICATION(S): at 13:02

## 2025-08-10 RX ADMIN — MUPIROCIN CALCIUM 1 APPLICATION(S): 20 CREAM TOPICAL at 18:22

## 2025-08-10 RX ADMIN — Medication 975 MILLIGRAM(S): at 13:02

## 2025-08-10 RX ADMIN — AMPICILLIN SODIUM AND SULBACTAM SODIUM 200 GRAM(S): 1; .5 INJECTION, POWDER, FOR SOLUTION INTRAMUSCULAR; INTRAVENOUS at 00:57

## 2025-08-10 RX ADMIN — Medication 975 MILLIGRAM(S): at 13:32

## 2025-08-10 RX ADMIN — Medication 975 MILLIGRAM(S): at 05:53

## 2025-08-10 RX ADMIN — ENOXAPARIN SODIUM 30 MILLIGRAM(S): 100 INJECTION SUBCUTANEOUS at 13:02

## 2025-08-10 RX ADMIN — OXYCODONE HYDROCHLORIDE 5 MILLIGRAM(S): 30 TABLET ORAL at 21:23

## 2025-08-10 RX ADMIN — OXYCODONE HYDROCHLORIDE 5 MILLIGRAM(S): 30 TABLET ORAL at 08:28

## 2025-08-10 RX ADMIN — Medication 975 MILLIGRAM(S): at 18:52

## 2025-08-10 RX ADMIN — OXYCODONE HYDROCHLORIDE 5 MILLIGRAM(S): 30 TABLET ORAL at 08:50

## 2025-08-10 RX ADMIN — Medication 40 MILLIGRAM(S): at 13:43

## 2025-08-10 RX ADMIN — MUPIROCIN CALCIUM 1 APPLICATION(S): 20 CREAM TOPICAL at 05:53

## 2025-08-11 DIAGNOSIS — Z98.2 PRESENCE OF CEREBROSPINAL FLUID DRAINAGE DEVICE: ICD-10-CM

## 2025-08-11 LAB
ANION GAP SERPL CALC-SCNC: 12 MMOL/L — SIGNIFICANT CHANGE UP (ref 7–14)
BUN SERPL-MCNC: 10 MG/DL — SIGNIFICANT CHANGE UP (ref 7–23)
CALCIUM SERPL-MCNC: 8.6 MG/DL — SIGNIFICANT CHANGE UP (ref 8.4–10.5)
CHLORIDE SERPL-SCNC: 102 MMOL/L — SIGNIFICANT CHANGE UP (ref 98–107)
CO2 SERPL-SCNC: 19 MMOL/L — LOW (ref 22–31)
CREAT SERPL-MCNC: 0.43 MG/DL — LOW (ref 0.5–1.3)
EGFR: 121 ML/MIN/1.73M2 — SIGNIFICANT CHANGE UP
EGFR: 121 ML/MIN/1.73M2 — SIGNIFICANT CHANGE UP
GLUCOSE SERPL-MCNC: 87 MG/DL — SIGNIFICANT CHANGE UP (ref 70–99)
HCT VFR BLD CALC: 31.6 % — LOW (ref 34.5–45)
HGB BLD-MCNC: 10.7 G/DL — LOW (ref 11.5–15.5)
MAGNESIUM SERPL-MCNC: 2.1 MG/DL — SIGNIFICANT CHANGE UP (ref 1.6–2.6)
MCHC RBC-ENTMCNC: 28.9 PG — SIGNIFICANT CHANGE UP (ref 27–34)
MCHC RBC-ENTMCNC: 33.9 G/DL — SIGNIFICANT CHANGE UP (ref 32–36)
MCV RBC AUTO: 85.4 FL — SIGNIFICANT CHANGE UP (ref 80–100)
NRBC # BLD AUTO: 0 K/UL — SIGNIFICANT CHANGE UP (ref 0–0)
NRBC # FLD: 0 K/UL — SIGNIFICANT CHANGE UP (ref 0–0)
NRBC BLD AUTO-RTO: 0 /100 WBCS — SIGNIFICANT CHANGE UP (ref 0–0)
PHOSPHATE SERPL-MCNC: 2.9 MG/DL — SIGNIFICANT CHANGE UP (ref 2.5–4.5)
PLATELET # BLD AUTO: 270 K/UL — SIGNIFICANT CHANGE UP (ref 150–400)
PMV BLD: 9.7 FL — SIGNIFICANT CHANGE UP (ref 7–13)
POTASSIUM SERPL-MCNC: 3.6 MMOL/L — SIGNIFICANT CHANGE UP (ref 3.5–5.3)
POTASSIUM SERPL-SCNC: 3.6 MMOL/L — SIGNIFICANT CHANGE UP (ref 3.5–5.3)
RBC # BLD: 3.7 M/UL — LOW (ref 3.8–5.2)
RBC # FLD: 14.3 % — SIGNIFICANT CHANGE UP (ref 10.3–14.5)
SODIUM SERPL-SCNC: 133 MMOL/L — LOW (ref 135–145)
WBC # BLD: 4.95 K/UL — SIGNIFICANT CHANGE UP (ref 3.8–10.5)
WBC # FLD AUTO: 4.95 K/UL — SIGNIFICANT CHANGE UP (ref 3.8–10.5)

## 2025-08-11 PROCEDURE — 70450 CT HEAD/BRAIN W/O DYE: CPT | Mod: 26

## 2025-08-11 PROCEDURE — 99222 1ST HOSP IP/OBS MODERATE 55: CPT

## 2025-08-11 RX ADMIN — MUPIROCIN CALCIUM 1 APPLICATION(S): 20 CREAM TOPICAL at 18:24

## 2025-08-11 RX ADMIN — OXYCODONE HYDROCHLORIDE 5 MILLIGRAM(S): 30 TABLET ORAL at 05:24

## 2025-08-11 RX ADMIN — Medication 975 MILLIGRAM(S): at 06:03

## 2025-08-11 RX ADMIN — Medication 975 MILLIGRAM(S): at 00:20

## 2025-08-11 RX ADMIN — Medication 975 MILLIGRAM(S): at 19:23

## 2025-08-11 RX ADMIN — OXYCODONE HYDROCHLORIDE 5 MILLIGRAM(S): 30 TABLET ORAL at 17:24

## 2025-08-11 RX ADMIN — Medication 975 MILLIGRAM(S): at 05:20

## 2025-08-11 RX ADMIN — OXYCODONE HYDROCHLORIDE 5 MILLIGRAM(S): 30 TABLET ORAL at 06:03

## 2025-08-11 RX ADMIN — OXYCODONE HYDROCHLORIDE 5 MILLIGRAM(S): 30 TABLET ORAL at 10:24

## 2025-08-11 RX ADMIN — ENOXAPARIN SODIUM 30 MILLIGRAM(S): 100 INJECTION SUBCUTANEOUS at 14:01

## 2025-08-11 RX ADMIN — Medication 975 MILLIGRAM(S): at 18:23

## 2025-08-11 RX ADMIN — MUPIROCIN CALCIUM 1 APPLICATION(S): 20 CREAM TOPICAL at 05:21

## 2025-08-11 RX ADMIN — Medication 975 MILLIGRAM(S): at 11:11

## 2025-08-11 RX ADMIN — Medication 975 MILLIGRAM(S): at 01:20

## 2025-08-11 RX ADMIN — OXYCODONE HYDROCHLORIDE 5 MILLIGRAM(S): 30 TABLET ORAL at 16:24

## 2025-08-11 RX ADMIN — Medication 975 MILLIGRAM(S): at 12:11

## 2025-08-11 RX ADMIN — OXYCODONE HYDROCHLORIDE 5 MILLIGRAM(S): 30 TABLET ORAL at 09:24

## 2025-08-11 RX ADMIN — Medication 40 MILLIGRAM(S): at 11:12

## 2025-08-12 PROCEDURE — 70450 CT HEAD/BRAIN W/O DYE: CPT | Mod: 26

## 2025-08-12 PROCEDURE — 93010 ELECTROCARDIOGRAM REPORT: CPT

## 2025-08-12 RX ORDER — SIMETHICONE 80 MG
80 TABLET,CHEWABLE ORAL
Refills: 0 | Status: DISCONTINUED | OUTPATIENT
Start: 2025-08-12 | End: 2025-08-15

## 2025-08-12 RX ORDER — ACETAMINOPHEN 500 MG/5ML
975 LIQUID (ML) ORAL EVERY 6 HOURS
Refills: 0 | Status: DISCONTINUED | OUTPATIENT
Start: 2025-08-12 | End: 2025-08-15

## 2025-08-12 RX ADMIN — Medication 975 MILLIGRAM(S): at 06:18

## 2025-08-12 RX ADMIN — MUPIROCIN CALCIUM 1 APPLICATION(S): 20 CREAM TOPICAL at 17:05

## 2025-08-12 RX ADMIN — Medication 40 MILLIGRAM(S): at 21:25

## 2025-08-12 RX ADMIN — Medication 975 MILLIGRAM(S): at 01:44

## 2025-08-12 RX ADMIN — Medication 975 MILLIGRAM(S): at 05:24

## 2025-08-12 RX ADMIN — ENOXAPARIN SODIUM 30 MILLIGRAM(S): 100 INJECTION SUBCUTANEOUS at 13:24

## 2025-08-12 RX ADMIN — OXYCODONE HYDROCHLORIDE 5 MILLIGRAM(S): 30 TABLET ORAL at 17:47

## 2025-08-12 RX ADMIN — Medication 975 MILLIGRAM(S): at 00:44

## 2025-08-12 RX ADMIN — OXYCODONE HYDROCHLORIDE 5 MILLIGRAM(S): 30 TABLET ORAL at 17:27

## 2025-08-12 RX ADMIN — Medication 975 MILLIGRAM(S): at 14:00

## 2025-08-12 RX ADMIN — POLYETHYLENE GLYCOL 3350 17 GRAM(S): 17 POWDER, FOR SOLUTION ORAL at 13:24

## 2025-08-12 RX ADMIN — Medication 975 MILLIGRAM(S): at 13:24

## 2025-08-12 RX ADMIN — Medication 1 APPLICATION(S): at 13:24

## 2025-08-12 RX ADMIN — MUPIROCIN CALCIUM 1 APPLICATION(S): 20 CREAM TOPICAL at 05:24

## 2025-08-13 ENCOUNTER — TRANSCRIPTION ENCOUNTER (OUTPATIENT)
Age: 45
End: 2025-08-13

## 2025-08-13 DIAGNOSIS — Z86.711 PERSONAL HISTORY OF PULMONARY EMBOLISM: ICD-10-CM

## 2025-08-13 DIAGNOSIS — R07.89 OTHER CHEST PAIN: ICD-10-CM

## 2025-08-13 DIAGNOSIS — K21.9 GASTRO-ESOPHAGEAL REFLUX DISEASE WITHOUT ESOPHAGITIS: ICD-10-CM

## 2025-08-13 LAB
ANION GAP SERPL CALC-SCNC: 13 MMOL/L — SIGNIFICANT CHANGE UP (ref 7–14)
BUN SERPL-MCNC: 10 MG/DL — SIGNIFICANT CHANGE UP (ref 7–23)
CALCIUM SERPL-MCNC: 9.2 MG/DL — SIGNIFICANT CHANGE UP (ref 8.4–10.5)
CHLORIDE SERPL-SCNC: 101 MMOL/L — SIGNIFICANT CHANGE UP (ref 98–107)
CO2 SERPL-SCNC: 20 MMOL/L — LOW (ref 22–31)
CREAT SERPL-MCNC: 0.39 MG/DL — LOW (ref 0.5–1.3)
EGFR: 124 ML/MIN/1.73M2 — SIGNIFICANT CHANGE UP
EGFR: 124 ML/MIN/1.73M2 — SIGNIFICANT CHANGE UP
GLUCOSE SERPL-MCNC: 102 MG/DL — HIGH (ref 70–99)
HCT VFR BLD CALC: 34.6 % — SIGNIFICANT CHANGE UP (ref 34.5–45)
HGB BLD-MCNC: 11.8 G/DL — SIGNIFICANT CHANGE UP (ref 11.5–15.5)
MAGNESIUM SERPL-MCNC: 2 MG/DL — SIGNIFICANT CHANGE UP (ref 1.6–2.6)
MCHC RBC-ENTMCNC: 29.6 PG — SIGNIFICANT CHANGE UP (ref 27–34)
MCHC RBC-ENTMCNC: 34.1 G/DL — SIGNIFICANT CHANGE UP (ref 32–36)
MCV RBC AUTO: 86.7 FL — SIGNIFICANT CHANGE UP (ref 80–100)
NRBC # BLD AUTO: 0 K/UL — SIGNIFICANT CHANGE UP (ref 0–0)
NRBC # FLD: 0 K/UL — SIGNIFICANT CHANGE UP (ref 0–0)
NRBC BLD AUTO-RTO: 0 /100 WBCS — SIGNIFICANT CHANGE UP (ref 0–0)
PHOSPHATE SERPL-MCNC: 3.4 MG/DL — SIGNIFICANT CHANGE UP (ref 2.5–4.5)
PLATELET # BLD AUTO: 356 K/UL — SIGNIFICANT CHANGE UP (ref 150–400)
PMV BLD: 10.1 FL — SIGNIFICANT CHANGE UP (ref 7–13)
POTASSIUM SERPL-MCNC: 3.9 MMOL/L — SIGNIFICANT CHANGE UP (ref 3.5–5.3)
POTASSIUM SERPL-SCNC: 3.9 MMOL/L — SIGNIFICANT CHANGE UP (ref 3.5–5.3)
RBC # BLD: 3.99 M/UL — SIGNIFICANT CHANGE UP (ref 3.8–5.2)
RBC # FLD: 14.4 % — SIGNIFICANT CHANGE UP (ref 10.3–14.5)
SODIUM SERPL-SCNC: 134 MMOL/L — LOW (ref 135–145)
TROPONIN T, HIGH SENSITIVITY RESULT: <6 NG/L — SIGNIFICANT CHANGE UP
WBC # BLD: 3.97 K/UL — SIGNIFICANT CHANGE UP (ref 3.8–10.5)
WBC # FLD AUTO: 3.97 K/UL — SIGNIFICANT CHANGE UP (ref 3.8–10.5)

## 2025-08-13 PROCEDURE — 99223 1ST HOSP IP/OBS HIGH 75: CPT

## 2025-08-13 RX ORDER — CALAMINE 8% AND ZINC OXIDE 8% 160 MG/ML
1 LOTION TOPICAL
Qty: 0 | Refills: 0 | DISCHARGE
Start: 2025-08-13

## 2025-08-13 RX ORDER — ACETAMINOPHEN 500 MG/5ML
3 LIQUID (ML) ORAL
Qty: 0 | Refills: 0 | DISCHARGE
Start: 2025-08-13

## 2025-08-13 RX ORDER — SIMETHICONE 80 MG
1 TABLET,CHEWABLE ORAL
Qty: 0 | Refills: 0 | DISCHARGE
Start: 2025-08-13

## 2025-08-13 RX ORDER — SENNA 187 MG
2 TABLET ORAL
Qty: 0 | Refills: 0 | DISCHARGE
Start: 2025-08-13

## 2025-08-13 RX ORDER — POLYETHYLENE GLYCOL 3350 17 G/17G
17 POWDER, FOR SOLUTION ORAL
Qty: 0 | Refills: 0 | DISCHARGE
Start: 2025-08-13

## 2025-08-13 RX ADMIN — OXYCODONE HYDROCHLORIDE 5 MILLIGRAM(S): 30 TABLET ORAL at 13:57

## 2025-08-13 RX ADMIN — POLYETHYLENE GLYCOL 3350 17 GRAM(S): 17 POWDER, FOR SOLUTION ORAL at 17:47

## 2025-08-13 RX ADMIN — ENOXAPARIN SODIUM 30 MILLIGRAM(S): 100 INJECTION SUBCUTANEOUS at 13:57

## 2025-08-13 RX ADMIN — Medication 40 MILLIGRAM(S): at 22:12

## 2025-08-13 RX ADMIN — OXYCODONE HYDROCHLORIDE 5 MILLIGRAM(S): 30 TABLET ORAL at 21:21

## 2025-08-13 RX ADMIN — OXYCODONE HYDROCHLORIDE 5 MILLIGRAM(S): 30 TABLET ORAL at 14:57

## 2025-08-13 RX ADMIN — Medication 975 MILLIGRAM(S): at 06:15

## 2025-08-13 RX ADMIN — OXYCODONE HYDROCHLORIDE 5 MILLIGRAM(S): 30 TABLET ORAL at 20:21

## 2025-08-13 RX ADMIN — MUPIROCIN CALCIUM 1 APPLICATION(S): 20 CREAM TOPICAL at 05:08

## 2025-08-13 RX ADMIN — Medication 975 MILLIGRAM(S): at 07:15

## 2025-08-14 ENCOUNTER — TRANSCRIPTION ENCOUNTER (OUTPATIENT)
Age: 45
End: 2025-08-14

## 2025-08-14 PROCEDURE — 93970 EXTREMITY STUDY: CPT | Mod: 26

## 2025-08-14 RX ORDER — OXYCODONE HYDROCHLORIDE 30 MG/1
2.5 TABLET ORAL
Qty: 20 | Refills: 0
Start: 2025-08-14 | End: 2025-08-18

## 2025-08-14 RX ADMIN — POLYETHYLENE GLYCOL 3350 17 GRAM(S): 17 POWDER, FOR SOLUTION ORAL at 12:48

## 2025-08-14 RX ADMIN — OXYCODONE HYDROCHLORIDE 5 MILLIGRAM(S): 30 TABLET ORAL at 05:26

## 2025-08-14 RX ADMIN — Medication 2 TABLET(S): at 21:27

## 2025-08-14 RX ADMIN — Medication 40 MILLIGRAM(S): at 21:28

## 2025-08-14 RX ADMIN — OXYCODONE HYDROCHLORIDE 5 MILLIGRAM(S): 30 TABLET ORAL at 12:00

## 2025-08-14 RX ADMIN — OXYCODONE HYDROCHLORIDE 5 MILLIGRAM(S): 30 TABLET ORAL at 23:54

## 2025-08-14 RX ADMIN — OXYCODONE HYDROCHLORIDE 5 MILLIGRAM(S): 30 TABLET ORAL at 13:00

## 2025-08-14 RX ADMIN — OXYCODONE HYDROCHLORIDE 5 MILLIGRAM(S): 30 TABLET ORAL at 06:37

## 2025-08-14 RX ADMIN — OXYCODONE HYDROCHLORIDE 5 MILLIGRAM(S): 30 TABLET ORAL at 19:08

## 2025-08-14 RX ADMIN — ENOXAPARIN SODIUM 30 MILLIGRAM(S): 100 INJECTION SUBCUTANEOUS at 15:17

## 2025-08-15 VITALS
DIASTOLIC BLOOD PRESSURE: 96 MMHG | SYSTOLIC BLOOD PRESSURE: 143 MMHG | TEMPERATURE: 99 F | OXYGEN SATURATION: 97 % | RESPIRATION RATE: 18 BRPM | HEART RATE: 82 BPM

## 2025-08-15 RX ADMIN — OXYCODONE HYDROCHLORIDE 5 MILLIGRAM(S): 30 TABLET ORAL at 00:54

## 2025-08-18 LAB — SURGICAL PATHOLOGY STUDY: SIGNIFICANT CHANGE UP

## 2025-08-21 LAB
CULTURE RESULTS: SIGNIFICANT CHANGE UP
SPECIMEN SOURCE: SIGNIFICANT CHANGE UP

## 2025-09-01 ENCOUNTER — APPOINTMENT (OUTPATIENT)
Dept: NEUROSURGERY | Facility: CLINIC | Age: 45
End: 2025-09-01

## 2025-09-04 ENCOUNTER — NON-APPOINTMENT (OUTPATIENT)
Age: 45
End: 2025-09-04

## 2025-09-04 ENCOUNTER — APPOINTMENT (OUTPATIENT)
Dept: NEUROSURGERY | Facility: CLINIC | Age: 45
End: 2025-09-04
Payer: MEDICAID

## 2025-09-04 VITALS
HEART RATE: 140 BPM | OXYGEN SATURATION: 97 % | BODY MASS INDEX: 21.01 KG/M2 | WEIGHT: 107 LBS | DIASTOLIC BLOOD PRESSURE: 101 MMHG | SYSTOLIC BLOOD PRESSURE: 143 MMHG | HEIGHT: 60 IN

## 2025-09-04 PROCEDURE — 99024 POSTOP FOLLOW-UP VISIT: CPT

## 2025-09-19 ENCOUNTER — RESULT REVIEW (OUTPATIENT)
Age: 45
End: 2025-09-19

## 2025-09-24 PROBLEM — Z86.018 PERSONAL HISTORY OF OTHER BENIGN NEOPLASM: Chronic | Status: ACTIVE | Noted: 2025-09-18

## (undated) DEVICE — ACRA-CUT CRANIAL PERFORATOR ADULT 14MM X 11MM (WHITE)

## (undated) DEVICE — UNDERPAD LINEN SAVER 17 X 24"

## (undated) DEVICE — ELCTR BOVIE TIP BLADE INSULATED 2.8" EDGE WITH SAFETY

## (undated) DEVICE — DRSG TELFA 3 X 8

## (undated) DEVICE — BITE BLOCK ADULT 20 X 27MM (GREEN)

## (undated) DEVICE — WARMING BLANKET UNDERBODY PEDS 36 X 33"

## (undated) DEVICE — LABELS BLANK W PEN

## (undated) DEVICE — DRSG CURITY GAUZE SPONGE 4 X 4" 12-PLY

## (undated) DEVICE — DRSG XEROFORM 1 X 8"

## (undated) DEVICE — ELCTR GROUNDING PAD ADULT COVIDIEN

## (undated) DEVICE — SUT ETHILON 4-0 18" PS-2

## (undated) DEVICE — DRSG 2X2

## (undated) DEVICE — SUT VICRYL PLUS 4-0 18" RB-1 UNDYED (POP-OFF)

## (undated) DEVICE — PACK LIJ BASIC ORTHO

## (undated) DEVICE — SUT MONOCRYL 4-0 27" PS-2 UNDYED

## (undated) DEVICE — ELCTR GROUNDING PAD INFANT COVIDIEN

## (undated) DEVICE — PREP DURAPREP 26CC

## (undated) DEVICE — BLADE SURGICAL #15 CARBON

## (undated) DEVICE — ACRA-CUT CRANIAL PERFORATOR PEDS 11MM X 7MM MINI (BLUE)

## (undated) DEVICE — POSITIONER STRAP ARMBOARD VELCRO TS-30

## (undated) DEVICE — VENODYNE/SCD SLEEVE CALF MEDIUM

## (undated) DEVICE — POSITIONER FOAM EGG CRATE ULNAR 2PCS (PINK)

## (undated) DEVICE — FRAZIER SUCTION TIP 8FR

## (undated) DEVICE — SYR LUER LOK 10CC

## (undated) DEVICE — PACK IV START WITH CHG

## (undated) DEVICE — DRSG STOCKINETTE TUBULAR 6"

## (undated) DEVICE — DRSG STERISTRIPS 0.25 X 3"

## (undated) DEVICE — DRSG KERLIX ROLL 4.5"

## (undated) DEVICE — MARKING PEN W RULER

## (undated) DEVICE — LUBRICATING JELLY HR ONE SHOT 3G

## (undated) DEVICE — DRAPE TOWEL BLUE STICKY

## (undated) DEVICE — ELCTR ECG CONDUCTIVE ADHESIVE

## (undated) DEVICE — CONTAINER FORMALIN 80ML YELLOW

## (undated) DEVICE — CLAMP BX HOT RAD JAW 3

## (undated) DEVICE — BASIN EMESIS 10IN GRADUATED MAUVE

## (undated) DEVICE — PROTECTOR HEEL / ELBOW FLUFFY

## (undated) DEVICE — DRAPE SURGICAL #1010

## (undated) DEVICE — DRSG KLING 4"

## (undated) DEVICE — DRSG TAPE HYPAFIX 4"

## (undated) DEVICE — DENTURE CUP PINK

## (undated) DEVICE — CANISTER DISPOSABLE THIN WALL 3000CC

## (undated) DEVICE — DRSG BANDAID 0.75X3"

## (undated) DEVICE — CATH IV SAFE BC 22G X 1" (BLUE)

## (undated) DEVICE — DRSG CURITY GAUZE SPONGE 4 X 4" 12-PLY NON-STERILE

## (undated) DEVICE — SALIVA EJECTOR (BLUE)

## (undated) DEVICE — SUT VICRYL 3-0 18" PS-2 UNDYED

## (undated) DEVICE — DRSG STOCKINETTE IMPERVIOUS XL

## (undated) DEVICE — WOUND IRR SURGIPHOR

## (undated) DEVICE — BIOPSY FORCEP COLD DISP

## (undated) DEVICE — BIPOLAR FORCEP STRYKER STANDARD 8" X 1MM (YELLOW)

## (undated) DEVICE — DRAPE TOWEL BLUE 17" X 24"

## (undated) DEVICE — DRAPE 3/4 SHEET 52X76"

## (undated) DEVICE — TUBING IV SET GRAVITY 3Y 100" MACRO

## (undated) DEVICE — TUBING MEDI-VAC W MAXIGRIP CONNECTORS 1/4"X6'

## (undated) DEVICE — STAPLER SKIN MULTI DIRECTION W35

## (undated) DEVICE — WARMING BLANKET LOWER ADULT

## (undated) DEVICE — WARMING BLANKET UPPER ADULT

## (undated) DEVICE — SUT VICRYL 3-0 18" SH UNDYED (POP-OFF)

## (undated) DEVICE — PASS CATH SUBQ 60CM

## (undated) DEVICE — PACK NEURO

## (undated) DEVICE — TOURNIQUET ESMARK 6"

## (undated) DEVICE — PACKING GAUZE PLAIN 2"

## (undated) DEVICE — SUT SILK 2-0 24" TIES

## (undated) DEVICE — DRAPE SPLIT SHEET 77" X 120"

## (undated) DEVICE — NDL HYPO REGULAR BEVEL 25G X 1.5" (BLUE)

## (undated) DEVICE — SOL IRR POUR NS 0.9% 500ML

## (undated) DEVICE — DRSG ACE BANDAGE 4" NS

## (undated) DEVICE — DRAPE CRANI INCISION 70X110"

## (undated) DEVICE — BIOPSY FORCEP RADIAL JAW 4 STANDARD WITH NEEDLE

## (undated) DEVICE — SUT VICRYL 4-0 18" PS-2 UNDYED

## (undated) DEVICE — PREP CHLORAPREP HI-LITE ORANGE 26ML

## (undated) DEVICE — SUT MONOCRYL 5-0 18" P-1 UNDYED

## (undated) DEVICE — GOWN LG

## (undated) DEVICE — SOL IRR POUR H2O 500ML